# Patient Record
Sex: MALE | Race: BLACK OR AFRICAN AMERICAN | Employment: OTHER | URBAN - METROPOLITAN AREA
[De-identification: names, ages, dates, MRNs, and addresses within clinical notes are randomized per-mention and may not be internally consistent; named-entity substitution may affect disease eponyms.]

---

## 2017-01-06 ENCOUNTER — PRE VISIT (OUTPATIENT)
Dept: CARDIOLOGY | Facility: CLINIC | Age: 62
End: 2017-01-06

## 2017-01-06 DIAGNOSIS — I50.32 CHRONIC DIASTOLIC HEART FAILURE (H): Primary | ICD-10-CM

## 2017-01-13 ENCOUNTER — OFFICE VISIT (OUTPATIENT)
Dept: CARDIOLOGY | Facility: CLINIC | Age: 62
End: 2017-01-13
Attending: NURSE PRACTITIONER
Payer: MEDICARE

## 2017-01-13 VITALS
HEART RATE: 58 BPM | WEIGHT: 260 LBS | OXYGEN SATURATION: 98 % | DIASTOLIC BLOOD PRESSURE: 86 MMHG | SYSTOLIC BLOOD PRESSURE: 133 MMHG | BODY MASS INDEX: 37.22 KG/M2 | HEIGHT: 70 IN

## 2017-01-13 DIAGNOSIS — I50.32 CHRONIC DIASTOLIC HEART FAILURE (H): ICD-10-CM

## 2017-01-13 DIAGNOSIS — I50.30 (HFPEF) HEART FAILURE WITH PRESERVED EJECTION FRACTION (H): Primary | ICD-10-CM

## 2017-01-13 LAB
ANION GAP SERPL CALCULATED.3IONS-SCNC: 5 MMOL/L (ref 3–14)
BUN SERPL-MCNC: 19 MG/DL (ref 7–30)
CALCIUM SERPL-MCNC: 8.7 MG/DL (ref 8.5–10.1)
CHLORIDE SERPL-SCNC: 108 MMOL/L (ref 94–109)
CO2 SERPL-SCNC: 30 MMOL/L (ref 20–32)
CREAT SERPL-MCNC: 1.14 MG/DL (ref 0.66–1.25)
GFR SERPL CREATININE-BSD FRML MDRD: 65 ML/MIN/1.7M2
GLUCOSE SERPL-MCNC: 96 MG/DL (ref 70–99)
POTASSIUM SERPL-SCNC: 4.1 MMOL/L (ref 3.4–5.3)
SODIUM SERPL-SCNC: 144 MMOL/L (ref 133–144)

## 2017-01-13 PROCEDURE — 99212 OFFICE O/P EST SF 10 MIN: CPT | Mod: ZF

## 2017-01-13 PROCEDURE — 80048 BASIC METABOLIC PNL TOTAL CA: CPT | Performed by: NURSE PRACTITIONER

## 2017-01-13 PROCEDURE — 36415 COLL VENOUS BLD VENIPUNCTURE: CPT | Performed by: NURSE PRACTITIONER

## 2017-01-13 PROCEDURE — 99213 OFFICE O/P EST LOW 20 MIN: CPT | Mod: ZP | Performed by: NURSE PRACTITIONER

## 2017-01-13 RX ORDER — LISINOPRIL 20 MG/1
40 TABLET ORAL DAILY
Qty: 180 TABLET | Refills: 3 | Status: SHIPPED | OUTPATIENT
Start: 2017-01-13 | End: 2018-02-27

## 2017-01-13 RX ORDER — POTASSIUM CHLORIDE 750 MG/1
20 TABLET, EXTENDED RELEASE ORAL DAILY
Qty: 180 TABLET | Refills: 3 | Status: SHIPPED | OUTPATIENT
Start: 2017-01-13 | End: 2018-01-15

## 2017-01-13 ASSESSMENT — PAIN SCALES - GENERAL: PAINLEVEL: NO PAIN (0)

## 2017-01-13 NOTE — NURSING NOTE
Diet: Patient instructed regarding a heart failure healthy diet, including discussion of reduced fat and 2000 mg daily sodium restriction, daily weights, medication purpose and compliance, fluid restrictions and resources for patient and family to access for assistance with heart failure management.       Labs: Patient was given results of the laboratory testing obtained today and patient was instructed about when to return for the next laboratory testing.    Med Reconcile: Reviewed and verified all current medications with the patient. The updated medication list was printed and given to the patient.    Return Appointment: Patient given instructions regarding scheduling next clinic visit.     Patient stated he understood all health information given and agreed to call with further questions or concerns.    Mohini Rhodes RN

## 2017-01-13 NOTE — MR AVS SNAPSHOT
After Visit Summary   2017    Arti Nguyen    MRN: [id number]           Patient Information     Date Of Birth          1955        Visit Information        Provider Department      2017 9:00 AM Sheela Yee NP [facility]        Today's Diagnoses     (HFpEF) heart failure with preserved ejection fraction (H)    -  1       Care Instructions    You were seen today in the Cardiovascular Clinic at the [facility].     Cardiology Providers you saw during your visit: Sheela Yee NP       1. Please increase Lisinopril to 40 mg daily by taking 2 tablets a day    2. Okay to continue taking potassium 20 mEq once a day rather than twice a day    Please make a follow-up CORE/heart failure appt with Sheela in 1 month.     Results for ARTI NGUYEN (MRN [id number]) as of 2017 09:07   Ref. Range 2017 08:14   Sodium Latest Ref Range: 133-144 mmol/L 144   Potassium Latest Ref Range: 3.4-5.3 mmol/L 4.1   Chloride Latest Ref Range:  mmol/L 108   Carbon Dioxide Latest Ref Range: 20-32 mmol/L 30   Urea Nitrogen Latest Ref Range: 7-30 mg/dL 19   Creatinine Latest Ref Range: 0.66-1.25 mg/dL 1.14   GFR Estimate Latest Ref Range: >60 mL/min/1.7m2 65   GFR Estimate If Black Latest Ref Range: >60 mL/min/1.7m2 79   Calcium Latest Ref Range: 8.5-10.1 mg/dL 8.7   Anion Gap Latest Ref Range: 3-14 mmol/L 5   Glucose Latest Ref Range: 70-99 mg/dL 96       Please limit your fluid intake to 2 L (64 ounces) daily.  2 Liters a day = 8.5 cups, or 72 ounces.  Please limit your salt intake to 2 grams a day or less.    If you gain 2# in 24 hours or 5# in one week call Mohini Rhodes RN so we can adjust your medications as needed over the phone.    Please feel free to call me with any questions or concerns.      Mohini Rhodes RN BSN [facility]  [facility]  Cardiology Care Coordinator-Heart Failure Clinic    Questions and [phone].   First press  "#1 for the University and then press #3 for \"Medical Questions\" to reach us Cardiology Nurses.     On Call Cardiologist for after hours or on weekends: [phone]   option #4 and ask to speak to the on-call Cardiologist. Inform them you are a CORE/heart failure patient at the [place].        If you need a medication refill please contact your pharmacy.  Please allow 3 business days for your refill to be completed.  _______________________________________________________  C.O.R.E. CLINIC Cardiomyopathy, Optimization, Rehabilitation, Education   The C.O.R.E. CLINIC is a heart failure specialty clinic within the [facility] where you will work with specialized nurse practioners dedicated to helping patients with heart failure carefully adjust medications, receive education, and learn who and when to call if symptoms develop. They specialize in helping you better understand your condition, slow the progression of your disease, improve the length and quality of your life, help you detect future heart problems before they become life threatening, and avoid hospitalizations.  As always, thank you for trusting us with your health care needs!             Follow-ups after your visit        Follow-up notes from your care team     Return in about 1 month (around 2/13/2017).      Your next 10 appointments already scheduled     Feb 17, 2017  9:30 AM   Lab with  LAB   [facility] ([facility])    [address] [place] [address]  1st [facility] [id number] [phone]            Feb 17, 2017 10:00 AM   (Arrive by 9:45 AM)   CORE RETURN with Sheela Yee NP   [facility] ([facility])    [address] [place] [address]  3rd [facility] [id number] [phone]            Mar 07, 2017 10:00 AM   LAB with  LAB   [facility] ([facility])    [address] [address] "   [phone]           Patient must bring picture ID.  Patient should be prepared to give a urine specimen  Please do not eat 10-12 hours before your appointment if you are coming in fasting for labs on lipids, cholesterol, or glucose (sugar).  Pregnant women should follow their Care Team instructions. Water with medications is okay. Do not drink coffee or other fluids.   If you have concerns about taking  your medications, please ask at office or if scheduling via [employer], send a message by clicking on Secure Messaging, Message Your Care Team.            Mar 07, 2017 10:40 AM   (Arrive by 10:25 AM)   CT CHEST ABDOMEN PELVIS W/O & W CONTRAST with UCCT1   [facility] ([facility])    [address] [facility]  1st Floor  [facility] [address] [phone]           Please bring any scans or X-rays taken at other hospitals, if similar tests were done. Also bring a list of your medicines, including vitamins, minerals and over-the-counter drugs. It is safest to leave personal items at home.  Be sure to tell your doctor:   If you have any allergies.   If there s any chance you are pregnant.   If you are breastfeeding.   If you have any special needs.  You may have contrast for this exam. To prepare:   Do not eat or drink for 2 hours before your exam. If you need to take medicine, you may take it with small sips of water. (We may ask you to take liquid medicine as well.)   The day before your exam, drink extra fluids at least six 8-ounce glasses (unless your doctor tells you to restrict your fluids).  Patients over 70 or patients with diabetes or kidney problems:   If you haven t had a blood test (creatinine test) within the last 30 days, go to your clinic or Diagnostic Imaging Department for this test.  If you have diabetes:   If your kidney function is normal, continue taking your metformin (Avandamet, Glucophage, Glucovance, Metaglip) on the day of your exam.   If your kidney  function is abnormal, wait 48 hours before restarting this medicine.  You will have oral contrast for this exam:   You will drink the contrast at home. Get this from your clinic or Diagnostic Imaging Department. Please follow the directions given.  Please wear loose clothing, such as a sweat suit or jogging clothes. Avoid snaps, zippers and other metal. We may ask you to undress and put on a hospital gown.  If you have any questions, please call the Imaging Department where you will have your exam.            Mar 09, 2017 10:30 AM   (Arrive by 10:15 AM)   Return Visit with Sean Freed MD   [facility] ([facility])    [address] [facility]  2nd Floor  [facility] [address] [phone]              Who to contact     If you have questions or need follow up information about today's clinic visit or your schedule please contact [facility] directly at [phone].  Normal or non-critical lab and imaging results will be communicated to you by [employer], letter or phone within 4 business days after the clinic has received the results. If you do not hear from us within 7 days, please contact the clinic through [employer] or phone. If you have a critical or abnormal lab result, we will notify you by phone as soon as possible.  Submit refill requests through [employer] or call your pharmacy and they will forward the refill request to us. Please allow 3 business days for your refill to be completed.          Additional Information About Your Visit        [employer] Information     [employer] gives you secure access to your electronic health record. If you see a primary care provider, you can also send messages to your care team and make appointments. If you have questions, please call your primary care clinic.  If you do not have a primary care provider, please call [phone] and they will assist you.        Care EveryWhere ID     This is your Care EveryWhere ID.  "This could be used by other organizations to access your [place] medical records  [id number]        Your Vitals Were     Pulse Height BMI (Body Mass Index) Pulse Oximetry          58 1.778 m (5' 10\") 37.31 kg/m2 98%         Blood Pressure from Last 3 Encounters:   01/13/17 141/94   12/13/16 137/85   12/02/16 142/99    Weight from Last 3 Encounters:   01/13/17 117.935 kg (260 lb)   12/13/16 118.071 kg (260 lb 4.8 oz)   12/02/16 119.568 kg (263 lb 9.6 oz)              Today, you had the following     No orders found for display         Today's Medication Changes          These changes are accurate as of: 1/13/17  9:35 AM.  If you have any questions, ask your nurse or doctor.               These medicines have changed or have updated prescriptions.        Dose/Directions    lisinopril 20 MG tablet   Commonly known as:  PRINIVIL/ZESTRIL   This may have changed:  how much to take   Used for:  (HFpEF) heart failure with preserved ejection fraction (H)   Changed by:  Sheela Yee NP        Dose:  40 mg   Take 2 tablets (40 mg) by mouth daily   Quantity:  180 tablet   Refills:  3       potassium chloride SA 10 MEQ CR tablet   Commonly known as:  K-DUR/KLOR-CON M   This may have changed:  when to take this   Used for:  (HFpEF) heart failure with preserved ejection fraction (H)   Changed by:  Sheela Yee NP        Dose:  20 mEq   Take 2 tablets (20 mEq) by mouth daily   Quantity:  180 tablet   Refills:  3       solifenacin 10 MG tablet   Commonly known as:  VESICARE   This may have changed:  additional instructions   Used for:  Urinary urgency        Dose:  10 mg   Take 1 tablet (10 mg) by mouth daily   Quantity:  60 tablet   Refills:  3       vitamin D 2000 UNITS tablet   This may have changed:  additional instructions   Used for:  Vitamin D deficiency        Dose:  2000 Units   Take 2,000 Units by mouth daily   Quantity:  60 tablet   Refills:  6            Where to get your medicines      These " medications were sent to [id number] - [place], [place] - [address]  [address], [place] [address]     Phone:  [phone]    - lisinopril 20 MG tablet  - potassium chloride SA 10 MEQ CR tablet             Primary Care Provider Office Phone # Fax #    Rabia Sarai Carmona [phone]       [facility] [address]  [facility] [address]        Thank you!     Thank you for choosing [facility]  for your care. Our goal is always to provide you with excellent care. Hearing back from our patients is one way we can continue to improve our services. Please take a few minutes to complete the written survey that you may receive in the mail after your visit with us. Thank you!             Your Updated Medication List - Protect others around you: Learn how to safely use, store and throw away your medicines at www.disposemymeds.org.          This list is accurate as of: 1/13/17  9:35 AM.  Always use your most recent med list.                   Brand Name Dispense Instructions for use    acetaminophen 500 MG tablet    TYLENOL    100 tablet    Take 2 tablets (1,000 mg) by mouth 3 times daily       amLODIPine 10 MG tablet    NORVASC    30 tablet    Take 1 tablet (10 mg) by mouth daily       blood glucose monitoring lancets     1 Box    Use to test blood sugars 2 times daily or as directed.       blood glucose monitoring test strip    no brand specified    100 each    Use to test blood sugars 2 times daily or as directed       ferrous sulfate 325 (65 FE) MG tablet    IRON    90 tablet    Take 1 tablet (325 mg) by mouth 2 times daily       furosemide 40 MG tablet    LASIX    60 tablet    Take 1 tablet (40 mg) by mouth daily       lisinopril 20 MG tablet    PRINIVIL/ZESTRIL    180 tablet    Take 2 tablets (40 mg) by mouth daily       metFORMIN 500 MG tablet    GLUCOPHAGE    60 tablet    Take 2 tablets (1,000 mg) by mouth daily (with breakfast) AM        metoprolol 100 MG 24 hr tablet    TOPROL-XL    45 tablet    Take 1.5 tablets (150 mg) by mouth daily       omeprazole 20 MG CR capsule    priLOSEC    120 capsule    Take 1 capsule (20 mg) by mouth every other day       order for DME     1 Units    Equipment being ordered: BP cuff, large       oxyCODONE 5 MG IR tablet    ROXICODONE    40 tablet    Take 1-2 tablets (5-10 mg) by mouth every 4 hours as needed for moderate to severe pain       polyethylene glycol Packet    MIRALAX/GLYCOLAX    24 packet    Take 17 g by mouth daily       potassium chloride SA 10 MEQ CR tablet    K-DUR/KLOR-CON M    180 tablet    Take 2 tablets (20 mEq) by mouth daily       senna-docusate 8.6-50 MG per tablet    SENOKOT-S;PERICOLACE    60 tablet    Take 1 tablet by mouth daily       solifenacin 10 MG tablet    VESICARE    60 tablet    Take 1 tablet (10 mg) by mouth daily       spironolactone 25 MG tablet    ALDACTONE    45 tablet    Take 1 tablet (25 mg) by mouth daily       vitamin D 2000 UNITS tablet     60 tablet    Take 2,000 Units by mouth daily

## 2017-01-13 NOTE — LETTER
1/13/2017      RE: Jazz Berman  [address]   [address]  [facility] [address]       Dear Colleague,    Thank you for the opportunity to participate in the care of your patient, Jazz Berman, at the [facility] HEART [facility]. Please see a copy of my visit note below.    HPI:  Mr. Berman is a 61-year-old male with recent diagnosis of Heart Failure with preserved Ejection Fraction, hypertension, diabetes, obesity and atrial fibrillation. He returns to CORE clinic for follow up.    He denies shortness of breath, palpitations, lightheadedness, edema, bloating, orthopnea, or PND. He does snore and is using his CPAP. He has noted a rare fleeting chest ache when at rest. It is not associated with nausea or radiating pain. No falls or syncope.    PAST MEDICAL HISTORY:  Past Medical History   Diagnosis Date     Arthritis      Atrial fibrillation (H)      BPH (benign prostatic hyperplasia) 8/29/2013     Cardiomegaly 8/30/2012     Crushing injury of foot 8/30/2012     Depressive disorder, not elsewhere classified 8/30/2012     Diabetes mellitus type 2 in obese (H)      Diverticulosis of large intestine 7/4/2016     Colonoscopy 7/2/16       Former smoker      Gastric mass      GI bleed      History of blood transfusion      Hypertension      Hypertension      Keloid 6/11/2012     GREG on CPAP        FAMILY HISTORY:  Family History   Problem Relation Age of Onset     Hypertension Father      DIABETES Mother      Colon Cancer No family hx of      Crohn Disease No family hx of      Ulcerative Colitis No family hx of      CANCER No family hx of      no skin cancer     Glaucoma No family hx of      Macular Degeneration No family hx of        SOCIAL HISTORY:  Social History     Social History     Marital Status: Single     Spouse Name: N/A     Number of Children: N/A     Years of Education: N/A     Social History Main Topics     Smoking status: Former Smoker -- 30 years     Types:  Cigarettes     Quit date: 06/02/2011     Smokeless tobacco: Never Used     Alcohol Use: No      Comment: twice per week and 6 pack per sitting quit about 6 months ago     Drug Use: No      Comment: +marijuana and crack cocaine     Sexual Activity: Yes     Other Topics Concern     None     Social History Narrative       CURRENT MEDICATIONS:    Current Outpatient Prescriptions on File Prior to Visit:  omeprazole (PRILOSEC) 20 MG capsule Take 1 capsule (20 mg) by mouth every other day   spironolactone (ALDACTONE) 25 MG tablet Take 1 tablet (25 mg) by mouth daily   oxyCODONE (ROXICODONE) 5 MG immediate release tablet Take 1-2 tablets (5-10 mg) by mouth every 4 hours as needed for moderate to severe pain   amLODIPine (NORVASC) 10 MG tablet Take 1 tablet (10 mg) by mouth daily   furosemide (LASIX) 40 MG tablet Take 1 tablet (40 mg) by mouth daily   polyethylene glycol (MIRALAX/GLYCOLAX) packet Take 17 g by mouth daily   senna-docusate (SENOKOT-S;PERICOLACE) 8.6-50 MG per tablet Take 1 tablet by mouth daily   acetaminophen (TYLENOL) 500 MG tablet Take 2 tablets (1,000 mg) by mouth 3 times daily   solifenacin (VESICARE) 10 MG tablet Take 1 tablet (10 mg) by mouth daily (Patient taking differently: Take 10 mg by mouth daily AM)   ferrous sulfate (IRON) 325 (65 FE) MG tablet Take 1 tablet (325 mg) by mouth 2 times daily   Cholecalciferol (VITAMIN D) 2000 UNITS tablet Take 2,000 Units by mouth daily (Patient taking differently: Take 2,000 Units by mouth daily AM)   blood glucose monitoring (NO BRAND SPECIFIED) test strip Use to test blood sugars 2 times daily or as directed   blood glucose monitoring (ONE TOUCH DELICA) lancets Use to test blood sugars 2 times daily or as directed.   order for DME Equipment being ordered: BP cuff, large     No current facility-administered medications on file prior to visit.       ROS:   Constitutional: No fever, chills, or sweats. Weight stable  ENT: No visual disturbance, ear ache, epistaxis,  "sore throat.   Allergies/Immunologic: Negative.   Respiratory: No cough, hemoptysis.   Cardiovascular: As per HPI.   GI: No nausea, vomiting, hematemesis, melena, or hematochezia.   : No urinary frequency, dysuria, or hematuria.   Integument: Negative.   Psychiatric: Negative.   Neuro: Negative.   Endocrinology: Negative.   Musculoskeletal: Negative.    EXAM:  BP (!) 141/94  Pulse 58  Ht 1.778 m (5' 10\")  Wt 117.9 kg (260 lb)  SpO2 98%  BMI 37.31 kg/m2  General: appears comfortable, alert and articulate  Head: normocephalic, atraumatic  Eyes: anicteric sclera, EOMI  Neck: no adenopathy  Orophyarynx: moist mucosa, no lesions, dentition intact  Heart: regular, S1/S2, no murmur, gallop, rub, JVP is flat  Lungs: clear, no rales or wheezing  Abdomen: Round, soft, non-tender, bowel sounds present  Extremities: warm and without edema  Neurological: normal speech and affect, no gross motor deficits    Labs:    Last Basic Metabolic Panel:  NA      144   1/13/2017   POTASSIUM      4.1   1/13/2017  CHLORIDE      108   1/13/2017  NATALYA      8.7   1/13/2017  CO2       30   1/13/2017  BUN       19   1/13/2017  CR     1.14   1/13/2017  CR      135   3/13/2009  GLC       96   1/13/2017    Assessment and Plan:   Mr. Berman is a 61 year old man with  HFpEF who looks well though hypertensive today. He will increase lisinopril to 40 mg daily. He will return to clinic in 1 month or as needed.  1. HFpEF  Rate controlled: Controlled  Blood pressure: increasing lisinopril today  Volume Controlled: Controlled  Apnea: using CPAP  Aldosterone antagonist: yes  2. Atrial fibrillation, rate controlled. Currently off warfarin. No evidence of GI bleed since his recent tumor resection. Defer to GI team for readiness to resume and encouraged Mr. Berman to contact GI team to pursue further  3. Hypertension. Increasing lisinopril today    20 minutes spent in direct care, of which >50% in counseling    CC  Patient Care Team:  Rabia Carmona MD " as PCP - General (Student in organized health care education/training program)  Preethi Coates MD as MD (Dermatology - MOHS-Micrographic Surgery)  Simeon Hillman MD as MD (Dermatology)  Naty Hua MD as MD (Colon and Rectal Surgery)  Azra Hall MD as MD (Ophthalmology)  Ashley Contreras PA-C as Physician Assistant (Physician Assistant)  Carol Andrade RD as Registered Dietician (Dietitian, Registered)  Mohini Rhodes, RN as Nurse Coordinator (Cardiology)

## 2017-01-13 NOTE — PROGRESS NOTES
HPI:  Mr. Berman is a 61-year-old male with recent diagnosis of Heart Failure with preserved Ejection Fraction, hypertension, diabetes, obesity and atrial fibrillation. He returns to [facility] clinic for follow up.    He denies shortness of breath, palpitations, lightheadedness, edema, bloating, orthopnea, or PND. He does snore and is using his CPAP. He has noted a rare fleeting chest ache when at rest. It is not associated with nausea or radiating pain. No falls or syncope.    PAST MEDICAL HISTORY:  Past Medical History   Diagnosis Date     Arthritis      Atrial fibrillation (H)      BPH (benign prostatic hyperplasia) 8/29/2013     Cardiomegaly 8/30/2012     Crushing injury of foot 8/30/2012     Depressive disorder, not elsewhere classified 8/30/2012     Diabetes mellitus type 2 in obese (H)      Diverticulosis of large intestine 7/4/2016     Colonoscopy 7/2/16       Former smoker      Gastric mass      GI bleed      History of blood transfusion      Hypertension      Hypertension      Keloid 6/11/2012     GREG on CPAP        FAMILY HISTORY:  Family History   Problem Relation Age of Onset     Hypertension Father      DIABETES Mother      Colon Cancer No family hx of      Crohn Disease No family hx of      Ulcerative Colitis No family hx of      CANCER No family hx of      no skin cancer     Glaucoma No family hx of      Macular Degeneration No family hx of        SOCIAL HISTORY:  Social History     Social History     Marital Status: Single     Spouse Name: N/A     Number of Children: N/A     Years of Education: N/A     Social History Main Topics     Smoking status: Former Smoker -- 30 years     Types: Cigarettes     Quit date: 06/02/2011     Smokeless tobacco: Never Used     Alcohol Use: No      Comment: twice per week and 6 pack per sitting quit about 6 months ago     Drug Use: No      Comment: +marijuana and crack cocaine     Sexual Activity: Yes     Other Topics Concern     None     Social History Narrative        CURRENT MEDICATIONS:    Current Outpatient Prescriptions on File Prior to Visit:  omeprazole (PRILOSEC) 20 MG capsule Take 1 capsule (20 mg) by mouth every other day   spironolactone (ALDACTONE) 25 MG tablet Take 1 tablet (25 mg) by mouth daily   oxyCODONE (ROXICODONE) 5 MG immediate release tablet Take 1-2 tablets (5-10 mg) by mouth every 4 hours as needed for moderate to severe pain   amLODIPine (NORVASC) 10 MG tablet Take 1 tablet (10 mg) by mouth daily   furosemide (LASIX) 40 MG tablet Take 1 tablet (40 mg) by mouth daily   polyethylene glycol (MIRALAX/GLYCOLAX) packet Take 17 g by mouth daily   senna-docusate (SENOKOT-S;PERICOLACE) 8.6-50 MG per tablet Take 1 tablet by mouth daily   acetaminophen (TYLENOL) 500 MG tablet Take 2 tablets (1,000 mg) by mouth 3 times daily   solifenacin (VESICARE) 10 MG tablet Take 1 tablet (10 mg) by mouth daily (Patient taking differently: Take 10 mg by mouth daily AM)   ferrous sulfate (IRON) 325 (65 FE) MG tablet Take 1 tablet (325 mg) by mouth 2 times daily   Cholecalciferol (VITAMIN D) 2000 UNITS tablet Take 2,000 Units by mouth daily (Patient taking differently: Take 2,000 Units by mouth daily AM)   blood glucose monitoring (NO BRAND SPECIFIED) test strip Use to test blood sugars 2 times daily or as directed   blood glucose monitoring (ONE TOUCH DELICA) lancets Use to test blood sugars 2 times daily or as directed.   order for DME Equipment being ordered: BP cuff, large     No current facility-administered medications on file prior to visit.       ROS:   Constitutional: No fever, chills, or sweats. Weight stable  ENT: No visual disturbance, ear ache, epistaxis, sore throat.   Allergies/Immunologic: Negative.   Respiratory: No cough, hemoptysis.   Cardiovascular: As per HPI.   GI: No nausea, vomiting, hematemesis, melena, or hematochezia.   : No urinary frequency, dysuria, or hematuria.   Integument: Negative.   Psychiatric: Negative.   Neuro: Negative.  "  Endocrinology: Negative.   Musculoskeletal: Negative.    EXAM:  BP (!) 141/94  Pulse 58  Ht 1.778 m (5' 10\")  Wt 117.9 kg (260 lb)  SpO2 98%  BMI 37.31 kg/m2  General: appears comfortable, alert and articulate  Head: normocephalic, atraumatic  Eyes: anicteric sclera, EOMI  Neck: no adenopathy  Orophyarynx: moist mucosa, no lesions, dentition intact  Heart: regular, S1/S2, no murmur, gallop, rub, JVP is flat  Lungs: clear, no rales or wheezing  Abdomen: Round, soft, non-tender, bowel sounds present  Extremities: warm and without edema  Neurological: normal speech and affect, no gross motor deficits    Labs:    Last Basic Metabolic Panel:  NA      144   1/13/2017   POTASSIUM      4.1   1/13/2017  CHLORIDE      108   1/13/2017  NATALYA      8.7   1/13/2017  CO2       30   1/13/2017  BUN       19   1/13/2017  CR     1.14   1/13/2017  CR      135   3/13/2009  GLC       96   1/13/2017    Assessment and Plan:   Mr. Berman is a 61 year old man with  HFpEF who looks well though hypertensive today. He will increase lisinopril to 40 mg daily. He will return to clinic in 1 month or as needed.  1. HFpEF  Rate controlled: Controlled  Blood pressure: increasing lisinopril today  Volume Controlled: Controlled  Apnea: using CPAP  Aldosterone antagonist: yes  2. Atrial fibrillation, rate controlled. Currently off warfarin. No evidence of GI bleed since his recent tumor resection. Defer to GI team for readiness to resume and encouraged Mr. Berman to contact GI team to pursue further  3. Hypertension. Increasing lisinopril today    20 minutes spent in direct care, of which >50% in counseling    CC  Patient Care Team:  Rabia Carmona MD as PCP - General (Student in organized health care education/training program)  Preethi Coates MD as MD (Dermatology - MOHS-Micrographic Surgery)  Simeon Hillman MD as MD (Dermatology)  Naty Hua MD as MD (Colon and Rectal Surgery)  Azra Hall MD as MD " (Ophthalmology)  Ashley Contreras PA-C as Physician Assistant (Physician Assistant)  Carol Andrade RD as Registered Dietician (Dietitian, Registered)  Mohini Rhodes, RN as Nurse Coordinator (Cardiology)  Sheela Yee, ZENON as Nurse Practitioner (Cardiology)  [facility]

## 2017-01-13 NOTE — PATIENT INSTRUCTIONS
"You were seen today in the Cardiovascular Clinic at the [facility].     Cardiology Providers you saw during your visit: Sheela Yee NP       1. Please increase Lisinopril to 40 mg daily by taking 2 tablets a day    2. Okay to continue taking potassium 20 mEq once a day rather than twice a day    Please make a follow-up CORE/heart failure appt with Sheela in 1 month.     Results for ARTI NGUYEN (MRN [id number]) as of 2017 09:07   Ref. Range 2017 08:14   Sodium Latest Ref Range: 133-144 mmol/L 144   Potassium Latest Ref Range: 3.4-5.3 mmol/L 4.1   Chloride Latest Ref Range:  mmol/L 108   Carbon Dioxide Latest Ref Range: 20-32 mmol/L 30   Urea Nitrogen Latest Ref Range: 7-30 mg/dL 19   Creatinine Latest Ref Range: 0.66-1.25 mg/dL 1.14   GFR Estimate Latest Ref Range: >60 mL/min/1.7m2 65   GFR Estimate If Black Latest Ref Range: >60 mL/min/1.7m2 79   Calcium Latest Ref Range: 8.5-10.1 mg/dL 8.7   Anion Gap Latest Ref Range: 3-14 mmol/L 5   Glucose Latest Ref Range: 70-99 mg/dL 96       Please limit your fluid intake to 2 L (64 ounces) daily.  2 Liters a day = 8.5 cups, or 72 ounces.  Please limit your salt intake to 2 grams a day or less.    If you gain 2# in 24 hours or 5# in one week call Mohini Rhodes RN so we can adjust your medications as needed over the phone.    Please feel free to call me with any questions or concerns.      Mohini Rhodes RN BSN CHFN  [facility]  Cardiology Care Coordinator-Heart Failure Clinic    Questions and [phone].   First press #1 for the [employer] and then press #3 for \"Medical Questions\" to reach us Cardiology Nurses.     On Call Cardiologist for after hours or on weekends: [phone]   option #4 and ask to speak to the on-call Cardiologist. Inform them you are a CORE/heart failure patient at the [facility].        If you need a medication refill please contact your pharmacy.  Please allow 3 business days " for your refill to be completed.  _______________________________________________________  C.O.R.E. CLINIC Cardiomyopathy, Optimization, Rehabilitation, Education   The C.O.R.E. CLINIC is a heart failure specialty clinic within the [facility] where you will work with specialized nurse practioners dedicated to helping patients with heart failure carefully adjust medications, receive education, and learn who and when to call if symptoms develop. They specialize in helping you better understand your condition, slow the progression of your disease, improve the length and quality of your life, help you detect future heart problems before they become life threatening, and avoid hospitalizations.  As always, thank you for trusting us with your health care needs!

## 2017-01-13 NOTE — NURSING NOTE
"Chief Complaint   Patient presents with     Follow Up For     Return CORE appt: 61yr old male with a h/o chronic diastolic heart failure/HFpEF presenting for follow up with labs     /94 mmHg  Pulse 58  Ht 1.778 m (5' 10\")  Wt 117.935 kg (260 lb)  BMI 37.31 kg/m2  SpO2 98%    Repeat Blood Pressure   BP Pulse Site Cuff Size Time Date   133/86 --- Left Arm Large 08:58 AM 1/13/2017        No orthostatic vitals data filed.  No peak flow data filed.  No pain information filed.    "

## 2017-02-03 ENCOUNTER — TELEPHONE (OUTPATIENT)
Dept: FAMILY MEDICINE | Facility: CLINIC | Age: 62
End: 2017-02-03

## 2017-02-03 NOTE — TELEPHONE ENCOUNTER
I have called the pt back.  Medication has been left for him at the .    Jayson Hernandez Pharm.D.

## 2017-02-10 DIAGNOSIS — E11.9 TYPE 2 DIABETES MELLITUS WITHOUT COMPLICATION, WITHOUT LONG-TERM CURRENT USE OF INSULIN (H): Primary | ICD-10-CM

## 2017-02-10 NOTE — TELEPHONE ENCOUNTER
Refill request received via fax from patient's pharmacy. Refilled per standing protocol. Sent to patient's preferred pharmacy.    Date of last office visit: 1/13/17  Last A1c value: 5.6    Cheryl Landaverde RN

## 2017-02-11 ENCOUNTER — PRE VISIT (OUTPATIENT)
Dept: CARDIOLOGY | Facility: CLINIC | Age: 62
End: 2017-02-11

## 2017-02-11 DIAGNOSIS — I50.32 CHRONIC DIASTOLIC HEART FAILURE (H): Primary | ICD-10-CM

## 2017-02-17 ENCOUNTER — OFFICE VISIT (OUTPATIENT)
Dept: CARDIOLOGY | Facility: CLINIC | Age: 62
End: 2017-02-17
Attending: NURSE PRACTITIONER
Payer: MEDICARE

## 2017-02-17 VITALS
HEART RATE: 60 BPM | HEIGHT: 70 IN | WEIGHT: 262 LBS | DIASTOLIC BLOOD PRESSURE: 83 MMHG | OXYGEN SATURATION: 98 % | BODY MASS INDEX: 37.51 KG/M2 | SYSTOLIC BLOOD PRESSURE: 130 MMHG

## 2017-02-17 DIAGNOSIS — E11.8 TYPE 2 DIABETES MELLITUS WITH COMPLICATION (H): ICD-10-CM

## 2017-02-17 DIAGNOSIS — I50.32 CHRONIC DIASTOLIC HEART FAILURE (H): ICD-10-CM

## 2017-02-17 DIAGNOSIS — I10 BENIGN ESSENTIAL HYPERTENSION: ICD-10-CM

## 2017-02-17 LAB
ANION GAP SERPL CALCULATED.3IONS-SCNC: 8 MMOL/L (ref 3–14)
BUN SERPL-MCNC: 18 MG/DL (ref 7–30)
CALCIUM SERPL-MCNC: 8.9 MG/DL (ref 8.5–10.1)
CHLORIDE SERPL-SCNC: 106 MMOL/L (ref 94–109)
CO2 SERPL-SCNC: 28 MMOL/L (ref 20–32)
CREAT SERPL-MCNC: 1.08 MG/DL (ref 0.66–1.25)
CREAT UR-MCNC: 37 MG/DL
GFR SERPL CREATININE-BSD FRML MDRD: 69 ML/MIN/1.7M2
GLUCOSE SERPL-MCNC: 83 MG/DL (ref 70–99)
MICROALBUMIN UR-MCNC: 6 MG/L
MICROALBUMIN/CREAT UR: 15.79 MG/G CR (ref 0–17)
POTASSIUM SERPL-SCNC: 3.9 MMOL/L (ref 3.4–5.3)
SODIUM SERPL-SCNC: 143 MMOL/L (ref 133–144)

## 2017-02-17 PROCEDURE — 99212 OFFICE O/P EST SF 10 MIN: CPT | Mod: ZF

## 2017-02-17 PROCEDURE — 99214 OFFICE O/P EST MOD 30 MIN: CPT | Mod: ZP | Performed by: NURSE PRACTITIONER

## 2017-02-17 PROCEDURE — 40000724 ZZH UMP OPEN ENCOUNTER >70 DAYS

## 2017-02-17 PROCEDURE — 82043 UR ALBUMIN QUANTITATIVE: CPT | Performed by: HOSPITALIST

## 2017-02-17 PROCEDURE — 80048 BASIC METABOLIC PNL TOTAL CA: CPT | Performed by: NURSE PRACTITIONER

## 2017-02-17 PROCEDURE — 36415 COLL VENOUS BLD VENIPUNCTURE: CPT | Performed by: NURSE PRACTITIONER

## 2017-02-17 RX ORDER — METOPROLOL SUCCINATE 100 MG/1
200 TABLET, EXTENDED RELEASE ORAL DAILY
Qty: 60 TABLET | Refills: 11 | Status: SHIPPED | OUTPATIENT
Start: 2017-02-17 | End: 2018-01-15

## 2017-02-17 ASSESSMENT — PAIN SCALES - GENERAL: PAINLEVEL: NO PAIN (0)

## 2017-02-17 NOTE — LETTER
2/17/2017      RE: Jazz Berman  [address]     [place] [facility] [address]       Dear Colleague,    Thank you for the opportunity to participate in the care of your patient, Jazz Berman, at the [facility] HEART [facility]. Please see a copy of my visit note below.    HPI  Mr. Berman is a 62 year old man with a history of HFpEF, HTN, diabetes, obesity, and atrial fibrillation who returns to [facility] for follow up.    Jazz is feeling well. He reports his dyspnea is stable. No chest pain, palpitations, lightheadedness, falls, or syncope. He notes rare fleeting lightheadedness related to position changes. His appetite is good. No nausea though does note some bloating which he relates to gas. He is sleeping on 1 pillow and denies PND. He is using his CPAP.    PMH  Past Medical History:   Diagnosis Date     Arthritis      Atrial fibrillation (H)      BPH (benign prostatic hyperplasia) 8/29/2013     Cardiomegaly 8/30/2012     Crushing injury of foot 8/30/2012     Depressive disorder, not elsewhere classified 8/30/2012     Diabetes mellitus type 2 in obese (H)      Diverticulosis of large intestine 7/4/2016    Colonoscopy 7/2/16       Former smoker      Gastric mass      GI bleed      History of blood transfusion      Hypertension      Hypertension      Keloid 6/11/2012     GREG on CPAP      Social History     Social History     Marital status: Single     Spouse name: N/A     Number of children: N/A     Years of education: N/A     Occupational History     Not on file.     Social History Main Topics     Smoking status: Former Smoker     Years: 30.00     Types: Cigarettes     Quit date: 6/2/2011     Smokeless tobacco: Never Used     Alcohol use No      Comment: twice per week and 6 pack per sitting, quit about 6 months ago     Drug use: No      Comment: +marijuana and crack cocaine     Sexual activity: Yes     Other Topics Concern     Not on file     Social History Narrative  "    Family History   Problem Relation Age of Onset     Hypertension Father      DIABETES Mother      Colon Cancer No family hx of      Crohn Disease No family hx of      Ulcerative Colitis No family hx of      CANCER No family hx of      no skin cancer     Glaucoma No family hx of      Macular Degeneration No family hx of        Current Outpatient Prescriptions on File Prior to Visit:  lisinopril (PRINIVIL/ZESTRIL) 20 MG tablet Take 2 tablets (40 mg) by mouth daily   potassium chloride SA (K-DUR/KLOR-CON M) 10 MEQ CR tablet Take 2 tablets (20 mEq) by mouth daily   amLODIPine (NORVASC) 10 MG tablet Take 1 tablet (10 mg) by mouth daily   polyethylene glycol (MIRALAX/GLYCOLAX) packet Take 17 g by mouth daily   acetaminophen (TYLENOL) 500 MG tablet Take 2 tablets (1,000 mg) by mouth 3 times daily   blood glucose monitoring (NO BRAND SPECIFIED) test strip Use to test blood sugars 2 times daily or as directed   blood glucose monitoring (ONE TOUCH DELICA) lancets Use to test blood sugars 2 times daily or as directed.   order for DME Equipment being ordered: BP cuff, large     No current facility-administered medications on file prior to visit.     Physical examination:  /83 (BP Location: Left arm, Cuff Size: Adult Large)  Pulse 60  Ht 1.778 m (5' 10\")  Wt 118.8 kg (262 lb)  SpO2 98%  BMI 37.59 kg/m2  GENERAL APPEARANCE: healthy, alert and no distress  HEENT: no icterus, no xanthelasmas, normal pupil size and reaction, normal palate, mucosa moist, no cyanosis.  NECK: no adenopathy, no asymmetry, masses, or scars  CHEST: lungs clear to auscultation - no rales, rhonchi or wheezes, no use of accessory muscles, no retractions, respirations are unlabored, normal respiratory rate, no kyphosis, no scoliosis  CARDIOVASCULAR: regular rhythm, normal S1 with physiologic split S2, no S3 or S4 and no murmur, click or rub, precordium quiet with normal PMI. JVP is flat  ABDOMEN: obese, soft, non tender, without hepatomegaly, no " masses palpable, bowel sounds normal  EXTREMITIES: warm and without edema  NEURO: alert and oriented to person/place/time, normal speech, gait and affect  SKIN: no ecchymoses, no rashes    LABS  Last Basic Metabolic Panel:  Lab Results   Component Value Date     02/17/2017      Lab Results   Component Value Date    POTASSIUM 3.9 02/17/2017     Lab Results   Component Value Date    CHLORIDE 106 02/17/2017     Lab Results   Component Value Date    NATALYA 8.9 02/17/2017     Lab Results   Component Value Date    CO2 28 02/17/2017     Lab Results   Component Value Date    BUN 18 02/17/2017     Lab Results   Component Value Date    CR 1.08 02/17/2017     03/13/2009     Lab Results   Component Value Date    GLC 83 02/17/2017         Assessment and Plan  Mr. Berman is a 62 year old man with a history of chronic heart failure with preserved ejection fraction who appears well though his blood pressure is not within goal ranges. He will increase Toprol to 200 mg daily. His risks for HFpEF include obesity and apnea. He was encouraged to increase physical activity and moderate his diet. He is using his CPAP. Mr. Berman will see his PCP next week and return to [facility] in 6 months or as needed.     25 minutes spent in direct care, >50% in counseling.    Sheela Yee NP

## 2017-02-17 NOTE — PROGRESS NOTES
HPI  Mr. Berman is a 62 year old man with a history of HFpEF, HTN, diabetes, obesity, and atrial fibrillation who returns to [facility] for follow up.    Jazz is feeling well. He reports his dyspnea is stable. No chest pain, palpitations, lightheadedness, falls, or syncope. He notes rare fleeting lightheadedness related to position changes. His appetite is good. No nausea though does note some bloating which he relates to gas. He is sleeping on 1 pillow and denies PND. He is using his CPAP.    PMH  Past Medical History:   Diagnosis Date     Arthritis      Atrial fibrillation (H)      BPH (benign prostatic hyperplasia) 8/29/2013     Cardiomegaly 8/30/2012     Crushing injury of foot 8/30/2012     Depressive disorder, not elsewhere classified 8/30/2012     Diabetes mellitus type 2 in obese (H)      Diverticulosis of large intestine 7/4/2016    Colonoscopy 7/2/16       Former smoker      Gastric mass      GI bleed      History of blood transfusion      Hypertension      Hypertension      Keloid 6/11/2012     GREG on CPAP      Social History     Social History     Marital status: Single     Spouse name: N/A     Number of children: N/A     Years of education: N/A     Occupational History     Not on file.     Social History Main Topics     Smoking status: Former Smoker     Years: 30.00     Types: Cigarettes     Quit date: 6/2/2011     Smokeless tobacco: Never Used     Alcohol use No      Comment: twice per week and 6 pack per sitting, quit about 6 months ago     Drug use: No      Comment: +marijuana and crack cocaine     Sexual activity: Yes     Other Topics Concern     Not on file     Social History Narrative     Family History   Problem Relation Age of Onset     Hypertension Father      DIABETES Mother      Colon Cancer No family hx of      Crohn Disease No family hx of      Ulcerative Colitis No family hx of      CANCER No family hx of      no skin cancer     Glaucoma No family hx of      Macular Degeneration No family hx of  "       Current Outpatient Prescriptions on File Prior to Visit:  lisinopril (PRINIVIL/ZESTRIL) 20 MG tablet Take 2 tablets (40 mg) by mouth daily   potassium chloride SA (K-DUR/KLOR-CON M) 10 MEQ CR tablet Take 2 tablets (20 mEq) by mouth daily   amLODIPine (NORVASC) 10 MG tablet Take 1 tablet (10 mg) by mouth daily   polyethylene glycol (MIRALAX/GLYCOLAX) packet Take 17 g by mouth daily   acetaminophen (TYLENOL) 500 MG tablet Take 2 tablets (1,000 mg) by mouth 3 times daily   blood glucose monitoring (NO BRAND SPECIFIED) test strip Use to test blood sugars 2 times daily or as directed   blood glucose monitoring (ONE TOUCH DELICA) lancets Use to test blood sugars 2 times daily or as directed.   order for DME Equipment being ordered: BP cuff, large     No current facility-administered medications on file prior to visit.     Physical examination:  /83 (BP Location: Left arm, Cuff Size: Adult Large)  Pulse 60  Ht 1.778 m (5' 10\")  Wt 118.8 kg (262 lb)  SpO2 98%  BMI 37.59 kg/m2  GENERAL APPEARANCE: healthy, alert and no distress  HEENT: no icterus, no xanthelasmas, normal pupil size and reaction, normal palate, mucosa moist, no cyanosis.  NECK: no adenopathy, no asymmetry, masses, or scars  CHEST: lungs clear to auscultation - no rales, rhonchi or wheezes, no use of accessory muscles, no retractions, respirations are unlabored, normal respiratory rate, no kyphosis, no scoliosis  CARDIOVASCULAR: regular rhythm, normal S1 with physiologic split S2, no S3 or S4 and no murmur, click or rub, precordium quiet with normal PMI. JVP is flat  ABDOMEN: obese, soft, non tender, without hepatomegaly, no masses palpable, bowel sounds normal  EXTREMITIES: warm and without edema  NEURO: alert and oriented to person/place/time, normal speech, gait and affect  SKIN: no ecchymoses, no rashes    LABS  Last Basic Metabolic Panel:  Lab Results   Component Value Date     02/17/2017      Lab Results   Component Value Date    " POTASSIUM 3.9 02/17/2017     Lab Results   Component Value Date    CHLORIDE 106 02/17/2017     Lab Results   Component Value Date    NATALYA 8.9 02/17/2017     Lab Results   Component Value Date    CO2 28 02/17/2017     Lab Results   Component Value Date    BUN 18 02/17/2017     Lab Results   Component Value Date    CR 1.08 02/17/2017     03/13/2009     Lab Results   Component Value Date    GLC 83 02/17/2017         Assessment and Plan  Mr. Berman is a 62 year old man with a history of chronic heart failure with preserved ejection fraction who appears well though his blood pressure is not within goal ranges. He will increase Toprol to 200 mg daily. His risks for HFpEF include obesity and apnea. He was encouraged to increase physical activity and moderate his diet. He is using his CPAP. Mr. Berman will see his PCP next week and return to [facility] in 6 months or as needed.     25 minutes spent in direct care, >50% in counseling.

## 2017-02-17 NOTE — PATIENT INSTRUCTIONS
"You were seen today in the Cardiovascular Clinic at the [facility].     Cardiology Providers you saw during your visit: Sheela Yee NP       1.  Please increase Toprol (metoprolol succinate) to 200 mg daily by taking two of the 100 mg tablets a day.    2.  Please call us if you have any complaints.     Please make a follow-up CORE/heart failure appt with Sheela in 6 months with labs.     Results for ARTI NGUYEN (MRN [id number]) as of 2017 09:36   Ref. Range 2017 09:01   Sodium Latest Ref Range: 133 - 144 mmol/L 143   Potassium Latest Ref Range: 3.4 - 5.3 mmol/L 3.9   Chloride Latest Ref Range: 94 - 109 mmol/L 106   Carbon Dioxide Latest Ref Range: 20 - 32 mmol/L 28   Urea Nitrogen Latest Ref Range: 7 - 30 mg/dL 18   Creatinine Latest Ref Range: 0.66 - 1.25 mg/dL 1.08   GFR Estimate Latest Ref Range: >60 mL/min/1.7m2 69   GFR Estimate If Black Latest Ref Range: >60 mL/min/1.7m2 84   Calcium Latest Ref Range: 8.5 - 10.1 mg/dL 8.9   Anion Gap Latest Ref Range: 3 - 14 mmol/L 8   Glucose Latest Ref Range: 70 - 99 mg/dL 83         Please limit your fluid intake to 2 L (64 ounces) daily.  2 Liters a day = 8.5 cups, or 72 ounces.  Please limit your salt intake to 2 grams a day or less.    If you gain 2# in 24 hours or 5# in one week call Mohini Rhodes RN so we can adjust your medications as needed over the phone.    Please feel free to call me with any questions or concerns.      Mohini Rhodes RN BSN CHFN  [facility]  Cardiology Care Coordinator-Heart Failure Clinic    Questions and [phone].   First press #1 for the [employer] and then press #3 for \"Medical Questions\" to reach us Cardiology Nurses.     On Call Cardiologist for after hours or on weekends: [phone]   option #4 and ask to speak to the on-call Cardiologist. Inform them you are a CORE/heart failure patient at the [facility].        If you need a medication refill please contact your " pharmacy.  Please allow 3 business days for your refill to be completed.  _______________________________________________________  C.O.R.E. CLINIC Cardiomyopathy, Optimization, Rehabilitation, Education   The C.O.R.E. CLINIC is a heart failure specialty clinic within the [facility] where you will work with specialized nurse practioners dedicated to helping patients with heart failure carefully adjust medications, receive education, and learn who and when to call if symptoms develop. They specialize in helping you better understand your condition, slow the progression of your disease, improve the length and quality of your life, help you detect future heart problems before they become life threatening, and avoid hospitalizations.  As always, thank you for trusting us with your health care needs!

## 2017-02-17 NOTE — MR AVS SNAPSHOT
After Visit Summary   2017    Arti Nguyen    MRN: [id number]           Patient Information     Date Of Birth          1955        Visit Information        Provider Department      2017 10:00 AM Sheela Yee NP [facility]        Today's Diagnoses     Benign essential hypertension          Care Instructions    You were seen today in the Cardiovascular Clinic at the [facility].     Cardiology Providers you saw during your visit: Sheela Yee NP       1.  Please increase Toprol (metoprolol succinate) to 200 mg daily by taking two of the 100 mg tablets a day.    2.  Please call us if you have any complaints.     Please make a follow-up CORE/heart failure appt with Sheela in 6 months with labs.     Results for ARTI NGUYEN (MRN [id number]) as of 2017 09:36   Ref. Range 2017 09:01   Sodium Latest Ref Range: 133 - 144 mmol/L 143   Potassium Latest Ref Range: 3.4 - 5.3 mmol/L 3.9   Chloride Latest Ref Range: 94 - 109 mmol/L 106   Carbon Dioxide Latest Ref Range: 20 - 32 mmol/L 28   Urea Nitrogen Latest Ref Range: 7 - 30 mg/dL 18   Creatinine Latest Ref Range: 0.66 - 1.25 mg/dL 1.08   GFR Estimate Latest Ref Range: >60 mL/min/1.7m2 69   GFR Estimate If Black Latest Ref Range: >60 mL/min/1.7m2 84   Calcium Latest Ref Range: 8.5 - 10.1 mg/dL 8.9   Anion Gap Latest Ref Range: 3 - 14 mmol/L 8   Glucose Latest Ref Range: 70 - 99 mg/dL 83         Please limit your fluid intake to 2 L (64 ounces) daily.  2 Liters a day = 8.5 cups, or 72 ounces.  Please limit your salt intake to 2 grams a day or less.    If you gain 2# in 24 hours or 5# in one week call Mohini Rhodes RN so we can adjust your medications as needed over the phone.    Please feel free to call me with any questions or concerns.      Mohini Rhodes RN BSN [facility]  [facility]  Cardiology Care Coordinator-Heart Failure Clinic    Questions and [phone].   First  "press #1 for the University and then press #3 for \"Medical Questions\" to reach us Cardiology Nurses.     On Call Cardiologist for after hours or on weekends: [phone]   option #4 and ask to speak to the on-call Cardiologist. Inform them you are a CORE/heart failure patient at the [place].        If you need a medication refill please contact your pharmacy.  Please allow 3 business days for your refill to be completed.  _______________________________________________________  C.O.R.E. CLINIC Cardiomyopathy, Optimization, Rehabilitation, Education   The C.O.R.E. CLINIC is a heart failure specialty clinic within the [facility] where you will work with specialized nurse practioners dedicated to helping patients with heart failure carefully adjust medications, receive education, and learn who and when to call if symptoms develop. They specialize in helping you better understand your condition, slow the progression of your disease, improve the length and quality of your life, help you detect future heart problems before they become life threatening, and avoid hospitalizations.  As always, thank you for trusting us with your health care needs!             Follow-ups after your visit        Your next 10 appointments already scheduled     Feb 22, 2017  1:40 PM CST   Return Visit with Rabia Carmona MD   [facility] ([facility])    [address],  [address]  [facility] [address]   [phone]            Mar 07, 2017 10:00 AM CST   LAB with  LAB    Health Lab ([facility])    [address]  [facility] [id number] [phone]           Patient must bring picture ID.  Patient should be prepared to give a urine specimen  Please do not eat 10-12 hours before your appointment if you are coming in fasting for labs on lipids, cholesterol, or glucose (sugar).  Pregnant women should follow their " Care Team instructions. Water with medications is okay. Do not drink coffee or other fluids.   If you have concerns about taking  your medications, please ask at office or if scheduling via Inveni, send a message by clicking on Secure Messaging, Message Your Care Team.            Mar 07, 2017 10:40 AM CST   (Arrive by 10:25 AM)   CT CHEST ABDOMEN PELVIS W/O & W CONTRAST with UCCT1   [facility] CT ([facility])    [address]  1st Floor  [facility] [address] [phone]           Please bring any scans or X-rays taken at other hospitals, if similar tests were done. Also bring a list of your medicines, including vitamins, minerals and over-the-counter drugs. It is safest to leave personal items at home.  Be sure to tell your doctor:   If you have any allergies.   If there s any chance you are pregnant.   If you are breastfeeding.   If you have any special needs.  You may have contrast for this exam. To prepare:   Do not eat or drink for 2 hours before your exam. If you need to take medicine, you may take it with small sips of water. (We may ask you to take liquid medicine as well.)   The day before your exam, drink extra fluids at least six 8-ounce glasses (unless your doctor tells you to restrict your fluids).  Patients over 70 or patients with diabetes or kidney problems:   If you haven t had a blood test (creatinine test) within the last 30 days, go to your clinic or Diagnostic Imaging Department for this test.  If you have diabetes:   If your kidney function is normal, continue taking your metformin (Avandamet, Glucophage, Glucovance, Metaglip) on the day of your exam.   If your kidney function is abnormal, wait 48 hours before restarting this medicine.  You will have oral contrast for this exam:   You will drink the contrast at home. Get this from your clinic or Diagnostic Imaging Department. Please follow the directions given.  Please wear loose clothing,  such as a sweat suit or jogging clothes. Avoid snaps, zippers and other metal. We may ask you to undress and put on a hospital gown.  If you have any questions, please call the Imaging Department where you will have your exam.            Mar 09, 2017 10:30 AM CST   (Arrive by 10:15 AM)   Return Visit with Sean Freed MD   [facility] ([facility])    [address]  2nd Floor  [facility] [address] [phone]            Aug 17, 2017 11:00 AM CDT   Lab with  LAB   [facility] ([facility])    [address]  1st Floor  [facility] [id number] [phone]            Aug 17, 2017 11:30 AM CDT   (Arrive by 11:15 AM)   CORE RETURN with Sheela Yee NP   [facility] ([facility])    [address] [place] [address]  3rd Floor  [facility] [id number] [phone]              Who to contact     If you have questions or need follow up information about today's clinic visit or your schedule please contact [facility] directly at [phone].  Normal or non-critical lab and imaging results will be communicated to you by MyChart, letter or phone within 4 business days after the clinic has received the results. If you do not hear from us within 7 days, please contact the clinic through WebVeth[employer] or phone. If you have a critical or abnormal lab result, we will notify you by phone as soon as possible.  Submit refill requests through [facility] or call your pharmacy and they will forward the refill request to us. Please allow 3 business days for your refill to be completed.          Additional Information About Your Visit        [facility] Information     [facility] gives you secure access to your electronic health record. If you see a primary care provider, you can also send messages to your care team and make appointments. If you have questions, please call your primary care clinic.  If you do  "not have a primary care provider, please call [phone] and they will assist you.        Care EveryWhere ID     This is your Care EveryWhere ID. This could be used by other organizations to access your [place] medical records  [id number]        Your Vitals Were     Pulse Height Pulse Oximetry BMI (Body Mass Index)          60 1.778 m (5' 10\") 98% 37.59 kg/m2         Blood Pressure from Last 3 Encounters:   02/17/17 130/83   01/13/17 (!) 141/94   12/13/16 137/85    Weight from Last 3 Encounters:   02/17/17 118.8 kg (262 lb)   01/13/17 117.9 kg (260 lb)   12/13/16 118.1 kg (260 lb 4.8 oz)              Today, you had the following     No orders found for display         Today's Medication Changes          These changes are accurate as of: 2/17/17 10:06 AM.  If you have any questions, ask your nurse or doctor.               These medicines have changed or have updated prescriptions.        Dose/Directions    metoprolol 100 MG 24 hr tablet   Commonly known as:  TOPROL-XL   This may have changed:  how much to take   Used for:  Benign essential hypertension   Changed by:  Sheela Yee, ZENON        Dose:  200 mg   Take 2 tablets (200 mg) by mouth daily   Quantity:  60 tablet   Refills:  11       solifenacin 10 MG tablet   Commonly known as:  VESICARE   This may have changed:  additional instructions   Used for:  Urinary urgency        Dose:  10 mg   Take 1 tablet (10 mg) by mouth daily   Quantity:  60 tablet   Refills:  3       vitamin D 2000 UNITS tablet   This may have changed:  additional instructions   Used for:  Vitamin D deficiency        Dose:  2000 Units   Take 2,000 Units by mouth daily   Quantity:  60 tablet   Refills:  6            Where to get your medicines      These medications [id number] - [place], [place] - [address]  [address], [place] [address]     Phone:  [phone]     metoprolol 100 MG 24 hr tablet                Primary Care Provider Office " Phone # Fax Micaela Camarillo Sarai Carmona [phone]       [facility] [address] [facility] [address]        Thank you!     Thank you for choosing [facility]  for your care. Our goal is always to provide you with excellent care. Hearing back from our patients is one way we can continue to improve our services. Please take a few minutes to complete the written survey that you may receive in the mail after your visit with us. Thank you!             Your Updated Medication List - Protect others around you: Learn how to safely use, store and throw away your medicines at www.disposemymeds.org.          This list is accurate as of: 2/17/17 10:06 AM.  Always use your most recent med list.                   Brand Name Dispense Instructions for use    acetaminophen 500 MG tablet    TYLENOL    100 tablet    Take 2 tablets (1,000 mg) by mouth 3 times daily       amLODIPine 10 MG tablet    NORVASC    30 tablet    Take 1 tablet (10 mg) by mouth daily       blood glucose monitoring lancets     1 Box    Use to test blood sugars 2 times daily or as directed.       blood glucose monitoring test strip    no brand specified    100 each    Use to test blood sugars 2 times daily or as directed       ferrous sulfate 325 (65 FE) MG tablet    IRON    90 tablet    Take 1 tablet (325 mg) by mouth 2 times daily       furosemide 40 MG tablet    LASIX    60 tablet    Take 1 tablet (40 mg) by mouth daily       lisinopril 20 MG tablet    PRINIVIL/ZESTRIL    180 tablet    Take 2 tablets (40 mg) by mouth daily       metFORMIN 500 MG tablet    GLUCOPHAGE    60 tablet    Take 2 tablets (1,000 mg) by mouth daily (with breakfast) AM       metoprolol 100 MG 24 hr tablet    TOPROL-XL    60 tablet    Take 2 tablets (200 mg) by mouth daily       omeprazole 20 MG CR capsule    priLOSEC    120 capsule    Take 1 capsule (20 mg) by mouth every other day       order for DME     1 Units    Equipment being ordered: BP  cuff, large       oxyCODONE 5 MG IR tablet    ROXICODONE    40 tablet    Take 1-2 tablets (5-10 mg) by mouth every 4 hours as needed for moderate to severe pain       polyethylene glycol Packet    MIRALAX/GLYCOLAX    24 packet    Take 17 g by mouth daily       potassium chloride SA 10 MEQ CR tablet    K-DUR/KLOR-CON M    180 tablet    Take 2 tablets (20 mEq) by mouth daily       senna-docusate 8.6-50 MG per tablet    SENOKOT-S;PERICOLACE    60 tablet    Take 1 tablet by mouth daily       solifenacin 10 MG tablet    VESICARE    60 tablet    Take 1 tablet (10 mg) by mouth daily       spironolactone 25 MG tablet    ALDACTONE    45 tablet    Take 1 tablet (25 mg) by mouth daily       vitamin D 2000 UNITS tablet     60 tablet    Take 2,000 Units by mouth daily

## 2017-02-17 NOTE — NURSING NOTE
"Chief Complaint   Patient presents with     Follow Up For     Return CORE appt: 62yr old male with a h/o chronic diastolic heart failure/HFpEF presenting for follow up with labs     Vitals:    02/17/17 0931 02/17/17 0933   BP: 125/87 130/83   BP Location: Left arm Left arm   Cuff Size: Adult Large Adult Large   Pulse: 56 60   SpO2: 98%    Weight: 118.8 kg (262 lb)    Height: 1.778 m (5' 10\")        "

## 2017-02-22 ENCOUNTER — OFFICE VISIT (OUTPATIENT)
Dept: FAMILY MEDICINE | Facility: CLINIC | Age: 62
End: 2017-02-22

## 2017-02-22 VITALS
OXYGEN SATURATION: 98 % | DIASTOLIC BLOOD PRESSURE: 85 MMHG | HEIGHT: 70 IN | TEMPERATURE: 97.4 F | SYSTOLIC BLOOD PRESSURE: 122 MMHG | HEART RATE: 57 BPM | BODY MASS INDEX: 37.88 KG/M2 | WEIGHT: 264.6 LBS

## 2017-02-22 DIAGNOSIS — I10 HYPERTENSION, ESSENTIAL: Primary | ICD-10-CM

## 2017-02-22 DIAGNOSIS — E11.9 TYPE 2 DIABETES MELLITUS WITHOUT COMPLICATION, WITHOUT LONG-TERM CURRENT USE OF INSULIN (H): ICD-10-CM

## 2017-02-22 LAB — HBA1C MFR BLD: 5.5 % (ref 4.1–5.7)

## 2017-02-22 ASSESSMENT — ANXIETY QUESTIONNAIRES
7. FEELING AFRAID AS IF SOMETHING AWFUL MIGHT HAPPEN: NOT AT ALL
3. WORRYING TOO MUCH ABOUT DIFFERENT THINGS: NOT AT ALL
5. BEING SO RESTLESS THAT IT IS HARD TO SIT STILL: NOT AT ALL
1. FEELING NERVOUS, ANXIOUS, OR ON EDGE: NOT AT ALL
6. BECOMING EASILY ANNOYED OR IRRITABLE: NOT AT ALL
2. NOT BEING ABLE TO STOP OR CONTROL WORRYING: NOT AT ALL
IF YOU CHECKED OFF ANY PROBLEMS ON THIS QUESTIONNAIRE, HOW DIFFICULT HAVE THESE PROBLEMS MADE IT FOR YOU TO DO YOUR WORK, TAKE CARE OF THINGS AT HOME, OR GET ALONG WITH OTHER PEOPLE: NOT DIFFICULT AT ALL
GAD7 TOTAL SCORE: 0

## 2017-02-22 ASSESSMENT — PATIENT HEALTH QUESTIONNAIRE - PHQ9: 5. POOR APPETITE OR OVEREATING: NOT AT ALL

## 2017-02-22 NOTE — PROGRESS NOTES
HPI:       Jazz Berman is a 62 year old who presents for the following  Patient presents with:  Recheck Medication: Follow up    Has been been having stable blood pressure at home.   Following at Core clinic. He is experiencing intermittent low blood sugars, 3-4x in last month.    Diabetes Follow-up    Patient is checking blood sugars: twice daily.    Blood sugar testing frequency justification: Frequent hypoglycemia  Results are as follows:         am -          bedtime -          -Last A1C was   Lab Results   Component Value Date    A1C 5.6 06/08/2016    A1C 6.1 03/30/2016    A1C 6.5 12/07/2015    A1C 6.2 01/28/2014    A1C 6.1 05/01/2013        Diabetic concerns: low blood sugar several less than 70 in the past few weeks    Chest Pain or exercise related calf pain (claudication):no     Symptoms of hypoglycemia (low blood sugar): shaky, dizzy, weak     Paresthesias (numbness or burning in feet) or sores: No   Diabetic eye exam within the last year?: Yes      Adherence and Exercise  Medication side effects: no  How often is a medication missed? Never  Are you able to follow the treatment plan? Yes  Exercise:walking  2-3 days/week for an average of 15-30 minutes      Problem, Medication and Allergy Lists were   reviewed and are current.     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 Favored to be a gastrointestinal stromal tumor. No evidence of obstruction. A few subcentimeter lymph nodes along the posterior margin.  No Remote metastases demonstrated.      Follow up: [facility] clinic at [facility], within 2-3 weeks for EUS and gastric tumor biopsy. GI nurse will and help schedule an appointment (Phone Nr: [phone])       Diverticulosis of large intestine 07/04/2016     Priority: Medium     Colonoscopy 7/2/16        Acute gastrointestinal hemorrhage 06/29/2016     Priority: Medium     Fracture of medial malleolus, left, closed 04/12/2014     Priority: Medium     Closed reduction and percutaneous fixation at [facility]       Closed fracture of part of tibia 04/12/2014     Priority: Medium     History of substance abuse 02/25/2014     Priority: Medium     Remote history of marijuana and crack cocaine. No IVDU       Mild recurrent major depression (H) 09/05/2013     Priority: Medium     Class: Chronic     CARDIOVASCULAR SCREENING; LDL GOAL LESS THAN 130 08/30/2013     Priority: Medium     August 30, 2013 CHD risk factors: +male age >45, +hypertension, +HDL <40, 10% Mohler Risk Calculator       H/O colonoscopy with polypectomy 08/29/2013     Priority: Medium     Class: Chronic     7/2/16:  2 polyps and diverticulosis. Follow up per pathology report   - One 2 mm polyp in the ascending colon. Resected andretrieved.  - One 6 mm polyp in the transverse colon. Resected and retrieved. Clip was placed.   - Diverticulosis in the sigmoid colon.    August 29, 2013  History of adenomatous polyp s/p resection, Recommendation repeat in one year.       Bilateral lower extremity edema 08/29/2013      Priority: Medium     BPH (benign prostatic hyperplasia) 08/29/2013     Priority: Medium     Erectile dysfunction 07/10/2013     Priority: Medium     Keloid 06/26/2013     Priority: Medium     Located on L ear s/p excision and R ear, follows with Dermatology       Psychosexual dysfunction with inhibited sexual excitement 08/30/2012     Priority: Medium     BMI greater than 40 08/30/2012     Priority: Medium     Class: Chronic     Ectropion 07/18/2011     Priority: Medium     Epiphora 07/18/2011     Priority: Medium     Mixed emotional features as adjustment reaction 10/06/2010     Priority: Medium   ,     Current Outpatient Prescriptions   Medication Sig Dispense Refill     metoprolol (TOPROL-XL) 100 MG 24 hr tablet Take 2 tablets (200 mg) by mouth daily 60 tablet 11     metFORMIN (GLUCOPHAGE) 500 MG tablet Take 2 tablets (1,000 mg) by mouth daily (with breakfast) AM 60 tablet 3     lisinopril (PRINIVIL/ZESTRIL) 20 MG tablet Take 2 tablets (40 mg) by mouth daily 180 tablet 3     potassium chloride SA (K-DUR/KLOR-CON M) 10 MEQ CR tablet Take 2 tablets (20 mEq) by mouth daily 180 tablet 3     omeprazole (PRILOSEC) 20 MG capsule Take 1 capsule (20 mg) by mouth every other day 120 capsule 0     spironolactone (ALDACTONE) 25 MG tablet Take 1 tablet (25 mg) by mouth daily 45 tablet 3     oxyCODONE (ROXICODONE) 5 MG immediate release tablet Take 1-2 tablets (5-10 mg) by mouth every 4 hours as needed for moderate to severe pain 40 tablet 0     amLODIPine (NORVASC) 10 MG tablet Take 1 tablet (10 mg) by mouth daily 30 tablet 3     furosemide (LASIX) 40 MG tablet Take 1 tablet (40 mg) by mouth daily 60 tablet 3     polyethylene glycol (MIRALAX/GLYCOLAX) packet Take 17 g by mouth daily 24 packet 0     senna-docusate (SENOKOT-S;PERICOLACE) 8.6-50 MG per tablet Take 1 tablet by mouth daily 60 tablet 1     acetaminophen (TYLENOL) 500 MG tablet Take 2 tablets (1,000 mg) by mouth 3 times daily 100 tablet 0     solifenacin (VESICARE)  "10 MG tablet Take 1 tablet (10 mg) by mouth daily (Patient taking differently: Take 10 mg by mouth daily AM) 60 tablet 3     ferrous sulfate (IRON) 325 (65 FE) MG tablet Take 1 tablet (325 mg) by mouth 2 times daily 90 tablet 2     Cholecalciferol (VITAMIN D) 2000 UNITS tablet Take 2,000 Units by mouth daily (Patient taking differently: Take 2,000 Units by mouth daily AM) 60 tablet 6     blood glucose monitoring (NO BRAND SPECIFIED) test strip Use to test blood sugars 2 times daily or as directed 100 each 3     blood glucose monitoring (ONE TOUCH DELICA) lancets Use to test blood sugars 2 times daily or as directed. 1 Box 11     order for DME Equipment being ordered: BP cuff, large 1 Units 0   ,     Allergies   Allergen Reactions     Dye [Contrast Dye] Rash     Dye left skin hyperpigmentation on his back     Patient is an established patient of this clinic.         Review of Systems:   Review of Systems   Constitutional: Negative for appetite change, chills, fatigue and fever.   HENT: Negative for congestion, sinus pressure and sore throat.    Eyes: Negative for visual disturbance.   Respiratory: Negative for cough, shortness of breath and wheezing.    Cardiovascular: Negative for chest pain and leg swelling.   Gastrointestinal: Negative for abdominal distention, abdominal pain, blood in stool, constipation, diarrhea, nausea and vomiting.   Endocrine: Negative for polydipsia, polyphagia and polyuria.   Genitourinary: Negative for dysuria and hematuria.   Musculoskeletal: Negative for arthralgias and myalgias.   Skin: Negative for color change and rash.   Neurological: Negative for weakness and headaches.             Physical Exam:   Patient Vitals for the past 24 hrs:   BP Temp Temp src Pulse SpO2 Height Weight   02/22/17 1348 122/85 97.4  F (36.3  C) Oral 57 98 % 5' 10\" (177.8 cm) 264 lb 9.6 oz (120 kg)     Body mass index is 37.97 kg/(m^2).  Vitals were reviewed and were normal     Physical Exam   Constitutional: " He is oriented to person, place, and time. He appears well-developed and well-nourished. No distress.   HENT:   Head: Normocephalic and atraumatic.   Eyes: Conjunctivae are normal. Right eye exhibits no discharge. Left eye exhibits no discharge. No scleral icterus.   Neck: Normal range of motion. Neck supple.   Cardiovascular: Normal rate and regular rhythm.  Exam reveals no gallop and no friction rub.    No murmur heard.  Pulmonary/Chest: Effort normal and breath sounds normal. No respiratory distress. He has no wheezes. He has no rales.   Abdominal: Soft. Bowel sounds are normal. He exhibits no distension and no mass. There is no tenderness. There is no rebound and no guarding.   Musculoskeletal: Normal range of motion. He exhibits edema (trace b/l peripheral edema. ).   Lymphadenopathy:     He has no cervical adenopathy.   Neurological: He is alert and oriented to person, place, and time.   Skin: Skin is warm and dry. No rash noted. He is not diaphoretic.   Psychiatric: He has a normal mood and affect.         Results:     Results for orders placed or performed in visit on 02/22/17   Hemoglobin A1c ([facility])   Result Value Ref Range    Hemoglobin A1C 5.5 4.1 - 5.7 %       Assessment and Plan     Jazz Berman is a 63 yo male with a h/o HTN, s/p gastric tumor resection, HFpEF and A.fib not on AC who was seen today for diabetes follow up.     1. Hypertension, essential, well controlled.   - patient will continue to be followed in CORE clinic   - no changes to medications today.   - Advised to continue to take home BP    2. Type 2 diabetes mellitus without complication, without long-term current use of insulin (H). Since patient has symptomatic lows with A1c very well under goal of <7, will decrease his tight glucose control. Especially in light of patient's increase in activity and better diet control since he has recovered from his abdominal surgery.   - will decrease metformin to 1.5 tablets a day (750mg a day)  to utilize his current available  medication. Once patient is ready for next refill, change to a 850mg daily pill.   - if patient continues to a hypoglycemic episodes despite the decrease in dosing, will need to proceed with further w/u.     There are no discontinued medications.  Options for treatment and follow-up care were reviewed with the patient. Jazz Berman  engaged in the decision making process and verbalized understanding of the options discussed and agreed with the final plan.    Rabia Carmona MD  [facility]  [phone]

## 2017-02-22 NOTE — MR AVS SNAPSHOT
After Visit Summary   2/22/2017    Jazz Berman    MRN: [id number]           Patient Information     Date Of Birth          1955        Visit Information        Provider Department      2/22/2017 1:40 PM Rabia Dickey MD [facility]        Today's Diagnoses     Hypertension, essential    -  1    Type 2 diabetes mellitus without complication, without long-term current use of insulin (H)          Care Instructions    Here is the plan from today's visit    1. Hypertension, essential  - no changes to you medications today. Your blood pressure is great!    2. Type 2 diabetes mellitus without complication, without long-term current use of insulin (H)  - we will decrease your metformin to 1.5 tablets a day (750mg a day). Once you need another refill we can change to a 850mg daily pill.     Please call or return to clinic if your symptoms don't go away.    Follow up plan  Please make a clinic appointment for follow up with me (RABIA DICKEY) in 2-3  months for follow up.    Thank you for coming to [facility] today.  Lab Testing:  **If you had lab testing today and your results are reassuring or normal they will be mailed to you or sent through [employer] within 7 days.   **If the lab tests need quick action we will call you with the results.  The phone number we will call with results is # [phone] (home) . If this is not the best number please call our clinic and change the number.  Medication Refills:  If you need any refills please call your pharmacy and they will contact us.   If you need to  your refill at a new pharmacy, please contact the new pharmacy directly. The new pharmacy will help you get your medications transferred faster.   Scheduling:  If you have any concerns about today's visit or wish to schedule another appointment please call our office during normal business hours [phone] (8-5:00 M-F)  If a referral was made to a [facility] and you don't get a call from central scheduling please call [phone].  If a Mammogram was ordered for you at [facility] call [phone] to schedule or change your appointment.  If you had an XRay/CT/Ultrasound/MRI ordered the number is [phone] to schedule or change your radiology appointment.   Medical Concerns:  If you have urgent medical concerns please call [phone] at any time of the day.  If you have a medical emergency please call 911.          Follow-ups after your visit        Follow-up notes from your care team     Return in about 3 months (around 5/22/2017).      Your next 10 appointments already scheduled     Mar 07, 2017 10:00 AM CST   LAB with  LAB   [facility] ([facility])    [address] [place] [address] [phone]           Patient must bring picture ID.  Patient should be prepared to give a urine specimen  Please do not eat 10-12 hours before your appointment if you are coming in fasting for labs on lipids, cholesterol, or glucose (sugar).  Pregnant women should follow their Care Team instructions. Water with medications is okay. Do not drink coffee or other fluids.   If you have concerns about taking  your medications, please ask at office or if scheduling via [employer], send a message by clicking on Secure Messaging, Message Your Care Team.            Mar 07, 2017 10:40 AM CST   (Arrive by 10:25 AM)   CT CHEST ABDOMEN PELVIS W/O & W CONTRAST with UCCT1   [facility] [place] [facility] ([facility])    [address] [address] [phone]           Please bring any scans or X-rays taken at other hospitals, if similar tests were done. Also bring a list of your medicines, including vitamins, minerals and over-the-counter drugs. It is safest to leave personal items at home.  Be sure to tell your doctor:   If you have any allergies.   If  there s any chance you are pregnant.   If you are breastfeeding.   If you have any special needs.  You may have contrast for this exam. To prepare:   Do not eat or drink for 2 hours before your exam. If you need to take medicine, you may take it with small sips of water. (We may ask you to take liquid medicine as well.)   The day before your exam, drink extra fluids at least six 8-ounce glasses (unless your doctor tells you to restrict your fluids).  Patients over 70 or patients with diabetes or kidney problems:   If you haven t had a blood test (creatinine test) within the last 30 days, go to your clinic or Diagnostic Imaging Department for this test.  If you have diabetes:   If your kidney function is normal, continue taking your metformin (Avandamet, Glucophage, Glucovance, Metaglip) on the day of your exam.   If your kidney function is abnormal, wait 48 hours before restarting this medicine.  You will have oral contrast for this exam:   You will drink the contrast at home. Get this from your clinic or Diagnostic Imaging Department. Please follow the directions given.  Please wear loose clothing, such as a sweat suit or jogging clothes. Avoid snaps, zippers and other metal. We may ask you to undress and put on a hospital gown.  If you have any questions, please call the Imaging Department where you will have your exam.            Mar 09, 2017 10:30 AM CST   (Arrive by 10:15 AM)   Return Visit with Sean Freed MD   [facility] ([facility])    [address] [place] [address]  2nd Floor  [facility] [address]   [phone]            Aug 17, 2017 11:00 AM CDT   Lab with  LAB   [facility])    [address] [place] [address]  1st Floor  [facility] [address]   [phone]            Aug 17, 2017 11:30 AM CDT   (Arrive by 11:15 AM)   CORE RETURN with Sheela Yee NP   [facility])     "[address] [id number] [phone]              Who to contact     Please call your clinic at [phone] to:    Ask questions about your health    Make or cancel appointments    Discuss your medicines    Learn about your test results    Speak to your doctor   If you have compliments or concerns about an experience at your clinic, or if you wish to file a complaint, please contact [facility] Patient Relations at [phone] or email us at [email or web address]         Additional Information About Your Visit        UIBLUEPRINThart Information     [employer] gives you secure access to your electronic health record. If you see a primary care provider, you can also send messages to your care team and make appointments. If you have questions, please call your primary care clinic.  If you do not have a primary care provider, please call [phone] and they will assist you.      [employer] is an electronic gateway that provides easy, online access to your medical records. With [employer], you can request a clinic appointment, read your test results, renew a prescription or communicate with your care team.     To access your existing account, please contact your [facility] or call [phone] for assistance.        Care EveryWhere ID     This is your Care EveryWhere ID. This could be used by other organizations to access your [place] medical records  [id number]        Your Vitals Were     Pulse Temperature Height Pulse Oximetry BMI (Body Mass Index)       57 97.4  F (36.3  C) (Oral) 5' 10\" (177.8 cm) 98% 37.97 kg/m2        Blood Pressure from Last 3 Encounters:   02/22/17 122/85   02/17/17 130/83   01/13/17 (!) 141/94    Weight from Last 3 Encounters:   02/22/17 264 lb 9.6 oz (120 kg)   02/17/17 262 lb (118.8 kg)   01/13/17 260 lb (117.9 kg)              We Performed the Following     Hemoglobin A1c ([facility])          Today's " Medication Changes          These changes are accurate as of: 2/22/17  2:31 PM.  If you have any questions, ask your nurse or doctor.               These medicines have changed or have updated prescriptions.        Dose/Directions    metFORMIN 850 MG tablet   Commonly known as:  GLUCOPHAGE   This may have changed:    - medication strength  - how much to take   Used for:  Type 2 diabetes mellitus without complication, without long-term current use of insulin (H)   Changed by:  Rabia Carmona MD        Dose:  850 mg   Take 1 tablet (850 mg) by mouth daily (with breakfast) AM   Quantity:  90 tablet   Refills:  3       solifenacin 10 MG tablet   Commonly known as:  VESICARE   This may have changed:  additional instructions   Used for:  Urinary urgency        Dose:  10 mg   Take 1 tablet (10 mg) by mouth daily   Quantity:  60 tablet   Refills:  3       vitamin D 2000 UNITS tablet   This may have changed:  additional instructions   Used for:  Vitamin D deficiency        Dose:  2000 Units   Take 2,000 Units by mouth daily   Quantity:  60 tablet   Refills:  6            Where to get your medicines      These medications were sent to [facility]/pharmacy #[id number] - [place], [place] - [address], [place] [address]     Phone:  [phone]     metFORMIN 850 MG tablet                Primary Care Provider Office Phone # Fax #    Rabia Carmona [phone]       [facility] [address] [facility] [address]        Thank you!     Thank you for choosing [facility]  for your care. Our goal is always to provide you with excellent care. Hearing back from our patients is one way we can continue to improve our services. Please take a few minutes to complete the written survey that you may receive in the mail after your visit with us. Thank you!             Your Updated Medication List - Protect others around you: Learn how to safely use, store  and throw away your medicines at www.disposemymeds.org.          This list is accurate as of: 2/22/17  2:31 PM.  Always use your most recent med list.                   Brand Name Dispense Instructions for use    acetaminophen 500 MG tablet    TYLENOL    100 tablet    Take 2 tablets (1,000 mg) by mouth 3 times daily       amLODIPine 10 MG tablet    NORVASC    30 tablet    Take 1 tablet (10 mg) by mouth daily       blood glucose monitoring lancets     1 Box    Use to test blood sugars 2 times daily or as directed.       blood glucose monitoring test strip    no brand specified    100 each    Use to test blood sugars 2 times daily or as directed       ferrous sulfate 325 (65 FE) MG tablet    IRON    90 tablet    Take 1 tablet (325 mg) by mouth 2 times daily       furosemide 40 MG tablet    LASIX    60 tablet    Take 1 tablet (40 mg) by mouth daily       lisinopril 20 MG tablet    PRINIVIL/ZESTRIL    180 tablet    Take 2 tablets (40 mg) by mouth daily       metFORMIN 850 MG tablet    GLUCOPHAGE    90 tablet    Take 1 tablet (850 mg) by mouth daily (with breakfast) AM       metoprolol 100 MG 24 hr tablet    TOPROL-XL    60 tablet    Take 2 tablets (200 mg) by mouth daily       omeprazole 20 MG CR capsule    priLOSEC    120 capsule    Take 1 capsule (20 mg) by mouth every other day       order for DME     1 Units    Equipment being ordered: BP cuff, large       oxyCODONE 5 MG IR tablet    ROXICODONE    40 tablet    Take 1-2 tablets (5-10 mg) by mouth every 4 hours as needed for moderate to severe pain       polyethylene glycol Packet    MIRALAX/GLYCOLAX    24 packet    Take 17 g by mouth daily       potassium chloride SA 10 MEQ CR tablet    K-DUR/KLOR-CON M    180 tablet    Take 2 tablets (20 mEq) by mouth daily       senna-docusate 8.6-50 MG per tablet    SENOKOT-S;PERICOLACE    60 tablet    Take 1 tablet by mouth daily       solifenacin 10 MG tablet    VESICARE    60 tablet    Take 1 tablet (10 mg) by mouth daily        spironolactone 25 MG tablet    ALDACTONE    45 tablet    Take 1 tablet (25 mg) by mouth daily       vitamin D 2000 UNITS tablet     60 tablet    Take 2,000 Units by mouth daily

## 2017-02-22 NOTE — PATIENT INSTRUCTIONS
Here is the plan from today's visit    1. Hypertension, essential  - no changes to you medications today. Your blood pressure is great!    2. Type 2 diabetes mellitus without complication, without long-term current use of insulin (H)  - we will decrease your metformin to 1.5 tablets a day (750mg a day). Once you need another refill we can change to a 850mg daily pill.     Please call or return to clinic if your symptoms don't go away.    Follow up plan  Please make a clinic appointment for follow up with me (TRINITY DICKEY) in 2-3  months for follow up.    Thank you for coming to [facility] today.  Lab Testing:  **If you had lab testing today and your results are reassuring or normal they will be mailed to you or sent through [employer] within 7 days.   **If the lab tests need quick action we will call you with the results.  The phone number we will call with results is # [phone] (home) . If this is not the best number please call our clinic and change the number.  Medication Refills:  If you need any refills please call your pharmacy and they will contact us.   If you need to  your refill at a new pharmacy, please contact the new pharmacy directly. The new pharmacy will help you get your medications transferred faster.   Scheduling:  If you have any concerns about today's visit or wish to schedule another appointment please call our office during normal business hours [phone] (8-5:00 M-F)  If a referral was made to a [facility] and you don't get a call from central scheduling please call [phone].  If a Mammogram was ordered for you at [facility] call [phone] to schedule or change your appointment.  If you had an XRay/CT/Ultrasound/MRI ordered the number is [phone] to schedule or change your radiology appointment.   Medical Concerns:  If you have urgent medical concerns please call [phone] at any time of the day.  If you have a medical  emergency please call 911.

## 2017-02-22 NOTE — PROGRESS NOTES
Preceptor Attestation:   Patient seen and discussed with the resident. Assessment and plan reviewed with resident and agreed upon.   Supervising Physician:  Inez Gibbons MD  [facility]

## 2017-02-23 ASSESSMENT — PATIENT HEALTH QUESTIONNAIRE - PHQ9: SUM OF ALL RESPONSES TO PHQ QUESTIONS 1-9: 1

## 2017-02-23 ASSESSMENT — ANXIETY QUESTIONNAIRES: GAD7 TOTAL SCORE: 0

## 2017-02-26 ASSESSMENT — ENCOUNTER SYMPTOMS
POLYPHAGIA: 0
SHORTNESS OF BREATH: 0
FEVER: 0
SINUS PRESSURE: 0
APPETITE CHANGE: 0
CONSTIPATION: 0
FATIGUE: 0
COLOR CHANGE: 0
HEMATURIA: 0
POLYDIPSIA: 0
SORE THROAT: 0
ARTHRALGIAS: 0
NAUSEA: 0
BLOOD IN STOOL: 0
MYALGIAS: 0
DYSURIA: 0
VOMITING: 0
DIARRHEA: 0
ABDOMINAL PAIN: 0
WEAKNESS: 0
HEADACHES: 0
CHILLS: 0
COUGH: 0
ABDOMINAL DISTENTION: 0
WHEEZING: 0

## 2017-03-03 ENCOUNTER — CARE COORDINATION (OUTPATIENT)
Dept: ONCOLOGY | Facility: CLINIC | Age: 62
End: 2017-03-03

## 2017-03-03 DIAGNOSIS — C49.A2: Primary | ICD-10-CM

## 2017-03-03 RX ORDER — METHYLPREDNISOLONE 32 MG/1
TABLET ORAL
Qty: 2 TABLET | Refills: 1 | Status: SHIPPED | OUTPATIENT
Start: 2017-03-03 | End: 2017-03-24

## 2017-03-03 NOTE — PROGRESS NOTES
Called Jazz to let him know that he has methylprednisolone ordered for his upcoming CT on 3/7.  Let him know to take 1 tablet 12 hours prior to scan, and 1 tablet 2 hours prior to scan.  Jazz will pick this up at his local pharmacy.  He verbalized understanding of the plan of care.

## 2017-03-07 DIAGNOSIS — I51.7 CARDIOMEGALY: ICD-10-CM

## 2017-03-07 DIAGNOSIS — I10 ESSENTIAL HYPERTENSION WITH GOAL BLOOD PRESSURE LESS THAN 140/90: ICD-10-CM

## 2017-03-07 DIAGNOSIS — C49.A0 GIST (GASTROINTESTINAL STROMAL TUMOR), MALIGNANT (H): ICD-10-CM

## 2017-03-07 DIAGNOSIS — G47.33 OSA (OBSTRUCTIVE SLEEP APNEA): ICD-10-CM

## 2017-03-07 LAB
ALBUMIN SERPL-MCNC: 3.8 G/DL (ref 3.4–5)
ALP SERPL-CCNC: 91 U/L (ref 40–150)
ALT SERPL W P-5'-P-CCNC: 20 U/L (ref 0–70)
ANION GAP SERPL CALCULATED.3IONS-SCNC: 8 MMOL/L (ref 3–14)
AST SERPL W P-5'-P-CCNC: 15 U/L (ref 0–45)
BASOPHILS # BLD AUTO: 0 10E9/L (ref 0–0.2)
BASOPHILS NFR BLD AUTO: 0.1 %
BILIRUB SERPL-MCNC: 0.9 MG/DL (ref 0.2–1.3)
BUN SERPL-MCNC: 18 MG/DL (ref 7–30)
CALCIUM SERPL-MCNC: 9.1 MG/DL (ref 8.5–10.1)
CHLORIDE SERPL-SCNC: 109 MMOL/L (ref 94–109)
CO2 SERPL-SCNC: 25 MMOL/L (ref 20–32)
CREAT SERPL-MCNC: 0.95 MG/DL (ref 0.66–1.25)
DIFFERENTIAL METHOD BLD: ABNORMAL
EOSINOPHIL # BLD AUTO: 0 10E9/L (ref 0–0.7)
EOSINOPHIL NFR BLD AUTO: 0 %
ERYTHROCYTE [DISTWIDTH] IN BLOOD BY AUTOMATED COUNT: 15 % (ref 10–15)
GFR SERPL CREATININE-BSD FRML MDRD: 81 ML/MIN/1.7M2
GLUCOSE SERPL-MCNC: 127 MG/DL (ref 70–99)
HCT VFR BLD AUTO: 46.2 % (ref 40–53)
HGB BLD-MCNC: 15.3 G/DL (ref 13.3–17.7)
IMM GRANULOCYTES # BLD: 0 10E9/L (ref 0–0.4)
IMM GRANULOCYTES NFR BLD: 0.5 %
LYMPHOCYTES # BLD AUTO: 0.6 10E9/L (ref 0.8–5.3)
LYMPHOCYTES NFR BLD AUTO: 7.5 %
MCH RBC QN AUTO: 29.1 PG (ref 26.5–33)
MCHC RBC AUTO-ENTMCNC: 33.1 G/DL (ref 31.5–36.5)
MCV RBC AUTO: 88 FL (ref 78–100)
MONOCYTES # BLD AUTO: 0.1 10E9/L (ref 0–1.3)
MONOCYTES NFR BLD AUTO: 1.6 %
NEUTROPHILS # BLD AUTO: 7.3 10E9/L (ref 1.6–8.3)
NEUTROPHILS NFR BLD AUTO: 90.3 %
NRBC # BLD AUTO: 0 10*3/UL
NRBC BLD AUTO-RTO: 0 /100
PLATELET # BLD AUTO: 234 10E9/L (ref 150–450)
POTASSIUM SERPL-SCNC: 4 MMOL/L (ref 3.4–5.3)
PROT SERPL-MCNC: 8 G/DL (ref 6.8–8.8)
RBC # BLD AUTO: 5.25 10E12/L (ref 4.4–5.9)
SODIUM SERPL-SCNC: 142 MMOL/L (ref 133–144)
WBC # BLD AUTO: 8 10E9/L (ref 4–11)

## 2017-03-09 ENCOUNTER — ONCOLOGY VISIT (OUTPATIENT)
Dept: ONCOLOGY | Facility: CLINIC | Age: 62
End: 2017-03-09
Attending: INTERNAL MEDICINE
Payer: MEDICARE

## 2017-03-09 VITALS
RESPIRATION RATE: 18 BRPM | TEMPERATURE: 97 F | DIASTOLIC BLOOD PRESSURE: 83 MMHG | BODY MASS INDEX: 38.21 KG/M2 | OXYGEN SATURATION: 96 % | HEART RATE: 44 BPM | SYSTOLIC BLOOD PRESSURE: 144 MMHG | WEIGHT: 266.32 LBS

## 2017-03-09 DIAGNOSIS — I50.32 CHRONIC DIASTOLIC HEART FAILURE (H): ICD-10-CM

## 2017-03-09 DIAGNOSIS — R60.0 BILATERAL LOWER EXTREMITY EDEMA: ICD-10-CM

## 2017-03-09 DIAGNOSIS — N40.0 BENIGN PROSTATIC HYPERPLASIA WITHOUT LOWER URINARY TRACT SYMPTOMS, UNSPECIFIED MORPHOLOGY: ICD-10-CM

## 2017-03-09 DIAGNOSIS — C49.A2: ICD-10-CM

## 2017-03-09 DIAGNOSIS — N40.0 BENIGN PROSTATIC HYPERPLASIA WITHOUT LOWER URINARY TRACT SYMPTOMS: ICD-10-CM

## 2017-03-09 DIAGNOSIS — F19.11 HISTORY OF SUBSTANCE ABUSE (H): ICD-10-CM

## 2017-03-09 DIAGNOSIS — G47.33 OSA (OBSTRUCTIVE SLEEP APNEA): ICD-10-CM

## 2017-03-09 DIAGNOSIS — I10 HYPERTENSION, ESSENTIAL: ICD-10-CM

## 2017-03-09 DIAGNOSIS — F33.0 MILD RECURRENT MAJOR DEPRESSION (H): ICD-10-CM

## 2017-03-09 PROCEDURE — 99212 OFFICE O/P EST SF 10 MIN: CPT

## 2017-03-09 PROCEDURE — 99214 OFFICE O/P EST MOD 30 MIN: CPT | Mod: ZP | Performed by: INTERNAL MEDICINE

## 2017-03-09 PROCEDURE — 80053 COMPREHEN METABOLIC PANEL: CPT | Performed by: INTERNAL MEDICINE

## 2017-03-09 ASSESSMENT — PAIN SCALES - GENERAL: PAINLEVEL: NO PAIN (0)

## 2017-03-09 NOTE — PROGRESS NOTES
3-9-17    I saw Mr. 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     Background  In brief, he was in his usual state of health but noted some black stools in 06/2016. In early July 2016 he was admitted for anemia and GI bleeding and had orthostatic symptoms. 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 Endoscopy before that showed some punctate oozing and it is possible that some of the bleeding was coming from the tumor. He is recovering well from surgery and right now he is feeling pretty good without much in the way of complaints. He does note, however, that has lost 30 pounds since surgery and has a rather low energy level which has not improved much since surgery. He does not get out of the house every day and walks only for about 15 minutes. He denies being bothered by shortness of breath right now though he has had that problem and is felt to likely have HFpEF. He does not go up stairs due to difficulty with his left leg which has a geo that was put in following a fracture. He has sleep apnea and uses a CPAP machine and also has a history of hypertension, depression, diabetes, cardiomegaly, and he was found to be in atrial fibrillation when he was hospitalized for the GI bleed.  -  Pathology report which showed 1 mitosis per 50 high-power fields, a tumor size of 15 cm and negative margins. 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   -    Interval history.    He is doing pretty well overall.  He does have a history of chronic congestive heart failure and is on multiple medications.   He had some tiny nonspecific lung nodules on the imaging.   A heart echo on 07/04/2016 showed a normal LV function, but the left ventricular diastolic function could not be assessed.   Hes not very active but gets out most days.    A 10-point ROS is otherwise unremarkable.     -  Background PMH, FH, SH  His past history is notable for keloid formation, resection of a rectal polyp,  and a crushing foot injury.   He feels that contrast dye created a bit of a rash that left some skin hyperpigmentation on his back.   He is currently single and lives alone. He stopped smoking in 2011, though he was a polysubstance abuser in the past. He no longer drinks alcohol. He is retired from working in a furniture store.   Family history is positive for diabetes and hypertension.   -    On exam he appeared comfortable with a quiet affect.   /83 (BP Location: Left arm, Patient Position: Chair, Cuff Size: Adult Regular)  Pulse (!) 44  Temp 97  F (36.1  C) (Oral)  Resp 18  Wt 120.8 kg (266 lb 5.1 oz)  SpO2 96%  BMI 38.21 kg/m2  HEENT: Normal oropharynx. No icterus.   LYMPH: No lymphadenopathy.   CHEST: Clear.   HEART: There was an irregularly irregular rhythm.   ABDOMEN: There was an obese abdomen with a well-healed surgical scar , no HSMT.   NEURO: Normal Romberg, cant heel walk w geo.      -  I reviewed his imaging and there is no suggestion of PD though he has some stable lung nodules.      -  No KIT mutation back yet.  We will resend.       It appears his recurrence risk is on the order of 10-15% over a few years.  I do not think that risk warrants the toxicities of Gleevec, especially given his multiple other problems and medications.     We will check on the sending his sample to [place] for KIT and reflex PDGFRA, SDH, etc., testing, as this might give us some further information.     He had an echo that showed no structural heart disease, and while he was felt to be at high risk for stroke with a CHADS2 score of 4, anticoagulation was planned to be held until a few months after surgery after documentation of no further GI bleeding.  He will f/u w his PCP on that.   His atrial fibrillation, anticoagulation and other issues will be followed by his primary care team and Cardiology.     We will see him about 9-7 w CT and labs 2 d before.    All his questions were addressed and he will call if he has  others.  -  Sean Freed M.D.  Professor  Hematology, Oncology and Transplantation

## 2017-03-09 NOTE — MR AVS SNAPSHOT
After Visit Summary   3/9/2017    Jazz Berman    MRN: [id number]           Patient Information     Date Of Birth          1955        Visit Information        Provider Department      3/9/2017 10:30 AM Sean Freed MD [facility]        Today's Diagnoses     Hypertension, essential        GREG (obstructive sleep apnea) on CPAP        Bilateral lower extremity edema        Benign prostatic hyperplasia without lower urinary tract symptoms, unspecified morphology        Mild recurrent major depression (H)        History of substance abuse        Gastrointestinal stromal sarcoma of stomach (H)        Chronic diastolic heart failure (H)           Follow-ups after your visit        Your next 10 appointments already scheduled     Aug 17, 2017 11:00 AM CDT   Lab with  LAB   [facility] ([facility])    [address] [place] [address]  1st [facility] [address] [phone]            Aug 17, 2017 11:30 AM CDT   (Arrive by 11:15 AM)   CORE RETURN with Sheela Yee NP   [facility] ([facility])    [address] [place] [id number] [phone]            Sep 05, 2017  9:00 AM CDT   LAB with [facility]   [facility] ([facility])    [address] [place] [id number] [phone]           Patient must bring picture ID.  Patient should be prepared to give a urine specimen  Please do not eat 10-12 hours before your appointment if you are coming in fasting for labs on lipids, cholesterol, or glucose (sugar).  Pregnant women should follow their Care Team instructions. Water with medications is okay. Do not drink coffee or other fluids.   If you have concerns about taking  your medications, please ask at office or if scheduling via [employer], send a message by clicking on Secure Messaging, Message Your Care Team.            Sep 05, 2017  9:40  AM CDT   (Arrive by 9:25 AM)   CT CHEST ABDOMEN PELVIS W/O & W CONTRAST with UCCT2   [facility] ([facility])    [address] [facility]  1st [facility] [address] [phone]           Please bring any scans or X-rays taken at other hospitals, if similar tests were done. Also bring a list of your medicines, including vitamins, minerals and over-the-counter drugs. It is safest to leave personal items at home.  Be sure to tell your doctor:   If you have any allergies.   If there s any chance you are pregnant.   If you are breastfeeding.   If you have any special needs.  You may have contrast for this exam. To prepare:   Do not eat or drink for 2 hours before your exam. If you need to take medicine, you may take it with small sips of water. (We may ask you to take liquid medicine as well.)   The day before your exam, drink extra fluids at least six 8-ounce glasses (unless your doctor tells you to restrict your fluids).  Patients over 70 or patients with diabetes or kidney problems:   If you haven t had a blood test (creatinine test) within the last 30 days, go to your clinic or Diagnostic Imaging Department for this test.  If you have diabetes:   If your kidney function is normal, continue taking your metformin (Avandamet, Glucophage, Glucovance, Metaglip) on the day of your exam.   If your kidney function is abnormal, wait 48 hours before restarting this medicine.  You will have oral contrast for this exam:   You will drink the contrast at home. Get this from your clinic or Diagnostic Imaging Department. Please follow the directions given.  Please wear loose clothing, such as a sweat suit or jogging clothes. Avoid snaps, zippers and other metal. We may ask you to undress and put on a hospital gown.  If you have any questions, please call the Imaging Department where you will have your exam.            Sep 07, 2017 10:00 AM CDT   (Arrive by 9:45 AM)   Return Visit  with Sean Freed MD   [facility] ([facility])    [address] [facility]  2nd Floor  [facility] [address] [phone]              Future tests that were ordered for you today     Open Future Orders        Priority Expected Expires Ordered    CT Chest Abdomen Pelvis w/o & w Contrast Routine 9/5/2017 6/7/2024 3/9/2017            Who to contact     If you have questions or need follow up information about today's clinic visit or your schedule please contact [facility] directly at [phone].  Normal or non-critical lab and imaging results will be communicated to you by [employer], letter or phone within 4 business days after the clinic has received the results. If you do not hear from us within 7 days, please contact the clinic through [employer] or phone. If you have a critical or abnormal lab result, we will notify you by phone as soon as possible.  Submit refill requests through [employer] or call your pharmacy and they will forward the refill request to us. Please allow 3 business days for your refill to be completed.          Additional Information About Your Visit        [employer] Information     [employer] gives you secure access to your electronic health record. If you see a primary care provider, you can also send messages to your care team and make appointments. If you have questions, please call your primary care clinic.  If you do not have a primary care provider, please call [phone] and they will assist you.        Care EveryWhere ID     This is your Care EveryWhere ID. This could be used by other organizations to access your [place] medical records  [id number]        Your Vitals Were     Pulse Temperature Respirations Pulse Oximetry BMI (Body Mass Index)       44 97  F (36.1  C) (Oral) 18 96% 38.21 kg/m2        Blood Pressure from Last 3 Encounters:   03/09/17 144/83   02/22/17 122/85   02/17/17 130/83    Weight from Last 3  Encounters:   03/09/17 120.8 kg (266 lb 5.1 oz)   02/22/17 120 kg (264 lb 9.6 oz)   02/17/17 118.8 kg (262 lb)              We Performed the Following     CBC with platelets differential     Comprehensive metabolic panel          Today's Medication Changes          These changes are accurate as of: 3/9/17 11:28 AM.  If you have any questions, ask your nurse or doctor.               These medicines have changed or have updated prescriptions.        Dose/Directions    solifenacin 10 MG tablet   Commonly known as:  VESICARE   This may have changed:  additional instructions   Used for:  Urinary urgency        Dose:  10 mg   Take 1 tablet (10 mg) by mouth daily   Quantity:  60 tablet   Refills:  3       vitamin D 2000 UNITS tablet   This may have changed:  additional instructions   Used for:  Vitamin D deficiency        Dose:  2000 Units   Take 2,000 Units by mouth daily   Quantity:  60 tablet   Refills:  6                Primary Care Provider Office Phone # Fax #    Rabia Carmona [phone]       [facility] [address] [facility] [address]        Thank you!     Thank you for choosing [facility]  for your care. Our goal is always to provide you with excellent care. Hearing back from our patients is one way we can continue to improve our services. Please take a few minutes to complete the written survey that you may receive in the mail after your visit with us. Thank you!             Your Updated Medication List - Protect others around you: Learn how to safely use, store and throw away your medicines at www.disposemymeds.org.          This list is accurate as of: 3/9/17 11:28 AM.  Always use your most recent med list.                   Brand Name Dispense Instructions for use    acetaminophen 500 MG tablet    TYLENOL    100 tablet    Take 2 tablets (1,000 mg) by mouth 3 times daily       amLODIPine 10 MG tablet    NORVASC    30 tablet    Take 1 tablet (10  mg) by mouth daily       blood glucose monitoring lancets     1 Box    Use to test blood sugars 2 times daily or as directed.       blood glucose monitoring test strip    no brand specified    100 each    Use to test blood sugars 2 times daily or as directed       ferrous sulfate 325 (65 FE) MG tablet    IRON    90 tablet    Take 1 tablet (325 mg) by mouth 2 times daily       furosemide 40 MG tablet    LASIX    60 tablet    Take 1 tablet (40 mg) by mouth daily       lisinopril 20 MG tablet    PRINIVIL/ZESTRIL    180 tablet    Take 2 tablets (40 mg) by mouth daily       metFORMIN 850 MG tablet    GLUCOPHAGE    90 tablet    Take 1 tablet (850 mg) by mouth daily (with breakfast) AM       methylPREDNISolone 32 MG tablet    MEDROL    2 tablet    Take 1 (32 mg) by mouth 24 hours before and one 12 hours before the scan       metoprolol 100 MG 24 hr tablet    TOPROL-XL    60 tablet    Take 2 tablets (200 mg) by mouth daily       omeprazole 20 MG CR capsule    priLOSEC    120 capsule    Take 1 capsule (20 mg) by mouth every other day       order for DME     1 Units    Equipment being ordered: BP cuff, large       oxyCODONE 5 MG IR tablet    ROXICODONE    40 tablet    Take 1-2 tablets (5-10 mg) by mouth every 4 hours as needed for moderate to severe pain       polyethylene glycol Packet    MIRALAX/GLYCOLAX    24 packet    Take 17 g by mouth daily       potassium chloride SA 10 MEQ CR tablet    K-DUR/KLOR-CON M    180 tablet    Take 2 tablets (20 mEq) by mouth daily       senna-docusate 8.6-50 MG per tablet    SENOKOT-S;PERICOLACE    60 tablet    Take 1 tablet by mouth daily       solifenacin 10 MG tablet    VESICARE    60 tablet    Take 1 tablet (10 mg) by mouth daily       spironolactone 25 MG tablet    ALDACTONE    45 tablet    Take 1 tablet (25 mg) by mouth daily       vitamin D 2000 UNITS tablet     60 tablet    Take 2,000 Units by mouth daily

## 2017-03-09 NOTE — LETTER
3/9/2017       RE: Jazz Berman  [address]   [address]  [place] [address]     Dear Colleague,    Thank you for referring your patient, Jazz Berman, to the [facility]. Please see a copy of my visit note below.       3-9-17    I saw Mr. 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     Background  In brief, he was in his usual state of health but noted some black stools in 06/2016. In early July 2016 he was admitted for anemia and GI bleeding and had orthostatic symptoms. 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 Endoscopy before that showed some punctate oozing and it is possible that some of the bleeding was coming from the tumor. He is recovering well from surgery and right now he is feeling pretty good without much in the way of complaints. He does note, however, that has lost 30 pounds since surgery and has a rather low energy level which has not improved much since surgery. He does not get out of the house every day and walks only for about 15 minutes. He denies being bothered by shortness of breath right now though he has had that problem and is felt to likely have HFpEF. He does not go up stairs due to difficulty with his left leg which has a geo that was put in following a fracture. He has sleep apnea and uses a CPAP machine and also has a history of hypertension, depression, diabetes, cardiomegaly, and he was found to be in atrial fibrillation when he was hospitalized for the GI bleed.  -  Pathology report which showed 1 mitosis per 50 high-power fields, a tumor size of 15 cm and negative margins. 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   -    Interval history.    He is doing pretty well overall.  He does have a history of chronic congestive heart failure and is on multiple medications.   He had some tiny nonspecific lung nodules on the imaging.   A heart echo on 07/04/2016 showed a normal LV function, but the left ventricular  diastolic function could not be assessed.   Hes not very active but gets out most days.    A 10-point ROS is otherwise unremarkable.     -  Background PMH, FH, SH  His past history is notable for keloid formation, resection of a rectal polyp, and a crushing foot injury.   He feels that contrast dye created a bit of a rash that left some skin hyperpigmentation on his back.   He is currently single and lives alone. He stopped smoking in 2011, though he was a polysubstance abuser in the past. He no longer drinks alcohol. He is retired from working in a furniture store.   Family history is positive for diabetes and hypertension.   -    On exam he appeared comfortable with a quiet affect.   /83 (BP Location: Left arm, Patient Position: Chair, Cuff Size: Adult Regular)  Pulse (!) 44  Temp 97  F (36.1  C) (Oral)  Resp 18  Wt 120.8 kg (266 lb 5.1 oz)  SpO2 96%  BMI 38.21 kg/m2  HEENT: Normal oropharynx. No icterus.   LYMPH: No lymphadenopathy.   CHEST: Clear.   HEART: There was an irregularly irregular rhythm.   ABDOMEN: There was an obese abdomen with a well-healed surgical scar , no HSMT.   NEURO: Normal Romberg, cant heel walk w geo.      -  I reviewed his imaging and there is no suggestion of PD though he has some stable lung nodules.      -  No KIT mutation back yet.  We will resend.       It appears his recurrence risk is on the order of 10-15% over a few years.  I do not think that risk warrants the toxicities of Gleevec, especially given his multiple other problems and medications.     We will check on the sending his sample to [place] for KIT and reflex PDGFRA, SDH, etc., testing, as this might give us some further information.     He had an echo that showed no structural heart disease, and while he was felt to be at high risk for stroke with a CHADS2 score of 4, anticoagulation was planned to be held until a few months after surgery after documentation of no further GI bleeding.  He will f/u w his PCP on  that.   His atrial fibrillation, anticoagulation and other issues will be followed by his primary care team and Cardiology.     We will see him about 9-7 w CT and labs 2 d before.    All his questions were addressed and he will call if he has others.  -  Sean Freed M.D.  Professor  Hematology, Oncology and Transplantation

## 2017-03-09 NOTE — NURSING NOTE
"Jazz Berman is a 62 year old male who presents for:  Chief Complaint   Patient presents with     Oncology Clinic Visit     Return for GIST Tumor, CT results        Initial Vitals:  /83 (BP Location: Left arm, Patient Position: Chair, Cuff Size: Adult Regular)  Pulse (!) 44  Temp 97  F (36.1  C) (Oral)  Resp 18  Wt 120.8 kg (266 lb 5.1 oz)  SpO2 96%  BMI 38.21 kg/m2 Estimated body mass index is 38.21 kg/(m^2) as calculated from the following:    Height as of 2/22/17: 1.778 m (5' 10\").    Weight as of this encounter: 120.8 kg (266 lb 5.1 oz).. Body surface area is 2.44 meters squared. BP completed using cuff size: regular  No Pain (0) No LMP for male patient. Allergies and medications reviewed.     Medications: Medication refills not needed today.  Pharmacy name entered into [employer]: [id number] - [place], [place] - [address]    Comments:     6  minutes for nursing intake (face to face time)   Kiah Wynne MA        "

## 2017-03-14 ENCOUNTER — TELEPHONE (OUTPATIENT)
Dept: FAMILY MEDICINE | Facility: CLINIC | Age: 62
End: 2017-03-14

## 2017-03-21 PROBLEM — Z79.01 CURRENT USE OF LONG TERM ANTICOAGULATION: Status: ACTIVE | Noted: 2017-03-21

## 2017-03-21 PROBLEM — Z51.81 ANTICOAGULATION GOAL OF INR 2 TO 3: Status: ACTIVE | Noted: 2017-03-21

## 2017-03-21 PROBLEM — Z79.01 ANTICOAGULATION GOAL OF INR 2 TO 3: Status: ACTIVE | Noted: 2017-03-21

## 2017-03-22 NOTE — TELEPHONE ENCOUNTER
I have called Jazz Berman today to discuss his anticoagulation plan. He agrees to come this week to discuss in the clinic.  He will come on Friday 3/24/17.    Alexei Crump.D.

## 2017-03-22 NOTE — TELEPHONE ENCOUNTER
Discussed with PharmD regarding anticoagulation for patient. It has been >3 months since GI procedure with no evidence of bleeding.     CHADS2 score is 3 (unknown previous stroke) and AC is indicated. Had been on warfarin in past.     INR goal of 2-3.     Has been following Cardiology and Hem/Onc.     Will defer to PharmD recommendations regarding Lovenox vs Warfarin.     Rabia Carmona MD  [facility]  [phone]

## 2017-03-24 ENCOUNTER — OFFICE VISIT (OUTPATIENT)
Dept: PHARMACY | Facility: CLINIC | Age: 62
End: 2017-03-24

## 2017-03-24 VITALS
TEMPERATURE: 98.5 F | OXYGEN SATURATION: 98 % | SYSTOLIC BLOOD PRESSURE: 124 MMHG | DIASTOLIC BLOOD PRESSURE: 71 MMHG | HEART RATE: 50 BPM | RESPIRATION RATE: 20 BRPM

## 2017-03-24 DIAGNOSIS — K92.2 ACUTE GASTROINTESTINAL HEMORRHAGE: ICD-10-CM

## 2017-03-24 DIAGNOSIS — I48.20 CHRONIC ATRIAL FIBRILLATION (H): ICD-10-CM

## 2017-03-24 DIAGNOSIS — I51.7 CARDIOMEGALY: Primary | ICD-10-CM

## 2017-03-24 LAB — INR PPP: 1

## 2017-03-24 RX ORDER — OMEPRAZOLE 40 MG/1
40 CAPSULE, DELAYED RELEASE ORAL DAILY
Qty: 30 CAPSULE | Refills: 3 | Status: SHIPPED | OUTPATIENT
Start: 2017-03-24 | End: 2017-07-31

## 2017-03-24 RX ORDER — DABIGATRAN ETEXILATE 150 MG/1
CAPSULE ORAL
Qty: 60 CAPSULE | Refills: 2 | Status: SHIPPED | OUTPATIENT
Start: 2017-03-24 | End: 2017-04-24

## 2017-03-24 NOTE — MR AVS SNAPSHOT
After Visit Summary   3/24/2017    Jazz Berman    MRN: [id number]           Patient Information     Date Of Birth          1955        Visit Information        Provider Department      3/24/2017 1:20 PM [facility]        Today's Diagnoses     Cardiomegaly    -  1    Acute gastrointestinal hemorrhage        Chronic atrial fibrillation (H)          Care Instructions    MEDICATION COMMENTS FROM TODAY:  Suggestions:  -       Stop taking Aspirin  -       Begin taking Dabigatran 150 mg twice daily,  May start tonight  -       Begin taking the Omeprazole 40 mg once a day      Follow up:  -       With your primary care provider in 4-8 weeks  -            I offer these suggestions to him and his medical providers. I suggested Jazz make a follow-up appointment with his provider(s) to discuss these suggestions.      Any questions or concerns, please call [phone] or contact me via [employer].     Jayson Hernandez, PharmD  Medication Management Pharmacist          Follow-ups after your visit        Your next 10 appointments already scheduled     Apr 18, 2017 10:40 AM CDT   Return Visit with [facility] ([facility])    [address] [place] [address],  [address]  [facility] [address]   [phone]            Aug 17, 2017 11:00 AM CDT   Lab with  LAB    Health Lab ([facility])    [address] [place] [id number] [phone]            Aug 17, 2017 11:30 AM CDT   (Arrive by 11:15 AM)   CORE RETURN with Sheela Yee NP   [facility] ([facility])    [address] [place] [address]  3rd [facility] [id number] [phone]            Sep 05, 2017  9:00 AM CDT   LAB with  LAB    Health Lab ([facility])    [address] [place] [address]  1st [facility] [id number] [phone]           Patient  must bring picture ID.  Patient should be prepared to give a urine specimen  Please do not eat 10-12 hours before your appointment if you are coming in fasting for labs on lipids, cholesterol, or glucose (sugar).  Pregnant women should follow their Care Team instructions. Water with medications is okay. Do not drink coffee or other fluids.   If you have concerns about taking  your medications, please ask at office or if scheduling via [employer], send a message by clicking on Secure Messaging, Message Your Care Team.            Sep 05, 2017  9:40 AM CDT   (Arrive by 9:25 AM)   CT CHEST ABDOMEN PELVIS W/O & W CONTRAST with UCCT2   [facility] ([facility])    [address] [facility]  [address]st [facility] [address] [phone]           Please bring any scans or X-rays taken at other hospitals, if similar tests were done. Also bring a list of your medicines, including vitamins, minerals and over-the-counter drugs. It is safest to leave personal items at home.  Be sure to tell your doctor:   If you have any allergies.   If there s any chance you are pregnant.   If you are breastfeeding.   If you have any special needs.  You may have contrast for this exam. To prepare:   Do not eat or drink for 2 hours before your exam. If you need to take medicine, you may take it with small sips of water. (We may ask you to take liquid medicine as well.)   The day before your exam, drink extra fluids at least six 8-ounce glasses (unless your doctor tells you to restrict your fluids).  Patients over 70 or patients with diabetes or kidney problems:   If you haven t had a blood test (creatinine test) within the last 30 days, go to your clinic or Diagnostic Imaging Department for this test.  If you have diabetes:   If your kidney function is normal, continue taking your metformin (Avandamet, Glucophage, Glucovance, Metaglip) on the day of your exam.   If your kidney function is abnormal, wait 48  hours before restarting this medicine.  You will have oral contrast for this exam:   You will drink the contrast at home. Get this from your clinic or Diagnostic Imaging Department. Please follow the directions given.  Please wear loose clothing, such as a sweat suit or jogging clothes. Avoid snaps, zippers and other metal. We may ask you to undress and put on a hospital gown.  If you have any questions, please call the Imaging Department where you will have your exam.            Sep 07, 2017 10:00 AM CDT   (Arrive by 9:45 AM)   Return Visit with Sean Freed MD   [facility] ([facility])    [address] [address] [phone]              Future tests that were ordered for you today     Open Standing Orders        Priority Remaining Interval Expires Ordered    INR Routine 12/12  3/24/2018 3/24/2017    INR ([facility]) Routine 15/16  3/24/2018 3/24/2017            Who to contact     Please call your clinic at [phone] to:    Ask questions about your health    Make or cancel appointments    Discuss your medicines    Learn about your test results    Speak to your doctor   If you have compliments or concerns about an experience at your clinic, or if you wish to file a complaint, please contact [facility] Patient Relations at [phone] or email us at [email or web address]         Additional Information About Your Visit        [employer] Information     [employer] gives you secure access to your electronic health record. If you see a primary care provider, you can also send messages to your care team and make appointments. If you have questions, please call your primary care clinic.  If you do not have a primary care provider, please call [phone] and they will assist you.      [employer] is an electronic gateway that provides easy, online access to your medical records. With [employer], you can  request a clinic appointment, read your test results, renew a prescription or communicate with your care team.     To access your existing account, please contact your [facility] or call [phone] for assistance.        Care EveryWhere ID     This is your Care EveryWhere ID. This could be used by other organizations to access your [facility] medical records  [id number]         Blood Pressure from Last 3 Encounters:   03/09/17 144/83   02/22/17 122/85   02/17/17 130/83    Weight from Last 3 Encounters:   03/09/17 266 lb 5.1 oz (120.8 kg)   02/22/17 264 lb 9.6 oz (120 kg)   02/17/17 262 lb (118.8 kg)              We Performed the Following     INR ([facility])          Today's Medication Changes          These changes are accurate as of: 3/24/17  1:54 PM.  If you have any questions, ask your nurse or doctor.               Start taking these medicines.        Dose/Directions    dabigatran ANTICOAGULANT 150 MG Caps capsule   Commonly known as:  PRADAXA   Used for:  Cardiomegaly, Chronic atrial fibrillation (H)        Take 1 capsule (150 mg) by mouth twice daily. Store in original 's bottle or blister pack; use within 120 days of opening.   Quantity:  60 capsule   Refills:  2         These medicines have changed or have updated prescriptions.        Dose/Directions    omeprazole 40 MG capsule   Commonly known as:  priLOSEC   This may have changed:    - medication strength  - how much to take  - when to take this  - additional instructions   Used for:  Acute gastrointestinal hemorrhage        Dose:  40 mg   Take 1 capsule (40 mg) by mouth daily Take 30-60 minutes before a meal.   Quantity:  30 capsule   Refills:  3       solifenacin 10 MG tablet   Commonly known as:  VESICARE   This may have changed:  additional instructions   Used for:  Urinary urgency        Dose:  10 mg   Take 1 tablet (10 mg) by mouth daily   Quantity:  60 tablet   Refills:  3       vitamin D 2000 UNITS  tablet   This may have changed:  additional instructions   Used for:  Vitamin D deficiency        Dose:  2000 Units   Take 2,000 Units by mouth daily   Quantity:  60 tablet   Refills:  6         Stop taking these medicines if you haven't already. Please contact your care team if you have questions.     methylPREDNISolone 32 MG tablet   Commonly known as:  MEDROL           oxyCODONE 5 MG IR tablet   Commonly known as:  ROXICODONE           senna-docusate 8.6-50 MG per tablet   Commonly known as:  SENOKOT-S;PERICOLACE                Where to get your medicines      These medications were sent to [facility]/pharmacy #[id number] - [place], [place] - [address]  [address], [place] [address]     Phone:  [phone]     dabigatran ANTICOAGULANT 150 MG Caps capsule    omeprazole 40 MG capsule                Primary Care Provider Office Phone # Fax #    Rabia Carmona [phone]       [facility] [address]  [facility] [address]        Thank you!     Thank you for choosing [facility]  for your care. Our goal is always to provide you with excellent care. Hearing back from our patients is one way we can continue to improve our services. Please take a few minutes to complete the written survey that you may receive in the mail after your visit with us. Thank you!             Your Updated Medication List - Protect others around you: Learn how to safely use, store and throw away your medicines at www.disposemymeds.org.          This list is accurate as of: 3/24/17  1:54 PM.  Always use your most recent med list.                   Brand Name Dispense Instructions for use    acetaminophen 500 MG tablet    TYLENOL    100 tablet    Take 2 tablets (1,000 mg) by mouth 3 times daily       amLODIPine 10 MG tablet    NORVASC    30 tablet    Take 1 tablet (10 mg) by mouth daily       blood glucose monitoring lancets     1 Box    Use to test blood sugars 2 times daily  or as directed.       blood glucose monitoring test strip    no brand specified    100 each    Use to test blood sugars 2 times daily or as directed       dabigatran ANTICOAGULANT 150 MG Caps capsule    PRADAXA    60 capsule    Take 1 capsule (150 mg) by mouth twice daily. Store in original 's bottle or blister pack; use within 120 days of opening.       ferrous sulfate 325 (65 FE) MG tablet    IRON    90 tablet    Take 1 tablet (325 mg) by mouth 2 times daily       furosemide 40 MG tablet    LASIX    60 tablet    Take 1 tablet (40 mg) by mouth daily       lisinopril 20 MG tablet    PRINIVIL/ZESTRIL    180 tablet    Take 2 tablets (40 mg) by mouth daily       metFORMIN 850 MG tablet    GLUCOPHAGE    90 tablet    Take 1 tablet (850 mg) by mouth daily (with breakfast) AM       metoprolol 100 MG 24 hr tablet    TOPROL-XL    60 tablet    Take 2 tablets (200 mg) by mouth daily       omeprazole 40 MG capsule    priLOSEC    30 capsule    Take 1 capsule (40 mg) by mouth daily Take 30-60 minutes before a meal.       order for DME     1 Units    Equipment being ordered: BP cuff, large       polyethylene glycol Packet    MIRALAX/GLYCOLAX    24 packet    Take 17 g by mouth daily       potassium chloride SA 10 MEQ CR tablet    K-DUR/KLOR-CON M    180 tablet    Take 2 tablets (20 mEq) by mouth daily       solifenacin 10 MG tablet    VESICARE    60 tablet    Take 1 tablet (10 mg) by mouth daily       spironolactone 25 MG tablet    ALDACTONE    45 tablet    Take 1 tablet (25 mg) by mouth daily       vitamin D 2000 UNITS tablet     60 tablet    Take 2,000 Units by mouth daily

## 2017-03-24 NOTE — PATIENT INSTRUCTIONS
MEDICATION COMMENTS FROM TODAY:  Suggestions:  -       Stop taking Aspirin  -       Begin taking Dabigatran 150 mg twice daily,  May start tonight  -       Begin taking the Omeprazole 40 mg once a day      Follow up:  -       With your primary care provider in 4-8 weeks  -            I offer these suggestions to him and his medical providers. I suggested Jazz make a follow-up appointment with his provider(s) to discuss these suggestions.      Any questions or concerns, please call [phone] or contact me via [employer].     Jayson Hernandez, PharmD  Medication Management Pharmacist

## 2017-03-24 NOTE — Clinical Note
Dr. Carmona, We have started Dabigatran on [place] today.  I have sent the Rx and will call him in 2 weeks to check in but otherwise no follow up needed.  Thanks Jayson

## 2017-03-24 NOTE — PROGRESS NOTES
Clinical Pharmacy Note     Jazz was referred by Dr. Carmona for pharmacy services for MTM      Med List Review:  Discussed today medication indications, dosage and effectiveness.    Patient does not bring their medication to the visit today.  Discussed use of OTC meds today: NONE    Patient reported being compliant all of the time   Patient reports no side effects.    Subjective:  Jazz is here today to discuss anticoagulation options.  Jazz is a 63 yo man that was recently treated for a GI bleed and in the process of that work up was found to have Atrial Fibrillation.  Pt has a history of heart failure, htn DM and obesity.  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          1)   A fib.    Currently rate controlled with  Metoprolol 100 mg Qday.   ASA -  Was holding for a while.  Has restarted after the surgery.     2)   Jazz was unsure why he was taking iron.  We were not clear if this was left after his hospitalization, but he has been avoiding this medication.     3)DM:   Metformin 850 once a day  Lab Results   Component Value Date    A1C 5.5 02/22/2017    A1C 5.6 06/08/2016    A1C 6.1 03/30/2016    A1C 6.5 12/07/2015    A1C 6.2 01/28/2014          Objective:    /71 (BP Location: Left arm, Patient Position: Chair, Cuff Size: Adult Regular)  Pulse 50  Temp 98.5  F (36.9  C) (Oral)  Resp 20  SpO2 98%  BP Readings from Last 6 Encounters:   03/24/17 124/71   03/09/17 144/83   02/22/17 122/85   02/17/17 130/83   01/13/17 (!) 141/94   12/13/16 137/85     Estimated Creatinine Clearance: 105 mL/min (based on Cr of 0.95).  GFR Estimate   Date Value Ref Range Status   03/07/2017 81 >60 mL/min/1.7m2 Final     Comment:     Non  GFR Calc   02/17/2017 69 >60 mL/min/1.7m2 Final     Comment:     Non  GFR Calc   01/13/2017 65 >60 mL/min/1.7m2 Final     Comment:     Non  GFR Calc     GFR Estimate If Black   Date Value Ref  Range Status   03/07/2017 >90   GFR Calc   >60 mL/min/1.7m2 Final   02/17/2017 84 >60 mL/min/1.7m2 Final     Comment:      GFR Calc   01/13/2017 79 >60 mL/min/1.7m2 Final     Comment:      GFR Calc     /71 (BP Location: Left arm, Patient Position: Chair, Cuff Size: Adult Regular)  Pulse 50  Temp 98.5  F (36.9  C) (Oral)  Resp 20  SpO2 98%    Drug Therapy Assessment:  Drug therapy problems identified:       1. Atrial FIb  We had a long discussion about the different options for anticoagulation. He has elected to use a DOAC.  I have described how to use the product(s).  I have explained related adverse effects of the drug therapy to the patient today.  I have reviewed his insurance coveage and feel confident that this medication will be covered by his insurance plan.    - dabigatran ANTICOAGULANT (PRADAXA) 150 MG CAPS capsule; Take 1 capsule (150 mg) by mouth twice daily. Store in original 's bottle or blister pack; use within 120 days of opening.  Dispense: 60 capsule; Refill: 2    2. Acute gastrointestinal hemorrhage  Continue on PPI along with anticoagulation plan.  While this does not need to be present for the entire course of anticoag, it may be safe to have this on for the sort therm.     - omeprazole (PRILOSEC) 40 MG capsule; Take 1 capsule (40 mg) by mouth daily Take 30-60 minutes before a meal.  Dispense: 30 capsule; Refill: 3    All medications were reviewed and found to be indicated, effective, safe and convenient unless drug therapy problem identified as described above.        Drug Therapy Plan and Follow up:    Plan  1.   Chronic atrial fibrillation (H)  Follow up by phone in one week  - dabigatran ANTICOAGULANT (PRADAXA) 150 MG CAPS capsule; Take 1 capsule (150 mg) by mouth twice daily. Store in original 's bottle or blister pack; use within 120 days of opening.  Dispense: 60 capsule; Refill: 2    2. Acute  gastrointestinal hemorrhage    - omeprazole (PRILOSEC) 40 MG capsule; Take 1 capsule (40 mg) by mouth daily Take 30-60 minutes before a meal.  Dispense: 30 capsule; Refill: 3    3.        Follow up: via phone   Patient was provided with written instructions/medication list via AVS        Options for treatment and/or follow-up care were reviewed with the patient. Patient was engaged and actively involved in the decision making process.  Jazz Berman verbalized understanding of the options discussed and was satisfied with the final plan.      Dr. Carmona was provided my action  via routed note and Dr. Zarate was available for supervision during this visit and is the authorizing prescriber for this visit through the pharmacist collaborative practice agreement.    Jayson Hernandez, Pharm.D             Total Time: 30  # DTPs Identified: 1    Medical Condition 1: Anticoagulation, Goals of therapy: Not at goal, Drug Class: Anticoagulant - oral, Indication: Needs additional drug therapy,  ,  ,  , Intervention: Initiate drug, Verification: Patient Agreed - CPA   ,  ,  ,  ,  ,  ,  ,  ,     ,  ,  ,  ,  ,  ,  ,  ,     ,  ,  ,

## 2017-03-27 DIAGNOSIS — R39.15 URINARY URGENCY: ICD-10-CM

## 2017-03-28 RX ORDER — SOLIFENACIN SUCCINATE 10 MG/1
10 TABLET, FILM COATED ORAL DAILY
Qty: 60 TABLET | Refills: 3 | Status: SHIPPED | OUTPATIENT
Start: 2017-03-28 | End: 2017-10-23

## 2017-04-07 ENCOUNTER — TELEPHONE (OUTPATIENT)
Dept: PHARMACY | Facility: CLINIC | Age: 62
End: 2017-04-07

## 2017-04-07 NOTE — TELEPHONE ENCOUNTER
Pt called  Told that Viagra has been received by the clinic and it is ready for .  Pt will come today to .    Alexei Crump.D.

## 2017-04-20 DIAGNOSIS — D50.9 IRON DEFICIENCY ANEMIA, UNSPECIFIED IRON DEFICIENCY ANEMIA TYPE: Primary | ICD-10-CM

## 2017-04-20 NOTE — TELEPHONE ENCOUNTER
Request for medication refill:    Date of last visit at clinic: 2-22-17    Please complete refill if appropriate and CLOSE ENCOUNTER.    Closing the encounter signifies the refill is complete.    If refill has been denied, please complete the smart phrase .smirefuse and route it to the [facility] RN TRIAGE pool to inform the patient and the pharmacy.    Shira Nicole

## 2017-04-21 RX ORDER — FERROUS SULFATE 325(65) MG
325 TABLET ORAL 2 TIMES DAILY
Qty: 90 TABLET | Refills: 2 | Status: SHIPPED | OUTPATIENT
Start: 2017-04-21 | End: 2018-01-17

## 2017-04-21 RX ORDER — FERROUS SULFATE 325(65) MG
325 TABLET ORAL 2 TIMES DAILY
Qty: 90 TABLET | Refills: 2 | Status: SHIPPED | OUTPATIENT
Start: 2017-04-21 | End: 2017-04-21

## 2017-04-24 ENCOUNTER — OFFICE VISIT (OUTPATIENT)
Dept: FAMILY MEDICINE | Facility: CLINIC | Age: 62
End: 2017-04-24

## 2017-04-24 VITALS
DIASTOLIC BLOOD PRESSURE: 83 MMHG | HEIGHT: 70 IN | HEART RATE: 61 BPM | RESPIRATION RATE: 16 BRPM | TEMPERATURE: 98 F | SYSTOLIC BLOOD PRESSURE: 126 MMHG | OXYGEN SATURATION: 99 % | WEIGHT: 265.2 LBS | BODY MASS INDEX: 37.97 KG/M2

## 2017-04-24 DIAGNOSIS — Z51.81 ANTICOAGULATION GOAL OF INR 2 TO 3: ICD-10-CM

## 2017-04-24 DIAGNOSIS — Z51.81 ENCOUNTER FOR THERAPEUTIC DRUG MONITORING: ICD-10-CM

## 2017-04-24 DIAGNOSIS — Z00.00 ENCOUNTER FOR ROUTINE ADULT HEALTH EXAMINATION WITHOUT ABNORMAL FINDINGS: ICD-10-CM

## 2017-04-24 DIAGNOSIS — I48.0 PAROXYSMAL ATRIAL FIBRILLATION (H): ICD-10-CM

## 2017-04-24 DIAGNOSIS — Z79.01 ANTICOAGULATION GOAL OF INR 2 TO 3: ICD-10-CM

## 2017-04-24 DIAGNOSIS — I10 HYPERTENSION, ESSENTIAL: Primary | ICD-10-CM

## 2017-04-24 LAB
ANION GAP SERPL CALC-SCNC: 8 MMOL/L (ref 6–17)
CHLORIDE SERPLBLD-SCNC: 102.2 MMOL/L (ref 98–110)
CHOLEST SERPL-MCNC: 144 MG/DL
CO2 SERPL-SCNC: 28 MMOL/L (ref 20–32)
HDLC SERPL-MCNC: 43 MG/DL
LDLC SERPL CALC-MCNC: 85 MG/DL
NONHDLC SERPL-MCNC: 101 MG/DL
POTASSIUM SERPL-SCNC: 3.5 MMOL/DL (ref 3.3–4.5)
SODIUM SERPL-SCNC: 138.2 MMOL/L (ref 132.6–141.4)
TRIGL SERPL-MCNC: 80 MG/DL

## 2017-04-24 ASSESSMENT — ENCOUNTER SYMPTOMS
SORE THROAT: 0
ABDOMINAL PAIN: 0
ABDOMINAL DISTENTION: 0
VOMITING: 0
DYSURIA: 0
COLOR CHANGE: 0
FATIGUE: 0
BLOOD IN STOOL: 0
COUGH: 0
APPETITE CHANGE: 0
SINUS PRESSURE: 0
HEADACHES: 0
SHORTNESS OF BREATH: 0
WEAKNESS: 0
MYALGIAS: 0
CONSTIPATION: 0
HEMATURIA: 0
WHEEZING: 0
DIARRHEA: 0
CHILLS: 0
NAUSEA: 0
ARTHRALGIAS: 0
FEVER: 0

## 2017-04-24 ASSESSMENT — ANXIETY QUESTIONNAIRES
7. FEELING AFRAID AS IF SOMETHING AWFUL MIGHT HAPPEN: NOT AT ALL
3. WORRYING TOO MUCH ABOUT DIFFERENT THINGS: NOT AT ALL
1. FEELING NERVOUS, ANXIOUS, OR ON EDGE: NOT AT ALL
5. BEING SO RESTLESS THAT IT IS HARD TO SIT STILL: NOT AT ALL
2. NOT BEING ABLE TO STOP OR CONTROL WORRYING: NOT AT ALL
6. BECOMING EASILY ANNOYED OR IRRITABLE: NOT AT ALL
GAD7 TOTAL SCORE: 0
IF YOU CHECKED OFF ANY PROBLEMS ON THIS QUESTIONNAIRE, HOW DIFFICULT HAVE THESE PROBLEMS MADE IT FOR YOU TO DO YOUR WORK, TAKE CARE OF THINGS AT HOME, OR GET ALONG WITH OTHER PEOPLE: NOT DIFFICULT AT ALL

## 2017-04-24 ASSESSMENT — PATIENT HEALTH QUESTIONNAIRE - PHQ9: 5. POOR APPETITE OR OVEREATING: NOT AT ALL

## 2017-04-24 NOTE — PROGRESS NOTES
"      HPI:       Jazz Berman is a 62 year old who presents for the following  Patient presents with:  Hypertension: Follow up  Recheck Medication: potassium medication      Jazz states he is overall doing well. He feels the Pradaxa is making him \"feel funny\". He would like to discuss other AC options.     Hypertension Follow-up      Outpatient blood pressures are not being checked.     Chest Pain? :No     Low Salt Diet: no added salt    Daily NSAID Use?No     Did patient take their HTN pills today/last night as usual?  Yes         Adherence and Exercise  Medication side effects: yes: headaches and funny feeling from pradaxa  How often is a medication missed? Never  Are you able to follow the treatment plan? Yes  Exercise:walking  4-5 days/week for an average of 15-30 minutes     Problem, Medication and Allergy Lists were   reviewed and are current.     Patient Active Problem List    Diagnosis Date Noted     Impaired fasting glucose 05/07/2013     Priority: High     Hypertension, essential 08/30/2012     Priority: High     Class: Chronic     Use large BP cuff       Cardiomegaly 08/30/2012     Priority: High     GREG (obstructive sleep apnea) on CPAP 08/30/2012     Priority: High     Class: Chronic     Paroxysmal atrial fibrillation (H) 04/24/2017     Priority: Medium     Atrial fibrillation, rate controlled.       Anticoagulation goal of INR 2 to 3 03/21/2017     Priority: Medium     Current use of long term anticoagulation 03/21/2017     Priority: Medium     Will need to restart anticoagulation. Pharmacy consulted and will appreciate recs. 3/21/2017. 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 Favored to be a gastrointestinal stromal tumor. No evidence of obstruction. A few subcentimeter lymph nodes along the posterior margin.  No Remote metastases demonstrated.      Follow up: GI clinic at [facility], within 2-3 weeks for EUS and gastric tumor biopsy. GI nurse will and help schedule an appointment (Phone Nr: [phone])       Diverticulosis of large intestine 07/04/2016     Priority: Medium     Colonoscopy 7/2/16        Acute gastrointestinal hemorrhage 06/29/2016     Priority: Medium     Fracture of medial malleolus, left, closed 04/12/2014     Priority: Medium     Closed reduction and percutaneous fixation at [facility]       Closed fracture of part of tibia 04/12/2014     Priority: Medium     History of substance abuse 02/25/2014     Priority: Medium     Remote history of marijuana and crack cocaine. No IVDU       Mild recurrent major depression (H) 09/05/2013     Priority: Medium     Class: Chronic     CARDIOVASCULAR SCREENING; LDL GOAL LESS THAN 130 08/30/2013     Priority: Medium     August 30, 2013 CHD risk factors: +male age >45, +hypertension, +HDL <40, 10% [place] Risk Calculator       H/O colonoscopy with polypectomy 08/29/2013     Priority: Medium     Class: Chronic     7/2/16:  2 polyps and diverticulosis. Follow up per pathology report   - One 2 mm polyp in the ascending colon. Resected andretrieved.  - One 6 mm polyp in the transverse colon. Resected and retrieved. Clip was placed.   - Diverticulosis in the sigmoid colon.    August 29, 2013  History of adenomatous polyp s/p resection,  Recommendation repeat in one year.       Bilateral lower extremity edema 08/29/2013     Priority: Medium     BPH (benign prostatic hyperplasia) 08/29/2013     Priority: Medium     Erectile dysfunction 07/10/2013     Priority: Medium     Keloid 06/26/2013     Priority: Medium     Located on L ear s/p excision and R ear, follows with Dermatology       Psychosexual dysfunction with inhibited sexual excitement 08/30/2012     Priority: Medium     BMI greater than 40 08/30/2012     Priority: Medium     Class: Chronic     Ectropion 07/18/2011     Priority: Medium     Epiphora 07/18/2011     Priority: Medium     Mixed emotional features as adjustment reaction 10/06/2010     Priority: Medium   ,     Current Outpatient Prescriptions   Medication Sig Dispense Refill     rivaroxaban ANTICOAGULANT (XARELTO) 20 MG TABS tablet Take 1 tablet (20 mg) by mouth daily (with dinner) 30 tablet 5     ferrous sulfate (IRON) 325 (65 FE) MG tablet Take 1 tablet (325 mg) by mouth 2 times daily 90 tablet 2     solifenacin (VESICARE) 10 MG tablet Take 1 tablet (10 mg) by mouth daily AM 60 tablet 3     omeprazole (PRILOSEC) 40 MG capsule Take 1 capsule (40 mg) by mouth daily Take 30-60 minutes before a meal. 30 capsule 3     metFORMIN (GLUCOPHAGE) 850 MG tablet Take 1 tablet (850 mg) by mouth daily (with breakfast) AM 90 tablet 3     metoprolol (TOPROL-XL) 100 MG 24 hr tablet Take 2 tablets (200 mg) by mouth daily 60 tablet 11     lisinopril (PRINIVIL/ZESTRIL) 20 MG tablet Take 2 tablets (40 mg) by mouth daily 180 tablet 3     potassium chloride SA (K-DUR/KLOR-CON M) 10 MEQ CR tablet Take 2 tablets (20 mEq) by mouth daily 180 tablet 3     spironolactone (ALDACTONE) 25 MG tablet Take 1 tablet (25 mg) by mouth daily 45 tablet 3     amLODIPine (NORVASC) 10 MG tablet Take 1 tablet (10 mg) by mouth daily 30 tablet 3     furosemide (LASIX) 40 MG tablet Take 1 tablet (40 mg) by mouth daily 60 tablet 3     polyethylene glycol (MIRALAX/GLYCOLAX) packet  Take 17 g by mouth daily 24 packet 0     acetaminophen (TYLENOL) 500 MG tablet Take 2 tablets (1,000 mg) by mouth 3 times daily 100 tablet 0     Cholecalciferol (VITAMIN D) 2000 UNITS tablet Take 2,000 Units by mouth daily (Patient taking differently: Take 2,000 Units by mouth daily AM) 60 tablet 6     blood glucose monitoring (NO BRAND SPECIFIED) test strip Use to test blood sugars 2 times daily or as directed 100 each 3     blood glucose monitoring (ONE TOUCH DELICA) lancets Use to test blood sugars 2 times daily or as directed. 1 Box 11     order for DME Equipment being ordered: BP cuff, large 1 Units 0   ,     Allergies   Allergen Reactions     Dye [Contrast Dye] Rash     Dye left skin hyperpigmentation on his back     Patient is an established patient of this clinic.         Review of Systems:   Review of Systems   Constitutional: Negative for appetite change, chills, fatigue and fever.   HENT: Negative for congestion, sinus pressure and sore throat.    Eyes: Negative for visual disturbance.   Respiratory: Negative for cough, shortness of breath and wheezing.    Cardiovascular: Negative for chest pain and leg swelling.   Gastrointestinal: Negative for abdominal distention, abdominal pain, blood in stool, constipation, diarrhea, nausea and vomiting.   Genitourinary: Negative for dysuria and hematuria.   Musculoskeletal: Negative for arthralgias and myalgias.   Skin: Negative for color change and rash.   Neurological: Negative for weakness and headaches.             Physical Exam:   No data found.    Body mass index is 38.05 kg/(m^2).  Vitals were reviewed and were normal      Physical Exam   Constitutional: He is oriented to person, place, and time. He appears well-developed and well-nourished. No distress.   HENT:   Head: Normocephalic and atraumatic.   Eyes: Conjunctivae are normal. Right eye exhibits no discharge. Left eye exhibits no discharge. No scleral icterus.   Neck: Normal range of motion. Neck supple.    Cardiovascular: Normal rate and regular rhythm.  Exam reveals no gallop and no friction rub.    No murmur heard.  Pulmonary/Chest: Effort normal and breath sounds normal. No respiratory distress. He has no wheezes. He has no rales.   Abdominal: Soft. Bowel sounds are normal. He exhibits no distension and no mass. There is no tenderness. There is no rebound and no guarding.   Musculoskeletal: Normal range of motion. He exhibits edema (trace b/l peripheral edema. ).   Lymphadenopathy:     He has no cervical adenopathy.   Neurological: He is alert and oriented to person, place, and time.   Skin: Skin is warm and dry. No rash noted. He is not diaphoretic.   Psychiatric: He has a normal mood and affect.         Results:     Results for orders placed or performed in visit on 04/24/17   Electrolyte Panel ([facility])   Result Value Ref Range    Chloride 102.2 98.0 - 110.0 mmol/L    Carbon Dioxide 28.0 20.0 - 32.0 mmol/L    Potassium 3.5 3.3 - 4.5 mmol/dL    Sodium 138.2 132.6 - 141.4 mmol/L    Anion Gap 8.0 6.0 - 17.0   Lipid panel reflex to direct LDL - Results > 1hr   Result Value Ref Range    Cholesterol 144 <200 mg/dL    Triglycerides 80 <150 mg/dL    HDL Cholesterol 43 >39 mg/dL    LDL Cholesterol Calculated 85 <100 mg/dL    Non HDL Cholesterol 101 <130 mg/dL       Assessment and Plan     Jazz Berman is a 63 yo male with a h/o HTN, s/p gastric tumor resection, HFpEF and A.fib not on AC who was seen today for hypertension follow up, to discuss AC and need for potassium supplementation.     1. Encounter for therapeutic drug monitoring  - Electrolyte Panel ([facility])  - K is wnl today on his current K dosing. Would continue K without any changes.     2. Encounter for routine adult health examination without abnormal findings  - Lipid panel reflex to direct LDL - Results > 1hr    3. Hypertension, essential. Currently at goal.   - no changes to blood pressure medications  - recheck your potassium in about 2-4 weeks.  Otherwise continue to the supplements.     4. Anticoagulation (thinning of blood) goal of INR 2 to 3  5. Paroxysmal atrial fibrillation (H)  - rivaroxaban ANTICOAGULANT (XARELTO) 20 MG TABS tablet; Take 1 tablet (20 mg) by mouth daily (with dinner)  Dispense: 30 tablet; Refill: 5    - discussed with Jazz to continue taking the pradaxa until you start Xarelto. Then stop Pradaxa and start Xarelto. Counseled patient that they can't be taken on the same day.   - will likely need to go through the prior auth process to get the xarelto.       Medications Discontinued During This Encounter   Medication Reason     dabigatran ANTICOAGULANT (PRADAXA) 150 MG CAPS capsule      Options for treatment and follow-up care were reviewed with the patient. Jazz Berman  engaged in the decision making process and verbalized understanding of the options discussed and agreed with the final plan.    Rabia Carmona MD  [facility]  [phone]

## 2017-04-24 NOTE — NURSING NOTE
Diabetic Foot Screen:  Any complaints of increased pain or numbness ? No  Is there a foot ulcer now or a history of foot ulcer? No  Does the foot have an abnormal shape? No  Are the nails thick, too long or ingrown?  YES Nails are thick and white/yellow  Are there any redness or open areas? No         Sensation Testing done at all points on the diagram with monofilament     Right Foot: Sensation Normal at all points  Left Foot: Sensation Normal at all points     Risk Category: 0- No loss of protective sensation  Performed by Shira Nicole

## 2017-04-24 NOTE — PATIENT INSTRUCTIONS
Here is the plan from today's visit    1. Encounter for therapeutic drug monitoring  - Electrolyte Panel ([facility])    2. Encounter for routine adult health examination without abnormal findings  - Lipid panel reflex to direct LDL - Results > 1hr    3. Hypertension, essential  - no changes to your blood pressure medications  - recheck your potassium in about 2-4 weeks. Otherwise continue to the supplements.     4. Anticoagulation (thinning of blood) goal of INR 2 to 3  5. Paroxysmal atrial fibrillation (H)  - rivaroxaban ANTICOAGULANT (XARELTO) 20 MG TABS tablet; Take 1 tablet (20 mg) by mouth daily (with dinner)  Dispense: 30 tablet; Refill: 5    - continue taking the pradaxa until you get the Xarelto. Then stop Pradaxa and start Xarelto. They can't be taken on the same day.   - will likely need to go through the prior auth process to get the xarelto.     Please call or return to clinic if your symptoms don't go away.    Follow up plan  Please make a clinic appointment for follow up with me (TRINITY DICKEY) in 2-4  weeks for recheck your potassium.    Thank you for coming to [facility] today.  Lab Testing:  **If you had lab testing today and your results are reassuring or normal they will be mailed to you or sent through [employer] within 7 days.   **If the lab tests need quick action we will call you with the results.  The phone number we will call with results is # [phone] (home) . If this is not the best number please call our clinic and change the number.  Medication Refills:  If you need any refills please call your pharmacy and they will contact us.   If you need to  your refill at a new pharmacy, please contact the new pharmacy directly. The new pharmacy will help you get your medications transferred faster.   Scheduling:  If you have any concerns about today's visit or wish to schedule another appointment please call our office during normal business hours [phone] (8-5:00 M-F)  If  a referral was made to a [facility] and you don't get a call from central scheduling please call [phone].  If a Mammogram was ordered for you at [facility] call [phone] to schedule or change your appointment.  If you had an XRay/CT/Ultrasound/MRI ordered the number is [phone] to schedule or change your radiology appointment.   Medical Concerns:  If you have urgent medical concerns please call [phone] at any time of the day.  If you have a medical emergency please call [phone].

## 2017-04-24 NOTE — MR AVS SNAPSHOT
After Visit Summary   4/24/2017    Jazz Berman    MRN: [id number]           Patient Information     Date Of Birth          1955        Visit Information        Provider Department      4/24/2017 2:20 PM Rabia Dickey MD [facility]        Today's Diagnoses     Hypertension, essential    -  1    Encounter for therapeutic drug monitoring        Encounter for routine adult health examination without abnormal findings        Anticoagulation goal of INR 2 to 3        Paroxysmal atrial fibrillation (H)          Care Instructions    Here is the plan from today's visit    1. Encounter for therapeutic drug monitoring  - Electrolyte Panel ([facility])    2. Encounter for routine adult health examination without abnormal findings  - Lipid panel reflex to direct LDL - Results > 1hr    3. Hypertension, essential  - no changes to your blood pressure medications  - recheck your potassium in about 2-4 weeks. Otherwise continue to the supplements.     4. Anticoagulation (thinning of blood) goal of INR 2 to 3  5. Paroxysmal atrial fibrillation (H)  - rivaroxaban ANTICOAGULANT (XARELTO) 20 MG TABS tablet; Take 1 tablet (20 mg) by mouth daily (with dinner)  Dispense: 30 tablet; Refill: 5    - continue taking the pradaxa until you get the Xarelto. Then stop Pradaxa and start Xarelto. They can't be taken on the same day.   - will likely need to go through the prior auth process to get the xarelto.     Please call or return to clinic if your symptoms don't go away.    Follow up plan  Please make a clinic appointment for follow up with me (RABIA DICKEY) in 2-4  weeks for recheck your potassium.    Thank you for coming to [facility] today.  Lab Testing:  **If you had lab testing today and your results are reassuring or normal they will be mailed to you or sent through [employer] within 7 days.   **If the lab tests need quick action we will call you with the results.  The phone number  we will call with results is # [phone] (home) . If this is not the best number please call our clinic and change the number.  Medication Refills:  If you need any refills please call your pharmacy and they will contact us.   If you need to  your refill at a new pharmacy, please contact the new pharmacy directly. The new pharmacy will help you get your medications transferred faster.   Scheduling:  If you have any concerns about today's visit or wish to schedule another appointment please call our office during normal business hours [phone] (8-5:00 M-F)  If a referral was made to a [facility] and you don't get a call from central scheduling please call [phone].  If a Mammogram was ordered for you at [facility] call [phone] to schedule or change your appointment.  If you had an XRay/CT/Ultrasound/MRI ordered the number is [phone] to schedule or change your radiology appointment.   Medical Concerns:  If you have urgent medical concerns please call [phone] at any time of the day.  If you have a medical emergency please call [phone].          Follow-ups after your visit        Your next 10 appointments already scheduled     Aug 17, 2017 11:00 AM CDT   Lab with [facility] ([facility])    [address] [place] [id number] [phone]            Aug 17, 2017 11:30 AM CDT   (Arrive by 11:15 AM)   CORE RETURN with [facility] ([facility])    [address] [place] [id number] [phone]            Sep 05, 2017  9:00 AM CDT   LAB with  LAB   [facility] ([facility])    [address] [place] [address] [phone]           Patient must bring picture ID.  Patient should be prepared to give a urine specimen  Please do not eat 10-12 hours before your  appointment if you are coming in fasting for labs on lipids, cholesterol, or glucose (sugar).  Pregnant women should follow their Care Team instructions. Water with medications is okay. Do not drink coffee or other fluids.   If you have concerns about taking  your medications, please ask at office or if scheduling via [employer], send a message by clicking on Secure Messaging, Message Your Care Team.            Sep 05, 2017  9:40 AM CDT   (Arrive by 9:25 AM)   CT CHEST ABDOMEN PELVIS W/O & W CONTRAST with UCCT2   [facility] ([facility])    [address] [address] [phone]           Please bring any scans or X-rays taken at other hospitals, if similar tests were done. Also bring a list of your medicines, including vitamins, minerals and over-the-counter drugs. It is safest to leave personal items at home.  Be sure to tell your doctor:   If you have any allergies.   If there s any chance you are pregnant.   If you are breastfeeding.   If you have any special needs.  You may have contrast for this exam. To prepare:   Do not eat or drink for 2 hours before your exam. If you need to take medicine, you may take it with small sips of water. (We may ask you to take liquid medicine as well.)   The day before your exam, drink extra fluids at least six 8-ounce glasses (unless your doctor tells you to restrict your fluids).  Patients over 70 or patients with diabetes or kidney problems:   If you haven t had a blood test (creatinine test) within the last 30 days, go to your clinic or Diagnostic Imaging Department for this test.  If you have diabetes:   If your kidney function is normal, continue taking your metformin (Avandamet, Glucophage, Glucovance, Metaglip) on the day of your exam.   If your kidney function is abnormal, wait 48 hours before restarting this medicine.  You will have oral contrast for this exam:   You will drink the contrast at  home. Get this from your clinic or Diagnostic Imaging Department. Please follow the directions given.  Please wear loose clothing, such as a sweat suit or jogging clothes. Avoid snaps, zippers and other metal. We may ask you to undress and put on a hospital gown.  If you have any questions, please call the Imaging Department where you will have your exam.            Sep 07, 2017 10:00 AM CDT   (Arrive by 9:45 AM)   Return Visit with Sean Freed MD   [facility] ([facility])    [address] [address] [phone]              Who to contact     Please call your clinic at [phone] to:    Ask questions about your health    Make or cancel appointments    Discuss your medicines    Learn about your test results    Speak to your doctor   If you have compliments or concerns about an experience at your clinic, or if you wish to file a complaint, please contact [facility] Patient Relations at [phone] or email us at [email or web address]         Additional Information About Your Visit        [employer] Information     [employer] gives you secure access to your electronic health record. If you see a primary care provider, you can also send messages to your care team and make appointments. If you have questions, please call your primary care clinic.  If you do not have a primary care provider, please call [phone] and they will assist you.      iLink is an electronic gateway that provides easy, online access to your medical records. With iLink, you can request a clinic appointment, read your test results, renew a prescription or communicate with your care team.     To access your existing account, please contact your [facility] or call [phone] for assistance.        Care EveryWhere ID     This is your Care EveryWhere ID. This could be used by other  "organizations to access your [facility] medical records  [id number]        Your Vitals Were     Pulse Temperature Respirations Height Pulse Oximetry BMI (Body Mass Index)    61 98  F (36.7  C) (Oral) 16 5' 10\" (177.8 cm) 99% 38.05 kg/m2       Blood Pressure from Last 3 Encounters:   04/24/17 126/83   03/24/17 124/71   03/09/17 144/83    Weight from Last 3 Encounters:   04/24/17 265 lb 3.2 oz (120.3 kg)   03/09/17 266 lb 5.1 oz (120.8 kg)   02/22/17 264 lb 9.6 oz (120 kg)              We Performed the Following     Electrolyte Panel ([facility])     Lipid panel reflex to direct LDL - Results > 1hr          Today's Medication Changes          These changes are accurate as of: 4/24/17  3:38 PM.  If you have any questions, ask your nurse or doctor.               Start taking these medicines.        Dose/Directions    rivaroxaban ANTICOAGULANT 20 MG Tabs tablet   Commonly known as:  XARELTO   Used for:  Paroxysmal atrial fibrillation (H)   Started by:  Rabia Carmona MD        Dose:  20 mg   Take 1 tablet (20 mg) by mouth daily (with dinner)   Quantity:  30 tablet   Refills:  5         These medicines have changed or have updated prescriptions.        Dose/Directions    vitamin D 2000 UNITS tablet   This may have changed:  additional instructions   Used for:  Vitamin D deficiency        Dose:  2000 Units   Take 2,000 Units by mouth daily   Quantity:  60 tablet   Refills:  6         Stop taking these medicines if you haven't already. Please contact your care team if you have questions.     dabigatran ANTICOAGULANT 150 MG Caps capsule   Commonly known as:  PRADAXA   Stopped by:  Rabia Carmona MD                Where to get your medicines      These medications were sent to [id number] - [place], [place] - [address]  [address], [place] [address]     Phone:  [phone]     rivaroxaban ANTICOAGULANT 20 MG Tabs tablet                Primary Care Provider Office Phone # Fax #    " Rabia Carmona [phone]       [facility] [address]  [facility] [address]        Thank you!     Thank you for choosing [facility]  for your care. Our goal is always to provide you with excellent care. Hearing back from our patients is one way we can continue to improve our services. Please take a few minutes to complete the written survey that you may receive in the mail after your visit with us. Thank you!             Your Updated Medication List - Protect others around you: Learn how to safely use, store and throw away your medicines at www.disposemymeds.org.          This list is accurate as of: 4/24/17  3:38 PM.  Always use your most recent med list.                   Brand Name Dispense Instructions for use    acetaminophen 500 MG tablet    TYLENOL    100 tablet    Take 2 tablets (1,000 mg) by mouth 3 times daily       amLODIPine 10 MG tablet    NORVASC    30 tablet    Take 1 tablet (10 mg) by mouth daily       blood glucose monitoring lancets     1 Box    Use to test blood sugars 2 times daily or as directed.       blood glucose monitoring test strip    no brand specified    100 each    Use to test blood sugars 2 times daily or as directed       ferrous sulfate 325 (65 FE) MG tablet    IRON    90 tablet    Take 1 tablet (325 mg) by mouth 2 times daily       furosemide 40 MG tablet    LASIX    60 tablet    Take 1 tablet (40 mg) by mouth daily       lisinopril 20 MG tablet    PRINIVIL/ZESTRIL    180 tablet    Take 2 tablets (40 mg) by mouth daily       metFORMIN 850 MG tablet    GLUCOPHAGE    90 tablet    Take 1 tablet (850 mg) by mouth daily (with breakfast) AM       metoprolol 100 MG 24 hr tablet    TOPROL-XL    60 tablet    Take 2 tablets (200 mg) by mouth daily       omeprazole 40 MG capsule    priLOSEC    30 capsule    Take 1 capsule (40 mg) by mouth daily Take 30-60 minutes before a meal.       order for DME     1 Units    Equipment being  ordered: BP cuff, large       polyethylene glycol Packet    MIRALAX/GLYCOLAX    24 packet    Take 17 g by mouth daily       potassium chloride SA 10 MEQ CR tablet    K-DUR/KLOR-CON M    180 tablet    Take 2 tablets (20 mEq) by mouth daily       rivaroxaban ANTICOAGULANT 20 MG Tabs tablet    XARELTO    30 tablet    Take 1 tablet (20 mg) by mouth daily (with dinner)       solifenacin 10 MG tablet    VESICARE    60 tablet    Take 1 tablet (10 mg) by mouth daily AM       spironolactone 25 MG tablet    ALDACTONE    45 tablet    Take 1 tablet (25 mg) by mouth daily       vitamin D 2000 UNITS tablet     60 tablet    Take 2,000 Units by mouth daily

## 2017-04-24 NOTE — PROGRESS NOTES
Preceptor Attestation:   Patient seen and discussed with the resident. Assessment and plan reviewed with resident and agreed upon.   Supervising Physician:  Florin Glasgow MD  [facility]

## 2017-04-25 ASSESSMENT — PATIENT HEALTH QUESTIONNAIRE - PHQ9: SUM OF ALL RESPONSES TO PHQ QUESTIONS 1-9: 0

## 2017-04-25 ASSESSMENT — ANXIETY QUESTIONNAIRES: GAD7 TOTAL SCORE: 0

## 2017-05-04 ENCOUNTER — MEDICAL CORRESPONDENCE (OUTPATIENT)
Dept: HEALTH INFORMATION MANAGEMENT | Facility: CLINIC | Age: 62
End: 2017-05-04

## 2017-05-12 ENCOUNTER — DOCUMENTATION ONLY (OUTPATIENT)
Dept: FAMILY MEDICINE | Facility: CLINIC | Age: 62
End: 2017-05-12

## 2017-05-12 DIAGNOSIS — I50.30 DIASTOLIC CONGESTIVE HEART FAILURE, UNSPECIFIED CONGESTIVE HEART FAILURE CHRONICITY: ICD-10-CM

## 2017-05-12 RX ORDER — FUROSEMIDE 40 MG
40 TABLET ORAL DAILY
Qty: 60 TABLET | Refills: 3 | Status: SHIPPED | OUTPATIENT
Start: 2017-05-12 | End: 2017-12-12

## 2017-05-12 NOTE — TELEPHONE ENCOUNTER
Date of last visit at clinic: 4-24-17    Please complete refill and CLOSE ENCOUNTER.  Closing the encounter signifies the refill is complete.

## 2017-05-12 NOTE — PROGRESS NOTES
"When opening a documentation only encounter, be sure to enter in \"Chief Complaint\" Forms and in \" Comments\" Title of form, description if needed.    Form received via: Fax  Form now resides in: Provider Ready    Form sent out via: Fax  Patient informed: No  Output date: 5/11/17    Form received by recipient via fax confirmation  Please close the encounter.    "

## 2017-05-15 ENCOUNTER — TELEPHONE (OUTPATIENT)
Dept: FAMILY MEDICINE | Facility: CLINIC | Age: 62
End: 2017-05-15

## 2017-05-15 NOTE — TELEPHONE ENCOUNTER
[facility] phone call message- patient requesting a refill:    Full Medication Name:  Viagra         Additional Comments: Pt requested a medication refill.     OK to leave a message on voice mail? Yes    Primary language: English      needed? No    Call taken on May 15, 2017 at 12:25 PM by Kacy Angeles

## 2017-05-19 NOTE — TELEPHONE ENCOUNTER
Med ordered  Will be filled June 3rd.  Order number: [id number]    Pt was called and informed.        Brittany CrumpD.

## 2017-06-05 DIAGNOSIS — I50.32 CHRONIC DIASTOLIC HEART FAILURE (H): ICD-10-CM

## 2017-06-05 RX ORDER — SPIRONOLACTONE 25 MG/1
25 TABLET ORAL DAILY
Qty: 90 TABLET | Refills: 3 | Status: SHIPPED | OUTPATIENT
Start: 2017-06-05 | End: 2018-02-14

## 2017-06-05 NOTE — TELEPHONE ENCOUNTER
Request for medication refill:    Date of last visit at clinic: 04/24/2017    Please complete refill if appropriate and CLOSE ENCOUNTER.    Closing the encounter signifies the refill is complete.    If refill has been denied, please complete the smart phrase .smirefuse and route it to the [facility] RN TRIAGE pool to inform the patient and the pharmacy.    Aida Pat, CMA

## 2017-06-07 ENCOUNTER — OFFICE VISIT (OUTPATIENT)
Dept: FAMILY MEDICINE | Facility: CLINIC | Age: 62
End: 2017-06-07

## 2017-06-07 VITALS
BODY MASS INDEX: 37.94 KG/M2 | TEMPERATURE: 98.3 F | OXYGEN SATURATION: 98 % | SYSTOLIC BLOOD PRESSURE: 133 MMHG | DIASTOLIC BLOOD PRESSURE: 91 MMHG | WEIGHT: 264.4 LBS | HEART RATE: 66 BPM | RESPIRATION RATE: 16 BRPM

## 2017-06-07 DIAGNOSIS — Z79.01 ANTICOAGULATION GOAL OF INR 2 TO 3: ICD-10-CM

## 2017-06-07 DIAGNOSIS — R73.01 IMPAIRED FASTING GLUCOSE: ICD-10-CM

## 2017-06-07 DIAGNOSIS — G47.33 OSA (OBSTRUCTIVE SLEEP APNEA): Primary | ICD-10-CM

## 2017-06-07 DIAGNOSIS — I10 HYPERTENSION, ESSENTIAL: ICD-10-CM

## 2017-06-07 DIAGNOSIS — I48.0 PAROXYSMAL ATRIAL FIBRILLATION (H): ICD-10-CM

## 2017-06-07 DIAGNOSIS — Z79.01 CURRENT USE OF LONG TERM ANTICOAGULATION: ICD-10-CM

## 2017-06-07 DIAGNOSIS — Z51.81 ANTICOAGULATION GOAL OF INR 2 TO 3: ICD-10-CM

## 2017-06-07 LAB — HBA1C MFR BLD: 5.8 % (ref 4.1–5.7)

## 2017-06-07 NOTE — PATIENT INSTRUCTIONS
Here is the plan from today's visit    1. GREG (obstructive sleep apnea) on CPAP  - continue to use the CPAP    2. Hypertension, essential  - no changes to medications today  - I will talk to Aydee Carlton, , to see how we can get you the BP cuff.     3. Paroxysmal atrial fibrillation (H)  4. Anticoagulation goal of INR 2 to 3  5. Current use of long term anticoagulation  - continue taking the xarelto.     6. Impaired fasting glucose  - OPHTHALMOLOGY ADULT REFERRAL  - Hemoglobin A1c ([facility])      Please call or return to clinic if your symptoms don't go away.    Follow up plan  Please make a clinic appointment for follow up with me (TRINITY DICKEY) in 2-3  months for follow up.    Thank you for coming to [facility] today.  Lab Testing:  **If you had lab testing today and your results are reassuring or normal they will be mailed to you or sent through [employer] within 7 days.   **If the lab tests need quick action we will call you with the results.  The phone number we will call with results is # [phone] (home) . If this is not the best number please call our clinic and change the number.  Medication Refills:  If you need any refills please call your pharmacy and they will contact us.   If you need to  your refill at a new pharmacy, please contact the new pharmacy directly. The new pharmacy will help you get your medications transferred faster.   Scheduling:  If you have any concerns about today's visit or wish to schedule another appointment please call our office during normal business hours [phone] (8-5:00 M-F)  If a referral was made to a [facility] and you don't get a call from central scheduling please call [phone].  If a Mammogram was ordered for you at [facility] call [phone] to schedule or change your appointment.  If you had an XRay/CT/Ultrasound/MRI ordered the number is [phone] to schedule or change your radiology  appointment.   Medical Concerns:  If you have urgent medical concerns please call [phone] at any time of the day.  If you have a medical emergency please call [phone].

## 2017-06-07 NOTE — PROGRESS NOTES
HPI:       Jazz Berman is a 62 year old who presents for the following  Patient presents with:  Recheck Medication: f/u on meds    On viagra as needed. Has been on it for quite some time. Uses special program to help with cost.     Diabetes Follow-up    Patient is checking blood sugars: once daily.  Results are as follows:         am - 98-120s         -Last A1C was   Lab Results   Component Value Date    A1C 5.5 02/22/2017    A1C 5.6 06/08/2016    A1C 6.1 03/30/2016    A1C 6.5 12/07/2015    A1C 6.2 01/28/2014        Diabetic concerns: None    Chest Pain or exercise related calf pain (claudication):no     Symptoms of hypoglycemia (low blood sugar): none     Paresthesias (numbness or burning in feet) or sores: No     Diabetic eye exam within the last year?: No      Hypertension Follow-up      Outpatient blood pressures are being checked at home.  Results are 130s/80-90s.    Chest Pain? :No     Low Salt Diet: no added salt    Daily NSAID Use?No     Did patient take their HTN pills today/last night as usual?  Yes         Adherence and Exercise  Medication side effects: no  How often is a medication missed? Never, has home RN set up meds.   Are you able to follow the treatment plan? Yes  Exercise:walking  6-7 days/week for an average of 15-30 minutes      Needs BP cuff at home. Using [employer] [address], [place] [address]. [facility] [employer]. [phone], Fax [phone].   THOMAS LindoW ,     Discussed xarelto risks and benefits. Possibly switching if needed. Not interested in warfarin with frequent INR checks.     Problem, Medication and Allergy Lists were   reviewed and are current.     Patient Active Problem List    Diagnosis Date Noted     Impaired fasting glucose 05/07/2013     Priority: High     Hypertension, essential 08/30/2012     Priority: High     Class: Chronic     Use large BP cuff       Cardiomegaly 08/30/2012     Priority: High     GREG (obstructive  sleep apnea) on CPAP 08/30/2012     Priority: High     Class: Chronic     Paroxysmal atrial fibrillation (H) 04/24/2017     Priority: Medium     Atrial fibrillation, rate controlled.       Anticoagulation goal of INR 2 to 3 03/21/2017     Priority: Medium     Current use of long term anticoagulation 03/21/2017     Priority: Medium     Will need to restart anticoagulation. Pharmacy consulted and will appreciate recs. 3/21/2017. 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 Favored to be a gastrointestinal stromal tumor. No evidence of obstruction. A few subcentimeter lymph nodes along the posterior margin.  No Remote metastases demonstrated.      Follow up: GI clinic at [facility], within 2-3 weeks for EUS and gastric tumor biopsy. GI nurse will and help schedule an appointment (Phone Nr: [phone])       Diverticulosis of large intestine 07/04/2016     Priority: Medium     Colonoscopy 7/2/16        Acute gastrointestinal hemorrhage 06/29/2016     Priority: Medium     Fracture of medial malleolus, left, closed 04/12/2014     Priority: Medium     Closed reduction and percutaneous fixation at [facility]       Closed fracture of part of tibia 04/12/2014     Priority: Medium     History of substance  abuse 02/25/2014     Priority: Medium     Remote history of marijuana and crack cocaine. No IVDU       Mild recurrent major depression (H) 09/05/2013     Priority: Medium     Class: Chronic     CARDIOVASCULAR SCREENING; LDL GOAL LESS THAN 130 08/30/2013     Priority: Medium     August 30, 2013 CHD risk factors: +male age >45, +hypertension, +HDL <40, 10% [place] Risk Calculator       H/O colonoscopy with polypectomy 08/29/2013     Priority: Medium     Class: Chronic     7/2/16:  2 polyps and diverticulosis. Follow up per pathology report   - One 2 mm polyp in the ascending colon. Resected andretrieved.  - One 6 mm polyp in the transverse colon. Resected and retrieved. Clip was placed.   - Diverticulosis in the sigmoid colon.    August 29, 2013  History of adenomatous polyp s/p resection, Recommendation repeat in one year.       Bilateral lower extremity edema 08/29/2013     Priority: Medium     BPH (benign prostatic hyperplasia) 08/29/2013     Priority: Medium     Erectile dysfunction 07/10/2013     Priority: Medium     Keloid 06/26/2013     Priority: Medium     Located on L ear s/p excision and R ear, follows with Dermatology       Psychosexual dysfunction with inhibited sexual excitement 08/30/2012     Priority: Medium     BMI greater than 40 08/30/2012     Priority: Medium     Class: Chronic     Ectropion 07/18/2011     Priority: Medium     Epiphora 07/18/2011     Priority: Medium     Mixed emotional features as adjustment reaction 10/06/2010     Priority: Medium   ,     Current Outpatient Prescriptions   Medication Sig Dispense Refill     spironolactone (ALDACTONE) 25 MG tablet Take 1 tablet (25 mg) by mouth daily 90 tablet 3     furosemide (LASIX) 40 MG tablet Take 1 tablet (40 mg) by mouth daily 60 tablet 3     rivaroxaban ANTICOAGULANT (XARELTO) 20 MG TABS tablet Take 1 tablet (20 mg) by mouth daily (with dinner) 30 tablet 5     ferrous sulfate (IRON) 325 (65 FE) MG tablet Take 1 tablet (325 mg) by mouth 2  times daily 90 tablet 2     solifenacin (VESICARE) 10 MG tablet Take 1 tablet (10 mg) by mouth daily AM 60 tablet 3     omeprazole (PRILOSEC) 40 MG capsule Take 1 capsule (40 mg) by mouth daily Take 30-60 minutes before a meal. 30 capsule 3     metFORMIN (GLUCOPHAGE) 850 MG tablet Take 1 tablet (850 mg) by mouth daily (with breakfast) AM 90 tablet 3     metoprolol (TOPROL-XL) 100 MG 24 hr tablet Take 2 tablets (200 mg) by mouth daily 60 tablet 11     lisinopril (PRINIVIL/ZESTRIL) 20 MG tablet Take 2 tablets (40 mg) by mouth daily 180 tablet 3     potassium chloride SA (K-DUR/KLOR-CON M) 10 MEQ CR tablet Take 2 tablets (20 mEq) by mouth daily 180 tablet 3     amLODIPine (NORVASC) 10 MG tablet Take 1 tablet (10 mg) by mouth daily 30 tablet 3     polyethylene glycol (MIRALAX/GLYCOLAX) packet Take 17 g by mouth daily 24 packet 0     acetaminophen (TYLENOL) 500 MG tablet Take 2 tablets (1,000 mg) by mouth 3 times daily 100 tablet 0     Cholecalciferol (VITAMIN D) 2000 UNITS tablet Take 2,000 Units by mouth daily (Patient taking differently: Take 2,000 Units by mouth daily AM) 60 tablet 6     blood glucose monitoring (NO BRAND SPECIFIED) test strip Use to test blood sugars 2 times daily or as directed 100 each 3     blood glucose monitoring (ONE TOUCH DELICA) lancets Use to test blood sugars 2 times daily or as directed. 1 Box 11     order for DME Equipment being ordered: BP cuff, large 1 Units 0   ,     Allergies   Allergen Reactions     Dye [Contrast Dye] Rash     Dye left skin hyperpigmentation on his back     Patient is an established patient of this clinic.         Review of Systems:   Review of Systems   Constitutional: Negative for appetite change, chills, fatigue and fever.   HENT: Negative for congestion, sinus pressure and sore throat.    Eyes: Negative for visual disturbance.   Respiratory: Negative for cough, shortness of breath and wheezing.    Cardiovascular: Negative for chest pain and leg swelling.    Gastrointestinal: Negative for abdominal distention, abdominal pain, blood in stool, constipation, diarrhea, nausea and vomiting.   Genitourinary: Negative for dysuria and hematuria.   Musculoskeletal: Negative for arthralgias and myalgias.   Skin: Negative for color change and rash.   Neurological: Negative for weakness and headaches.             Physical Exam:   Patient Vitals for the past 24 hrs:   BP Temp Temp src Pulse Resp SpO2 Weight   06/07/17 1606 (!) 133/91 98.3  F (36.8  C) Oral 66 16 98 % 264 lb 6.4 oz (119.9 kg)     Body mass index is 37.94 kg/(m^2).  Vital signs normal except elevated DBP.     Physical Exam   Constitutional: He is oriented to person, place, and time. He appears well-developed and well-nourished. No distress.   HENT:   Head: Normocephalic and atraumatic.   Eyes: Conjunctivae are normal. Right eye exhibits no discharge. Left eye exhibits no discharge. No scleral icterus.   Neck: Normal range of motion. Neck supple.   Cardiovascular: Normal rate and regular rhythm.  Exam reveals no gallop and no friction rub.    No murmur heard.  Pulmonary/Chest: Effort normal and breath sounds normal. No respiratory distress. He has no wheezes. He has no rales.   Abdominal: Soft. Bowel sounds are normal. He exhibits no distension and no mass. There is no tenderness. There is no rebound and no guarding.   Musculoskeletal: Normal range of motion. He exhibits edema (trace b/l peripheral edema. ).   Lymphadenopathy:     He has no cervical adenopathy.   Neurological: He is alert and oriented to person, place, and time.   Skin: Skin is warm and dry. No rash noted. He is not diaphoretic.   Psychiatric: He has a normal mood and affect.         Results:     Results for orders placed or performed in visit on 06/07/17   Hemoglobin A1c ([facility])   Result Value Ref Range    Hemoglobin A1C 5.8 (H) 4.1 - 5.7 %       Assessment and Plan     Jazz Berman is a 63 yo male with a h/o HTN, s/p gastric tumor resection,  HFpEF and A.fib on xarelto who was seen today for chronic disease follow up.    1. GREG (obstructive sleep apnea) on CPAP  - continue to use the CPAP    2. Hypertension, essential  - no changes to medications today  - Will talk to Aydee Carlton, , to see how we can get patient the BP cuff.   - copied business card regarding how to get BP cuff and placed in Aydee's mailbox.     3. Paroxysmal atrial fibrillation (H)  4. Anticoagulation goal of INR 2 to 3  5. Current use of long term anticoagulation  - continue taking the xarelto.   - discussed risks and benefits of other anticoagulants vs current xarelto. He chose to continue xarelto.     6. Impaired fasting glucose  - OPHTHALMOLOGY ADULT REFERRAL, has been >1yr since last eye exam  - Hemoglobin A1c ([facility]), see above.     There are no discontinued medications.  Options for treatment and follow-up care were reviewed with the patient. aJzz Berman  engaged in the decision making process and verbalized understanding of the options discussed and agreed with the final plan.    Rabia Carmona MD  [facility]  [phone]

## 2017-06-07 NOTE — MR AVS SNAPSHOT
After Visit Summary   6/7/2017    Jazz Berman    MRN: [id number]           Patient Information     Date Of Birth          1955        Visit Information        Provider Department      6/7/2017 4:00 PM Rabia Dickey MD [facility]        Today's Diagnoses     GREG (obstructive sleep apnea) on CPAP    -  1    Hypertension, essential        Paroxysmal atrial fibrillation (H)        Anticoagulation goal of INR 2 to 3        Current use of long term anticoagulation        Impaired fasting glucose          Care Instructions    Here is the plan from today's visit    1. GREG (obstructive sleep apnea) on CPAP  - continue to use the CPAP    2. Hypertension, essential  - no changes to medications today  - I will talk to Aydee Carlton, , to see how we can get you the BP cuff.     3. Paroxysmal atrial fibrillation (H)  4. Anticoagulation goal of INR 2 to 3  5. Current use of long term anticoagulation  - continue taking the xarelto.     6. Impaired fasting glucose  - OPHTHALMOLOGY ADULT REFERRAL  - Hemoglobin A1c ([facility])      Please call or return to clinic if your symptoms don't go away.    Follow up plan  Please make a clinic appointment for follow up with me (RABIA DICKEY) in 2-3  months for follow up.    Thank you for coming to [facility] today.  Lab Testing:  **If you had lab testing today and your results are reassuring or normal they will be mailed to you or sent through [email or web address] within 7 days.   **If the lab tests need quick action we will call you with the results.  The phone number we will call with results is # [phone] (home) . If this is not the best number please call our clinic and change the number.  Medication Refills:  If you need any refills please call your pharmacy and they will contact us.   If you need to  your refill at a new pharmacy, please contact the new pharmacy directly. The new pharmacy will help you get your  medications transferred faster.   Scheduling:  If you have any concerns about today's visit or wish to schedule another appointment please call our office during normal business hours [phone] (8-5:00 M-F)  If a referral was made to a [facility] and you don't get a call from central scheduling please call [phone].  If a Mammogram was ordered for you at [facility] call [phone] to schedule or change your appointment.  If you had an XRay/CT/Ultrasound/MRI ordered the number is [phone] to schedule or change your radiology appointment.   Medical Concerns:  If you have urgent medical concerns please call [phone] at any time of the day.  If you have a medical emergency please call 285.            Follow-ups after your visit        Additional Services     OPHTHALMOLOGY ADULT REFERRAL       Your provider has referred you to: [facility]: [facility] - [place] [phone]   [email or web address]    Please be aware that coverage of these services is subject to the terms and limitations of your health insurance plan.  Call member services at your health plan with any benefit or coverage questions.      Please bring the following with you to your appointment:    (1) Any X-Rays, CTs or MRIs which have been performed.  Contact the facility where they were done to arrange for  prior to your scheduled appointment.    (2) List of current medications  (3) This referral request   (4) Any documents/labs given to you for this referral                  Your next 10 appointments already scheduled     Aug 17, 2017 11:00 AM CDT   Lab with  LAB   [facility])    [address] [place] [id number] [phone]            Aug 17, 2017 11:30 AM CDT   (Arrive by 11:15 AM)   CORE RETURN with DULCE Yates [facility] ([facility])    [address] [place] [address]  [address] [id number] [phone]            Sep 05, 2017  9:00 AM CDT   LAB with  LAB   [facility] ([facility])    [address] [place] [id number] [phone]           Patient must bring picture ID.  Patient should be prepared to give a urine specimen  Please do not eat 10-12 hours before your appointment if you are coming in fasting for labs on lipids, cholesterol, or glucose (sugar).  Pregnant women should follow their Care Team instructions. Water with medications is okay. Do not drink coffee or other fluids.   If you have concerns about taking  your medications, please ask at office or if scheduling via Voice Assist, send a message by clicking on Secure Messaging, Message Your Care Team.            Sep 05, 2017  9:40 AM CDT   (Arrive by 9:25 AM)   CT CHEST ABDOMEN PELVIS W/O & W CONTRAST with UCCT2   [facility] [place] [facility] ([facility])    [address] [place] [id number] [phone]           Please bring any scans or X-rays taken at other hospitals, if similar tests were done. Also bring a list of your medicines, including vitamins, minerals and over-the-counter drugs. It is safest to leave personal items at home.  Be sure to tell your doctor:   If you have any allergies.   If there s any chance you are pregnant.   If you are breastfeeding.   If you have any special needs.  You may have contrast for this exam. To prepare:   Do not eat or drink for 2 hours before your exam. If you need to take medicine, you may take it with small sips of water. (We may ask you to take liquid medicine as well.)   The day before your exam, drink extra fluids at least six 8-ounce glasses (unless your doctor tells you to restrict your fluids).  Patients over 70 or patients with diabetes or kidney problems:   If you haven t had a blood test (creatinine test) within the last 30 days, go to your clinic  or Diagnostic Imaging Department for this test.  If you have diabetes:   If your kidney function is normal, continue taking your metformin (Avandamet, Glucophage, Glucovance, Metaglip) on the day of your exam.   If your kidney function is abnormal, wait 48 hours before restarting this medicine.  You will have oral contrast for this exam:   You will drink the contrast at home. Get this from your clinic or Diagnostic Imaging Department. Please follow the directions given.  Please wear loose clothing, such as a sweat suit or jogging clothes. Avoid snaps, zippers and other metal. We may ask you to undress and put on a hospital gown.  If you have any questions, please call the Imaging Department where you will have your exam.            Sep 07, 2017 10:00 AM CDT   (Arrive by 9:45 AM)   Return Visit with Sean Freed MD   [facility] ([facility])    [address] [place] [address] [phone]              Who to contact     Please call your clinic at [phone] to:    Ask questions about your health    Make or cancel appointments    Discuss your medicines    Learn about your test results    Speak to your doctor   If you have compliments or concerns about an experience at your clinic, or if you wish to file a complaint, please contact [facility] Patient Relations at [phone] or email us at [email or web address]         Additional Information About Your Visit        Stream[employer] Information     [employer] gives you secure access to your electronic health record. If you see a primary care provider, you can also send messages to your care team and make appointments. If you have questions, please call your primary care clinic.  If you do not have a primary care provider, please call [phone] and they will assist you.      [employer] is an electronic gateway that provides easy, online access to your medical  records. With [employer], you can request a clinic appointment, read your test results, renew a prescription or communicate with your care team.     To access your existing account, please contact your [facility] or call [phone] for assistance.        Care EveryWhere ID     This is your Care EveryWhere ID. This could be used by other organizations to access your [place] medical records  [id number]        Your Vitals Were     Pulse Temperature Respirations Pulse Oximetry BMI (Body Mass Index)       66 98.3  F (36.8  C) (Oral) 16 98% 37.94 kg/m2        Blood Pressure from Last 3 Encounters:   06/07/17 (!) 133/91   04/24/17 126/83   03/24/17 124/71    Weight from Last 3 Encounters:   06/07/17 264 lb 6.4 oz (119.9 kg)   04/24/17 265 lb 3.2 oz (120.3 kg)   03/09/17 266 lb 5.1 oz (120.8 kg)              We Performed the Following     Hemoglobin A1c ([facility])     OPHTHALMOLOGY ADULT REFERRAL          Today's Medication Changes          These changes are accurate as of: 6/7/17  4:41 PM.  If you have any questions, ask your nurse or doctor.               These medicines have changed or have updated prescriptions.        Dose/Directions    vitamin D 2000 UNITS tablet   This may have changed:  additional instructions   Used for:  Vitamin D deficiency        Dose:  2000 Units   Take 2,000 Units by mouth daily   Quantity:  60 tablet   Refills:  6                Primary Care Provider Office Phone # Fax #    Rabia Carmona [phone]       [facility] [address] [facility] [address]        Thank you!     Thank you for choosing [facility]  for your care. Our goal is always to provide you with excellent care. Hearing back from our patients is one way we can continue to improve our services. Please take a few minutes to complete the written survey that you may receive in the mail after your visit with us. Thank you!             Your  Updated Medication List - Protect others around you: Learn how to safely use, store and throw away your medicines at www.disposemymeds.org.          This list is accurate as of: 6/7/17  4:41 PM.  Always use your most recent med list.                   Brand Name Dispense Instructions for use    acetaminophen 500 MG tablet    TYLENOL    100 tablet    Take 2 tablets (1,000 mg) by mouth 3 times daily       amLODIPine 10 MG tablet    NORVASC    30 tablet    Take 1 tablet (10 mg) by mouth daily       blood glucose monitoring lancets     1 Box    Use to test blood sugars 2 times daily or as directed.       blood glucose monitoring test strip    no brand specified    100 each    Use to test blood sugars 2 times daily or as directed       ferrous sulfate 325 (65 FE) MG tablet    IRON    90 tablet    Take 1 tablet (325 mg) by mouth 2 times daily       furosemide 40 MG tablet    LASIX    60 tablet    Take 1 tablet (40 mg) by mouth daily       lisinopril 20 MG tablet    PRINIVIL/ZESTRIL    180 tablet    Take 2 tablets (40 mg) by mouth daily       metFORMIN 850 MG tablet    GLUCOPHAGE    90 tablet    Take 1 tablet (850 mg) by mouth daily (with breakfast) AM       metoprolol 100 MG 24 hr tablet    TOPROL-XL    60 tablet    Take 2 tablets (200 mg) by mouth daily       omeprazole 40 MG capsule    priLOSEC    30 capsule    Take 1 capsule (40 mg) by mouth daily Take 30-60 minutes before a meal.       order for DME     1 Units    Equipment being ordered: BP cuff, large       polyethylene glycol Packet    MIRALAX/GLYCOLAX    24 packet    Take 17 g by mouth daily       potassium chloride SA 10 MEQ CR tablet    K-DUR/KLOR-CON M    180 tablet    Take 2 tablets (20 mEq) by mouth daily       rivaroxaban ANTICOAGULANT 20 MG Tabs tablet    XARELTO    30 tablet    Take 1 tablet (20 mg) by mouth daily (with dinner)       solifenacin 10 MG tablet    VESICARE    60 tablet    Take 1 tablet (10 mg) by mouth daily AM       spironolactone 25 MG  tablet    ALDACTONE    90 tablet    Take 1 tablet (25 mg) by mouth daily       vitamin D 2000 UNITS tablet     60 tablet    Take 2,000 Units by mouth daily

## 2017-06-07 NOTE — PROGRESS NOTES
Preceptor Attestation:   Patient seen and discussed with the resident. Assessment and plan reviewed with resident and agreed upon.   Supervising Physician:  Moses Lind MD  [facility]

## 2017-06-09 ENCOUNTER — TELEPHONE (OUTPATIENT)
Dept: FAMILY MEDICINE | Facility: CLINIC | Age: 62
End: 2017-06-09

## 2017-06-09 ENCOUNTER — DOCUMENTATION ONLY (OUTPATIENT)
Dept: FAMILY MEDICINE | Facility: CLINIC | Age: 62
End: 2017-06-09

## 2017-06-09 NOTE — TELEPHONE ENCOUNTER
Medication was received today.  I have called the patient and informed him to come to clinic for .    Brittany CrumpD.

## 2017-06-09 NOTE — PROGRESS NOTES
"When opening a documentation only encounter, be sure to enter in \"Chief Complaint\" Forms and in \" Comments\" Title of form, description if needed.    Form received via: Fax  Form now resides in: PCS Pending    Form sent out via: Fax  Patient informed: No  Output date: 6/8/17    Form received by recipient via fax confirmation  Please close the encounter.    "

## 2017-06-09 NOTE — TELEPHONE ENCOUNTER
[facility] phone call message- medication clarification/question:    Full Medication Name: Viagra      Question: Pt states he checked the company and they said the Medication is already mailed to the clinic here. Pt is wondering if he can come pick it up today. Please advise      Pharmacy [facility] as [id number] - [place], [place] - [address]: No    OK to leave a message on voice mail? Yes    Primary language: English      needed? No    Call taken on June 9, 2017 at 12:32 PM by Kacy Angeles

## 2017-06-12 ASSESSMENT — ENCOUNTER SYMPTOMS
WEAKNESS: 0
SHORTNESS OF BREATH: 0
HEMATURIA: 0
FATIGUE: 0
APPETITE CHANGE: 0
ABDOMINAL PAIN: 0
COLOR CHANGE: 0
HEADACHES: 0
NAUSEA: 0
SINUS PRESSURE: 0
ARTHRALGIAS: 0
CHILLS: 0
FEVER: 0
BLOOD IN STOOL: 0
DYSURIA: 0
WHEEZING: 0
CONSTIPATION: 0
COUGH: 0
VOMITING: 0
DIARRHEA: 0
SORE THROAT: 0
ABDOMINAL DISTENTION: 0
MYALGIAS: 0

## 2017-06-13 DIAGNOSIS — E55.9 VITAMIN D DEFICIENCY: ICD-10-CM

## 2017-06-13 RX ORDER — CHOLECALCIFEROL (VITAMIN D3) 50 MCG
2000 TABLET ORAL DAILY
Qty: 60 TABLET | Refills: 6 | Status: SHIPPED | OUTPATIENT
Start: 2017-06-13 | End: 2018-02-27

## 2017-06-13 NOTE — TELEPHONE ENCOUNTER
Request for medication refill:    Date of last visit at clinic: 6-7-17    Please complete refill if appropriate and CLOSE ENCOUNTER.    Closing the encounter signifies the refill is complete.    If refill has been denied, please complete the smart phrase .smirefuse and route it to the [facility] RN TRIAGE pool to inform the patient and the pharmacy.    Shira Nicole

## 2017-06-22 ENCOUNTER — OFFICE VISIT (OUTPATIENT)
Dept: OPHTHALMOLOGY | Facility: CLINIC | Age: 62
End: 2017-06-22

## 2017-06-22 DIAGNOSIS — H04.123 DRY EYES, BILATERAL: Primary | ICD-10-CM

## 2017-06-22 DIAGNOSIS — H16.143 PUNCTATE KERATITIS, BILATERAL: ICD-10-CM

## 2017-06-22 RX ORDER — CYCLOSPORINE 0.5 MG/ML
1 EMULSION OPHTHALMIC 2 TIMES DAILY
Qty: 1 BOX | Refills: 3 | Status: SHIPPED | OUTPATIENT
Start: 2017-06-22 | End: 2019-10-30

## 2017-06-22 ASSESSMENT — TONOMETRY
OD_IOP_MMHG: 18
IOP_METHOD: TONOPEN
OS_IOP_MMHG: 17

## 2017-06-22 ASSESSMENT — VISUAL ACUITY
OS_CC: 20/20
OD_PH_CC: 20/20
OD_CC: 20/25
METHOD: SNELLEN - LINEAR

## 2017-06-22 ASSESSMENT — EXTERNAL EXAM - LEFT EYE: OS_EXAM: NORMAL

## 2017-06-22 ASSESSMENT — EXTERNAL EXAM - RIGHT EYE: OD_EXAM: NORMAL

## 2017-06-22 NOTE — PROGRESS NOTES
A/P  1.) Dry Eye OU  -Symptomatic x several months OU  -AT only help temporarily  -Dryness 2' to ocular surface inflammation  -Start FML chidi short term, Restasis bid OU  -AT prn for ocular comfort    RTC 2 months for dry eye f/u, diabetic DFE    I have confirmed the patient's CC, HPI and reviewed Past Medical History, Past Surgical History, Social History, Family History, Problem List, Medication List and agree with Tech note.     Andie Hyde, OD FAAO

## 2017-06-22 NOTE — MR AVS SNAPSHOT
After Visit Summary   6/22/2017    Jazz Berman    MRN: [id number]           Patient Information     Date Of Birth          1955        Visit Information        Provider Department      6/22/2017 9:40 AM Andie Hyde OD M [facility] Ophthalmology        Today's Diagnoses     Dry eyes, bilateral    -  1    Punctate keratitis, bilateral           Follow-ups after your visit        Your next 10 appointments already scheduled     Aug 17, 2017 11:00 AM CDT   Lab with  LAB    [facility] ([facility])    [address] [place] [address]  [address]st [facility] [address]   [phone]            Aug 17, 2017 11:30 AM CDT   (Arrive by 11:15 AM)   CORE RETURN with DULCE Yates [facility] ([facility])    [address] [place] [address]  [address] [facility] [address] [phone]            Aug 24, 2017  9:00 AM CDT   (Arrive by 8:45 AM)   RETURN GENERAL with JEAN Fay [facility] Ophthalmology ([facility])    [address] [place] [address]  [address] [facility] [address] [phone]            Sep 05, 2017  9:00 AM CDT   LAB with  LAB   [facility] ([facility])    [address] [place] [id number] [phone]           Patient must bring picture ID.  Patient should be prepared to give a urine specimen  Please do not eat 10-12 hours before your appointment if you are coming in fasting for labs on lipids, cholesterol, or glucose (sugar).  Pregnant women should follow their Care Team instructions. Water with medications is okay. Do not drink coffee or other fluids.   If you have concerns about taking  your medications, please ask at office or if scheduling via [employer], send a message by clicking on Secure Messaging, Message Your Care Team.            Sep 05, 2017  9:40 AM CDT   (Arrive by 9:25 AM)   CT CHEST ABDOMEN PELVIS W/O & W CONTRAST  with UCCT2   [facility] ([facility])    [address]  [address] [employer] [address] [phone]           Please bring any scans or X-rays taken at other hospitals, if similar tests were done. Also bring a list of your medicines, including vitamins, minerals and over-the-counter drugs. It is safest to leave personal items at home.  Be sure to tell your doctor:   If you have any allergies.   If there s any chance you are pregnant.   If you are breastfeeding.   If you have any special needs.  You may have contrast for this exam. To prepare:   Do not eat or drink for 2 hours before your exam. If you need to take medicine, you may take it with small sips of water. (We may ask you to take liquid medicine as well.)   The day before your exam, drink extra fluids at least six 8-ounce glasses (unless your doctor tells you to restrict your fluids).  Patients over 70 or patients with diabetes or kidney problems:   If you haven t had a blood test (creatinine test) within the last 30 days, go to your clinic or Diagnostic Imaging Department for this test.  If you have diabetes:   If your kidney function is normal, continue taking your metformin (Avandamet, Glucophage, Glucovance, Metaglip) on the day of your exam.   If your kidney function is abnormal, wait 48 hours before restarting this medicine.  You will have oral contrast for this exam:   You will drink the contrast at home. Get this from your clinic or Diagnostic Imaging Department. Please follow the directions given.  Please wear loose clothing, such as a sweat suit or jogging clothes. Avoid snaps, zippers and other metal. We may ask you to undress and put on a hospital gown.  If you have any questions, please call the Imaging Department where you will have your exam.            Sep 07, 2017 10:00 AM CDT   (Arrive by 9:45 AM)   Return Visit with Sean Freed MD   [facility] ([facility])    [address]  [address] [facility] [address] [phone]              Who to contact     Please call your clinic at [phone] to:    Ask questions about your health    Make or cancel appointments    Discuss your medicines    Learn about your test results    Speak to your doctor   If you have compliments or concerns about an experience at your clinic, or if you wish to file a complaint, please contact [facility] Patient Relations at [phone] or email us at [email or web address]         Additional Information About Your Visit        [employer] Information     [employer] gives you secure access to your electronic health record. If you see a primary care provider, you can also send messages to your care team and make appointments. If you have questions, please call your primary care clinic.  If you do not have a primary care provider, please call [phone] and they will assist you.      [employer] is an electronic gateway that provides easy, online access to your medical records. With [employer], you can request a clinic appointment, read your test results, renew a prescription or communicate with your care team.     To access your existing account, please contact your [facility] Clinic or call [phone] for assistance.        Care EveryWhere ID     This is your Care EveryWhere ID. This could be used by other organizations to access your [facility] medical records  [id number]         Blood Pressure from Last 3 Encounters:   06/07/17 (!) 133/91   04/24/17 126/83   03/24/17 124/71    Weight from Last 3 Encounters:   06/07/17 119.9 kg (264 lb 6.4 oz)   04/24/17 120.3 kg (265 lb 3.2 oz)   03/09/17 120.8 kg (266 lb 5.1 oz)              Today, you had the following     No orders found for display         Today's Medication Changes          These changes are accurate as of: 6/22/17 10:11 AM.  If you have any questions,  ask your nurse or doctor.               Start taking these medicines.        Dose/Directions    cycloSPORINE 0.05 % ophthalmic emulsion   Commonly known as:  RESTASIS   Used for:  Dry eyes, bilateral, Punctate keratitis, bilateral   Started by:  Andie Hyde, OD        Dose:  1 drop   Place 1 drop into both eyes 2 times daily   Quantity:  1 Box   Refills:  3       fluorometholone 0.1 % ophthalmic ointment   Commonly known as:  FML S.O.P.   Used for:  Dry eyes, bilateral, Punctate keratitis, bilateral   Started by:  Andie Hyde, OD        Dose:  1 Application   Place 1 Application into both eyes 2 times daily for 14 days   Quantity:  1 Tube   Refills:  3            Where to get your medicines      These medications were sent to [id number] - [place], [place] - [address], [place] [address]     Phone:  [phone]     cycloSPORINE 0.05 % ophthalmic emulsion    fluorometholone 0.1 % ophthalmic ointment                Primary Care Provider Office Phone # Fax #    Rabia Carmona [phone]       [facility] [address] [facility] [address]        Equal Access to Services     JENNA [facility] HospitalMARAH AH: Hadii corey ku hadasho Soomaali, waaxda luqadaha, qaybta kaalmada adeegyada, latia perez . So [facility] [phone].    ATENCIÓN: Si habla español, tiene a tapia disposición servicios gratuitos de asistencia lingüística. Llame al [phone].    We comply with applicable federal civil rights laws and [place] laws. We do not discriminate on the basis of race, color, national origin, age, disability sex, sexual orientation or gender identity.            Thank you!     Thank you for choosing [facility] OPHTHALMOLOG[facility]  for your care. Our goal is always to provide you with excellent care. Hearing back from our patients is one way we can continue to improve our services. Please take a few minutes to complete the  written survey that you may receive in the mail after your visit with us. Thank you!             Your Updated Medication List - Protect others around you: Learn how to safely use, store and throw away your medicines at www.disposemymeds.org.          This list is accurate as of: 6/22/17 10:11 AM.  Always use your most recent med list.                   Brand Name Dispense Instructions for use Diagnosis    acetaminophen 500 MG tablet    TYLENOL    100 tablet    Take 2 tablets (1,000 mg) by mouth 3 times daily    Gastrointestinal stromal sarcoma of stomach (H)       amLODIPine 10 MG tablet    NORVASC    30 tablet    Take 1 tablet (10 mg) by mouth daily    Essential hypertension with goal blood pressure less than 140/90       blood glucose monitoring lancets     1 Box    Use to test blood sugars 2 times daily or as directed.    Diabetes mellitus (H)       blood glucose monitoring test strip    no brand specified    100 each    Use to test blood sugars 2 times daily or as directed    Diabetes mellitus (H)       cycloSPORINE 0.05 % ophthalmic emulsion    RESTASIS    1 Box    Place 1 drop into both eyes 2 times daily    Dry eyes, bilateral, Punctate keratitis, bilateral       ferrous sulfate 325 (65 FE) MG tablet    IRON    90 tablet    Take 1 tablet (325 mg) by mouth 2 times daily    Iron deficiency anemia, unspecified iron deficiency anemia type       fluorometholone 0.1 % ophthalmic ointment    FML S.O.P.    1 Tube    Place 1 Application into both eyes 2 times daily for 14 days    Dry eyes, bilateral, Punctate keratitis, bilateral       furosemide 40 MG tablet    LASIX    60 tablet    Take 1 tablet (40 mg) by mouth daily    Diastolic congestive heart failure, unspecified congestive heart failure chronicity (H)       lisinopril 20 MG tablet    PRINIVIL/ZESTRIL    180 tablet    Take 2 tablets (40 mg) by mouth daily    (HFpEF) heart failure with preserved ejection fraction (H)       metFORMIN 850 MG tablet    GLUCOPHAGE     90 tablet    Take 1 tablet (850 mg) by mouth daily (with breakfast) AM    Type 2 diabetes mellitus without complication, without long-term current use of insulin (H)       metoprolol 100 MG 24 hr tablet    TOPROL-XL    60 tablet    Take 2 tablets (200 mg) by mouth daily    Benign essential hypertension       omeprazole 40 MG capsule    priLOSEC    30 capsule    Take 1 capsule (40 mg) by mouth daily Take 30-60 minutes before a meal.    Acute gastrointestinal hemorrhage       order for DME     1 Units    Equipment being ordered: BP cuff, large    Hypertension, essential       polyethylene glycol Packet    MIRALAX/GLYCOLAX    24 packet    Take 17 g by mouth daily    Constipation, unspecified constipation type       potassium chloride SA 10 MEQ CR tablet    K-DUR/KLOR-CON M    180 tablet    Take 2 tablets (20 mEq) by mouth daily    (HFpEF) heart failure with preserved ejection fraction (H)       rivaroxaban ANTICOAGULANT 20 MG Tabs tablet    XARELTO    30 tablet    Take 1 tablet (20 mg) by mouth daily (with dinner)    Paroxysmal atrial fibrillation (H)       solifenacin 10 MG tablet    VESICARE    60 tablet    Take 1 tablet (10 mg) by mouth daily AM    Urinary urgency       spironolactone 25 MG tablet    ALDACTONE    90 tablet    Take 1 tablet (25 mg) by mouth daily    Chronic diastolic heart failure (H)       vitamin D 2000 UNITS tablet     60 tablet    Take 2,000 Units by mouth daily    Vitamin D deficiency

## 2017-06-22 NOTE — LETTER
June 22, 2017    Eye drops for dry eye:    FML ointment: use twice a day (morning & night) for 2-3 weeks. This can temporarily blur the vision.    Restasis: use twice a day continuously (BEFORE the ointment). This drop reduces inflammation that causes dry eye, but may take a while to start working. It can sting on instillation    Refresh Artificial Tears: Use as needed if the eyes are feeling dry. 3-4 times a day. These are over the counter eyedrops.

## 2017-06-22 NOTE — NURSING NOTE
Chief Complaint   Patient presents with     Consult For     dry eyes     HPI    Symptoms:              Comments:  DM2  Last eye exam 1/27/16 w/ Dr. Hall:  Diabetes mellitus type 2 without retinopathy, Hyperopic astigmatism of both eyes, Presbyopia

## 2017-07-03 ENCOUNTER — MEDICAL CORRESPONDENCE (OUTPATIENT)
Dept: HEALTH INFORMATION MANAGEMENT | Facility: CLINIC | Age: 62
End: 2017-07-03

## 2017-07-03 DIAGNOSIS — I10 ESSENTIAL HYPERTENSION WITH GOAL BLOOD PRESSURE LESS THAN 140/90: ICD-10-CM

## 2017-07-03 NOTE — TELEPHONE ENCOUNTER
Request for medication refill:    Date of last visit at clinic: 6/7/17    Please complete refill if appropriate and CLOSE ENCOUNTER.    Closing the encounter signifies the refill is complete.    If refill has been denied, please complete the smart phrase .smirefuse and route it to the [facility] RN TRIAGE pool to inform the patient and the pharmacy.    Manjula Kaur MA

## 2017-07-04 RX ORDER — AMLODIPINE BESYLATE 10 MG/1
10 TABLET ORAL DAILY
Qty: 30 TABLET | Refills: 3 | Status: SHIPPED | OUTPATIENT
Start: 2017-07-04 | End: 2017-10-03

## 2017-07-31 DIAGNOSIS — K92.2 ACUTE GASTROINTESTINAL HEMORRHAGE: ICD-10-CM

## 2017-07-31 RX ORDER — OMEPRAZOLE 40 MG/1
40 CAPSULE, DELAYED RELEASE ORAL DAILY
Qty: 30 CAPSULE | Refills: 3 | Status: SHIPPED | OUTPATIENT
Start: 2017-07-31 | End: 2017-10-06

## 2017-07-31 NOTE — TELEPHONE ENCOUNTER
Date of last visit at clinic: 6/7/17    Please complete refill and CLOSE ENCOUNTER.  Closing the encounter signifies the refill is complete.

## 2017-08-01 DIAGNOSIS — N52.9 ERECTILE DYSFUNCTION, UNSPECIFIED ERECTILE DYSFUNCTION TYPE: ICD-10-CM

## 2017-08-01 RX ORDER — SILDENAFIL 100 MG/1
TABLET, FILM COATED ORAL
Qty: 30 TABLET | Refills: 3 | Status: SHIPPED | OUTPATIENT
Start: 2017-08-01 | End: 2018-06-20

## 2017-08-01 NOTE — TELEPHONE ENCOUNTER
Request for medication refill:    Date of last visit at clinic: 6/7/17    Please complete refill if appropriate and CLOSE ENCOUNTER.    Closing the encounter signifies the refill is complete.    If refill has been denied, please complete the smart phrase .smirefuse and route it to the [facility] RN TRIAGE pool to inform the patient and the pharmacy.    Claire Frye

## 2017-08-02 ENCOUNTER — TELEPHONE (OUTPATIENT)
Dept: FAMILY MEDICINE | Facility: CLINIC | Age: 62
End: 2017-08-02

## 2017-08-02 NOTE — TELEPHONE ENCOUNTER
[facility] phone call message- patient requesting a refill:    Full Medication Name: viagra    Dose:     [facility]   [id number] - [place], [place] - [address] [place] [address]  Phone: [phone] Fax: [phone]  : Yes    Additional Comments: pt requesting viagra    OK to leave a message on voice mail? Yes    Primary language: English      needed? No    Call taken on August 2, 2017 at 2:02 PM by Francisco Agosto

## 2017-08-04 NOTE — TELEPHONE ENCOUNTER
Rx order called into [facility]    Next available order date: 8/5/17  Order #:[id number]    Jayson Hernandez Pharm.D.

## 2017-08-07 ENCOUNTER — DOCUMENTATION ONLY (OUTPATIENT)
Dept: FAMILY MEDICINE | Facility: CLINIC | Age: 62
End: 2017-08-07

## 2017-08-07 NOTE — PROGRESS NOTES
"When opening a documentation only encounter, be sure to enter in \"Chief Complaint\" Forms and in \" Comments\" Title of form, description if needed.    Form received via: Fax  Form now resides in: Provider Ready    Form sent out via: Fax  Patient informed: No  Output date: 8/2/17    Form received by recipient via fax confirmation  Please close the encounter.    "

## 2017-08-09 ENCOUNTER — PRE VISIT (OUTPATIENT)
Dept: CARDIOLOGY | Facility: CLINIC | Age: 62
End: 2017-08-09

## 2017-08-09 DIAGNOSIS — I50.32 CHRONIC DIASTOLIC HEART FAILURE (H): Primary | ICD-10-CM

## 2017-08-11 ENCOUNTER — TELEPHONE (OUTPATIENT)
Dept: FAMILY MEDICINE | Facility: CLINIC | Age: 62
End: 2017-08-11

## 2017-08-11 NOTE — TELEPHONE ENCOUNTER
I have called the pt to inform him that the Viagra has arrived.  He will  today.    Alexei Crump.D.

## 2017-08-11 NOTE — TELEPHONE ENCOUNTER
Patient needs his medication when it is ready to .  He said the packages came by [employer] to us yesterday.  Please call when ready to .

## 2017-08-14 ENCOUNTER — OFFICE VISIT (OUTPATIENT)
Dept: CARDIOLOGY | Facility: CLINIC | Age: 62
End: 2017-08-14
Attending: NURSE PRACTITIONER
Payer: MEDICARE

## 2017-08-14 ENCOUNTER — TRANSFERRED RECORDS (OUTPATIENT)
Dept: HEALTH INFORMATION MANAGEMENT | Facility: CLINIC | Age: 62
End: 2017-08-14

## 2017-08-14 ENCOUNTER — DOCUMENTATION ONLY (OUTPATIENT)
Dept: FAMILY MEDICINE | Facility: CLINIC | Age: 62
End: 2017-08-14

## 2017-08-14 VITALS
WEIGHT: 270.8 LBS | DIASTOLIC BLOOD PRESSURE: 53 MMHG | SYSTOLIC BLOOD PRESSURE: 128 MMHG | OXYGEN SATURATION: 98 % | BODY MASS INDEX: 38.77 KG/M2 | HEIGHT: 70 IN | HEART RATE: 52 BPM

## 2017-08-14 DIAGNOSIS — I50.32 CHRONIC DIASTOLIC HEART FAILURE (H): Primary | ICD-10-CM

## 2017-08-14 DIAGNOSIS — I50.32 CHRONIC DIASTOLIC HEART FAILURE (H): ICD-10-CM

## 2017-08-14 DIAGNOSIS — K92.2 ACUTE GASTROINTESTINAL HEMORRHAGE: ICD-10-CM

## 2017-08-14 LAB
ANION GAP SERPL CALCULATED.3IONS-SCNC: 7 MMOL/L (ref 3–14)
BUN SERPL-MCNC: 14 MG/DL (ref 7–30)
CALCIUM SERPL-MCNC: 9.1 MG/DL (ref 8.5–10.1)
CHLORIDE SERPL-SCNC: 107 MMOL/L (ref 94–109)
CO2 SERPL-SCNC: 27 MMOL/L (ref 20–32)
CREAT SERPL-MCNC: 1.3 MG/DL (ref 0.66–1.25)
GFR SERPL CREATININE-BSD FRML MDRD: 56 ML/MIN/1.7M2
GLUCOSE SERPL-MCNC: 104 MG/DL (ref 70–99)
INR PPP: 1.98 (ref 0.86–1.14)
POTASSIUM SERPL-SCNC: 4 MMOL/L (ref 3.4–5.3)
SODIUM SERPL-SCNC: 140 MMOL/L (ref 133–144)

## 2017-08-14 PROCEDURE — 80048 BASIC METABOLIC PNL TOTAL CA: CPT | Performed by: FAMILY MEDICINE

## 2017-08-14 PROCEDURE — 99213 OFFICE O/P EST LOW 20 MIN: CPT | Mod: ZP | Performed by: NURSE PRACTITIONER

## 2017-08-14 PROCEDURE — 99212 OFFICE O/P EST SF 10 MIN: CPT | Mod: ZF

## 2017-08-14 PROCEDURE — 36415 COLL VENOUS BLD VENIPUNCTURE: CPT | Performed by: FAMILY MEDICINE

## 2017-08-14 PROCEDURE — 85610 PROTHROMBIN TIME: CPT | Performed by: FAMILY MEDICINE

## 2017-08-14 ASSESSMENT — PAIN SCALES - GENERAL: PAINLEVEL: NO PAIN (0)

## 2017-08-14 NOTE — MR AVS SNAPSHOT
"              After Visit Summary   2017    Jazz Berman    MRN: [id number]           Patient Information     Date Of Birth          1955        Visit Information        Provider Department      2017 8:30 AM Sheela Yee NP [facility]        Today's Diagnoses     Chronic diastolic heart failure (H)    -  1      Care Instructions    You were seen today in the Cardiovascular Clinic at the [facility].     Cardiology Providers you saw during your visit: Sheela Yee      1. Please have blood work drawn in 2 weeks    2. Please increase physical activity    3. Please consider an organized weight loss program such as Weight Watchers or the DASH Diet    4. Please make a follow-up CORE/heart failure appt in 6 months      Please limit your fluid intake to 2 L (64 ounces) daily.  2 Liters a day = 8.5 cups, or 72 ounces.  Please limit your salt intake to 2 grams a day or less.    If you gain 2# in 24 hours or 5# in one week call Mohini Rhodes RN so we can adjust your medications as needed over the phone.    Please feel free to call me with any questions or concerns.      Mohini Rhodes RN BSN CHFN  [facility]  Cardiology Care Coordinator-Heart Failure Clinic    Questions and [phone].   First press #1 for the University and then press #3 for \"Medical Questions\" to reach us Cardiology Nurses.     On Call Cardiologist for after hours or on weekends: [phone]   option #4 and ask to speak to the on-call Cardiologist. Inform them you are a CORE/heart failure patient at the [facility].        If you need a medication refill please contact your pharmacy.  Please allow 3 business days for your refill to be completed.  _______________________________________________________  C.O.R.E. CLINIC Cardiomyopathy, Optimization, Rehabilitation, Education   The C.O.R.E. CLINIC is a heart failure specialty clinic within the [facility] " [facility] where you will work with specialized nurse practitioners dedicated to helping patients with heart failure carefully adjust medications, receive education, and learn who and when to call if symptoms develop. They specialize in helping you better understand your condition, slow the progression of your disease, improve the length and quality of your life, help you detect future heart problems before they become life threatening, and avoid hospitalizations.  As always, thank you for trusting us with your health care needs!                Follow-ups after your visit        Your next 10 appointments already scheduled     Aug 24, 2017  9:00 AM CDT   (Arrive by 8:45 AM)   RETURN GENERAL with Andie Hyde OD   [facility] ([facility])    [address] [place] [address]  4th Floor  [facility] [address] [phone]            Sep 05, 2017  9:00 AM CDT   LAB with  LAB   [facility] ([facility])    [address] [place] [id number] [phone]           Patient must bring picture ID. Patient should be prepared to give a urine specimen  Please do not eat 10-12 hours before your appointment if you are coming in fasting for labs on lipids, cholesterol, or glucose (sugar). Pregnant women should follow their Care Team instructions. Water with medications is okay. Do not drink coffee or other fluids. If you have concerns about taking  your medications, please ask at office or if scheduling via [employer], send a message by clicking on Secure Messaging, Message Your Care Team.            Sep 05, 2017  9:40 AM CDT   (Arrive by 9:25 AM)   CT CHEST ABDOMEN PELVIS W/O & W CONTRAST with UCCT2   [facility] ([facility])    [address] [place] [id number] [phone]           Please bring any scans or X-rays taken at other hospitals, if similar tests  were done. Also bring a list of your medicines, including vitamins, minerals and over-the-counter drugs. It is safest to leave personal items at home.  Be sure to tell your doctor:   If you have any allergies.   If there s any chance you are pregnant.   If you are breastfeeding.   If you have any special needs.  You may have contrast for this exam. To prepare:   Do not eat or drink for 2 hours before your exam. If you need to take medicine, you may take it with small sips of water. (We may ask you to take liquid medicine as well.)   The day before your exam, drink extra fluids at least six 8-ounce glasses (unless your doctor tells you to restrict your fluids).  Patients over 70 or patients with diabetes or kidney problems:   If you haven t had a blood test (creatinine test) within the last 30 days, go to your clinic or Diagnostic Imaging Department for this test.  If you have diabetes:   If your kidney function is normal, continue taking your metformin (Avandamet, Glucophage, Glucovance, Metaglip) on the day of your exam.   If your kidney function is abnormal, wait 48 hours before restarting this medicine.  You will have oral contrast for this exam:   You will drink the contrast at home. Get this from your clinic or Diagnostic Imaging Department. Please follow the directions given.  Please wear loose clothing, such as a sweat suit or jogging clothes. Avoid snaps, zippers and other metal. We may ask you to undress and put on a hospital gown.  If you have any questions, please call the Imaging Department where you will have your exam.            Sep 07, 2017 10:00 AM CDT   (Arrive by 9:45 AM)   Return Visit with Sean Freed MD   [facility] ([facility])    [address] [place] [address]  2nd Floor  [facility] [id number] [phone]            Feb 14, 2018  8:00 AM CST   Lab with [facility] ([facility])    [address] [place] [address]  "Floor  [facility] [address] [phone]            Feb 14, 2018  8:30 AM CST   (Arrive by 8:15 AM)   CORE RETURN with DULCE Pimentel CNP   [facility] ([facility])    [address] [facility]  3rd [facility] [address] [phone]              Future tests that were ordered for you today     Open Future Orders        Priority Expected Expires Ordered    Basic metabolic panel Routine 8/28/2017 9/1/2017 8/14/2017            Who to contact     If you have questions or need follow up information about today's clinic visit or your schedule please contact [facility] directly at [phone].  Normal or non-critical lab and imaging results will be communicated to you by [employer], letter or phone within 4 business days after the clinic has received the results. If you do not hear from us within 7 days, please contact the clinic through "[employer]t or phone. If you have a critical or abnormal lab result, we will notify you by phone as soon as possible.  Submit refill requests through [employer] or call your pharmacy and they will forward the refill request to us. Please allow 3 business days for your refill to be completed.          Additional Information About Your Visit        Appticles Information     [employer] gives you secure access to your electronic health record. If you see a primary care provider, you can also send messages to your care team and make appointments. If you have questions, please call your primary care clinic.  If you do not have a primary care provider, please call [phone] and they will assist you.        Care EveryWhere ID     This is your Care EveryWhere ID. This could be used by other organizations to access your [facility] medical records  [id number]        Your Vitals Were     Pulse Height Pulse Oximetry BMI (Body Mass Index)          52 1.778 m (5' 10\") 98% 38.86 kg/m2         Blood Pressure from Last 3 Encounters:   08/14/17 128/53 "   06/07/17 (!) 133/91   04/24/17 126/83    Weight from Last 3 Encounters:   08/14/17 122.8 kg (270 lb 12.8 oz)   06/07/17 119.9 kg (264 lb 6.4 oz)   04/24/17 120.3 kg (265 lb 3.2 oz)               Primary Care Provider Office Phone # Fax Micaela Camarillo Sarai Carmona [phone]       [facility] [address] [facility] [address]        Equal Access to Services     [facility] : Hadii aad ku hadasho Soomaali, waaxda luqadaha, qaybta kaalmada adeegyada, waxmarkos dixon hayhiginio perez . So [facility] [phone].    ATENCIÓN: Si habla español, tiene a tapia disposición servicios gratuitos de asistencia lingüística. [facility] [phone].    We comply with applicable federal civil rights laws and [place] laws. We do not discriminate on the basis of race, color, national origin, age, disability sex, sexual orientation or gender identity.            Thank you!     Thank you for choosing [facility]  for your care. Our goal is always to provide you with excellent care. Hearing back from our patients is one way we can continue to improve our services. Please take a few minutes to complete the written survey that you may receive in the mail after your visit with us. Thank you!             Your Updated Medication List - Protect others around you: Learn how to safely use, store and throw away your medicines at www.disposemymeds.org.          This list is accurate as of: 8/14/17  9:14 AM.  Always use your most recent med list.                   Brand Name Dispense Instructions for use Diagnosis    acetaminophen 500 MG tablet    TYLENOL    100 tablet    Take 2 tablets (1,000 mg) by mouth 3 times daily    Gastrointestinal stromal sarcoma of stomach (H)       amLODIPine 10 MG tablet    NORVASC    30 tablet    Take 1 tablet (10 mg) by mouth daily    Essential hypertension with goal blood pressure less than 140/90       blood glucose monitoring lancets     1 Box    Use to test blood  sugars 2 times daily or as directed.    Diabetes mellitus (H)       blood glucose monitoring test strip    no brand specified    100 each    Use to test blood sugars 2 times daily or as directed    Diabetes mellitus (H)       cycloSPORINE 0.05 % ophthalmic emulsion    RESTASIS    1 Box    Place 1 drop into both eyes 2 times daily    Dry eyes, bilateral, Punctate keratitis, bilateral       ferrous sulfate 325 (65 FE) MG tablet    IRON    90 tablet    Take 1 tablet (325 mg) by mouth 2 times daily    Iron deficiency anemia, unspecified iron deficiency anemia type       furosemide 40 MG tablet    LASIX    60 tablet    Take 1 tablet (40 mg) by mouth daily    Diastolic congestive heart failure, unspecified congestive heart failure chronicity (H)       lisinopril 20 MG tablet    PRINIVIL/ZESTRIL    180 tablet    Take 2 tablets (40 mg) by mouth daily    (HFpEF) heart failure with preserved ejection fraction (H)       metFORMIN 850 MG tablet    GLUCOPHAGE    90 tablet    Take 1 tablet (850 mg) by mouth daily (with breakfast) AM    Type 2 diabetes mellitus without complication, without long-term current use of insulin (H)       metoprolol 100 MG 24 hr tablet    TOPROL-XL    60 tablet    Take 2 tablets (200 mg) by mouth daily    Benign essential hypertension       omeprazole 40 MG capsule    priLOSEC    30 capsule    Take 1 capsule (40 mg) by mouth daily Take 30-60 minutes before a meal.    Acute gastrointestinal hemorrhage       order for DME     1 Units    Equipment being ordered: BP cuff, large    Hypertension, essential       polyethylene glycol Packet    MIRALAX/GLYCOLAX    24 packet    Take 17 g by mouth daily    Constipation, unspecified constipation type       potassium chloride SA 10 MEQ CR tablet    K-DUR/KLOR-CON M    180 tablet    Take 2 tablets (20 mEq) by mouth daily    (HFpEF) heart failure with preserved ejection fraction (H)       rivaroxaban ANTICOAGULANT 20 MG Tabs tablet    XARELTO    30 tablet    Take 1  tablet (20 mg) by mouth daily (with dinner)    Paroxysmal atrial fibrillation (H)       sildenafil 100 MG cap/tab    VIAGRA    30 tablet    Take 100mg tablet as directed.  -Rx prescribed via [employer] connection to care program-    Erectile dysfunction, unspecified erectile dysfunction type       solifenacin 10 MG tablet    VESICARE    60 tablet    Take 1 tablet (10 mg) by mouth daily AM    Urinary urgency       spironolactone 25 MG tablet    ALDACTONE    90 tablet    Take 1 tablet (25 mg) by mouth daily    Chronic diastolic heart failure (H)       vitamin D 2000 UNITS tablet     60 tablet    Take 2,000 Units by mouth daily    Vitamin D deficiency

## 2017-08-14 NOTE — LETTER
8/14/2017      RE: Jazz Berman  [address]     [facility] [address]       Dear Colleague,    Thank you for the opportunity to participate in the care of your patient, Jazz Berman, at the [facility] HEART [facility]. Please see a copy of my visit note below.    HPI  Mr. Berman is a 62 year old man with a history of HFpEF, HTN, diabetes, obesity, and atrial fibrillation who returns to [facility] for follow up. He was seen in clinic 6 months ago when he was hypertensive. His Toprol was increased.     Jazz is feeling well. He denies shortness of breath, chest pain, palpitations, lightheadedness, significant edema, or syncope. One pillow orthopnea and no PND. Using CPAP. Appetite is good. Weights have trended up slowly. He is trying to increase exercise with more walking.     Jazz has a home care nurse that sets up his pills weekly. His blood pressures without his medications are quite high but are appropriate after his medicines.    PMH  Past Medical History:   Diagnosis Date     Arthritis      Atrial fibrillation (H)      BPH (benign prostatic hyperplasia) 8/29/2013     Cardiomegaly 8/30/2012     Crushing injury of foot 8/30/2012     Depressive disorder, not elsewhere classified 8/30/2012     Diabetes mellitus type 2 in obese (H)      Diverticulosis of large intestine 7/4/2016    Colonoscopy 7/2/16       Former smoker      Gastric mass      GI bleed      History of blood transfusion      Hypertension      Hypertension      Keloid 6/11/2012     GREG on CPAP      Social History     Social History     Marital status: Single     Spouse name: N/A     Number of children: N/A     Years of education: N/A     Occupational History     Not on file.     Social History Main Topics     Smoking status: Former Smoker     Years: 30.00     Types: Cigarettes     Quit date: 6/2/2011     Smokeless tobacco: Never Used     Alcohol use No      Comment: twice per week and 6 pack per  sitting, quit about 6 months ago     Drug use: No      Comment: +marijuana and crack cocaine     Sexual activity: Yes     Other Topics Concern     Not on file     Social History Narrative     Family History   Problem Relation Age of Onset     Hypertension Father      DIABETES Mother      Colon Cancer No family hx of      Crohn Disease No family hx of      Ulcerative Colitis No family hx of      CANCER No family hx of      no skin cancer     Glaucoma No family hx of      Macular Degeneration No family hx of      MEDS  Current Outpatient Prescriptions on File Prior to Visit:  sildenafil (VIAGRA) 100 MG cap/tab Take 100mg tablet as directed.  -Rx prescribed via [employer] connection to care program-   omeprazole (PRILOSEC) 40 MG capsule Take 1 capsule (40 mg) by mouth daily Take 30-60 minutes before a meal.   amLODIPine (NORVASC) 10 MG tablet Take 1 tablet (10 mg) by mouth daily   cycloSPORINE (RESTASIS) 0.05 % ophthalmic emulsion Place 1 drop into both eyes 2 times daily   Cholecalciferol (VITAMIN D) 2000 UNITS tablet Take 2,000 Units by mouth daily   spironolactone (ALDACTONE) 25 MG tablet Take 1 tablet (25 mg) by mouth daily   furosemide (LASIX) 40 MG tablet Take 1 tablet (40 mg) by mouth daily   rivaroxaban ANTICOAGULANT (XARELTO) 20 MG TABS tablet Take 1 tablet (20 mg) by mouth daily (with dinner)   ferrous sulfate (IRON) 325 (65 FE) MG tablet Take 1 tablet (325 mg) by mouth 2 times daily   solifenacin (VESICARE) 10 MG tablet Take 1 tablet (10 mg) by mouth daily AM   metFORMIN (GLUCOPHAGE) 850 MG tablet Take 1 tablet (850 mg) by mouth daily (with breakfast) AM   metoprolol (TOPROL-XL) 100 MG 24 hr tablet Take 2 tablets (200 mg) by mouth daily   lisinopril (PRINIVIL/ZESTRIL) 20 MG tablet Take 2 tablets (40 mg) by mouth daily   potassium chloride SA (K-DUR/KLOR-CON M) 10 MEQ CR tablet Take 2 tablets (20 mEq) by mouth daily   blood glucose monitoring (NO BRAND SPECIFIED) test strip Use to test blood sugars 2 times daily  "or as directed   blood glucose monitoring (ONE TOUCH DELICA) lancets Use to test blood sugars 2 times daily or as directed.   order for DME Equipment being ordered: BP cuff, large   polyethylene glycol (MIRALAX/GLYCOLAX) packet Take 17 g by mouth daily (Patient not taking: Reported on 8/14/2017)   acetaminophen (TYLENOL) 500 MG tablet Take 2 tablets (1,000 mg) by mouth 3 times daily (Patient not taking: Reported on 8/14/2017)     No current facility-administered medications on file prior to visit.     Physical examination:  /53 (BP Location: Left arm, Patient Position: Chair, Cuff Size: Adult Large)  Pulse 52  Ht 1.778 m (5' 10\")  Wt 122.8 kg (270 lb 12.8 oz)  SpO2 98%  BMI 38.86 kg/m2  GENERAL APPEARANCE: healthy, alert and no distress  HEENT: no icterus, no xanthelasmas, normal pupil size and reaction, normal palate, mucosa moist, no cyanosis.  NECK: no adenopathy, no asymmetry, masses, or scars  CHEST: lungs clear to auscultation - no rales, rhonchi or wheezes, no use of accessory muscles, no retractions, respirations are unlabored, normal respiratory rate, no kyphosis, no scoliosis  CARDIOVASCULAR: regular rhythm, normal S1 with physiologic split S2, no S3 or S4 and no murmur, click or rub, precordium quiet with normal PMI. JVP is flat  ABDOMEN: obese, soft, non tender, without hepatomegaly, no masses palpable, bowel sounds normal  EXTREMITIES: warm and without edema  NEURO: alert and oriented to person/place/time, normal speech, gait and affect  SKIN: no ecchymoses, no rashes    LABS  Last Basic Metabolic Panel:  Lab Results   Component Value Date     08/14/2017      Lab Results   Component Value Date    POTASSIUM 4.0 08/14/2017     Lab Results   Component Value Date    CHLORIDE 107 08/14/2017     Lab Results   Component Value Date    NATALYA 9.1 08/14/2017     Lab Results   Component Value Date    CO2 27 08/14/2017     Lab Results   Component Value Date    BUN 14 08/14/2017     Lab Results "   Component Value Date    CR 1.30 08/14/2017     Lab Results   Component Value Date     08/14/2017       Assessment and Plan  Mr. Berman is a 62 year old man with a history of chronic heart failure with preserved ejection fraction who appears well. Creatinine is up today. We'll repeat labs in 2 weeks. We'll make no changes today. Encouraged to increase physical activity and weight loss through an organized weight management program such as Weight Watchers or following the DASH Diet. He will return to clinic in 6 months and as needed.    20 minutes spent in direct care, >50% in counseling.      Sheela Yee, ZENON

## 2017-08-14 NOTE — PROGRESS NOTES
HPI  Mr. Beramn is a 62 year old man with a history of HFpEF, HTN, diabetes, obesity, and atrial fibrillation who [place] [facility] [place] He was seen in clinic 6 months ago when he was hypertensive. His Toprol was increased.     Jazz is feeling well. He denies shortness of breath, chest pain, palpitations, lightheadedness, significant edema, or syncope. One pillow orthopnea and no PND. Using CPAP. Appetite is good. Weights have trended up slowly. He is trying to increase exercise with more walking.     Jazz has a home care nurse that sets up his pills weekly. His blood pressures without his medications are quite high but are appropriate after his medicines.    PMH  Past Medical History:   Diagnosis Date     Arthritis      Atrial fibrillation (H)      BPH (benign prostatic hyperplasia) 8/29/2013     Cardiomegaly 8/30/2012     Crushing injury of foot 8/30/2012     Depressive disorder, not elsewhere classified 8/30/2012     Diabetes mellitus type 2 in obese (H)      Diverticulosis of large intestine 7/4/2016    Colonoscopy 7/2/16       Former smoker      Gastric mass      GI bleed      History of blood transfusion      Hypertension      Hypertension      Keloid 6/11/2012     GREG on CPAP      Social History     Social History     Marital status: Single     Spouse name: N/A     Number of children: N/A     Years of education: N/A     Occupational History     Not on file.     Social History Main Topics     Smoking status: Former Smoker     Years: 30.00     Types: Cigarettes     Quit date: 6/2/2011     Smokeless tobacco: Never Used     Alcohol use No      Comment: twice per week and 6 pack per sitting, quit about 6 months ago     Drug use: No      Comment: +marijuana and crack cocaine     Sexual activity: Yes     Other Topics Concern     Not on file     Social History Narrative     Family History   Problem Relation Age of Onset     Hypertension Father      DIABETES Mother      Colon Cancer No family hx of       Crohn Disease No family hx of      Ulcerative Colitis No family hx of      CANCER No family hx of      no skin cancer     Glaucoma No family hx of      Macular Degeneration No family hx of      MEDS  Current Outpatient Prescriptions on File Prior to Visit:  sildenafil (VIAGRA) 100 MG cap/tab Take 100mg tablet as directed.  -Rx prescribed via [facility] connection to care program-   omeprazole (PRILOSEC) 40 MG capsule Take 1 capsule (40 mg) by mouth daily Take 30-60 minutes before a meal.   amLODIPine (NORVASC) 10 MG tablet Take 1 tablet (10 mg) by mouth daily   cycloSPORINE (RESTASIS) 0.05 % ophthalmic emulsion Place 1 drop into both eyes 2 times daily   Cholecalciferol (VITAMIN D) 2000 UNITS tablet Take 2,000 Units by mouth daily   spironolactone (ALDACTONE) 25 MG tablet Take 1 tablet (25 mg) by mouth daily   furosemide (LASIX) 40 MG tablet Take 1 tablet (40 mg) by mouth daily   rivaroxaban ANTICOAGULANT (XARELTO) 20 MG TABS tablet Take 1 tablet (20 mg) by mouth daily (with dinner)   ferrous sulfate (IRON) 325 (65 FE) MG tablet Take 1 tablet (325 mg) by mouth 2 times daily   solifenacin (VESICARE) 10 MG tablet Take 1 tablet (10 mg) by mouth daily AM   metFORMIN (GLUCOPHAGE) 850 MG tablet Take 1 tablet (850 mg) by mouth daily (with breakfast) AM   metoprolol (TOPROL-XL) 100 MG 24 hr tablet Take 2 tablets (200 mg) by mouth daily   lisinopril (PRINIVIL/ZESTRIL) 20 MG tablet Take 2 tablets (40 mg) by mouth daily   potassium chloride SA (K-DUR/KLOR-CON M) 10 MEQ CR tablet Take 2 tablets (20 mEq) by mouth daily   blood glucose monitoring (NO BRAND SPECIFIED) test strip Use to test blood sugars 2 times daily or as directed   blood glucose monitoring (ONE TOUCH DELICA) lancets Use to test blood sugars 2 times daily or as directed.   order for DME Equipment being ordered: BP cuff, large   polyethylene glycol (MIRALAX/GLYCOLAX) packet Take 17 g by mouth daily (Patient not taking: Reported on 8/14/2017)   acetaminophen (TYLENOL)  "500 MG tablet Take 2 tablets (1,000 mg) by mouth 3 times daily (Patient not taking: Reported on 8/14/2017)     No current facility-administered medications on file prior to visit.     Physical examination:  /53 (BP Location: Left arm, Patient Position: Chair, Cuff Size: Adult Large)  Pulse 52  Ht 1.778 m (5' 10\")  Wt 122.8 kg (270 lb 12.8 oz)  SpO2 98%  BMI 38.86 kg/m2  GENERAL APPEARANCE: healthy, alert and no distress  HEENT: no icterus, no xanthelasmas, normal pupil size and reaction, normal palate, mucosa moist, no cyanosis.  NECK: no adenopathy, no asymmetry, masses, or scars  CHEST: lungs clear to auscultation - no rales, rhonchi or wheezes, no use of accessory muscles, no retractions, respirations are unlabored, normal respiratory rate, no kyphosis, no scoliosis  CARDIOVASCULAR: regular rhythm, normal S1 with physiologic split S2, no S3 or S4 and no murmur, click or rub, precordium quiet with normal PMI. JVP is flat  ABDOMEN: obese, soft, non tender, without hepatomegaly, no masses palpable, bowel sounds normal  EXTREMITIES: warm and without edema  NEURO: alert and oriented to person/place/time, normal speech, gait and affect  SKIN: no ecchymoses, no rashes    LABS  Last Basic Metabolic Panel:  Lab Results   Component Value Date     08/14/2017      Lab Results   Component Value Date    POTASSIUM 4.0 08/14/2017     Lab Results   Component Value Date    CHLORIDE 107 08/14/2017     Lab Results   Component Value Date    NATALYA 9.1 08/14/2017     Lab Results   Component Value Date    CO2 27 08/14/2017     Lab Results   Component Value Date    BUN 14 08/14/2017     Lab Results   Component Value Date    CR 1.30 08/14/2017     Lab Results   Component Value Date     08/14/2017       Assessment and Plan  Mr. Berman is a 62 year old man with a history of chronic heart failure with preserved ejection fraction who appears well. Creatinine is up today. We'll repeat labs in 2 weeks. We'll make no " changes today. Encouraged to increase physical activity and weight loss through an organized weight management program such as Weight Watchers or following the DASH Diet. He will return to clinic in 6 months and as needed.    20 minutes spent in direct care, >50% in counseling.

## 2017-08-14 NOTE — NURSING NOTE
Chief Complaint   Patient presents with     Follow Up For     Return CORE appt: 62yr old male with a h/o chronic diastolic heart failure/HFpEF presenting for follow up with labs     Vitals were taken and medications were reconciled.     BHAVIK Hernandez  8:25 AM

## 2017-08-14 NOTE — PATIENT INSTRUCTIONS
"You were seen today in the Cardiovascular Clinic at the [facility].     Cardiology Providers you saw during your visit: Sheela Yee      1. Please have blood work drawn in 2 weeks    2. Please increase physical activity    3. Please consider an organized weight loss program such as Weight Watchers or the DASH Diet    4. Please make a follow-up CORE/heart failure appt in 6 months      Please limit your fluid intake to 2 L (64 ounces) daily.  2 Liters a day = 8.5 cups, or 72 ounces.  Please limit your salt intake to 2 grams a day or less.    If you gain 2# in 24 hours or 5# in one week call Mohini Rhodes RN so we can adjust your medications as needed over the phone.    Please feel free to call me with any questions or concerns.      Mohini Rhodes RN BSN CHFN  [facility]  Cardiology Care Coordinator-Heart Failure Clinic    Questions and [phone].   First press #1 for the University and then press #3 for \"Medical Questions\" to reach us Cardiology Nurses.     On Call Cardiologist for after hours or on weekends: [phone]   option #4 and ask to speak to the on-call Cardiologist. Inform them you are a CORE/heart failure patient at the [facility].        If you need a medication refill please contact your pharmacy.  Please allow 3 business days for your refill to be completed.  _______________________________________________________  [facility]IN[facility] Cardiomyopathy, Optimization, Rehabilitation, Education   The [facility].R.E. CLINIC is a heart failure specialty clinic within the [facility] where you will work with specialized nurse practitioners dedicated to helping patients with heart failure carefully adjust medications, receive education, and learn who and when to call if symptoms develop. They specialize in helping you better understand your condition, slow the progression of your disease, improve the length and quality of your " life, help you detect future heart problems before they become life threatening, and avoid hospitalizations.  As always, thank you for trusting us with your health care needs!

## 2017-08-23 DIAGNOSIS — E11.9 DIABETES MELLITUS (H): ICD-10-CM

## 2017-08-23 NOTE — TELEPHONE ENCOUNTER
Refill request received via fax from patient's pharmacy. Refilled per standing protocol. Sent to patient's preferred pharmacy.    Supplies refilled as previously written.    Gayla Bales RN

## 2017-08-23 NOTE — TELEPHONE ENCOUNTER
Refill request received via fax from patient's pharmacy. Refilled per standing protocol. Sent to patient's preferred pharmacy.    Date of last office visit: 6/7/17  Last A1c value: 5.8    Medication refilled as previously written.    Najma Deshpande RN

## 2017-08-24 ENCOUNTER — OFFICE VISIT (OUTPATIENT)
Dept: OPHTHALMOLOGY | Facility: CLINIC | Age: 62
End: 2017-08-24

## 2017-08-24 DIAGNOSIS — E11.9 TYPE 2 DIABETES MELLITUS WITHOUT COMPLICATION, WITHOUT LONG-TERM CURRENT USE OF INSULIN (H): Primary | ICD-10-CM

## 2017-08-24 DIAGNOSIS — H04.123 DRY EYES, BILATERAL: ICD-10-CM

## 2017-08-24 DIAGNOSIS — H52.203 HYPEROPIC ASTIGMATISM OF BOTH EYES: ICD-10-CM

## 2017-08-24 ASSESSMENT — VISUAL ACUITY
OD_CC: J1+
CORRECTION_TYPE: GLASSES
OS_CC: 20/20
OD_CC: 20/20
OS_CC: J1+
METHOD: SNELLEN - LINEAR

## 2017-08-24 ASSESSMENT — CUP TO DISC RATIO
OD_RATIO: 0.40
OS_RATIO: 0.40

## 2017-08-24 ASSESSMENT — CONF VISUAL FIELD
OS_NORMAL: 1
OD_NORMAL: 1
METHOD: COUNTING FINGERS

## 2017-08-24 ASSESSMENT — TONOMETRY
OS_IOP_MMHG: 16
IOP_METHOD: ICARE
OD_IOP_MMHG: 16

## 2017-08-24 ASSESSMENT — REFRACTION_MANIFEST
OS_SPHERE: +0.50
OD_AXIS: 105
OD_CYLINDER: +0.25
OS_ADD: +2.25
OD_SPHERE: +0.50
OS_AXIS: 060
OS_CYLINDER: +0.50
OD_ADD: +2.25

## 2017-08-24 ASSESSMENT — REFRACTION_WEARINGRX
OS_SPHERE: +0.50
OD_AXIS: 105
OD_CYLINDER: +0.25
OD_ADD: +2.25
OS_CYLINDER: +0.50
OD_SPHERE: +0.50
OS_ADD: +2.25
OS_AXIS: 060

## 2017-08-24 ASSESSMENT — EXTERNAL EXAM - LEFT EYE: OS_EXAM: NORMAL

## 2017-08-24 ASSESSMENT — EXTERNAL EXAM - RIGHT EYE: OD_EXAM: NORMAL

## 2017-08-24 NOTE — NURSING NOTE
Chief Complaints and History of Present Illnesses   Patient presents with     Follow Up For     Dry eyes, bilateral      Eye Exam For Diabetes     HPI    Affected eye(s):  Both   Symptoms:     No blurred vision   Eye discharge      Frequency:  Constant       Do you have eye pain now?:  No      Comments:  Pt states eyes have cleared up a little bit. Restasis BID OU and done with FML. No pain. Occasionally still waking up with eye discharge.     DM II Diagnosed for 3 years,  A1c 5.8 in June 2017.    Matilda Kaur COT 8:54 AM August 24, 2017

## 2017-08-24 NOTE — PROGRESS NOTES
A/P  1.) Type 2 DM without ophthalmic manifestation  -Last A1c 5.8, no previous h/o retinopathy  -Reviewed effects of DM on the eyes and importance of good blood sugar control  -Monitor annually    2.) Dry Eye OU  -Symptoms and clinical appearance much improved on Restasis - continue  -Add nighttime chidi (Genteal gel) OU  -Continue AT prn during the day    RTC 1 year diabetic eye exam, sooner prn for worsening dryness concerns    I have confirmed the patient's CC, HPI and reviewed Past Medical History, Past Surgical History, Social History, Family History, Problem List, Medication List and agree with Tech note.     Andie Hyde, OD FAAO

## 2017-08-24 NOTE — LETTER
August 24, 2017       DIABETIC EYE EXAMINATION REPORT:    ESTABLISHED PATIENT  Rabia Carmona MD      Name: Jazz Berman   MRN: [id number]   Date of Service: 8/24/2017       Dear Dr. Carmona,    I had the pleasure of seeing your patient Jazz for his diabetic eye exam. He has type 2 diabetes with an excellent A1c of 5.8. He has no history of diabetic retinopathy.    VISUAL ACUITY:    Visual Acuity (Snellen - Linear)      Right Left   Dist cc 20/20 20/20   Near cc J1+ J1+       Correction:  Glasses           Detailed Retinal Examination:  250.00 NIDDM without Eye Complications    Follow up recommendations: in 1 year for reevaluation.    Thank you again for the kind referral.  If I can provide you with any additional information or be of further assistance in the future, please feel free to contact me at any time.    Sincerely,    MALLORIE BARRON

## 2017-08-30 DIAGNOSIS — C49.A2: Primary | ICD-10-CM

## 2017-08-30 RX ORDER — METHYLPREDNISOLONE 32 MG/1
32 TABLET ORAL DAILY
Qty: 2 TABLET | Refills: 3 | Status: SHIPPED | OUTPATIENT
Start: 2017-08-30 | End: 2017-10-12

## 2017-09-01 ENCOUNTER — MEDICAL CORRESPONDENCE (OUTPATIENT)
Dept: HEALTH INFORMATION MANAGEMENT | Facility: CLINIC | Age: 62
End: 2017-09-01

## 2017-09-02 ENCOUNTER — HEALTH MAINTENANCE LETTER (OUTPATIENT)
Age: 62
End: 2017-09-02

## 2017-09-05 DIAGNOSIS — C49.A2: ICD-10-CM

## 2017-09-05 DIAGNOSIS — I50.32 CHRONIC DIASTOLIC HEART FAILURE (H): ICD-10-CM

## 2017-09-05 DIAGNOSIS — C49.A2: Primary | ICD-10-CM

## 2017-09-05 LAB
ALBUMIN SERPL-MCNC: 3.6 G/DL (ref 3.4–5)
ALP SERPL-CCNC: 97 U/L (ref 40–150)
ALT SERPL W P-5'-P-CCNC: 20 U/L (ref 0–70)
ANION GAP SERPL CALCULATED.3IONS-SCNC: 6 MMOL/L (ref 3–14)
AST SERPL W P-5'-P-CCNC: 13 U/L (ref 0–45)
BILIRUB DIRECT SERPL-MCNC: 0.2 MG/DL (ref 0–0.2)
BILIRUB SERPL-MCNC: 0.7 MG/DL (ref 0.2–1.3)
BUN SERPL-MCNC: 21 MG/DL (ref 7–30)
CALCIUM SERPL-MCNC: 9.4 MG/DL (ref 8.5–10.1)
CHLORIDE SERPL-SCNC: 105 MMOL/L (ref 94–109)
CO2 SERPL-SCNC: 27 MMOL/L (ref 20–32)
CREAT SERPL-MCNC: 1.14 MG/DL (ref 0.66–1.25)
ERYTHROCYTE [DISTWIDTH] IN BLOOD BY AUTOMATED COUNT: 13.6 % (ref 10–15)
GFR SERPL CREATININE-BSD FRML MDRD: 65 ML/MIN/1.7M2
GLUCOSE SERPL-MCNC: 138 MG/DL (ref 70–99)
HCT VFR BLD AUTO: 44.7 % (ref 40–53)
HGB BLD-MCNC: 14.9 G/DL (ref 13.3–17.7)
MCH RBC QN AUTO: 30.2 PG (ref 26.5–33)
MCHC RBC AUTO-ENTMCNC: 33.3 G/DL (ref 31.5–36.5)
MCV RBC AUTO: 91 FL (ref 78–100)
PLATELET # BLD AUTO: 234 10E9/L (ref 150–450)
POTASSIUM SERPL-SCNC: 4.7 MMOL/L (ref 3.4–5.3)
PROT SERPL-MCNC: 8.2 G/DL (ref 6.8–8.8)
RBC # BLD AUTO: 4.94 10E12/L (ref 4.4–5.9)
SODIUM SERPL-SCNC: 138 MMOL/L (ref 133–144)
WBC # BLD AUTO: 6.3 10E9/L (ref 4–11)

## 2017-09-06 NOTE — PROGRESS NOTES
9-7-17     I saw Mr. 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      Background  In brief, he was in his usual state of health but noted some black stools in 06/2016. In early July 2016 he was admitted for anemia and GI bleeding and had orthostatic symptoms. 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 Endoscopy before that showed some punctate oozing and it is possible that some of the bleeding was coming from the tumor. He is recovering well from surgery and right now he is feeling pretty good without much in the way of complaints. He does note, however, that has lost 30 pounds since surgery and has a rather low energy level which has not improved much since surgery. He does not get out of the house every day and walks only for about 15 minutes. He denies being bothered by shortness of breath right now though he has had that problem and is felt to likely have HFpEF. He does not go up stairs due to difficulty with his left leg which has a geo that was put in following a fracture. He has sleep apnea and uses a CPAP machine and also has a history of hypertension, depression, diabetes, cardiomegaly, and he was found to be in atrial fibrillation when he was hospitalized for the GI bleed.  -  Surgery was August 2016 and pathology report showed 1 mitosis per 50 high-power fields, a tumor size of 15 cm and negative margins. 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   -       Interval history.     He is doing pretty well overall.    He does have a history of chronic congestive heart failure and is on multiple medications. He is walking about 30 min a day and that's going well.    He had some tiny nonspecific lung nodules, some adrenal nodules, and nonspecific splenic lesions on the imaging.     He has occasional hand tingling when he gets cold.    A heart echo on 07/04/2016 showed a normal LV function, but the left ventricular diastolic function could not be assessed.      A  "10-point ROS is otherwise unremarkable.      -  Background PMH, FH, SH  His past history is notable for keloid formation, resection of a rectal polyp, and a crushing foot injury.   He feels that contrast dye created a bit of a rash that left some skin hyperpigmentation on his back.   He is currently single and lives alone. He stopped smoking in 2011, though he was a polysubstance abuser in the past. He no longer drinks alcohol. He is retired from working in a furniture store.   Family history is positive for diabetes and hypertension.   -     On exam he appeared comfortable with a quiet affect.   /83 (BP Location: Left arm, Patient Position: Sitting, Cuff Size: Adult Large)  Pulse 67  Temp 98.9  F (37.2  C) (Oral)  Resp 16  Ht 1.778 m (5' 10\")  Wt 122.2 kg (269 lb 8 oz)  SpO2 97%  BMI 38.67 kg/m2  HEENT: Normal oropharynx. No icterus.   LYMPH: No lymphadenopathy.   CHEST: Clear.   HEART: There was an irregularly irregular rhythm.   ABDOMEN: There was an obese abdomen with a well-healed surgical scar , no HSMT.   NEURO: Normal Romberg, can heel walk w difficulty (broke both ankles in the past).        -  I reviewed his imaging and there is no suggestion of PD though he has some stable lung nodules. There are some nonspecific splenic lesion that are less obvious today.  The adrenal nodules are stable.       -  GNE, LFT, Hb OK.    No KIT mutation back yet it seems.  We will resend.       -    It appears his recurrence risk is on the order of 10-15% over a few years.  I do not think that risk warrants the toxicities of Gleevec, especially given his multiple other problems and medications.      We will check on the sending his sample to [place] for KIT and reflex PDGFRA, SDH, etc., testing, as this might give us some further information.     He had an echo that showed no structural heart disease, and while he was felt to be at high risk for stroke with a CHADS2 score of 4, and he is on xarelto.  He will f/u w " his PCP on that. His atrial fibrillation, anticoagulation and other issues will be followed by his primary care team and Cardiology.     We will see him about 3-22 w CT and labs 2 d before.     All his questions were addressed and he will call if he has others.  -  Sean Freed M.D.  Professor  Hematology, Oncology and Transplantation

## 2017-09-07 ENCOUNTER — ONCOLOGY VISIT (OUTPATIENT)
Dept: ONCOLOGY | Facility: CLINIC | Age: 62
End: 2017-09-07
Attending: INTERNAL MEDICINE
Payer: MEDICARE

## 2017-09-07 VITALS
BODY MASS INDEX: 38.58 KG/M2 | OXYGEN SATURATION: 97 % | TEMPERATURE: 98.9 F | HEIGHT: 70 IN | RESPIRATION RATE: 16 BRPM | SYSTOLIC BLOOD PRESSURE: 113 MMHG | WEIGHT: 269.5 LBS | HEART RATE: 67 BPM | DIASTOLIC BLOOD PRESSURE: 83 MMHG

## 2017-09-07 DIAGNOSIS — Z79.01 CURRENT USE OF LONG TERM ANTICOAGULATION: ICD-10-CM

## 2017-09-07 DIAGNOSIS — C49.A2: ICD-10-CM

## 2017-09-07 DIAGNOSIS — N40.0 BENIGN PROSTATIC HYPERPLASIA WITHOUT LOWER URINARY TRACT SYMPTOMS: ICD-10-CM

## 2017-09-07 DIAGNOSIS — F43.29 MIXED EMOTIONAL FEATURES AS ADJUSTMENT REACTION: ICD-10-CM

## 2017-09-07 DIAGNOSIS — I10 HYPERTENSION, ESSENTIAL: ICD-10-CM

## 2017-09-07 DIAGNOSIS — I48.0 PAROXYSMAL ATRIAL FIBRILLATION (H): ICD-10-CM

## 2017-09-07 DIAGNOSIS — F33.0 MILD RECURRENT MAJOR DEPRESSION (H): ICD-10-CM

## 2017-09-07 DIAGNOSIS — E66.9 OBESITY, UNSPECIFIED OBESITY SEVERITY, UNSPECIFIED OBESITY TYPE: ICD-10-CM

## 2017-09-07 DIAGNOSIS — I51.7 CARDIOMEGALY: ICD-10-CM

## 2017-09-07 DIAGNOSIS — I50.32 CHRONIC DIASTOLIC HEART FAILURE (H): ICD-10-CM

## 2017-09-07 DIAGNOSIS — K57.30 DIVERTICULOSIS OF LARGE INTESTINE WITHOUT HEMORRHAGE: ICD-10-CM

## 2017-09-07 DIAGNOSIS — G47.33 OSA (OBSTRUCTIVE SLEEP APNEA): ICD-10-CM

## 2017-09-07 PROCEDURE — 99213 OFFICE O/P EST LOW 20 MIN: CPT | Mod: ZF

## 2017-09-07 PROCEDURE — 99214 OFFICE O/P EST MOD 30 MIN: CPT | Mod: ZP | Performed by: INTERNAL MEDICINE

## 2017-09-07 ASSESSMENT — PAIN SCALES - GENERAL: PAINLEVEL: NO PAIN (0)

## 2017-09-07 NOTE — NURSING NOTE
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    Weight as of this encounter: 122.2 kg (269 lb 8 oz). Body surface area is 2.46 meters squared.  No Pain (0) Comment: Data Unavailable   No LMP for male patient.  Allergies reviewed: Yes  Medications reviewed: Yes    Medications: Medication refills not needed today.  Pharmacy name entered into [employer]: [id number] - [place], [place] - [address]    Clinical concerns: No new concerns. Provider was notified.    10 minutes for nursing intake (face to face time)     Sarai Buckner LPN            "

## 2017-09-07 NOTE — MR AVS SNAPSHOT
After Visit Summary   9/7/2017    Jazz Berman    MRN: [id number]           Patient Information     Date Of Birth          1955        Visit Information        Provider Department      9/7/2017 10:00 AM Sean Fered MD [facility]        Today's Diagnoses     Hypertension, essential        Cardiomegaly        GREG (obstructive sleep apnea) on CPAP        Benign prostatic hyperplasia without lower urinary tract symptoms        Mild recurrent major depression (H)        Gastrointestinal stromal sarcoma of stomach (H)        Diverticulosis of large intestine without hemorrhage        Mixed emotional features as adjustment reaction        Obesity, unspecified obesity severity, unspecified obesity type        Chronic diastolic heart failure (H)        Current use of long term anticoagulation        Paroxysmal atrial fibrillation (H)           Follow-ups after your visit        Your next 10 appointments already scheduled     Sep 12, 2017 10:20 AM CDT   Return Visit with Rabia Carmona MD   [facility] ([facility])    [address] [place] [address],  [address]  [facility] [address]   [phone]            Feb 14, 2018  8:00 AM CST   Lab with [facility])    [address] [place] [address]  1st [facility] [id number] [phone]            Feb 14, 2018  8:30 AM CST   (Arrive by 8:15 AM)   CORE RETURN with DULCE Pimentel [facility])    [address] [place] [id number] [phone]              Future tests that were ordered for you today     Open Future Orders        Priority Expected Expires Ordered    CT Abdomen Pelvis w/o & w Contrast Routine 3/20/2018 7/7/2018 9/7/2017    CBC with platelets differential Routine 3/20/2018 7/7/2018 9/7/2017    Comprehensive metabolic panel Routine 3/20/2018 7/7/2018 9/7/2017     "        Who to contact     If you have questions or need follow up information about today's clinic visit or your schedule please contact [facility] directly at [phone].  Normal or non-critical lab and imaging results will be communicated to you by MyChart, letter or phone within 4 business days after the clinic has received the results. If you do not hear from us within 7 days, please contact the clinic through MyChart or phone. If you have a critical or abnormal lab result, we will notify you by phone as soon as possible.  Submit refill requests through [employer] or call your pharmacy and they will forward the refill request to us. Please allow 3 business days for your refill to be completed.          Additional Information About Your Visit        [employer] Information     [employer] gives you secure access to your electronic health record. If you see a primary care provider, you can also send messages to your care team and make appointments. If you have questions, please call your primary care clinic.  If you do not have a primary care provider, please call [phone] and they will assist you.        Care EveryWhere ID     This is your Care EveryWhere ID. This could be used by other organizations to access your [place] medical records  [id number]        Your Vitals Were     Pulse Temperature Respirations Height Pulse Oximetry BMI (Body Mass Index)    67 98.9  F (37.2  C) (Oral) 16 1.778 m (5' 10\") 97% 38.67 kg/m2       Blood Pressure from Last 3 Encounters:   09/07/17 113/83   08/14/17 128/53   06/07/17 (!) 133/91    Weight from Last 3 Encounters:   09/07/17 122.2 kg (269 lb 8 oz)   08/14/17 122.8 kg (270 lb 12.8 oz)   06/07/17 119.9 kg (264 lb 6.4 oz)               Primary Care Provider Office Phone # Fax #    Rabia Carmona [phone]       [facility] [address] [facility] [address]        Equal Access to Services     GERTRUDE RAJAN AH: Hadii aad " shirley Valentin, wasylwiada luqadaha, qaybta kamichelle ceron, latia rodriguezin hayaan mallyalana rosenbaum lamundojanet tonie. So [facility] [phone].    ATENCIÓN: Si habla leni, tiene a tapai disposición servicios gratuitos de asistencia lingüística. Kayy al [phone].    We comply with applicable federal civil rights laws and [place] laws. We do not discriminate on the basis of race, color, national origin, age, disability sex, sexual orientation or gender identity.            Thank you!     Thank you for choosing [facility]  for your care. Our goal is always to provide you with excellent care. Hearing back from our patients is one way we can continue to improve our services. Please take a few minutes to complete the written survey that you may receive in the mail after your visit with us. Thank you!             Your Updated Medication List - Protect others around you: Learn how to safely use, store and throw away your medicines at www.disposemymeds.org.          This list is accurate as of: 9/7/17 10:26 AM.  Always use your most recent med list.                   Brand Name Dispense Instructions for use Diagnosis    acetaminophen 500 MG tablet    TYLENOL    100 tablet    Take 2 tablets (1,000 mg) by mouth 3 times daily    Gastrointestinal stromal sarcoma of stomach (H)       amLODIPine 10 MG tablet    NORVASC    30 tablet    Take 1 tablet (10 mg) by mouth daily    Essential hypertension with goal blood pressure less than 140/90       blood glucose monitoring lancets     100 each    Use to test blood sugars 2 times daily or as directed.    Diabetes mellitus (H)       blood glucose monitoring test strip    no brand specified    100 each    Use to test blood sugars 2 times daily or as directed    Diabetes mellitus (H)       cycloSPORINE 0.05 % ophthalmic emulsion    RESTASIS    1 Box    Place 1 drop into both eyes 2 times daily    Dry eyes, bilateral, Punctate keratitis, bilateral       ferrous sulfate 325 (65  FE) MG tablet    IRON    90 tablet    Take 1 tablet (325 mg) by mouth 2 times daily    Iron deficiency anemia, unspecified iron deficiency anemia type       furosemide 40 MG tablet    LASIX    60 tablet    Take 1 tablet (40 mg) by mouth daily    Diastolic congestive heart failure, unspecified congestive heart failure chronicity (H)       lisinopril 20 MG tablet    PRINIVIL/ZESTRIL    180 tablet    Take 2 tablets (40 mg) by mouth daily    (HFpEF) heart failure with preserved ejection fraction (H)       metFORMIN 850 MG tablet    GLUCOPHAGE    90 tablet    Take 1 tablet (850 mg) by mouth daily (with breakfast) AM    Type 2 diabetes mellitus without complication, without long-term current use of insulin (H)       methylPREDNISolone 32 MG tablet    MEDROL    2 tablet    Take 1 tablet (32 mg) by mouth daily Take 1 tab (32 mg) 12 and 2 hours before CT scan    Gastrointestinal stromal sarcoma of stomach (H)       metoprolol 100 MG 24 hr tablet    TOPROL-XL    60 tablet    Take 2 tablets (200 mg) by mouth daily    Benign essential hypertension       omeprazole 40 MG capsule    priLOSEC    30 capsule    Take 1 capsule (40 mg) by mouth daily Take 30-60 minutes before a meal.    Acute gastrointestinal hemorrhage       order for DME     1 Units    Equipment being ordered: BP cuff, large    Hypertension, essential       polyethylene glycol Packet    MIRALAX/GLYCOLAX    24 packet    Take 17 g by mouth daily    Constipation, unspecified constipation type       potassium chloride SA 10 MEQ CR tablet    K-DUR/KLOR-CON M    180 tablet    Take 2 tablets (20 mEq) by mouth daily    (HFpEF) heart failure with preserved ejection fraction (H)       rivaroxaban ANTICOAGULANT 20 MG Tabs tablet    XARELTO    30 tablet    Take 1 tablet (20 mg) by mouth daily (with dinner)    Paroxysmal atrial fibrillation (H)       sildenafil 100 MG cap/tab    VIAGRA    30 tablet    Take 100mg tablet as directed.  -Rx prescribed via [employer] connection to care  program-    Erectile dysfunction, unspecified erectile dysfunction type       solifenacin 10 MG tablet    VESICARE    60 tablet    Take 1 tablet (10 mg) by mouth daily AM    Urinary urgency       spironolactone 25 MG tablet    ALDACTONE    90 tablet    Take 1 tablet (25 mg) by mouth daily    Chronic diastolic heart failure (H)       vitamin D 2000 UNITS tablet     60 tablet    Take 2,000 Units by mouth daily    Vitamin D deficiency

## 2017-09-07 NOTE — LETTER
9/7/2017       RE: Jazz Berman  [address]     [facility] [address]     Dear Colleague,    Thank you for referring your patient, Jazz Berman, to the [facility]. Please see a copy of my visit note below.    9-7-17     I saw Mr. 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      Background  In brief, he was in his usual state of health but noted some black stools in 06/2016. In early July 2016 he was admitted for anemia and GI bleeding and had orthostatic symptoms. 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 Endoscopy before that showed some punctate oozing and it is possible that some of the bleeding was coming from the tumor. He is recovering well from surgery and right now he is feeling pretty good without much in the way of complaints. He does note, however, that has lost 30 pounds since surgery and has a rather low energy level which has not improved much since surgery. He does not get out of the house every day and walks only for about 15 minutes. He denies being bothered by shortness of breath right now though he has had that problem and is felt to likely have HFpEF. He does not go up stairs due to difficulty with his left leg which has a geo that was put in following a fracture. He has sleep apnea and uses a CPAP machine and also has a history of hypertension, depression, diabetes, cardiomegaly, and he was found to be in atrial fibrillation when he was hospitalized for the GI bleed.  -  Surgery was August 2016 and pathology report showed 1 mitosis per 50 high-power fields, a tumor size of 15 cm and negative margins. 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   -       Interval history.     He is doing pretty well overall.    He does have a history of chronic congestive heart failure and is on multiple medications. He is walking about 30 min a day and that's going well.    He had some tiny nonspecific lung nodules, some adrenal  "nodules, and nonspecific splenic lesions on the imaging.     He has occasional hand tingling when he gets cold.    A heart echo on 07/04/2016 showed a normal LV function, but the left ventricular diastolic function could not be assessed.      A 10-point ROS is otherwise unremarkable.      -  Background PMH, FH, SH  His past history is notable for keloid formation, resection of a rectal polyp, and a crushing foot injury.   He feels that contrast dye created a bit of a rash that left some skin hyperpigmentation on his back.   He is currently single and lives alone. He stopped smoking in 2011, though he was a polysubstance abuser in the past. He no longer drinks alcohol. He is retired from working in a furniture store.   Family history is positive for diabetes and hypertension.   -     On exam he appeared comfortable with a quiet affect.   /83 (BP Location: Left arm, Patient Position: Sitting, Cuff Size: Adult Large)  Pulse 67  Temp 98.9  F (37.2  C) (Oral)  Resp 16  Ht 1.778 m (5' 10\")  Wt 122.2 kg (269 lb 8 oz)  SpO2 97%  BMI 38.67 kg/m2  HEENT: Normal oropharynx. No icterus.   LYMPH: No lymphadenopathy.   CHEST: Clear.   HEART: There was an irregularly irregular rhythm.   ABDOMEN: There was an obese abdomen with a well-healed surgical scar , no HSMT.   NEURO: Normal Romberg, can heel walk w difficulty (broke both ankles in the past).        -  I reviewed his imaging and there is no suggestion of PD though he has some stable lung nodules. There are some nonspecific splenic lesion that are less obvious today.  The adrenal nodules are stable.       -  GNE, LFT, Hb OK.    No KIT mutation back yet it seems.  We will resend.       -    It appears his recurrence risk is on the order of 10-15% over a few years.  I do not think that risk warrants the toxicities of Gleevec, especially given his multiple other problems and medications.      We will check on the sending his sample to [place] for KIT and reflex " PDGFRA, SDH, etc., testing, as this might give us some further information.     He had an echo that showed no structural heart disease, and while he was felt to be at high risk for stroke with a CHADS2 score of 4, and he is on xarelto.  He will f/u w his PCP on that. His atrial fibrillation, anticoagulation and other issues will be followed by his primary care team and Cardiology.     We will see him about 3-22 w CT and labs 2 d before.     All his questions were addressed and he will call if he has others.  -  Sean Freed M.D.  Professor  Hematology, Oncology and Transplantation

## 2017-09-20 ENCOUNTER — TELEPHONE (OUTPATIENT)
Dept: FAMILY MEDICINE | Facility: CLINIC | Age: 62
End: 2017-09-20

## 2017-09-20 NOTE — TELEPHONE ENCOUNTER
[facility] phone call message- patient requesting a refill:    Full Medication Name: sildenafil (VIAGRA) 100 MG cap/tab    Dose:     Pharmacy confirmed as   [id number] - [place], [place] - [address] [place] [address]  Phone: [phone] Fax: [phone]  : Yes    Additional Comments: pt requesting refill    OK to leave a message on voice mail? Yes    Primary language: English      needed? No    Call taken on September 20, 2017 at 2:56 PM by Francisco Agosto

## 2017-09-22 NOTE — TELEPHONE ENCOUNTER
Refill has been ordered from [employer].  Order number [id number]  Will be shipped and ordered Oct 7th at the soonest.    Jayson Hernandez Pharm.D.

## 2017-10-03 DIAGNOSIS — I10 ESSENTIAL HYPERTENSION WITH GOAL BLOOD PRESSURE LESS THAN 140/90: ICD-10-CM

## 2017-10-04 RX ORDER — AMLODIPINE BESYLATE 10 MG/1
10 TABLET ORAL DAILY
Qty: 30 TABLET | Refills: 3 | Status: SHIPPED | OUTPATIENT
Start: 2017-10-04 | End: 2018-02-22

## 2017-10-06 DIAGNOSIS — K92.2 ACUTE GASTROINTESTINAL HEMORRHAGE: ICD-10-CM

## 2017-10-06 RX ORDER — OMEPRAZOLE 40 MG/1
40 CAPSULE, DELAYED RELEASE ORAL DAILY
Qty: 90 CAPSULE | Refills: 3 | Status: SHIPPED | OUTPATIENT
Start: 2017-10-06 | End: 2017-10-10

## 2017-10-06 NOTE — TELEPHONE ENCOUNTER
Request for medication refill:    Date of last visit at clinic: 6-7-17    Please complete refill if appropriate and CLOSE ENCOUNTER.    Closing the encounter signifies the refill is complete.    If refill has been denied, please complete the smart phrase .smirefuse and route it to the [facility] RN TRIAGE pool to inform the patient and the pharmacy.    Manjula Kaur MA

## 2017-10-10 DIAGNOSIS — K92.2 ACUTE GASTROINTESTINAL HEMORRHAGE: ICD-10-CM

## 2017-10-10 RX ORDER — OMEPRAZOLE 40 MG/1
40 CAPSULE, DELAYED RELEASE ORAL DAILY
Qty: 90 CAPSULE | Refills: 3 | Status: SHIPPED | OUTPATIENT
Start: 2017-10-10 | End: 2018-02-27

## 2017-10-10 NOTE — TELEPHONE ENCOUNTER
Request for medication refill:    Date of last visit at clinic: 6/7/17    Please complete refill if appropriate and CLOSE ENCOUNTER.    Closing the encounter signifies the refill is complete.    If refill has been denied, please complete the smart phrase .smirefuse and route it to the [facility] RN TRIAGE pool to inform the patient and the pharmacy.    Claire Frye

## 2017-10-12 ENCOUNTER — DOCUMENTATION ONLY (OUTPATIENT)
Dept: FAMILY MEDICINE | Facility: CLINIC | Age: 62
End: 2017-10-12

## 2017-10-12 ENCOUNTER — TELEPHONE (OUTPATIENT)
Dept: FAMILY MEDICINE | Facility: CLINIC | Age: 62
End: 2017-10-12

## 2017-10-12 ENCOUNTER — OFFICE VISIT (OUTPATIENT)
Dept: FAMILY MEDICINE | Facility: CLINIC | Age: 62
End: 2017-10-12

## 2017-10-12 VITALS
OXYGEN SATURATION: 99 % | TEMPERATURE: 98.1 F | WEIGHT: 271 LBS | BODY MASS INDEX: 38.88 KG/M2 | RESPIRATION RATE: 18 BRPM | SYSTOLIC BLOOD PRESSURE: 124 MMHG | DIASTOLIC BLOOD PRESSURE: 83 MMHG | HEART RATE: 71 BPM

## 2017-10-12 DIAGNOSIS — Z00.129 ENCOUNTER FOR WELL CHILD VISIT AT 4 MONTHS OF AGE: Primary | ICD-10-CM

## 2017-10-12 DIAGNOSIS — I50.32 CHRONIC DIASTOLIC HEART FAILURE (H): ICD-10-CM

## 2017-10-12 DIAGNOSIS — E11.9 TYPE 2 DIABETES MELLITUS WITHOUT COMPLICATION, WITHOUT LONG-TERM CURRENT USE OF INSULIN (H): ICD-10-CM

## 2017-10-12 DIAGNOSIS — I10 HYPERTENSION, ESSENTIAL: ICD-10-CM

## 2017-10-12 DIAGNOSIS — Z00.00 PREVENTATIVE HEALTH CARE: ICD-10-CM

## 2017-10-12 LAB
BUN SERPL-MCNC: 23.9 MG/DL (ref 7–21)
CALCIUM SERPL-MCNC: 10.1 MG/DL (ref 8.5–10.1)
CHLORIDE SERPLBLD-SCNC: 103.9 MMOL/L (ref 98–110)
CO2 SERPL-SCNC: 27.3 MMOL/L (ref 20–32)
CREAT SERPL-MCNC: 1.2 MG/DL (ref 0.7–1.3)
GFR SERPL CREATININE-BSD FRML MDRD: 65.2 ML/MIN/1.7 M2
GLUCOSE SERPL-MCNC: 107.6 MG'DL (ref 70–99)
HBA1C MFR BLD: 5.5 % (ref 4.1–5.7)
NT-PROBNP SERPL-MCNC: 1002 PG/ML (ref 0–125)
POTASSIUM SERPL-SCNC: 4.7 MMOL/DL (ref 3.3–4.5)
SODIUM SERPL-SCNC: 141.2 MMOL/L (ref 132.6–141.4)

## 2017-10-12 NOTE — PROGRESS NOTES
"When opening a documentation only encounter, be sure to enter in \"Chief Complaint\" Forms and in \" Comments\" Title of form, description if needed.    Form received via: Fax  Form now resides in: Provider Ready    Form sent out via: Fax  Patient informed: No  Output date: 10/11/17    Form received by recipient via fax confirmation  Please close the encounter.    "

## 2017-10-12 NOTE — PATIENT INSTRUCTIONS
Here is the plan from today's visit    1. Hypertension, essential  - Basic Metabolic Panel ([facility])  - I want you to make sure to check your blood pressures at home at least a few days a week. If you get a high reading, then recheck it 5-10 minutes later.     2. Chronic diastolic heart failure (H)  - N terminal pro BNP outpatient  - if this is elevated, you will likely need extra dose(s) of lasix. However, before we make that decision, I will contact your cardiologist to discuss.     3. Type 2 diabetes mellitus without complication, without long-term current use of insulin (H)  - STATIN NOT PRESCRIBED, INTENTIONAL,; 1 each daily Please choose reason not prescribed, below  - Hemoglobin A1c ([facility])  - I will ask your cardiologist about the statin and whether you should be on it or not given your history.       Please call or return to clinic if your symptoms don't go away.    Follow up plan  Please make a clinic appointment for follow up with me (TRINITY DICKEY) in 3-4  weeks for blood pressure and weight check.    Thank you for coming to [facility] today.  Lab Testing:  **If you had lab testing today and your results are reassuring or normal they will be mailed to you or sent through [employer] within 7 days.   **If the lab tests need quick action we will call you with the results.  The phone number we will call with results is # [phone] (home) . If this is not the best number please call our clinic and change the number.  Medication Refills:  If you need any refills please call your pharmacy and they will contact us.   If you need to  your refill at a new pharmacy, please contact the new pharmacy directly. The new pharmacy will help you get your medications transferred faster.   Scheduling:  If you have any concerns about today's visit or wish to schedule another appointment please call our office during normal business hours [phone] (8-5:00 M-F)    Medical Concerns:  If you have  urgent medical concerns please call [phone] at any time of the day.

## 2017-10-12 NOTE — MR AVS SNAPSHOT
After Visit Summary   10/12/2017    Jazz Berman    MRN: [id number]           Patient Information     Date Of Birth          1955        Visit Information        Provider Department      10/12/2017 9:40 AM Rabia Dickey MD [facility]        Today's Diagnoses     Hypertension, essential    -  1    Chronic diastolic heart failure (H)        Type 2 diabetes mellitus without complication, without long-term current use of insulin (H)          Care Instructions    Here is the plan from today's visit    1. Hypertension, essential  - Basic Metabolic Panel ([facility])  - I want you to make sure to check your blood pressures at home at least a few days a week. If you get a high reading, then recheck it 5-10 minutes later.     2. Chronic diastolic heart failure (H)  - N terminal pro BNP outpatient  - if this is elevated, you will likely need extra dose(s) of lasix. However, before we make that decision, I will contact your cardiologist to discuss.     3. Type 2 diabetes mellitus without complication, without long-term current use of insulin (H)  - STATIN NOT PRESCRIBED, INTENTIONAL,; 1 each daily Please choose reason not prescribed, below  - Hemoglobin A1c ([facility])  - I will ask your cardiologist about the statin and whether you should be on it or not given your history.       Please call or return to clinic if your symptoms don't go away.    Follow up plan  Please make a clinic appointment for follow up with me (RABIA DICKEY) in 3-4  weeks for blood pressure and weight check.    Thank you for coming to [facility] today.  Lab Testing:  **If you had lab testing today and your results are reassuring or normal they will be mailed to you or sent through [employer] within 7 days.   **If the lab tests need quick action we will call you with the results.  The phone number we will call with results is # [phone] (home) . If this is not the best number please call our  clinic and change the number.  Medication Refills:  If you need any refills please call your pharmacy and they will contact us.   If you need to  your refill at a new pharmacy, please contact the new pharmacy directly. The new pharmacy will help you get your medications transferred faster.   Scheduling:  If you have any concerns about today's visit or wish to schedule another appointment please call our office during normal business hours [phone] (8-5:00 M-F)    Medical Concerns:  If you have urgent medical concerns please call [phone] at any time of the day.            Follow-ups after your visit        Your next 10 appointments already scheduled     Feb 14, 2018  8:00 AM CST   Lab with  LAB   [facility] ([facility])    [address] [place] [id number]   [phone]            Feb 14, 2018  8:30 AM CST   (Arrive by 8:15 AM)   CORE RETURN with DULCE Pimentel CNP   [facility] ([facility])    [address] [place] [id number]   [phone]            Mar 20, 2018 10:00 AM CDT   [facility] Draw with  [facility] DRAW   [facility] ([facility])    [address] [place] [id number]   [phone]            Mar 20, 2018 10:20 AM CDT   (Arrive by 10:05 AM)   CT ABDOMEN PELVIS W/O & W CONTRAST with UCCT2   [facility] ([facility])    [address] [place] [id number] [phone]           Please bring any scans or X-rays taken at other hospitals, if similar tests were done. Also bring a list of your medicines, including vitamins, minerals and over-the-counter drugs. It is safest to leave personal items at home.  Be sure to tell your doctor:   If you have any allergies.   If there s any chance you are pregnant.   If you are breastfeeding.   If you have  any special needs.  You may have contrast for this exam. To prepare:   Do not eat or drink for 2 hours before your exam. If you need to take medicine, you may take it with small sips of water. (We may ask you to take liquid medicine as well.)   The day before your exam, drink extra fluids at least six 8-ounce glasses (unless your doctor tells you to restrict your fluids).  Patients over 70 or patients with diabetes or kidney problems:   If you haven t had a blood test (creatinine test) within the last 30 days, go to your clinic or Diagnostic Imaging Department for this test.  If you have diabetes:   If your kidney function is normal, continue taking your metformin (Avandamet, Glucophage, Glucovance, Metaglip) on the day of your exam.   If your kidney function is abnormal, wait 48 hours before restarting this medicine.  You will have oral contrast for this exam:   You will drink the contrast at home. Get this from your clinic or Diagnostic Imaging Department. Please follow the directions given.  Please wear loose clothing, such as a sweat suit or jogging clothes. Avoid snaps, zippers and other metal. We may ask you to undress and put on a hospital gown.  If you have any questions, please call the Imaging Department where you will have your exam.            Mar 22, 2018 10:30 AM CDT   (Arrive by 10:15 AM)   Return Visit with Sean Freed MD   [facility] ([facility])    [address] [place] [address] [phone]              Who to contact     Please call your clinic at [phone] to:    Ask questions about your health    Make or cancel appointments    Discuss your medicines    Learn about your test results    Speak to your doctor   If you have compliments or concerns about an experience at your clinic, or if you wish to file a complaint, please contact [facility] Patient Relations at [phone] or email  us at [email or web address]         Additional Information About Your Visit        [employer] Information     [employer] gives you secure access to your electronic health record. If you see a primary care provider, you can also send messages to your care team and make appointments. If you have questions, please call your primary care clinic.  If you do not have a primary care provider, please call [phone] and they will assist you.      [employer] is an electronic gateway that provides easy, online access to your medical records. With [employer], you can request a clinic appointment, read your test results, renew a prescription or communicate with your care team.     To access your existing account, please contact your [facility] or call [phone] for assistance.        Care EveryWhere ID     This is your Care EveryWhere ID. This could be used by other organizations to access your [place] medical records  [id number]        Your Vitals Were     Pulse Temperature Respirations Pulse Oximetry BMI (Body Mass Index)       71 98.1  F (36.7  C) (Oral) 18 99% 38.88 kg/m2        Blood Pressure from Last 3 Encounters:   10/12/17 (!) 147/92   09/07/17 113/83   08/14/17 128/53    Weight from Last 3 Encounters:   10/12/17 271 lb (122.9 kg)   09/07/17 269 lb 8 oz (122.2 kg)   08/14/17 270 lb 12.8 oz (122.8 kg)              We Performed the Following     Basic Metabolic Panel ([facility])     Hemoglobin A1c ([facility])     N terminal pro BNP outpatient          Today's Medication Changes          These changes are accurate as of: 10/12/17 10:53 AM.  If you have any questions, ask your nurse or doctor.               Start taking these medicines.        Dose/Directions    STATIN NOT PRESCRIBED (INTENTIONAL)   Used for:  Type 2 diabetes mellitus without complication, without long-term current use of insulin (H)   Started by:  Rabia Carmona MD        Dose:  1 each   1 each daily Please  choose reason not prescribed, below   Refills:  0         Stop taking these medicines if you haven't already. Please contact your care team if you have questions.     acetaminophen 500 MG tablet   Commonly known as:  TYLENOL   Stopped by:  Rabia Carmona MD           methylPREDNISolone 32 MG tablet   Commonly known as:  MEDROL   Stopped by:  Rabia Carmona MD           polyethylene glycol Packet   Commonly known as:  MIRALAX/GLYCOLAX   Stopped by:  Rabia Carmona MD                Where to get your medicines      Some of these will need a paper prescription and others can be bought over the counter.  Ask your nurse if you have questions.     You don't need a prescription for these medications     STATIN NOT PRESCRIBED (INTENTIONAL)                Primary Care Provider Office Phone # Fax #    Rabia Carmona [phone]       [facility] [address] [facility] [address]        Equal Access to Services     [facility]: Hadii corey ku hadasho Soomaali, waaxda luqadaha, qaybta kaalmada adeegyada, waxay destiny hayhiginio perez . So [facility] [phone].    ATENCIÓN: Si habla español, tiene a tapia disposición servicios gratuitos de asistencia lingüística. Llame al [phone].    We comply with applicable federal civil rights laws and [place] laws. We do not discriminate on the basis of race, color, national origin, age, disability, sex, sexual orientation, or gender identity.            Thank you!     Thank you for choosing [facility]  for your care. Our goal is always to provide you with excellent care. Hearing back from our patients is one way we can continue to improve our services. Please take a few minutes to complete the written survey that you may receive in the mail after your visit with us. Thank you!             Your Updated Medication List - Protect others around you: Learn how to safely use, store and throw away  your medicines at www.disposemymeds.org.          This list is accurate as of: 10/12/17 10:53 AM.  Always use your most recent med list.                   Brand Name Dispense Instructions for use Diagnosis    amLODIPine 10 MG tablet    NORVASC    30 tablet    Take 1 tablet (10 mg) by mouth daily    Essential hypertension with goal blood pressure less than 140/90       blood glucose monitoring lancets     100 each    Use to test blood sugars 2 times daily or as directed.    Diabetes mellitus (H)       blood glucose monitoring test strip    no brand specified    100 each    Use to test blood sugars 2 times daily or as directed    Diabetes mellitus (H)       cycloSPORINE 0.05 % ophthalmic emulsion    RESTASIS    1 Box    Place 1 drop into both eyes 2 times daily    Dry eyes, bilateral, Punctate keratitis, bilateral       ferrous sulfate 325 (65 FE) MG tablet    IRON    90 tablet    Take 1 tablet (325 mg) by mouth 2 times daily    Iron deficiency anemia, unspecified iron deficiency anemia type       furosemide 40 MG tablet    LASIX    60 tablet    Take 1 tablet (40 mg) by mouth daily    Diastolic congestive heart failure, unspecified congestive heart failure chronicity (H)       lisinopril 20 MG tablet    PRINIVIL/ZESTRIL    180 tablet    Take 2 tablets (40 mg) by mouth daily    (HFpEF) heart failure with preserved ejection fraction (H)       metFORMIN 850 MG tablet    GLUCOPHAGE    90 tablet    Take 1 tablet (850 mg) by mouth daily (with breakfast) AM    Type 2 diabetes mellitus without complication, without long-term current use of insulin (H)       metoprolol 100 MG 24 hr tablet    TOPROL-XL    60 tablet    Take 2 tablets (200 mg) by mouth daily    Benign essential hypertension       omeprazole 40 MG capsule    priLOSEC    90 capsule    Take 1 capsule (40 mg) by mouth daily Take 30-60 minutes before a meal.    Acute gastrointestinal hemorrhage       order for DME     1 Units    Equipment being ordered: BP cuff, large     Hypertension, essential       potassium chloride SA 10 MEQ CR tablet    K-DUR/KLOR-CON M    180 tablet    Take 2 tablets (20 mEq) by mouth daily    (HFpEF) heart failure with preserved ejection fraction (H)       rivaroxaban ANTICOAGULANT 20 MG Tabs tablet    XARELTO    30 tablet    Take 1 tablet (20 mg) by mouth daily (with dinner)    Paroxysmal atrial fibrillation (H)       sildenafil 100 MG tablet    VIAGRA    30 tablet    Take 100mg tablet as directed.  -Rx prescribed via [email or web address] connection to care program-    Erectile dysfunction, unspecified erectile dysfunction type       solifenacin 10 MG tablet    VESICARE    60 tablet    Take 1 tablet (10 mg) by mouth daily AM    Urinary urgency       spironolactone 25 MG tablet    ALDACTONE    90 tablet    Take 1 tablet (25 mg) by mouth daily    Chronic diastolic heart failure (H)       STATIN NOT PRESCRIBED (INTENTIONAL)      1 each daily Please choose reason not prescribed, below    Type 2 diabetes mellitus without complication, without long-term current use of insulin (H)       vitamin D 2000 UNITS tablet     60 tablet    Take 2,000 Units by mouth daily    Vitamin D deficiency

## 2017-10-12 NOTE — PROGRESS NOTES
HPI:       Jazz Berman is a 62 year old who presents for the following  Patient presents with:  Follow Up BP    Hypertension Follow-up      Outpatient blood pressures are being checked at home.  Results are 120s-140s/80-90s.    Chest Pain? :No     Low Salt Diet: no added salt    Daily NSAID Use?No     Did patient take their HTN pills today/last night as usual?  Yes    Home RN setting up medications and taking BP at home. No headaches, vision change, wheezing, SOB or LE swelling. Has not missed any doses of medications. Is not taking a statin at this time.     Has gained some weight over last few months. Does report increase in PO intake. Has not missing any doses of spironolactone or lasix. No recent changes in medications.        Diabetes Follow-up      Patient is checking blood sugars: a few times a week, 90s-120s    Diabetic concerns: None     Symptoms of hypoglycemia (low blood sugar): none     Paresthesias (numbness or burning in feet) or sores: No     Date of last diabetic eye exam: 10/2017 (a couple weeks ago)    Adherence and Exercise  Medication side effects: no  How often is a medication missed? Never  Exercise:walking  2-3 days/week for an average of 15-30 minutes       Problem, Medication and Allergy Lists were   reviewed and are current.     Patient Active Problem List    Diagnosis Date Noted     Impaired fasting glucose 05/07/2013     Priority: High     Hypertension, essential 08/30/2012     Priority: High     Class: Chronic     Use large BP cuff       Cardiomegaly 08/30/2012     Priority: High     GREG (obstructive sleep apnea) on CPAP 08/30/2012     Priority: High     Class: Chronic     Type 2 diabetes mellitus without complication, without long-term current use of insulin (H) 10/12/2017     Priority: Medium     Paroxysmal atrial fibrillation (H) 04/24/2017     Priority: Medium     Atrial fibrillation, rate controlled.       Anticoagulation goal of INR 2 to 3 03/21/2017     Priority: Medium      Current use of long term anticoagulation 03/21/2017     Priority: Medium     Will need to restart anticoagulation. Pharmacy consulted and will appreciate recs. 3/21/2017. 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 Favored to be a gastrointestinal stromal tumor. No evidence of obstruction. A few subcentimeter lymph nodes along the posterior margin.  No Remote metastases demonstrated.      Follow up: GI clinic at [facility], within 2-3 weeks for EUS and gastric tumor biopsy. GI nurse will and help schedule an appointment (Phone Nr: [phone])       Diverticulosis of large intestine 07/04/2016     Priority: Medium     Colonoscopy 7/2/16        Acute gastrointestinal hemorrhage 06/29/2016     Priority: Medium     Fracture of medial malleolus, left, closed 04/12/2014     Priority: Medium     Closed reduction and percutaneous fixation at [facility]       Closed fracture of part of tibia 04/12/2014     Priority: Medium     History of substance abuse 02/25/2014     Priority: Medium     Remote history of marijuana and crack cocaine. No IVDU       Mild recurrent major depression (H) 09/05/2013     Priority: Medium     Class: Chronic     CARDIOVASCULAR SCREENING; LDL GOAL LESS THAN 130 08/30/2013      Priority: Medium     August 30, 2013 CHD risk factors: +male age >45, +hypertension, +HDL <40, 10% [place] Risk Calculator       H/O colonoscopy with polypectomy 08/29/2013     Priority: Medium     Class: Chronic     7/2/16:  2 polyps and diverticulosis. Follow up per pathology report   - One 2 mm polyp in the ascending colon. Resected andretrieved.  - One 6 mm polyp in the transverse colon. Resected and retrieved. Clip was placed.   - Diverticulosis in the sigmoid colon.    August 29, 2013  History of adenomatous polyp s/p resection, Recommendation repeat in one year.       Bilateral lower extremity edema 08/29/2013     Priority: Medium     BPH (benign prostatic hyperplasia) 08/29/2013     Priority: Medium     Erectile dysfunction 07/10/2013     Priority: Medium     Keloid 06/26/2013     Priority: Medium     Located on L ear s/p excision and R ear, follows with Dermatology       Psychosexual dysfunction with inhibited sexual excitement 08/30/2012     Priority: Medium     BMI greater than 40 08/30/2012     Priority: Medium     Class: Chronic     Ectropion 07/18/2011     Priority: Medium     Epiphora 07/18/2011     Priority: Medium     Mixed emotional features as adjustment reaction 10/06/2010     Priority: Medium   ,     Current Outpatient Prescriptions   Medication Sig Dispense Refill     STATIN NOT PRESCRIBED, INTENTIONAL, 1 each daily Please choose reason not prescribed, below       omeprazole (PRILOSEC) 40 MG capsule Take 1 capsule (40 mg) by mouth daily Take 30-60 minutes before a meal. 90 capsule 3     amLODIPine (NORVASC) 10 MG tablet Take 1 tablet (10 mg) by mouth daily 30 tablet 3     blood glucose monitoring (NO BRAND SPECIFIED) test strip Use to test blood sugars 2 times daily or as directed 100 each 3     blood glucose monitoring (ONE TOUCH DELICA) lancets Use to test blood sugars 2 times daily or as directed. 100 each 3     sildenafil (VIAGRA) 100 MG cap/tab Take 100mg tablet as directed.  -Rx  prescribed via [facility] connection to care program- 30 tablet 3     cycloSPORINE (RESTASIS) 0.05 % ophthalmic emulsion Place 1 drop into both eyes 2 times daily 1 Box 3     Cholecalciferol (VITAMIN D) 2000 UNITS tablet Take 2,000 Units by mouth daily 60 tablet 6     spironolactone (ALDACTONE) 25 MG tablet Take 1 tablet (25 mg) by mouth daily 90 tablet 3     furosemide (LASIX) 40 MG tablet Take 1 tablet (40 mg) by mouth daily 60 tablet 3     rivaroxaban ANTICOAGULANT (XARELTO) 20 MG TABS tablet Take 1 tablet (20 mg) by mouth daily (with dinner) 30 tablet 5     ferrous sulfate (IRON) 325 (65 FE) MG tablet Take 1 tablet (325 mg) by mouth 2 times daily 90 tablet 2     solifenacin (VESICARE) 10 MG tablet Take 1 tablet (10 mg) by mouth daily AM 60 tablet 3     metFORMIN (GLUCOPHAGE) 850 MG tablet Take 1 tablet (850 mg) by mouth daily (with breakfast) AM 90 tablet 3     metoprolol (TOPROL-XL) 100 MG 24 hr tablet Take 2 tablets (200 mg) by mouth daily 60 tablet 11     lisinopril (PRINIVIL/ZESTRIL) 20 MG tablet Take 2 tablets (40 mg) by mouth daily 180 tablet 3     potassium chloride SA (K-DUR/KLOR-CON M) 10 MEQ CR tablet Take 2 tablets (20 mEq) by mouth daily 180 tablet 3     order for DME Equipment being ordered: BP cuff, large 1 Units 0   ,     Allergies   Allergen Reactions     Dye [Contrast Dye] Rash     Dye left skin hyperpigmentation on his back     Patient is an established patient of this clinic.         Review of Systems:   Review of Systems   Constitutional: Negative for appetite change, chills, fatigue and fever.   HENT: Negative for congestion, sinus pressure and sore throat.    Eyes: Negative for visual disturbance.   Respiratory: Negative for cough, shortness of breath and wheezing.    Cardiovascular: Negative for chest pain and leg swelling.   Gastrointestinal: Negative for abdominal distention, abdominal pain, blood in stool, constipation, diarrhea, nausea and vomiting.   Genitourinary: Negative for dysuria  and hematuria.   Musculoskeletal: Negative for arthralgias and myalgias.   Skin: Negative for color change and rash.   Neurological: Negative for weakness and headaches.             Physical Exam:     /83  Pulse 71  Temp 98.1  F (36.7  C) (Oral)  Resp 18  Wt 271 lb (122.9 kg)  SpO2 99%  BMI 38.88 kg/m2  BP Readings from Last 3 Encounters:   10/12/17 124/83   09/07/17 113/83   08/14/17 128/53     Patient Vitals for the past 24 hrs:   BP   10/12/17 1143 124/83     Body mass index is 38.88 kg/(m^2).  Vitals were reviewed and were normal     Physical Exam   Constitutional: He is oriented to person, place, and time. He appears well-developed and well-nourished. No distress.   HENT:   Head: Normocephalic and atraumatic.   Eyes: Conjunctivae are normal. Right eye exhibits no discharge. Left eye exhibits no discharge. No scleral icterus.   Neck: Normal range of motion. Neck supple.   Cardiovascular: Normal rate and regular rhythm.  Exam reveals no gallop and no friction rub.    No murmur heard.  Pulmonary/Chest: Effort normal and breath sounds normal. No respiratory distress. He has no wheezes. He has no rales.   Abdominal: Soft. Bowel sounds are normal. He exhibits no distension and no mass. There is no tenderness. There is no rebound and no guarding.   Musculoskeletal: Normal range of motion. He exhibits edema (trace b/l peripheral edema. ).   Lymphadenopathy:     He has no cervical adenopathy.   Neurological: He is alert and oriented to person, place, and time.   Skin: Skin is warm and dry. No rash noted. He is not diaphoretic.   Psychiatric: He has a normal mood and affect.         Results:     Results for orders placed or performed in visit on 10/12/17   Basic Metabolic Panel ([facility])   Result Value Ref Range    Urea Nitrogen 23.9 (H) 7.0 - 21.0 mg/dL    Calcium 10.1 8.5 - 10.1 mg/dL    Chloride 103.9 98.0 - 110.0 mmol/L    Carbon Dioxide 27.3 20.0 - 32.0 mmol/L    Creatinine 1.2 0.7 - 1.3 mg/dL     Glucose 107.6 (H) 70.0 - 99.0 mg'dL    Potassium 4.7 (H) 3.3 - 4.5 mmol/dL    Sodium 141.2 132.6 - 141.4 mmol/L    GFR Estimate 65.2 >60.0 mL/min/1.7 m2    GFR Estimate If Black 78.9 >60.0 mL/min/1.7 m2   Hemoglobin A1c ([facility])   Result Value Ref Range    Hemoglobin A1C 5.5 4.1 - 5.7 %   N terminal pro BNP outpatient   Result Value Ref Range    N-Terminal Pro Bnp 1002 (H) 0 - 125 pg/mL       Assessment and Plan     Jazz Berman is a 61 yo male with a h/o HTN, s/p gastric tumor resection, HFpEF and A.fib on xarelto who was seen today for DM and HTN follow up.     1. Hypertension, essential, goal on repeat check.   - Basic Metabolic Panel ([facility]) - elevated K  - Advised Jazz to check blood pressures at home at least a few days a week. If he gets a high reading, then recheck it 5-10 minutes later.     2. Chronic diastolic heart failure (H)  - N terminal pro BNP outpatient - elevated.   - Since this is elevated today and weight has increased he will likely need extra dose(s) of lasix. Will discuss with cardiologist appropriate intervention. Currently taking 40mg Lasix daily.     3. Type 2 diabetes mellitus without complication, without long-term current use of insulin (H)  - STATIN NOT PRESCRIBED, INTENTIONAL,; 1 each daily Please choose reason not prescribed, below  - Hemoglobin A1c ([facility]) - very well controlled.   - Will discuss with cardiologist about the statin use given patient's history.   The 10-year ASCVD risk score (Smithton DC Jr, et al., 2013) is: 23.3%    Values used to calculate the score:      Age: 62 years      Sex: Male      Is Non- : Yes      Diabetic: Yes      Tobacco smoker: No      Systolic Blood Pressure: 124 mmHg      Is BP treated: Yes      HDL Cholesterol: 43 mg/dL      Total Cholesterol: 144 mg/dL    Medications Discontinued During This Encounter   Medication Reason     methylPREDNISolone (MEDROL) 32 MG tablet      polyethylene glycol (MIRALAX/GLYCOLAX) packet       acetaminophen (TYLENOL) 500 MG tablet      Options for treatment and follow-up care were reviewed with the patient. Trimble Cullen  engaged in the decision making process and verbalized understanding of the options discussed and agreed with the final plan.    Rabia Carmona MD  [facility]  [phone]    Addendum 10/13/2017:   Called patient October 13, 2017 regarding results of labs and that I spoke to provider from [facility]. Recommend that increase lasix dosing to BID over weekend. Then [facility] staff will contact patient early next week to follow up.     Sent script for lasix 40mg tablets x3 to add to his daily 40mg dosing. Will discuss statin use at his next visit.     Rabia Carmona MD  [facility]  [phone]

## 2017-10-12 NOTE — Clinical Note
I was thinking about having Jazz take BID dosing of his lasix for the next week since his weight is up and BNP is elevated. He is not on a statin and he does not recall why he is not on one. Just wanted to touch base with you and your team to see if you are ok with this plan or do you want him to be on increased dosing long term? Does your team feel strongly about getting him on a statin at this time? Thanks, Susan Carmona

## 2017-10-12 NOTE — TELEPHONE ENCOUNTER
[facility] phone call message- medication clarification/question:    Full Medication Name: sildenafil (VIAGRA) 100 MG cap/tab    Question: Patient states that the above medication will be delivered today (he was informed via the tracking information) and is wanting to pick the medication up from the clinic today as he will be leaving out of town tomorrow. Please call patient when medication is ready for . Thank you!     Pharmacy confirmed as   [id number] - [place], [place] - [address] [place] [address]  Phone: [phone] Fax: [phone]  : Yes    OK to leave a message on voice mail? Yes    Call taken on October 12, 2017 at 11:13 AM by Aravind Wilkinson

## 2017-10-13 ENCOUNTER — TELEPHONE (OUTPATIENT)
Dept: FAMILY MEDICINE | Facility: CLINIC | Age: 62
End: 2017-10-13

## 2017-10-13 RX ORDER — FUROSEMIDE 40 MG
40 TABLET ORAL DAILY
Qty: 3 TABLET | Refills: 0 | Status: SHIPPED | OUTPATIENT
Start: 2017-10-13 | End: 2017-11-06

## 2017-10-13 ASSESSMENT — ENCOUNTER SYMPTOMS
SHORTNESS OF BREATH: 0
COLOR CHANGE: 0
BLOOD IN STOOL: 0
SORE THROAT: 0
VOMITING: 0
NAUSEA: 0
DIARRHEA: 0
ABDOMINAL PAIN: 0
DYSURIA: 0
SINUS PRESSURE: 0
FATIGUE: 0
APPETITE CHANGE: 0
WEAKNESS: 0
COUGH: 0
ARTHRALGIAS: 0
HEMATURIA: 0
FEVER: 0
HEADACHES: 0
CHILLS: 0
MYALGIAS: 0
ABDOMINAL DISTENTION: 0
CONSTIPATION: 0
WHEEZING: 0

## 2017-10-13 NOTE — TELEPHONE ENCOUNTER
Patient states they were planning to pick-up medication at the  today.  There is no medication at the , and neither Jayson or Jess in Pharm D are in clinic.  Patient requests nurse call [phone] and voicemail is okay.

## 2017-10-13 NOTE — TELEPHONE ENCOUNTER
RN unable to locate medication at lock box, PharmD desk, mailboxes, or . RN called patient to inform. Patient states tracking reports it was delivered yesterday. Informed patient RN will continue to keep a watch out for the medication and send a message to PharmD to follow up.    Najma Deshpande RN

## 2017-10-13 NOTE — TELEPHONE ENCOUNTER
RN called patient per medication list with directions to take Furosemide twice daily dosing 10/13, 10/14, and 10/15 then return to daily dosing 10/16. Patient verbalized understanding.    Najma Deshpande RN

## 2017-10-13 NOTE — PROGRESS NOTES
Preceptor Attestation:   Patient seen and discussed with the resident. Assessment and plan reviewed with resident and agreed upon.   Supervising Physician:  Patrica Mas MD

## 2017-10-13 NOTE — TELEPHONE ENCOUNTER
[facility] phone call message- medication clarification/question:    Full Medication Name: Unknown   Dose: Unknown    Question: Patient states they received telephone call from PCP 10/13/2017 regarding medication they should take this weekend.  Patient requesting call back with name of medication.    Pharmacy confirmed as [id number] - [place], [place] - [address]: Yes    OK to leave a message on voice mail? Yes    Primary language: English      needed? No    Call taken on October 13, 2017 at 1:46 PM by Laury Vines

## 2017-10-17 ENCOUNTER — TELEPHONE (OUTPATIENT)
Dept: FAMILY MEDICINE | Facility: CLINIC | Age: 62
End: 2017-10-17

## 2017-10-17 ENCOUNTER — OFFICE VISIT (OUTPATIENT)
Dept: PHARMACY | Facility: CLINIC | Age: 62
End: 2017-10-17

## 2017-10-17 VITALS
WEIGHT: 268 LBS | OXYGEN SATURATION: 98 % | DIASTOLIC BLOOD PRESSURE: 87 MMHG | BODY MASS INDEX: 38.45 KG/M2 | HEART RATE: 64 BPM | SYSTOLIC BLOOD PRESSURE: 122 MMHG | TEMPERATURE: 98.5 F

## 2017-10-17 DIAGNOSIS — I10 HYPERTENSION, ESSENTIAL: Primary | ICD-10-CM

## 2017-10-17 NOTE — Clinical Note
Hi, we had a quick fu today (viagra refill) but wanted to let you know that Jazz had a 3lb wt loss after doubling his loop, but has returned to his normal dose.  Thanks Jayson Hernandez

## 2017-10-17 NOTE — PROGRESS NOTES
Clinical Pharmacy Note     Jazz is here today to  his Viagra Rx.  I have labeled the medications and given him the rx.      I have also reviewed his recent medication changes today. He was able to take the larger dose of Furosemide over the weekend and feels he lost some wt.  He does not check his weights at home.  He has resumed furosemide 20 mg daily.  Today he notes having some dizziness earlier in the day.    Wt today was down 3lbs.    No other changes or questions or concerns.    /87  Pulse 64  Temp 98.5  F (36.9  C) (Oral)  Wt 268 lb (121.6 kg)  SpO2 98%  BMI 38.45 kg/m2      Drug Therapy Plan and Follow up:    Plan.      Follow up: with pcp         Options for treatment and/or follow-up care were reviewed with the patient. Patient was engaged and actively involved in the decision making process.  Jazz Berman verbalized understanding of the options discussed and was satisfied with the final plan.      Dr. Carmona was provided my action  via routed note and Dr. Elizabeth  was available for supervision during this visit and is the authorizing prescriber for this visit through the pharmacist collaborative practice agreement.      Jayson Hernandez, Pharm.D             Total Time: 15  # DTPs Identified: 0    Medical Condition 1: CHF, Goals of therapy: At Goal,  ,  ,  ,  ,  ,  ,     ,  ,  ,  ,  ,  ,  ,  ,     ,  ,  ,  ,  ,  ,  ,  ,     ,  ,  ,

## 2017-10-17 NOTE — TELEPHONE ENCOUNTER
Patient requests call from Jayson regarding medication they state should have been delivered to clinic and ready to pick-up at the .  Please call patient at [phone].  Patient has appointment scheduled with Pharm D for 10/17/2017 at 1:40 to discuss this.

## 2017-10-17 NOTE — MR AVS SNAPSHOT
After Visit Summary   10/17/2017    Jazz Berman    MRN: [id number]           Patient Information     Date Of Birth          1955        Visit Information        Provider Department      10/17/2017 1:40 PM [facility]        Today's Diagnoses     Hypertension, essential    -  1       Follow-ups after your visit        Your next 10 appointments already scheduled     Nov 02, 2017 10:20 AM CDT   Return Visit with [facility] ([facility])    [address],  [address]  [facility] [address]   [phone]            Feb 14, 2018  8:00 AM CST   Lab with  LAB   [facility] ([facility])    [address] [place] [address]  1st Floor  [facility] [address]   [phone]            Feb 14, 2018  8:30 AM CST   (Arrive by 8:15 AM)   CORE RETURN with D[facility] ([facility])    [address] [place] [address]  [address] [facility] [address]   [phone]            Mar 20, 2018 10:00 AM CDT   [facility] Draw with  [facility] LAB DRAW   [facility] Draw ([facility])    [address] [place] [address]  [address] [facility] [address]   [phone]            Mar 20, 2018 10:20 AM CDT   (Arrive by 10:05 AM)   CT ABDOMEN PELVIS W/O & W CONTRAST with UCCT2   [facility] [place] [facility] ([facility])    [address] [place] [address]  [address] [facility] [address] [phone]           Please bring any scans or X-rays taken at other hospitals, if similar tests were done. Also bring a list of your medicines, including vitamins, minerals and over-the-counter drugs. It is safest to leave personal items at home.  Be sure to tell your doctor:   If you have any allergies.   If there s any chance you are pregnant.   If you are breastfeeding.   If you have any special needs.  You may have  contrast for this exam. To prepare:   Do not eat or drink for 2 hours before your exam. If you need to take medicine, you may take it with small sips of water. (We may ask you to take liquid medicine as well.)   The day before your exam, drink extra fluids at least six 8-ounce glasses (unless your doctor tells you to restrict your fluids).  Patients over 70 or patients with diabetes or kidney problems:   If you haven t had a blood test (creatinine test) within the last 30 days, go to your clinic or Diagnostic Imaging Department for this test.  If you have diabetes:   If your kidney function is normal, continue taking your metformin (Avandamet, Glucophage, Glucovance, Metaglip) on the day of your exam.   If your kidney function is abnormal, wait 48 hours before restarting this medicine.  You will have oral contrast for this exam:   You will drink the contrast at home. Get this from your clinic or Diagnostic Imaging Department. Please follow the directions given.  Please wear loose clothing, such as a sweat suit or jogging clothes. Avoid snaps, zippers and other metal. We may ask you to undress and put on a hospital gown.  If you have any questions, please call the Imaging Department where you will have your exam.            Mar 22, 2018 10:30 AM CDT   (Arrive by 10:15 AM)   Return Visit with Sean Freed MD   [facility] ([facility])    [address] [place] [address] [phone]              Who to contact     Please call your clinic at [phone] to:    Ask questions about your health    Make or cancel appointments    Discuss your medicines    Learn about your test results    Speak to your doctor   If you have compliments or concerns about an experience at your clinic, or if you wish to file a complaint, please contact [facility] Patient Relations at [phone] or email us at  [email or web address]         Additional Information About Your Visit        [employer] Information     [employer] gives you secure access to your electronic health record. If you see a primary care provider, you can also send messages to your care team and make appointments. If you have questions, please call your primary care clinic.  If you do not have a primary care provider, please call [phone] and they will assist you.      [employer] is an electronic gateway that provides easy, online access to your medical records. With [employer], you can request a clinic appointment, read your test results, renew a prescription or communicate with your care team.     To access your existing account, please contact your [facility] or call [phone] for assistance.        Care EveryWhere ID     This is your Care EveryWhere ID. This could be used by other organizations to access your [place] medical records  [id number]        Your Vitals Were     Pulse Temperature Pulse Oximetry BMI (Body Mass Index)          64 98.5  F (36.9  C) (Oral) 98% 38.45 kg/m2         Blood Pressure from Last 3 Encounters:   10/17/17 122/87   10/12/17 124/83   09/07/17 113/83    Weight from Last 3 Encounters:   10/17/17 268 lb (121.6 kg)   10/12/17 271 lb (122.9 kg)   09/07/17 269 lb 8 oz (122.2 kg)              Today, you had the following     No orders found for display       Primary Care Provider Office Phone # Fax #    Rabia Carmona [phone]       [facility] [address] [facility] [address]        Equal Access to Services     GERTRUDE RAJAN : Hadii aad ku hadasho Soomaali, waaxda luqadaha, qaybta kaalmada osmany, latia schilling. So [facility] [phone].    ATENCIÓN: Si habla español, tiene a tapia disposición servicios gratuitos de asistencia lingüística. Llame al [phone].    We comply with applicable federal civil rights laws and  [place] laws. We do not discriminate on the basis of race, color, national origin, age, disability, sex, sexual orientation, or gender identity.            Thank you!     Thank you for choosing [facility]  for your care. Our goal is always to provide you with excellent care. Hearing back from our patients is one way we can continue to improve our services. Please take a few minutes to complete the written survey that you may receive in the mail after your visit with us. Thank you!             Your Updated Medication List - Protect others around you: Learn how to safely use, store and throw away your medicines at www.disposemymeds.org.          This list is accurate as of: 10/17/17  4:50 PM.  Always use your most recent med list.                   Brand Name Dispense Instructions for use Diagnosis    amLODIPine 10 MG tablet    NORVASC    30 tablet    Take 1 tablet (10 mg) by mouth daily    Essential hypertension with goal blood pressure less than 140/90       blood glucose monitoring lancets     100 each    Use to test blood sugars 2 times daily or as directed.    Diabetes mellitus (H)       blood glucose monitoring test strip    no brand specified    100 each    Use to test blood sugars 2 times daily or as directed    Diabetes mellitus (H)       cycloSPORINE 0.05 % ophthalmic emulsion    RESTASIS    1 Box    Place 1 drop into both eyes 2 times daily    Dry eyes, bilateral, Punctate keratitis, bilateral       ferrous sulfate 325 (65 FE) MG tablet    IRON    90 tablet    Take 1 tablet (325 mg) by mouth 2 times daily    Iron deficiency anemia, unspecified iron deficiency anemia type       * furosemide 40 MG tablet    LASIX    60 tablet    Take 1 tablet (40 mg) by mouth daily    Diastolic congestive heart failure, unspecified congestive heart failure chronicity (H)       * furosemide 40 MG tablet    LASIX    3 tablet    Take 1 tablet (40 mg) by mouth daily    Chronic diastolic heart failure (H)        lisinopril 20 MG tablet    PRINIVIL/ZESTRIL    180 tablet    Take 2 tablets (40 mg) by mouth daily    (HFpEF) heart failure with preserved ejection fraction (H)       metFORMIN 850 MG tablet    GLUCOPHAGE    90 tablet    Take 1 tablet (850 mg) by mouth daily (with breakfast) AM    Type 2 diabetes mellitus without complication, without long-term current use of insulin (H)       metoprolol 100 MG 24 hr tablet    TOPROL-XL    60 tablet    Take 2 tablets (200 mg) by mouth daily    Benign essential hypertension       omeprazole 40 MG capsule    priLOSEC    90 capsule    Take 1 capsule (40 mg) by mouth daily Take 30-60 minutes before a meal.    Acute gastrointestinal hemorrhage       order for DME     1 Units    Equipment being ordered: BP cuff, large    Hypertension, essential       potassium chloride SA 10 MEQ CR tablet    K-DUR/KLOR-CON M    180 tablet    Take 2 tablets (20 mEq) by mouth daily    (HFpEF) heart failure with preserved ejection fraction (H)       rivaroxaban ANTICOAGULANT 20 MG Tabs tablet    XARELTO    30 tablet    Take 1 tablet (20 mg) by mouth daily (with dinner)    Paroxysmal atrial fibrillation (H)       sildenafil 100 MG tablet    VIAGRA    30 tablet    Take 100mg tablet as directed.  -Rx prescribed via [employer] connection to care program-    Erectile dysfunction, unspecified erectile dysfunction type       solifenacin 10 MG tablet    VESICARE    60 tablet    Take 1 tablet (10 mg) by mouth daily AM    Urinary urgency       spironolactone 25 MG tablet    ALDACTONE    90 tablet    Take 1 tablet (25 mg) by mouth daily    Chronic diastolic heart failure (H)       STATIN NOT PRESCRIBED (INTENTIONAL)      1 each daily Please choose reason not prescribed, below    Type 2 diabetes mellitus without complication, without long-term current use of insulin (H)       vitamin D 2000 UNITS tablet     60 tablet    Take 2,000 Units by mouth daily    Vitamin D deficiency       * Notice:  This list has 2 medication(s)  that are the same as other medications prescribed for you. Read the directions carefully, and ask your doctor or other care provider to review them with you.

## 2017-10-23 DIAGNOSIS — R39.15 URINARY URGENCY: ICD-10-CM

## 2017-10-23 RX ORDER — SOLIFENACIN SUCCINATE 10 MG/1
10 TABLET, FILM COATED ORAL DAILY
Qty: 60 TABLET | Refills: 3 | Status: SHIPPED | OUTPATIENT
Start: 2017-10-23 | End: 2018-02-27

## 2017-10-23 NOTE — TELEPHONE ENCOUNTER
Request for medication refill:    Date of last visit at clinic: 10-12-17    Please complete refill if appropriate and CLOSE ENCOUNTER.    Closing the encounter signifies the refill is complete.    If refill has been denied, please complete the smart phrase .smirefuse and route it to the [facility] RN TRIAGE pool to inform the patient and the pharmacy.    Manjula Kaur MA

## 2017-10-31 ENCOUNTER — MEDICAL CORRESPONDENCE (OUTPATIENT)
Dept: HEALTH INFORMATION MANAGEMENT | Facility: CLINIC | Age: 62
End: 2017-10-31

## 2017-11-02 ENCOUNTER — OFFICE VISIT (OUTPATIENT)
Dept: FAMILY MEDICINE | Facility: CLINIC | Age: 62
End: 2017-11-02

## 2017-11-02 VITALS
DIASTOLIC BLOOD PRESSURE: 79 MMHG | RESPIRATION RATE: 20 BRPM | TEMPERATURE: 98.5 F | SYSTOLIC BLOOD PRESSURE: 110 MMHG | OXYGEN SATURATION: 99 % | WEIGHT: 268 LBS | HEART RATE: 68 BPM | BODY MASS INDEX: 38.45 KG/M2

## 2017-11-02 DIAGNOSIS — I10 HYPERTENSION, ESSENTIAL: Primary | ICD-10-CM

## 2017-11-02 DIAGNOSIS — R06.02 SOB (SHORTNESS OF BREATH): ICD-10-CM

## 2017-11-02 LAB
BUN SERPL-MCNC: 22.1 MG/DL (ref 7–21)
CALCIUM SERPL-MCNC: 9.7 MG/DL (ref 8.5–10.1)
CHLORIDE SERPLBLD-SCNC: 103.8 MMOL/L (ref 98–110)
CO2 SERPL-SCNC: 30 MMOL/L (ref 20–32)
CREAT SERPL-MCNC: 1.3 MG/DL (ref 0.7–1.3)
GFR SERPL CREATININE-BSD FRML MDRD: 59.5 ML/MIN/1.7 M2
GLUCOSE SERPL-MCNC: 101.7 MG'DL (ref 70–99)
NT-PROBNP SERPL-MCNC: 929 PG/ML (ref 0–125)
POTASSIUM SERPL-SCNC: 4.2 MMOL/DL (ref 3.3–4.5)
SODIUM SERPL-SCNC: 140.6 MMOL/L (ref 132.6–141.4)

## 2017-11-02 ASSESSMENT — ENCOUNTER SYMPTOMS
FATIGUE: 0
COLOR CHANGE: 0
DIARRHEA: 0
DYSURIA: 0
ABDOMINAL PAIN: 0
CHILLS: 0
BLOOD IN STOOL: 0
ABDOMINAL DISTENTION: 0
CONSTIPATION: 0
COUGH: 0
APPETITE CHANGE: 0
ARTHRALGIAS: 0
MYALGIAS: 0
NAUSEA: 0
HEMATURIA: 0
HEADACHES: 0
SINUS PRESSURE: 0
WHEEZING: 0
FEVER: 0
SORE THROAT: 0
SHORTNESS OF BREATH: 0
WEAKNESS: 0
VOMITING: 0

## 2017-11-02 NOTE — PATIENT INSTRUCTIONS
Here is the plan from today's visit    1. Hypertension, essential  2. SOB (shortness of breath)  - Basic Metabolic Panel ([facility])  - N terminal pro BNP outpatient    - I will call you if there is anything concerning on your labs.   - I will talk with Jayson about the medications.     Please call or return to clinic if your symptoms don't go away.    Follow up plan  Please make a clinic appointment for follow up with me (TRINITY DICKEY) in 2  months for followup.    Thank you for coming to [facility] today.  Lab Testing:  **If you had lab testing today and your results are reassuring or normal they will be mailed to you or sent through [employer] within 7 days.   **If the lab tests need quick action we will call you with the results.  The phone number we will call with results is # [phone] (home) . If this is not the best number please call our clinic and change the number.  Medication Refills:  If you need any refills please call your pharmacy and they will contact us.   If you need to  your refill at a new pharmacy, please contact the new pharmacy directly. The new pharmacy will help you get your medications transferred faster.   Scheduling:  If you have any concerns about today's visit or wish to schedule another appointment please call our office during normal business hours [phone] (8-5:00 M-F)  If a referral was made to a [facility] and you don't get a call from central scheduling please call [phone].  If a Mammogram was ordered for you at [facility] call [phone] to schedule or change your appointment.  If you had an XRay/CT/Ultrasound/MRI ordered the number is [phone] to schedule or change your radiology appointment.   Medical Concerns:  If you have urgent medical concerns please call [phone] at any time of the day.

## 2017-11-02 NOTE — PROGRESS NOTES
HPI:       Jazz Berman is a 62 year old who presents for the following  Patient presents with:  Recheck Medication    Left thigh numbness and shooting pain, intermittent, worse at night. Has been going on since he fell on the bus several weeks ago. Bruising on his right side has resolved. He is not concerned about this at this time as he believes it will likely resolve soon.     He received letter regarding formulary changes for 2018. Xarelto will not be covered.     SOB improved since he increased his lasix for a few days just after his last office visit. No acute issues. BP seems to be well controlled. Denies any significant SOB, chest pain, nausea, vomiting, abdominal pain, heartburn, hematuria, blood in the stool or diarrhea. Overall has been doing well.       Problem, Medication and Allergy Lists were   reviewed and are current.     Patient Active Problem List    Diagnosis Date Noted     Impaired fasting glucose 05/07/2013     Priority: High     Hypertension, essential 08/30/2012     Priority: High     Class: Chronic     Use large BP cuff       Cardiomegaly 08/30/2012     Priority: High     GREG (obstructive sleep apnea) on CPAP 08/30/2012     Priority: High     Class: Chronic     Type 2 diabetes mellitus without complication, without long-term current use of insulin (H) 10/12/2017     Priority: Medium     Paroxysmal atrial fibrillation (H) 04/24/2017     Priority: Medium     Atrial fibrillation, rate controlled.       Anticoagulation goal of INR 2 to 3 03/21/2017     Priority: Medium     Current use of long term anticoagulation 03/21/2017     Priority: Medium     Will need to restart anticoagulation. Pharmacy consulted and will appreciate recs. 3/21/2017. Rabia Carmona MD, 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 Favored to be a gastrointestinal stromal tumor. No evidence of obstruction. A few subcentimeter lymph nodes along the posterior margin.  No Remote metastases demonstrated.      Follow up: [facility] clinic at [facility], within 2-3 weeks for EUS and gastric tumor biopsy. GI nurse will and help schedule an appointment (Phone Nr: [phone])       Diverticulosis of large intestine 07/04/2016     Priority: Medium     Colonoscopy 7/2/16        Acute gastrointestinal hemorrhage 06/29/2016     Priority: Medium     Fracture of medial malleolus, left, closed 04/12/2014     Priority: Medium     [place] [facility]       Closed fracture of part of tibia 04/12/2014     Priority: Medium     History of substance abuse 02/25/2014     Priority: Medium     Remote history of marijuana and crack cocaine. No IVDU       Mild recurrent major depression (H) 09/05/2013     Priority: Medium     Class: Chronic     CARDIOVASCULAR SCREENING; LDL GOAL LESS THAN 130 08/30/2013     Priority: Medium     August 30, 2013 CHD risk factors: +male age >45, +hypertension, +HDL <40, 10% [place] Risk Calculator       H/O colonoscopy with polypectomy 08/29/2013     Priority: Medium     Class: Chronic     7/2/16:  2 polyps and diverticulosis. Follow up per pathology report   - One 2 mm polyp in the ascending colon. Resected andretrieved.  - One 6 mm polyp in the transverse colon. Resected and retrieved. Clip was  placed.   - Diverticulosis in the sigmoid colon.    August 29, 2013  History of adenomatous polyp s/p resection, Recommendation repeat in one year.       Bilateral lower extremity edema 08/29/2013     Priority: Medium     BPH (benign prostatic hyperplasia) 08/29/2013     Priority: Medium     Erectile dysfunction 07/10/2013     Priority: Medium     Keloid 06/26/2013     Priority: Medium     Located on L ear s/p excision and R ear, follows with Dermatology       Psychosexual dysfunction with inhibited sexual excitement 08/30/2012     Priority: Medium     BMI greater than 40 08/30/2012     Priority: Medium     Class: Chronic     Ectropion 07/18/2011     Priority: Medium     Epiphora 07/18/2011     Priority: Medium     Mixed emotional features as adjustment reaction 10/06/2010     Priority: Medium   ,     Current Outpatient Prescriptions   Medication Sig Dispense Refill     solifenacin (VESICARE) 10 MG tablet Take 1 tablet (10 mg) by mouth daily AM 60 tablet 3     furosemide (LASIX) 40 MG tablet Take 1 tablet (40 mg) by mouth daily 3 tablet 0     STATIN NOT PRESCRIBED, INTENTIONAL, 1 each daily Please choose reason not prescribed, below       omeprazole (PRILOSEC) 40 MG capsule Take 1 capsule (40 mg) by mouth daily Take 30-60 minutes before a meal. 90 capsule 3     amLODIPine (NORVASC) 10 MG tablet Take 1 tablet (10 mg) by mouth daily 30 tablet 3     blood glucose monitoring (NO BRAND SPECIFIED) test strip Use to test blood sugars 2 times daily or as directed 100 each 3     blood glucose monitoring (ONE TOUCH DELICA) lancets Use to test blood sugars 2 times daily or as directed. 100 each 3     sildenafil (VIAGRA) 100 MG cap/tab Take 100mg tablet as directed.  -Rx prescribed via [employer] connection to care program- 30 tablet 3     cycloSPORINE (RESTASIS) 0.05 % ophthalmic emulsion Place 1 drop into both eyes 2 times daily 1 Box 3     Cholecalciferol (VITAMIN D) 2000 UNITS tablet Take 2,000 Units by mouth daily 60 tablet 6      spironolactone (ALDACTONE) 25 MG tablet Take 1 tablet (25 mg) by mouth daily 90 tablet 3     furosemide (LASIX) 40 MG tablet Take 1 tablet (40 mg) by mouth daily 60 tablet 3     rivaroxaban ANTICOAGULANT (XARELTO) 20 MG TABS tablet Take 1 tablet (20 mg) by mouth daily (with dinner) 30 tablet 5     ferrous sulfate (IRON) 325 (65 FE) MG tablet Take 1 tablet (325 mg) by mouth 2 times daily 90 tablet 2     metFORMIN (GLUCOPHAGE) 850 MG tablet Take 1 tablet (850 mg) by mouth daily (with breakfast) AM 90 tablet 3     metoprolol (TOPROL-XL) 100 MG 24 hr tablet Take 2 tablets (200 mg) by mouth daily 60 tablet 11     lisinopril (PRINIVIL/ZESTRIL) 20 MG tablet Take 2 tablets (40 mg) by mouth daily 180 tablet 3     potassium chloride SA (K-DUR/KLOR-CON M) 10 MEQ CR tablet Take 2 tablets (20 mEq) by mouth daily 180 tablet 3     order for DME Equipment being ordered: BP cuff, large 1 Units 0   ,     Allergies   Allergen Reactions     Dye [Contrast Dye] Rash     Dye left skin hyperpigmentation on his back     Patient is an established patient of this clinic.         Review of Systems:   Review of Systems   Constitutional: Negative for appetite change, chills, fatigue and fever.   HENT: Negative for congestion, sinus pressure and sore throat.    Eyes: Negative for visual disturbance.   Respiratory: Negative for cough, shortness of breath and wheezing.    Cardiovascular: Negative for chest pain and leg swelling.   Gastrointestinal: Negative for abdominal distention, abdominal pain, blood in stool, constipation, diarrhea, nausea and vomiting.   Genitourinary: Negative for dysuria and hematuria.   Musculoskeletal: Negative for arthralgias and myalgias.        Left lateral hip to thigh superficial pain, numbness and tingling, intermittent since his fall. Worse when in bed and laying on left side.    Skin: Negative for color change and rash.   Neurological: Negative for weakness and headaches.             Physical Exam:   Patient  Vitals for the past 24 hrs:   BP Temp Temp src Pulse Resp SpO2 Weight   11/02/17 0957 110/79 98.5  F (36.9  C) Oral 68 20 99 % 268 lb (121.6 kg)     Body mass index is 38.45 kg/(m^2).  Vitals were reviewed and were normal     Physical Exam   Constitutional: He is oriented to person, place, and time. He appears well-developed and well-nourished. No distress.   HENT:   Head: Normocephalic.   Mouth/Throat: Oropharynx is clear and moist. No oropharyngeal exudate.   Eyes: Conjunctivae are normal. No scleral icterus.   Neck: Normal range of motion. Neck supple.   Cardiovascular: Normal rate and regular rhythm.    No murmur heard.  Pulmonary/Chest: Effort normal and breath sounds normal. No respiratory distress.   Abdominal: Soft. Bowel sounds are normal. He exhibits no distension. There is no tenderness.   Musculoskeletal: He exhibits no edema or tenderness.   Lymphadenopathy:     He has no cervical adenopathy.   Neurological: He is alert and oriented to person, place, and time.   Skin: Skin is warm. No rash noted. He is not diaphoretic.         Results:     Results for orders placed or performed in visit on 11/02/17   Basic Metabolic Panel ([facility])   Result Value Ref Range    Urea Nitrogen 22.1 (H) 7.0 - 21.0 mg/dL    Calcium 9.7 8.5 - 10.1 mg/dL    Chloride 103.8 98.0 - 110.0 mmol/L    Carbon Dioxide 30.0 20.0 - 32.0 mmol/L    Creatinine 1.3 0.7 - 1.3 mg/dL    Glucose 101.7 (H) 70.0 - 99.0 mg'dL    Potassium 4.2 3.3 - 4.5 mmol/dL    Sodium 140.6 132.6 - 141.4 mmol/L    GFR Estimate 59.5 (L) >60.0 mL/min/1.7 m2    GFR Estimate If Black 71.9 >60.0 mL/min/1.7 m2     BNP pending.     Assessment and Plan     Jazz Berman is a 61 yo male with a h/o HTN, s/p gastric tumor resection, HFpEF and A.fib on xarelto who was seen today for SOB and lower extremity swelling follow up. Discussed with patient that will speak with PharmD to decide which AC medication would be appropriate for him going forward as Xarelto will not be  covered in 2018. He does not have any SOB and swelling has resolved today. [facility]. No adjustments to his medications long-term at this time. Will recheck labs today.     1. Hypertension, essential. At goal.   2. SOB (shortness of breath)  - Basic Metabolic Panel ([facility])  - N terminal pro BNP outpatient    There are no discontinued medications.  Options for treatment and follow-up care were reviewed with the patient. Jazz Berman  engaged in the decision making process and verbalized understanding of the options discussed and agreed with the final plan.    Rabia Carmona MD  [facility]  [phone]

## 2017-11-02 NOTE — MR AVS SNAPSHOT
After Visit Summary   11/2/2017    Jazz Berman    MRN: [id number]           Patient Information     Date Of Birth          1955        Visit Information        Provider Department      11/2/2017 10:20 AM Rabia Dickey MD [facility]        Today's Diagnoses     Hypertension, essential    -  1    SOB (shortness of breath)          Care Instructions    Here is the plan from today's visit    1. Hypertension, essential  2. SOB (shortness of breath)  - Basic Metabolic Panel ([facility])  - N terminal pro BNP outpatient    - I will call you if there is anything concerning on your labs.   - I will talk with [facility] about the medications.     Please call or return to clinic if your symptoms don't go away.    Follow up plan  Please make a clinic appointment for follow up with me (RABIA DICKEY) in 2  months for followup.    Thank you for coming to [facility] today.  Lab Testing:  **If you had lab testing today and your results are reassuring or normal they will be mailed to you or sent through [facility] within 7 days.   **If the lab tests need quick action we will call you with the results.  The phone number we will call with results is # [phone] (home) . If this is not the best number please call our clinic and change the number.  Medication Refills:  If you need any refills please call your pharmacy and they will contact us.   If you need to  your refill at a new pharmacy, please contact the new pharmacy directly. The new pharmacy will help you get your medications transferred faster.   Scheduling:  If you have any concerns about today's visit or wish to schedule another appointment please call our office during normal business hours [phone] (8-5:00 M-F)  If a referral was made to a [facility] and you don't get a call from central scheduling please call [phone].  If a Mammogram was ordered for you at [facility]  call [phone] to schedule or change your appointment.  If you had an XRay/CT/Ultrasound/MRI ordered the number is [phone] to schedule or change your radiology appointment.   Medical Concerns:  If you have urgent medical concerns please call [phone] at any time of the day.            Follow-ups after your visit        Your next 10 appointments already scheduled     Feb 14, 2018  8:00 AM CST   Lab with  LAB   [facility] ([facility])    [address] [place] [address]  [address] [facility] [id number] [phone]            Feb 14, 2018  8:30 AM CST   (Arrive by 8:15 AM)   CORE RETURN with DULCE Pimentel CNP   [facility] ([facility])    [address] [place] [address]  [address] [facility] [id number] [phone]            Mar 20, 2018 10:00 AM CDT   [facility] Draw with  [facility] DRAW   [facility] ([facility])    [address] [place] [id number] [phone]            Mar 20, 2018 10:20 AM CDT   (Arrive by 10:05 AM)   CT ABDOMEN PELVIS W/O & W CONTRAST with UCCT2   [facility] ([facility])    [address] [place] [id number] [phone]           Please bring any scans or X-rays taken at other hospitals, if similar tests were done. Also bring a list of your medicines, including vitamins, minerals and over-the-counter drugs. It is safest to leave personal items at home.  Be sure to tell your doctor:   If you have any allergies.   If there s any chance you are pregnant.   If you are breastfeeding.   If you have any special needs.  You may have contrast for this exam. To prepare:   Do not eat or drink for 2 hours before your exam. If you need to take medicine, you may take it with small sips of water. (We may ask you to take liquid medicine as well.)   The day before your exam, drink extra fluids at  least six 8-ounce glasses (unless your doctor tells you to restrict your fluids).  Patients over 70 or patients with diabetes or kidney problems:   If you haven t had a blood test (creatinine test) within the last 30 days, go to your clinic or Diagnostic Imaging Department for this test.  If you have diabetes:   If your kidney function is normal, continue taking your metformin (Avandamet, Glucophage, Glucovance, Metaglip) on the day of your exam.   If your kidney function is abnormal, wait 48 hours before restarting this medicine.  You will have oral contrast for this exam:   You will drink the contrast at home. Get this from your clinic or Diagnostic Imaging Department. Please follow the directions given.  Please wear loose clothing, such as a sweat suit or jogging clothes. Avoid snaps, zippers and other metal. We may ask you to undress and put on a hospital gown.  If you have any questions, please call the Imaging Department where you will have your exam.            Mar 22, 2018 10:30 AM CDT   (Arrive by 10:15 AM)   Return Visit with Sean Freed MD   [facility] ([facility])    [address] [place] [id number] [phone]              Who to contact     Please call your clinic at [phone] to:    Ask questions about your health    Make or cancel appointments    Discuss your medicines    Learn about your test results    Speak to your doctor   If you have compliments or concerns about an experience at your clinic, or if you wish to file a complaint, please contact [facility] Patient Relations at [phone] or email us at [email or web address]         Additional Information About Your Visit        MyChart Information     [employer] gives you secure access to your electronic health record. If you see a primary care provider, you can also send messages to your care team and make appointments. If  you have questions, please call your primary care clinic.  If you do not have a primary care provider, please call [phone] and they will assist you.      [employer] is an electronic gateway that provides easy, online access to your medical records. With [employer], you can request a clinic appointment, read your test results, renew a prescription or communicate with your care team.     To access your existing account, please contact your [facility] or call [phone] for assistance.        Care EveryWhere ID     This is your Care EveryWhere ID. This could be used by other organizations to access your [place] medical records  [id number]        Your Vitals Were     Pulse Temperature Respirations Pulse Oximetry BMI (Body Mass Index)       68 98.5  F (36.9  C) (Oral) 20 99% 38.45 kg/m2        Blood Pressure from Last 3 Encounters:   11/02/17 110/79   10/17/17 122/87   10/12/17 124/83    Weight from Last 3 Encounters:   11/02/17 268 lb (121.6 kg)   10/17/17 268 lb (121.6 kg)   10/12/17 271 lb (122.9 kg)              We Performed the Following     Basic Metabolic Panel ([facility])     N terminal pro BNP outpatient        Primary Care Provider Office Phone # Fax #    Rabia Sarai Carmona [phone]       [facility] [address] [facility] [address]        Equal Access to Services     GERTRUDE RAJAN : Hadii aad ku hadasho Somaddyali, waaxda luqadaha, qaybta kaalmada adeegyada, latia perez . So [facility] [phone].    ATENCIÓN: Si habla español, tiene a tapia disposición servicios gratuitos de asistencia lingüística. Llame al [phone].    We comply with applicable federal civil rights laws and [place] laws. We do not discriminate on the basis of race, color, national origin, age, disability, sex, sexual orientation, or gender identity.            Thank you!     Thank you for choosing [facility]  for your  care. Our goal is always to provide you with excellent care. Hearing back from our patients is one way we can continue to improve our services. Please take a few minutes to complete the written survey that you may receive in the mail after your visit with us. Thank you!             Your Updated Medication List - Protect others around you: Learn how to safely use, store and throw away your medicines at www.disposemymeds.org.          This list is accurate as of: 11/2/17 10:27 AM.  Always use your most recent med list.                   Brand Name Dispense Instructions for use Diagnosis    amLODIPine 10 MG tablet    NORVASC    30 tablet    Take 1 tablet (10 mg) by mouth daily    Essential hypertension with goal blood pressure less than 140/90       blood glucose monitoring lancets     100 each    Use to test blood sugars 2 times daily or as directed.    Diabetes mellitus (H)       blood glucose monitoring test strip    no brand specified    100 each    Use to test blood sugars 2 times daily or as directed    Diabetes mellitus (H)       cycloSPORINE 0.05 % ophthalmic emulsion    RESTASIS    1 Box    Place 1 drop into both eyes 2 times daily    Dry eyes, bilateral, Punctate keratitis, bilateral       ferrous sulfate 325 (65 FE) MG tablet    IRON    90 tablet    Take 1 tablet (325 mg) by mouth 2 times daily    Iron deficiency anemia, unspecified iron deficiency anemia type       * furosemide 40 MG tablet    LASIX    60 tablet    Take 1 tablet (40 mg) by mouth daily    Diastolic congestive heart failure, unspecified congestive heart failure chronicity (H)       * furosemide 40 MG tablet    LASIX    3 tablet    Take 1 tablet (40 mg) by mouth daily    Chronic diastolic heart failure (H)       lisinopril 20 MG tablet    PRINIVIL/ZESTRIL    180 tablet    Take 2 tablets (40 mg) by mouth daily    (HFpEF) heart failure with preserved ejection fraction (H)       metFORMIN 850 MG tablet    GLUCOPHAGE    90 tablet    Take 1 tablet  (850 mg) by mouth daily (with breakfast) AM    Type 2 diabetes mellitus without complication, without long-term current use of insulin (H)       metoprolol 100 MG 24 hr tablet    TOPROL-XL    60 tablet    Take 2 tablets (200 mg) by mouth daily    Benign essential hypertension       omeprazole 40 MG capsule    priLOSEC    90 capsule    Take 1 capsule (40 mg) by mouth daily Take 30-60 minutes before a meal.    Acute gastrointestinal hemorrhage       order for DME     1 Units    Equipment being ordered: BP cuff, large    Hypertension, essential       potassium chloride SA 10 MEQ CR tablet    K-DUR/KLOR-CON M    180 tablet    Take 2 tablets (20 mEq) by mouth daily    (HFpEF) heart failure with preserved ejection fraction (H)       rivaroxaban ANTICOAGULANT 20 MG Tabs tablet    XARELTO    30 tablet    Take 1 tablet (20 mg) by mouth daily (with dinner)    Paroxysmal atrial fibrillation (H)       sildenafil 100 MG tablet    VIAGRA    30 tablet    Take 100mg tablet as directed.  -Rx prescribed via [employer] connection to care program-    Erectile dysfunction, unspecified erectile dysfunction type       solifenacin 10 MG tablet    VESICARE    60 tablet    Take 1 tablet (10 mg) by mouth daily AM    Urinary urgency       spironolactone 25 MG tablet    ALDACTONE    90 tablet    Take 1 tablet (25 mg) by mouth daily    Chronic diastolic heart failure (H)       STATIN NOT PRESCRIBED (INTENTIONAL)      1 each daily Please choose reason not prescribed, below    Type 2 diabetes mellitus without complication, without long-term current use of insulin (H)       vitamin D 2000 UNITS tablet     60 tablet    Take 2,000 Units by mouth daily    Vitamin D deficiency       * Notice:  This list has 2 medication(s) that are the same as other medications prescribed for you. Read the directions carefully, and ask your doctor or other care provider to review them with you.

## 2017-11-02 NOTE — PROGRESS NOTES
Preceptor Attestation:   Patient seen and discussed with the resident. Assessment and plan reviewed with resident and agreed upon.   Supervising Physician:  Tiago Fung MD MD  [facility]

## 2017-11-06 DIAGNOSIS — I50.32 CHRONIC DIASTOLIC HEART FAILURE (H): ICD-10-CM

## 2017-11-09 RX ORDER — FUROSEMIDE 40 MG
TABLET ORAL
Qty: 90 TABLET | Refills: 3 | Status: SHIPPED | OUTPATIENT
Start: 2017-11-09 | End: 2018-02-27

## 2017-11-09 NOTE — TELEPHONE ENCOUNTER
Prescription approved per [facility] Ref[facility].  Elsa Menchaca RN

## 2017-12-05 ENCOUNTER — TELEPHONE (OUTPATIENT)
Dept: FAMILY MEDICINE | Facility: CLINIC | Age: 62
End: 2017-12-05

## 2017-12-05 DIAGNOSIS — I48.0 PAROXYSMAL ATRIAL FIBRILLATION (H): ICD-10-CM

## 2017-12-05 NOTE — TELEPHONE ENCOUNTER
[facility] phone call message- patient requesting a refill:    Full Medication Name: sildenafil (VIAGRA) 100 MG cap/tab        Pharmacy confirmed as   [employer] connection to care program  : Yes    Additional Comments: Patient states the Jayson calls in this prescription     OK to leave a message on voice mail? Yes    Primary language: English      needed? No    Call taken on December 5, 2017 at 8:57 AM by Elissa Carrillo

## 2017-12-06 NOTE — TELEPHONE ENCOUNTER
Order for Only-apartmentsa sent to [facility] today.    Rx will be available for reorder 12-9-17    rx order: [id number]    Jayson Hernandez Pharm.D.

## 2017-12-12 ENCOUNTER — OFFICE VISIT (OUTPATIENT)
Dept: PHARMACY | Facility: CLINIC | Age: 62
End: 2017-12-12

## 2017-12-12 VITALS
TEMPERATURE: 98.3 F | RESPIRATION RATE: 18 BRPM | SYSTOLIC BLOOD PRESSURE: 137 MMHG | OXYGEN SATURATION: 99 % | DIASTOLIC BLOOD PRESSURE: 94 MMHG | HEART RATE: 67 BPM

## 2017-12-12 DIAGNOSIS — I48.0 PAROXYSMAL ATRIAL FIBRILLATION (H): Primary | ICD-10-CM

## 2017-12-12 NOTE — PATIENT INSTRUCTIONS
MEDICATION COMMENTS FROM TODAY:  Suggestions:  -       In 2018 start taking Eliquis (apixabam)  The day after you stop taking Xarelto, begin the twice daily Eliquis the next day.              I offer these suggestions to him and his medical providers. I suggested Jazz make a follow-up appointment with his provider(s) to discuss these suggestions.      Any questions or concerns, please call [phone] or contact me via [employer].     Jayson Hernandez, PharmD  Medication Management Pharmacist

## 2017-12-12 NOTE — MR AVS SNAPSHOT
After Visit Summary   12/12/2017    aJzz Berman    MRN: [id number]           Patient Information     Date Of Birth          1955        Visit Information        Provider Department      12/12/2017 9:20 AM [facility]        Today's Diagnoses     Paroxysmal atrial fibrillation (H)    -  1      Care Instructions    MEDICATION COMMENTS FROM TODAY:  Suggestions:  -       In 2018 start taking Eliquis (apixabam)  The day after you stop taking Xarelto, begin the twice daily Eliquis the next day.              I offer these suggestions to him and his medical providers. I suggested Jazz make a follow-up appointment with his provider(s) to discuss these suggestions.      Any questions or concerns, please call [phone] or contact me via [employer].     Jayson Hernandez, PharmD  Medication Management Pharmacist            Follow-ups after your visit        Your next 10 appointments already scheduled     Jan 08, 2018  1:00 PM CST   Return Visit with [facility] ([facility])    [address] [place] [address],  [address]  [facility] [address]   [phone]            Feb 14, 2018  8:00 AM CST   Lab with  LAB   [facility] ([facility])    [address] [place] [address]  1st Floor  [facility] [address] [phone]            Feb 14, 2018  8:30 AM CST   (Arrive by 8:15 AM)   CORE RETURN with DULCE Pimentel CNP   [facility] ([facility])    [address] [place] [address]  3rd Floor  [facility] [address] [phone]            Mar 20, 2018 10:00 AM CDT   [facility] Draw with  [facility] DRAW   [facility] ([facility])    [address]  2nd Floor  [facility] [address] [phone]            Mar 20, 2018 10:20 AM CDT   (Arrive by 10:05 AM)   CT ABDOMEN PELVIS W/O & W CONTRAST with UCCT2   [facility] ([facility])    [address]  1st [facility] [address] [phone]           Please bring any scans or X-rays taken at other hospitals, if similar tests were done. Also bring a list of your medicines, including vitamins, minerals and over-the-counter drugs. It is safest to leave personal items at home.  Be sure to tell your doctor:   If you have any allergies.   If there s any chance you are pregnant.   If you are breastfeeding.   If you have any special needs.  You may have contrast for this exam. To prepare:   Do not eat or drink for 2 hours before your exam. If you need to take medicine, you may take it with small sips of water. (We may ask you to take liquid medicine as well.)   The day before your exam, drink extra fluids at least six 8-ounce glasses (unless your doctor tells you to restrict your fluids).  Patients over 70 or patients with diabetes or kidney problems:   If you haven t had a blood test (creatinine test) within the last 30 days, go to your clinic or Diagnostic Imaging Department for this test.  If you have diabetes:   If your kidney function is normal, continue taking your metformin (Avandamet, Glucophage, Glucovance, Metaglip) on the day of your exam.   If your kidney function is abnormal, wait 48 hours before restarting this medicine.  You will have oral contrast for this exam:   You will drink the contrast at home. Get this from your clinic or Diagnostic Imaging Department. Please follow the directions given.  Please wear loose clothing, such as a sweat suit or jogging clothes. Avoid snaps, zippers and other metal. We may ask you to undress and put on a hospital gown.  If you have any questions, please call the Imaging Department where you will have your exam.            Mar 22, 2018 10:30 AM CDT   (Arrive by 10:15 AM)   Return Visit with Sean Freed MD   [facility] ([facility]  [place])    [address] [address] [phone]              Who to contact     Please call your clinic at [phone] to:    Ask questions about your health    Make or cancel appointments    Discuss your medicines    Learn about your test results    Speak to your doctor   If you have compliments or concerns about an experience at your clinic, or if you wish to file a complaint, please contact [facility] Patient Relations at [phone] or email us at [email or web address]         Additional Information About Your Visit        [employer] Information     "[employer]" gives you secure access to your electronic health record. If you see a primary care provider, you can also send messages to your care team and make appointments. If you have questions, please call your primary care clinic.  If you do not have a primary care provider, please call [phone] and they will assist you.      "[employer]" is an electronic gateway that provides easy, online access to your medical records. With "[employer]", you can request a clinic appointment, read your test results, renew a prescription or communicate with your care team.     To access your existing account, please contact your [facility] or call [phone] for assistance.        Care EveryWhere ID     This is your Care EveryWhere ID. This could be used by other organizations to access your [facility] medical records  [id number]        Your Vitals Were     Pulse Temperature Respirations Pulse Oximetry          67 98.3  F (36.8  C) (Oral) 18 99%         Blood Pressure from Last 3 Encounters:   12/12/17 (!) 137/94   11/02/17 110/79   10/17/17 122/87    Weight from Last 3 Encounters:   11/02/17 268 lb (121.6 kg)   10/17/17 268 lb (121.6 kg)   10/12/17 271 lb (122.9 kg)              Today, you had the following     No orders found for display         Today's Medication Changes           These changes are accurate as of: 12/12/17 10:00 AM.  If you have any questions, ask your nurse or doctor.               Start taking these medicines.        Dose/Directions    apixaban ANTICOAGULANT 5 MG tablet   Commonly known as:  ELIQUIS   Used for:  Paroxysmal atrial fibrillation (H)        Dose:  5 mg   Take 1 tablet (5 mg) by mouth 2 times daily   Quantity:  60 tablet   Refills:  11         These medicines have changed or have updated prescriptions.        Dose/Directions    furosemide 40 MG tablet   Commonly known as:  LASIX   This may have changed:  Another medication with the same name was removed. Continue taking this medication, and follow the directions you see here.   Used for:  Chronic diastolic heart failure (H)        TAKE 1 TABLET (40 MG) BY MOUTH DAILY   Quantity:  90 tablet   Refills:  3         Stop taking these medicines if you haven't already. Please contact your care team if you have questions.     rivaroxaban ANTICOAGULANT 20 MG Tabs tablet   Commonly known as:  XARELTO                Where to get your medicines      These medications were sent to [facility]pharm[facility] #[id number] - [place], [place] - [address], [place] [address]     Phone:  [phone]     apixaban ANTICOAGULANT 5 MG tablet                Primary Care Provider Office Phone # Fax #    Rabia Sarai Carmona [phone]       [facility] [address] [facility] [address]        Equal Access to Services     GERTRUDE RAJAN AH: Hadii corey huitrono Sodestiny, waaxda luqadaha, qaybta kaalmada adeegyada, latia schilling. So [facility] [phone].    ATENCIÓN: Si habla español, tiene a tapia disposición servicios gratuitos de asistencia lingüística. Llame al [phone].    We comply with applicable federal civil rights laws and [place] laws. We do not discriminate on the basis of race, color, national origin, age, disability, sex, sexual orientation, or  gender identity.            Thank you!     Thank you for choosing [facility]  for your care. Our goal is always to provide you with excellent care. Hearing back from our patients is one way we can continue to improve our services. Please take a few minutes to complete the written survey that you may receive in the mail after your visit with us. Thank you!             Your Updated Medication List - Protect others around you: Learn how to safely use, store and throw away your medicines at www.disposemymeds.org.          This list is accurate as of: 12/12/17 10:00 AM.  Always use your most recent med list.                   Brand Name Dispense Instructions for use Diagnosis    amLODIPine 10 MG tablet    NORVASC    30 tablet    Take 1 tablet (10 mg) by mouth daily    Essential hypertension with goal blood pressure less than 140/90       apixaban ANTICOAGULANT 5 MG tablet    ELIQUIS    60 tablet    Take 1 tablet (5 mg) by mouth 2 times daily    Paroxysmal atrial fibrillation (H)       blood glucose monitoring lancets     100 each    Use to test blood sugars 2 times daily or as directed.    Diabetes mellitus (H)       blood glucose monitoring test strip    no brand specified    100 each    Use to test blood sugars 2 times daily or as directed    Diabetes mellitus (H)       cycloSPORINE 0.05 % ophthalmic emulsion    RESTASIS    1 Box    Place 1 drop into both eyes 2 times daily    Dry eyes, bilateral, Punctate keratitis, bilateral       ferrous sulfate 325 (65 FE) MG tablet    IRON    90 tablet    Take 1 tablet (325 mg) by mouth 2 times daily    Iron deficiency anemia, unspecified iron deficiency anemia type       furosemide 40 MG tablet    LASIX    90 tablet    TAKE 1 TABLET (40 MG) BY MOUTH DAILY    Chronic diastolic heart failure (H)       lisinopril 20 MG tablet    PRINIVIL/ZESTRIL    180 tablet    Take 2 tablets (40 mg) by mouth daily    (HFpEF) heart failure with preserved ejection fraction (H)        metFORMIN 850 MG tablet    GLUCOPHAGE    90 tablet    Take 1 tablet (850 mg) by mouth daily (with breakfast) AM    Type 2 diabetes mellitus without complication, without long-term current use of insulin (H)       metoprolol 100 MG 24 hr tablet    TOPROL-XL    60 tablet    Take 2 tablets (200 mg) by mouth daily    Benign essential hypertension       omeprazole 40 MG capsule    priLOSEC    90 capsule    Take 1 capsule (40 mg) by mouth daily Take 30-60 minutes before a meal.    Acute gastrointestinal hemorrhage       order for DME     1 Units    Equipment being ordered: BP cuff, large    Hypertension, essential       potassium chloride SA 10 MEQ CR tablet    K-DUR/KLOR-CON M    180 tablet    Take 2 tablets (20 mEq) by mouth daily    (HFpEF) heart failure with preserved ejection fraction (H)       sildenafil 100 MG tablet    VIAGRA    30 tablet    Take 100mg tablet as directed.  -Rx prescribed via [employer] connection to care program-    Erectile dysfunction, unspecified erectile dysfunction type       solifenacin 10 MG tablet    VESICARE    60 tablet    Take 1 tablet (10 mg) by mouth daily AM    Urinary urgency       spironolactone 25 MG tablet    ALDACTONE    90 tablet    Take 1 tablet (25 mg) by mouth daily    Chronic diastolic heart failure (H)       STATIN NOT PRESCRIBED (INTENTIONAL)      1 each daily Please choose reason not prescribed, below    Type 2 diabetes mellitus without complication, without long-term current use of insulin (H)       vitamin D 2000 UNITS tablet     60 tablet    Take 2,000 Units by mouth daily    Vitamin D deficiency

## 2017-12-12 NOTE — PROGRESS NOTES
Clinical Pharmacy Note     Jazz was referred by Rabia Wolf  for pharmacy services for [facility]      MEDICATION REVIEW:  Discussed all medication indications, dosage and effectiveness, adverse effects, and adherence with patient/caregiver.    Pt had meds with them: yes  Pt had med list with them: no  Pt was knowledgeable about meds: yes  Medications set up by: self  Medications administered by someone else (e.g., LTCF): No  Pt uses a medication box or automated dispenser: no  Called pharmacy to obtain or clarify med list:  no  Called HHN or LTCF to obtain or clarify med list:  no    Medication Discrepancies  Medications on EMR med list that pt is NOT taking:  none  Medications pt IS taking that are NOT on EMR med list (e.g., from specialist, hospital): none  OTC meds/ dietary supplements pt taking on own that are NOT on EMR med list:  none  Dosage listed differently than how patient is taking: none  Frequency listed differently than how patient is taking: none  Duplicate medication on list (two occurrences of the same medication):  none  TOTAL NUMBER OF MEDICATION DISCREPANCIES:  0  ______________________________________________________________________      Subjective:  Jazz is here today to review his medications.        1)  HTN    Took meds this AM at 9AM    Takes home blood pressure readings    Furosemide does not seem to cause a large diuresis.   He tells me he takes the medication but does not describe a significant amount of urination after the dose.      Amlodipine 10 mg    Metoprolol 200 mg daily      2) anticoagulation     Riveroxaban:  Continues to take this daily.  No new problems.  There will be a change in his insurance that causes this Rx to change to a new product.     Patient reported being compliant all of the time   Patient reports no side effects.      Objective:    BP (!) 137/94  Pulse 67  Temp 98.3  F (36.8  C) (Oral)  Resp 18  SpO2 99%  BP Readings from Last 6 Encounters:    12/12/17 (!) 137/94   11/02/17 110/79   10/17/17 122/87   10/12/17 124/83   09/07/17 113/83   08/14/17 128/53     Estimated Creatinine Clearance: 77 mL/min (based on Cr of 1.3).  GFR Estimate   Date Value Ref Range Status   11/02/2017 59.5 (L) >60.0 mL/min/1.7 m2 Final   10/12/2017 65.2 >60.0 mL/min/1.7 m2 Final   09/05/2017 65 >60 mL/min/1.7m2 Final     Comment:     Non  GFR Calc     GFR Estimate If Black   Date Value Ref Range Status   11/02/2017 71.9 >60.0 mL/min/1.7 m2 Final   10/12/2017 78.9 >60.0 mL/min/1.7 m2 Final   09/05/2017 79 >60 mL/min/1.7m2 Final     Comment:      GFR Calc     Immunization History   Administered Date(s) Administered     Influenza (H1N1) 12/14/2008     Influenza (IIV3) PF 10/20/1998, 01/18/2005, 11/29/2005, 11/16/2006, 11/29/2007, 10/01/2008, 09/23/2009, 10/11/2010, 10/04/2011, 10/16/2013, 10/16/2014     Influenza Vaccine IM 3yrs+ 4 Valent IIV4 11/10/2016, 10/12/2017     Pneumococcal (PCV 13) 10/12/2017     Pneumococcal 23 valent 10/11/2010, 10/16/2013     TDAP Vaccine (Boostrix) 01/31/2012     Zoster vaccine, live 10/12/2017     BP (!) 137/94  Pulse 67  Temp 98.3  F (36.8  C) (Oral)  Resp 18  SpO2 99%    Drug Therapy Assessment:  Drug therapy problems identified:       1. Paroxysmal atrial fibrillation (H)  Will change therapy to match insurance formulary.    -      All medications were reviewed and found to be indicated, effective, safe and convenient unless drug therapy problem identified as described above.        Drug Therapy Plan and Follow up:    Plan  1. Paroxysmal atrial fibrillation (H)  - apixaban ANTICOAGULANT (ELIQUIS) 5 MG tablet; Take 1 tablet (5 mg) by mouth 2 times daily  Dispense: 60 tablet; Refill: 11        Follow up: with PCP   Patient was provided with written instructions/medication list via AVS        Options for treatment and/or follow-up care were reviewed with the patient. Patient was engaged and actively involved in the  decision making process.  Jazz Berman verbalized understanding of the options discussed and was satisfied with the final plan.      Dr. Carmona  was provided my action  in clinic today and Dr. Michael Barrientos was available for supervision during this visit and is the authorizing prescriber for this visit through the pharmacist collaborative practice agreement.      Jayson Hernandez, Pharm.D   35 minute encounter                   ,  ,  ,  ,  ,  ,  ,  ,     ,  ,  ,  ,  ,  ,  ,  ,     ,  ,  ,  ,  ,  ,  ,  ,     ,  ,  ,

## 2017-12-15 ENCOUNTER — TELEPHONE (OUTPATIENT)
Dept: PHARMACY | Facility: CLINIC | Age: 62
End: 2017-12-15

## 2017-12-15 NOTE — TELEPHONE ENCOUNTER
PHARMACY TELEPHONE ENCOUNTER:    Reason: Viagra rx      Rx has arrived.  Pt contacted to come and .    Brittany CrumpD.

## 2017-12-27 ENCOUNTER — DOCUMENTATION ONLY (OUTPATIENT)
Dept: FAMILY MEDICINE | Facility: CLINIC | Age: 62
End: 2017-12-27

## 2017-12-30 ENCOUNTER — MEDICAL CORRESPONDENCE (OUTPATIENT)
Dept: HEALTH INFORMATION MANAGEMENT | Facility: CLINIC | Age: 62
End: 2017-12-30

## 2018-01-08 ENCOUNTER — OFFICE VISIT (OUTPATIENT)
Dept: FAMILY MEDICINE | Facility: CLINIC | Age: 63
End: 2018-01-08
Payer: MEDICARE

## 2018-01-08 VITALS
BODY MASS INDEX: 38.51 KG/M2 | SYSTOLIC BLOOD PRESSURE: 126 MMHG | RESPIRATION RATE: 18 BRPM | TEMPERATURE: 98.3 F | DIASTOLIC BLOOD PRESSURE: 88 MMHG | HEART RATE: 57 BPM | WEIGHT: 268.4 LBS | OXYGEN SATURATION: 99 %

## 2018-01-08 DIAGNOSIS — I10 HYPERTENSION, ESSENTIAL: ICD-10-CM

## 2018-01-08 DIAGNOSIS — Z11.3 ROUTINE SCREENING FOR STI (SEXUALLY TRANSMITTED INFECTION): Primary | ICD-10-CM

## 2018-01-08 DIAGNOSIS — E11.9 TYPE 2 DIABETES MELLITUS WITHOUT COMPLICATION, WITHOUT LONG-TERM CURRENT USE OF INSULIN (H): ICD-10-CM

## 2018-01-08 LAB
HBA1C MFR BLD: 5.5 % (ref 4.1–5.7)
HIV 1+2 AB+HIV1 P24 AG SERPL QL IA: NONREACTIVE

## 2018-01-08 ASSESSMENT — ENCOUNTER SYMPTOMS
BLOOD IN STOOL: 0
WHEEZING: 0
MYALGIAS: 0
CHILLS: 0
SORE THROAT: 0
COLOR CHANGE: 0
DIARRHEA: 0
HEADACHES: 0
SHORTNESS OF BREATH: 0
ABDOMINAL PAIN: 0
FATIGUE: 0
ARTHRALGIAS: 0
SINUS PRESSURE: 0
FEVER: 0
ABDOMINAL DISTENTION: 0
NAUSEA: 0
COUGH: 0
DYSURIA: 0
CONSTIPATION: 0
APPETITE CHANGE: 0
HEMATURIA: 0
VOMITING: 0
WEAKNESS: 0

## 2018-01-08 NOTE — PROGRESS NOTES
HPI:       Jazz Berman is a 62 year old who presents for the following  Patient presents with:  Recheck Medication: follow up     Jazz states that he has started Eliquis about 2 weeks ago.  He completed the Xarelto and had to switch to Eliquis due to insurance formulary changes.  He saw Dr. Hernandez on 12/12 when switch switched to Eliquis was discussed.  He denies any active bleeding, bruising, skin changes, or side effects of the new medication.  States he takes it twice a day.    He reports that he has intermittent dizziness upon getting up from bed in the morning.  This dizziness resolves quite quickly.  Denies any recent changes to his medicines other than the anticoagulant.  He is due to see the core clinic on 2/14.    He reports his sugars have been ranging between 80s-130s.  He denies any highs or lows.  He had no trouble taking his medications.  He continues to try to be more active and have a healthier diet.      Problem, Medication and Allergy Lists were   reviewed and are current.     Patient Active Problem List    Diagnosis Date Noted     Impaired fasting glucose 05/07/2013     Priority: High     Hypertension, essential 08/30/2012     Priority: High     Class: Chronic     Use large BP cuff       Cardiomegaly 08/30/2012     Priority: High     GREG (obstructive sleep apnea) on CPAP 08/30/2012     Priority: High     Class: Chronic     Type 2 diabetes mellitus without complication, without long-term current use of insulin (H) 10/12/2017     Priority: Medium     Paroxysmal atrial fibrillation (H) 04/24/2017     Priority: Medium     Atrial fibrillation, rate controlled.       Anticoagulation goal of INR 2 to 3 03/21/2017     Priority: Medium     Current use of long term anticoagulation 03/21/2017     Priority: Medium     Will need to restart anticoagulation. Pharmacy consulted and will appreciate recs. 3/21/2017. 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 Favored to be a gastrointestinal stromal tumor. No evidence of obstruction. A few subcentimeter lymph nodes along the posterior margin.  No Remote metastases demonstrated.      Follow up: GI clinic at [facility], within 2-3 weeks for EUS and gastric tumor biopsy. GI nurse will and help schedule an appointment (Phone Nr: [phone])       Diverticulosis of large intestine 07/04/2016     Priority: Medium     Colonoscopy 7/2/16        Acute gastrointestinal hemorrhage 06/29/2016     Priority: Medium     Fracture of medial malleolus, left, closed 04/12/2014     Priority: Medium     [place] [facility] [place] [facility]       Closed fracture of part of tibia 04/12/2014     Priority: Medium     History of substance abuse 02/25/2014     Priority: Medium     Remote history of marijuana and crack cocaine. No IVDU       Mild recurrent major depression (H) 09/05/2013     Priority: Medium     Class: Chronic     CARDIOVASCULAR SCREENING; LDL GOAL LESS THAN 130 08/30/2013     Priority: Medium     August 30, 2013 CHD risk factors: +male age >45, +hypertension, +HDL <40, 10% [place] Risk Calculator       H/O colonoscopy with polypectomy 08/29/2013     Priority: Medium     Class: Chronic     7/2/16:  2 polyps and diverticulosis. Follow  up per pathology report   - One 2 mm polyp in the ascending colon. Resected andretrieved.  - One 6 mm polyp in the transverse colon. Resected and retrieved. Clip was placed.   - Diverticulosis in the sigmoid colon.    August 29, 2013  History of adenomatous polyp s/p resection, Recommendation repeat in one year.       Bilateral lower extremity edema 08/29/2013     Priority: Medium     BPH (benign prostatic hyperplasia) 08/29/2013     Priority: Medium     Erectile dysfunction 07/10/2013     Priority: Medium     Keloid 06/26/2013     Priority: Medium     Located on L ear s/p excision and R ear, follows with Dermatology       Psychosexual dysfunction with inhibited sexual excitement 08/30/2012     Priority: Medium     BMI greater than 40 08/30/2012     Priority: Medium     Class: Chronic     Ectropion 07/18/2011     Priority: Medium     Epiphora 07/18/2011     Priority: Medium     Mixed emotional features as adjustment reaction 10/06/2010     Priority: Medium   ,     Current Outpatient Prescriptions   Medication Sig Dispense Refill     apixaban ANTICOAGULANT (ELIQUIS) 5 MG tablet Take 1 tablet (5 mg) by mouth 2 times daily 60 tablet 11     furosemide (LASIX) 40 MG tablet TAKE 1 TABLET (40 MG) BY MOUTH DAILY 90 tablet 3     solifenacin (VESICARE) 10 MG tablet Take 1 tablet (10 mg) by mouth daily AM 60 tablet 3     STATIN NOT PRESCRIBED, INTENTIONAL, 1 each daily Please choose reason not prescribed, below       omeprazole (PRILOSEC) 40 MG capsule Take 1 capsule (40 mg) by mouth daily Take 30-60 minutes before a meal. 90 capsule 3     amLODIPine (NORVASC) 10 MG tablet Take 1 tablet (10 mg) by mouth daily 30 tablet 3     blood glucose monitoring (NO BRAND SPECIFIED) test strip Use to test blood sugars 2 times daily or as directed 100 each 3     blood glucose monitoring (ONE TOUCH DELICA) lancets Use to test blood sugars 2 times daily or as directed. 100 each 3     sildenafil (VIAGRA) 100 MG cap/tab Take 100mg tablet as  directed.  -Rx prescribed via [employer] connection to care program- 30 tablet 3     cycloSPORINE (RESTASIS) 0.05 % ophthalmic emulsion Place 1 drop into both eyes 2 times daily 1 Box 3     Cholecalciferol (VITAMIN D) 2000 UNITS tablet Take 2,000 Units by mouth daily 60 tablet 6     spironolactone (ALDACTONE) 25 MG tablet Take 1 tablet (25 mg) by mouth daily 90 tablet 3     ferrous sulfate (IRON) 325 (65 FE) MG tablet Take 1 tablet (325 mg) by mouth 2 times daily 90 tablet 2     metFORMIN (GLUCOPHAGE) 850 MG tablet Take 1 tablet (850 mg) by mouth daily (with breakfast) AM 90 tablet 3     metoprolol (TOPROL-XL) 100 MG 24 hr tablet Take 2 tablets (200 mg) by mouth daily 60 tablet 11     lisinopril (PRINIVIL/ZESTRIL) 20 MG tablet Take 2 tablets (40 mg) by mouth daily 180 tablet 3     potassium chloride SA (K-DUR/KLOR-CON M) 10 MEQ CR tablet Take 2 tablets (20 mEq) by mouth daily 180 tablet 3     order for DME Equipment being ordered: BP cuff, large 1 Units 0   ,     Allergies   Allergen Reactions     Dye [Contrast Dye] Rash     Dye left skin hyperpigmentation on his back     Patient is an established patient of this clinic.         Review of Systems:   Review of Systems   Constitutional: Negative for appetite change, chills, fatigue and fever.   HENT: Negative for congestion, sinus pressure and sore throat.    Eyes: Negative for visual disturbance.   Respiratory: Negative for cough, shortness of breath and wheezing.    Cardiovascular: Negative for chest pain and leg swelling.   Gastrointestinal: Negative for abdominal distention, abdominal pain, blood in stool, constipation, diarrhea, nausea and vomiting.   Genitourinary: Negative for dysuria and hematuria.   Musculoskeletal: Negative for arthralgias and myalgias.   Skin: Negative for color change and rash.   Neurological: Negative for weakness and headaches.             Physical Exam:   Patient Vitals for the past 24 hrs:   BP Temp Temp src Pulse Resp SpO2 Weight    01/08/18 1258 126/88 98.3  F (36.8  C) Oral 57 18 99 % 268 lb 6.4 oz (121.7 kg)     Body mass index is 38.51 kg/(m^2).  Vitals were reviewed and were normal     Physical Exam   Constitutional: He is oriented to person, place, and time. He appears well-developed and well-nourished. No distress.   HENT:   Head: Normocephalic.   Mouth/Throat: Oropharynx is clear and moist. No oropharyngeal exudate.   Eyes: Conjunctivae are normal. No scleral icterus.   Neck: Normal range of motion. Neck supple.   Cardiovascular: Normal rate and regular rhythm.    No murmur heard.  Pulmonary/Chest: Effort normal and breath sounds normal. No respiratory distress.   Abdominal: Soft. Bowel sounds are normal. He exhibits no distension. There is no tenderness.   Musculoskeletal: He exhibits no edema or tenderness.   Lymphadenopathy:     He has no cervical adenopathy.   Neurological: He is alert and oriented to person, place, and time.   Skin: Skin is warm. No rash noted. He is not diaphoretic.         Results:     Results for orders placed or performed in visit on 01/08/18   HIV Antigen Antibody Combo   Result Value Ref Range    HIV Antigen Antibody Combo Nonreactive NR^Nonreactive       Hemoglobin A1c ([facility])   Result Value Ref Range    Hemoglobin A1C 5.5 4.1 - 5.7 %         Assessment and Plan     Neal Berman is a 62-year-old male with a history of essential hypertension, status post gastric tumor resection, half-pack and proximal A. fib currently on Eliquis.  He recently was changed to Eliquis due to formulary changes in his insurance, has been doing well on the new anticoagulant.  He is currently sexually active, does use condoms, but not every time.  He would like to be tested for STDs at this time, and denies any current symptoms.    His diabetes seems to be well controlled.  He is currently on 850 mg of metformin daily with a more recent A1c of 5.5, A1c is 5.5 today as well.  Will likely need to decrease the metformin dose even  further, possibly removing metformin altogether.    He is due to see the [facility] clinic next month.  No recent changes to his medications per the CORE team.  Advised patient to follow-up with me after his next C[facility] visit.    1. Routine screening for STI (sexually transmitted infection)  - NEISSERIA GONORRHOEA PCR  - CHLAMYDIA TRACHOMATIS PCR  - HIV Antigen Antibody Combo  - Anti Treponema    2. Hypertension, essential  - ASPIRIN NOT PRESCRIBED (INTENTIONAL); Please choose reason not prescribed, below  Dispense: 1 each; Refill: 0    3. Type 2 diabetes mellitus without complication, without long-term current use of insulin (H)  - Hemoglobin A1c ([facility])  - ASPIRIN NOT PRESCRIBED (INTENTIONAL); Please choose reason not prescribed, below  Dispense: 1 each; Refill: 0    There are no discontinued medications.  Options for treatment and follow-up care were reviewed with the patient. Jazz Berman  engaged in the decision making process and verbalized understanding of the options discussed and agreed with the final plan.    Rabia Carmona MD  [facility]  [phone]

## 2018-01-08 NOTE — MR AVS SNAPSHOT
After Visit Summary   1/8/2018    Jazz Berman    MRN: [id number]           Patient Information     Date Of Birth          1955        Visit Information        Provider Department      1/8/2018 1:00 PM Rabia Dickey MD [facility]        Today's Diagnoses     Routine screening for STI (sexually transmitted infection)    -  1    Hypertension, essential        Type 2 diabetes mellitus without complication, without long-term current use of insulin (H)          Care Instructions    Here is the plan from today's visit    1. Routine screening for STI (sexually transmitted infection)  - NEISSERIA GONORRHOEA PCR  - CHLAMYDIA TRACHOMATIS PCR  - HIV Antigen Antibody Combo  - Anti Treponema    2. Hypertension, essential  - your blood pressure is at goal today.   - No changes to medications today.   - I would recommend continuing to check your blood pressure at home.     3. Type 2 diabetes mellitus without complication, without long-term current use of insulin (H)  - Hemoglobin A1c ([facility])  - when you feel the dizziness, check your blood sugar.     Please call or return to clinic if your symptoms don't go away.    Follow up plan  Please make a clinic appointment for follow up with me (RABIA DICKEY) in 6-8  weeks for follow up (after 2/14 when you see the [facility] clinic).    Thank you for coming to [facility] today.  Lab Testing:  **If you had lab testing today and your results are reassuring or normal they will be mailed to you or sent through [employer] within 7 days.   **If the lab tests need quick action we will call you with the results.  The phone number we will call with results is # [phone] (home) . If this is not the best number please call our clinic and change the number.  Medication Refills:  If you need any refills please call your pharmacy and they will contact us.   If you need to  your refill at a new pharmacy, please contact the new pharmacy  directly. The new pharmacy will help you get your medications transferred faster.   Scheduling:  If you have any concerns about today's visit or wish to schedule another appointment please call our office during normal business hours [phone] (8-5:00 M-F)  If a referral was made to a [facility] and you don't get a call from central scheduling please call [phone].  If a Mammogram was ordered for you at [facility] call [phone] to schedule or change your appointment.  If you had an XRay/CT/Ultrasound/MRI ordered the number is [phone] to schedule or change your radiology appointment.   Medical Concerns:  If you have urgent medical concerns please call [phone] at any time of the day.            Follow-ups after your visit        Your next 10 appointments already scheduled     Feb 14, 2018  8:00 AM CST   Lab with  LAB   [facility] ([facility])    [address]  1st Floor  [place] [id number] [phone]            Feb 14, 2018  8:30 AM CST   (Arrive by 8:15 AM)   CORE RETURN with DULCE Pimentel CNP   [facility]    [address]  [address]  [place] [id number] [phone]            Mar 20, 2018 10:00 AM CDT   [facility] Draw with  [facility] LAB DRAW   [facility] Draw ([facility])    [address]  [address]  [place] [id number] [phone]            Mar 20, 2018 10:20 AM CDT   (Arrive by 10:05 AM)   CT ABDOMEN PELVIS W/O & W CONTRAST with UCCT2   [facility] Imaging [place] [facility] ([facility])    [address]  1st Floor  [place] [id number] [phone]           Please bring any scans or X-rays taken at other hospitals, if similar tests were done. Also bring a list of your medicines, including vitamins, minerals and over-the-counter drugs. It is safest to leave  personal items at home.  Be sure to tell your doctor:   If you have any allergies.   If there s any chance you are pregnant.   If you are breastfeeding.   If you have any special needs.  You may have contrast for this exam. To prepare:   Do not eat or drink for 2 hours before your exam. If you need to take medicine, you may take it with small sips of water. (We may ask you to take liquid medicine as well.)   The day before your exam, drink extra fluids at least six 8-ounce glasses (unless your doctor tells you to restrict your fluids).  Patients over 70 or patients with diabetes or kidney problems:   If you haven t had a blood test (creatinine test) within the last 30 days, go to your clinic or Diagnostic Imaging Department for this test.  If you have diabetes:   If your kidney function is normal, continue taking your metformin (Avandamet, Glucophage, Glucovance, Metaglip) on the day of your exam.   If your kidney function is abnormal, wait 48 hours before restarting this medicine.  You will have oral contrast for this exam:   You will drink the contrast at home. Get this from your clinic or Diagnostic Imaging Department. Please follow the directions given.  Please wear loose clothing, such as a sweat suit or jogging clothes. Avoid snaps, zippers and other metal. We may ask you to undress and put on a hospital gown.  If you have any questions, please call the Imaging Department where you will have your exam.            Mar 22, 2018 10:30 AM CDT   (Arrive by 10:15 AM)   Return Visit with Sean Freed MD   [facility] ([facility])    [address] [place] [address]  [address]  [facility] [address] [phone]              Who to contact     Please call your clinic at [phone] to:    Ask questions about your health    Make or cancel appointments    Discuss your medicines    Learn about your test results    Speak to your doctor   If you have compliments or concerns  about an experience at your clinic, or if you wish to file a complaint, please contact [facility] Patient Relations at [phone] or email us at [email or web address]         Additional Information About Your Visit        [employer] Information     [address] gives you secure access to your electronic health record. If you see a primary care provider, you can also send messages to your care team and make appointments. If you have questions, please call your primary care clinic.  If you do not have a primary care provider, please call [phone] and they will assist you.      [employer] is an electronic gateway that provides easy, online access to your medical records. With [employer], you can request a clinic appointment, read your test results, renew a prescription or communicate with your care team.     To access your existing account, please contact your [facility] or call [phone] for assistance.        Care EveryWhere ID     This is your Care EveryWhere ID. This could be used by other organizations to access your [facility] medical records  [id number]        Your Vitals Were     Pulse Temperature Respirations Pulse Oximetry BMI (Body Mass Index)       57 98.3  F (36.8  C) (Oral) 18 99% 38.51 kg/m2        Blood Pressure from Last 3 Encounters:   01/08/18 126/88   12/12/17 (!) 137/94   11/02/17 110/79    Weight from Last 3 Encounters:   01/08/18 268 lb 6.4 oz (121.7 kg)   11/02/17 268 lb (121.6 kg)   10/17/17 268 lb (121.6 kg)              We Performed the Following     Anti Treponema     CHLAMYDIA TRACHOMATIS PCR     Hemoglobin A1c ([facility])     HIV Antigen Antibody Combo     NEISSERIA GONORRHOEA PCR        Primary Care Provider Office Phone # Fax #    Rabia Carmona [phone]       [facility] [address]  [facility] [address]        Equal Access to Services     GERTRUDE RAJAN AH: Karol campos  Sylviadestiny, ireneda luqadaha, qaclotildeta kamichelle ceron, latia simpson lyndsayjud lamundojanet tonie. So [facility] [phone].    ATENCIÓN: Si sabas farrar, tiene a tapia disposición servicios gratuitos de asistencia lingüística. Kayy al [phone].    We comply with applicable federal civil rights laws and [place] laws. We do not discriminate on the basis of race, color, national origin, age, disability, sex, sexual orientation, or gender identity.            Thank you!     Thank you for choosing [facility]  for your care. Our goal is always to provide you with excellent care. Hearing back from our patients is one way we can continue to improve our services. Please take a few minutes to complete the written survey that you may receive in the mail after your visit with us. Thank you!             Your Updated Medication List - Protect others around you: Learn how to safely use, store and throw away your medicines at www.disposemymeds.org.          This list is accurate as of: 1/8/18  1:29 PM.  Always use your most recent med list.                   Brand Name Dispense Instructions for use Diagnosis    amLODIPine 10 MG tablet    NORVASC    30 tablet    Take 1 tablet (10 mg) by mouth daily    Essential hypertension with goal blood pressure less than 140/90       apixaban ANTICOAGULANT 5 MG tablet    ELIQUIS    60 tablet    Take 1 tablet (5 mg) by mouth 2 times daily    Paroxysmal atrial fibrillation (H)       blood glucose monitoring lancets     100 each    Use to test blood sugars 2 times daily or as directed.    Diabetes mellitus (H)       blood glucose monitoring test strip    no brand specified    100 each    Use to test blood sugars 2 times daily or as directed    Diabetes mellitus (H)       cycloSPORINE 0.05 % ophthalmic emulsion    RESTASIS    1 Box    Place 1 drop into both eyes 2 times daily    Dry eyes, bilateral, Punctate keratitis, bilateral       ferrous sulfate 325 (65 FE) MG tablet     IRON    90 tablet    Take 1 tablet (325 mg) by mouth 2 times daily    Iron deficiency anemia, unspecified iron deficiency anemia type       furosemide 40 MG tablet    LASIX    90 tablet    TAKE 1 TABLET (40 MG) BY MOUTH DAILY    Chronic diastolic heart failure (H)       lisinopril 20 MG tablet    PRINIVIL/ZESTRIL    180 tablet    Take 2 tablets (40 mg) by mouth daily    (HFpEF) heart failure with preserved ejection fraction (H)       metFORMIN 850 MG tablet    GLUCOPHAGE    90 tablet    Take 1 tablet (850 mg) by mouth daily (with breakfast) AM    Type 2 diabetes mellitus without complication, without long-term current use of insulin (H)       metoprolol 100 MG 24 hr tablet    TOPROL-XL    60 tablet    Take 2 tablets (200 mg) by mouth daily    Benign essential hypertension       omeprazole 40 MG capsule    priLOSEC    90 capsule    Take 1 capsule (40 mg) by mouth daily Take 30-60 minutes before a meal.    Acute gastrointestinal hemorrhage       order for DME     1 Units    Equipment being ordered: BP cuff, large    Hypertension, essential       potassium chloride SA 10 MEQ CR tablet    K-DUR/KLOR-CON M    180 tablet    Take 2 tablets (20 mEq) by mouth daily    (HFpEF) heart failure with preserved ejection fraction (H)       sildenafil 100 MG tablet    VIAGRA    30 tablet    Take 100mg tablet as directed.  -Rx prescribed via "[employer] connection to care program-    Erectile dysfunction, unspecified erectile dysfunction type       solifenacin 10 MG tablet    VESICARE    60 tablet    Take 1 tablet (10 mg) by mouth daily AM    Urinary urgency       spironolactone 25 MG tablet    ALDACTONE    90 tablet    Take 1 tablet (25 mg) by mouth daily    Chronic diastolic heart failure (H)       STATIN NOT PRESCRIBED (INTENTIONAL)      1 each daily Please choose reason not prescribed, below    Type 2 diabetes mellitus without complication, without long-term current use of insulin (H)       vitamin D 2000 UNITS tablet     60 tablet     Take 2,000 Units by mouth daily    Vitamin D deficiency

## 2018-01-08 NOTE — PROGRESS NOTES
Preceptor Attestation:   Patient seen and discussed with the resident. Assessment and plan reviewed with resident and agreed upon.   Supervising Physician:  [facility]

## 2018-01-08 NOTE — PATIENT INSTRUCTIONS
Here is the plan from today's visit    1. Routine screening for STI (sexually transmitted infection)  - NEISSERIA GONORRHOEA PCR  - CHLAMYDIA TRACHOMATIS PCR  - HIV Antigen Antibody Combo  - Anti Treponema    2. Hypertension, essential  - your blood pressure is at goal today.   - No changes to medications today.   - I would recommend continuing to check your blood pressure at home.     3. Type 2 diabetes mellitus without complication, without long-term current use of insulin (H)  - Hemoglobin A1c ([place])  - when you feel the dizziness, check your blood sugar.     Please call or return to clinic if your symptoms don't go away.    Follow up plan  Please make a clinic appointment for follow up with me (TRINITY DICKEY) in 6-8  weeks for follow up (after 2/14 when you see the [facility] clinic).    Thank you for coming to [facility] today.  Lab Testing:  **If you had lab testing today and your results are reassuring or normal they will be mailed to you or sent through [employer] within 7 days.   **If the lab tests need quick action we will call you with the results.  The phone number we will call with results is # [phone] (home) . If this is not the best number please call our clinic and change the number.  Medication Refills:  If you need any refills please call your pharmacy and they will contact us.   If you need to  your refill at a new pharmacy, please contact the new pharmacy directly. The new pharmacy will help you get your medications transferred faster.   Scheduling:  If you have any concerns about today's visit or wish to schedule another appointment please call our office during normal business hours [phone] (8-5:00 M-F)  If a referral was made to a [facility] and you don't get a call from central scheduling please call [phone].  If a Mammogram was ordered for you at [facility] call [phone] to schedule or change your appointment.  If you had an  XRay/CT/Ultrasound/MRI ordered the number is [phone] to schedule or change your radiology appointment.   Medical Concerns:  If you have urgent medical concerns please call [phone] at any time of the day.

## 2018-01-09 LAB
C TRACH DNA SPEC QL NAA+PROBE: NEGATIVE
N GONORRHOEA DNA SPEC QL NAA+PROBE: NEGATIVE
SPECIMEN SOURCE: NORMAL
SPECIMEN SOURCE: NORMAL
T PALLIDUM IGG+IGM SER QL: NEGATIVE

## 2018-01-15 DIAGNOSIS — I10 BENIGN ESSENTIAL HYPERTENSION: ICD-10-CM

## 2018-01-15 DIAGNOSIS — I50.30 (HFPEF) HEART FAILURE WITH PRESERVED EJECTION FRACTION (H): ICD-10-CM

## 2018-01-15 RX ORDER — POTASSIUM CHLORIDE 750 MG/1
20 TABLET, EXTENDED RELEASE ORAL DAILY
Qty: 180 TABLET | Refills: 3 | Status: SHIPPED | OUTPATIENT
Start: 2018-01-15 | End: 2018-01-17

## 2018-01-15 RX ORDER — METOPROLOL SUCCINATE 100 MG/1
200 TABLET, EXTENDED RELEASE ORAL DAILY
Qty: 60 TABLET | Refills: 11 | Status: SHIPPED | OUTPATIENT
Start: 2018-01-15 | End: 2018-02-27

## 2018-01-15 NOTE — TELEPHONE ENCOUNTER
[facility] phone call message- patient requesting a refill:    Full Medication Name: metoprolol and potassium    Dose: see chart    Pharmacy confirmed as   [id number] - [place], [place] - [address] [place] [address]  Phone: [phone] Fax: [phone]  : Yes    Additional Comments: Please refill both medications. HOme care nurse is there today setting up last dose and says the pharmacy has been calling us and asking for refill     OK to leave a message on voice mail? Yes    Primary language: English      needed? No    Call taken on January 15, 2018 at 10:54 AM by Felicia Morales

## 2018-01-17 DIAGNOSIS — D50.9 IRON DEFICIENCY ANEMIA, UNSPECIFIED IRON DEFICIENCY ANEMIA TYPE: ICD-10-CM

## 2018-01-17 DIAGNOSIS — I50.30 (HFPEF) HEART FAILURE WITH PRESERVED EJECTION FRACTION (H): ICD-10-CM

## 2018-01-17 RX ORDER — FERROUS SULFATE 325(65) MG
325 TABLET ORAL 2 TIMES DAILY
Qty: 90 TABLET | Refills: 2 | Status: SHIPPED | OUTPATIENT
Start: 2018-01-17 | End: 2018-02-27

## 2018-01-17 RX ORDER — POTASSIUM CHLORIDE 750 MG/1
20 TABLET, EXTENDED RELEASE ORAL DAILY
Qty: 180 TABLET | Refills: 3 | Status: SHIPPED | OUTPATIENT
Start: 2018-01-17 | End: 2018-02-14

## 2018-01-17 NOTE — TELEPHONE ENCOUNTER
Request for medication refill:    Date of last visit at clinic: 1-8-18    Please complete refill if appropriate and CLOSE ENCOUNTER.    Closing the encounter signifies the refill is complete.    If refill has been denied, please complete the smart phrase .smirefuse and route it to the [facility] RN TRIAGE pool to inform the patient and the pharmacy.    Shira Nicole, CMA

## 2018-02-05 DIAGNOSIS — I48.0 PAROXYSMAL ATRIAL FIBRILLATION (H): ICD-10-CM

## 2018-02-08 ENCOUNTER — CARE COORDINATION (OUTPATIENT)
Dept: CARDIOLOGY | Facility: CLINIC | Age: 63
End: 2018-02-08

## 2018-02-08 DIAGNOSIS — I50.32 CHRONIC DIASTOLIC HEART FAILURE (H): Primary | ICD-10-CM

## 2018-02-14 ENCOUNTER — OFFICE VISIT (OUTPATIENT)
Dept: CARDIOLOGY | Facility: CLINIC | Age: 63
End: 2018-02-14
Attending: NURSE PRACTITIONER
Payer: MEDICARE

## 2018-02-14 VITALS
HEART RATE: 69 BPM | WEIGHT: 271.8 LBS | SYSTOLIC BLOOD PRESSURE: 131 MMHG | BODY MASS INDEX: 38.91 KG/M2 | OXYGEN SATURATION: 98 % | DIASTOLIC BLOOD PRESSURE: 83 MMHG | HEIGHT: 70 IN

## 2018-02-14 DIAGNOSIS — I50.32 CHRONIC DIASTOLIC HEART FAILURE (H): ICD-10-CM

## 2018-02-14 LAB
ANION GAP SERPL CALCULATED.3IONS-SCNC: 6 MMOL/L (ref 3–14)
BUN SERPL-MCNC: 22 MG/DL (ref 7–30)
CALCIUM SERPL-MCNC: 9.2 MG/DL (ref 8.5–10.1)
CHLORIDE SERPL-SCNC: 106 MMOL/L (ref 94–109)
CO2 SERPL-SCNC: 29 MMOL/L (ref 20–32)
CREAT SERPL-MCNC: 1.3 MG/DL (ref 0.66–1.25)
GFR SERPL CREATININE-BSD FRML MDRD: 56 ML/MIN/1.7M2
GLUCOSE SERPL-MCNC: 103 MG/DL (ref 70–99)
POTASSIUM SERPL-SCNC: 4.3 MMOL/L (ref 3.4–5.3)
SODIUM SERPL-SCNC: 141 MMOL/L (ref 133–144)

## 2018-02-14 PROCEDURE — 36415 COLL VENOUS BLD VENIPUNCTURE: CPT | Performed by: NURSE PRACTITIONER

## 2018-02-14 PROCEDURE — G0463 HOSPITAL OUTPT CLINIC VISIT: HCPCS | Mod: ZF

## 2018-02-14 PROCEDURE — 80048 BASIC METABOLIC PNL TOTAL CA: CPT | Performed by: NURSE PRACTITIONER

## 2018-02-14 PROCEDURE — 99214 OFFICE O/P EST MOD 30 MIN: CPT | Mod: ZP | Performed by: NURSE PRACTITIONER

## 2018-02-14 RX ORDER — SPIRONOLACTONE 25 MG/1
50 TABLET ORAL DAILY
Qty: 90 TABLET | Refills: 3 | COMMUNITY
Start: 2018-02-14 | End: 2018-02-20

## 2018-02-14 ASSESSMENT — PAIN SCALES - GENERAL: PAINLEVEL: NO PAIN (0)

## 2018-02-14 NOTE — MR AVS SNAPSHOT
"              After Visit Summary   2018    Arti Nguyen    MRN: [id number]           Patient Information     Date Of Birth          1955        Visit Information        Provider Department      2018 8:30 AM Kiera Woodson APRN CNP [facility]        Today's Diagnoses     Chronic diastolic heart failure (H)          Care Instructions    You were seen today in the Cardiovascular Clinic at the [facility].     Cardiology Providers you saw during your visit: Kiera Woodson NP     1. Stop Potassium medication.   2. Start taking Aldactone 50 mg daily  3. Repeat Basic Metabolic Panel lab in one week.   4. Follow up with Kiera in 6 months.         Results for ARTI NGUYEN (MRN [id number]) as of 2018 08:39   Ref. Range 2018 07:47   Sodium Latest Ref Range: 133 - 144 mmol/L 141   Potassium Latest Ref Range: 3.4 - 5.3 mmol/L 4.3   Chloride Latest Ref Range: 94 - 109 mmol/L 106   Carbon Dioxide Latest Ref Range: 20 - 32 mmol/L 29   Urea Nitrogen Latest Ref Range: 7 - 30 mg/dL 22   Creatinine Latest Ref Range: 0.66 - 1.25 mg/dL 1.30 (H)   GFR Estimate Latest Ref Range: >60 mL/min/1.7m2 56 (L)   GFR Estimate If Black Latest Ref Range: >60 mL/min/1.7m2 67   Calcium Latest Ref Range: 8.5 - 10.1 mg/dL 9.2   Anion Gap Latest Ref Range: 3 - 14 mmol/L 6   Glucose Latest Ref Range: 70 - 99 mg/dL 103 (H)       Please limit your fluid intake to 2 L (64 ounces) daily.  2 Liters a day = 8.5 cups, or 72 ounces.  Please limit your salt intake to 2 grams a day or less.     If you gain 2# in 24 hours or 5# in one week call Kecia Rodriguez RN so we can adjust your medications as needed over the phone.     Please feel free to call me with any questions or concerns.       Kecia Rodriguez RN  [facility]  Cardiology Care Coordinator-Heart Failure Clinic     Questions and [phone].   First press #1 for the [employer] and then press #3 for \"Medical Questions\" " to reach us Cardiology Nurses.      On Call Cardiologist for after hours or on weekends: [phone]   option #4 and ask to speak to the on-call Cardiologist. Inform them you are a CORE/heart failure patient at the [place].           If you need a medication refill please contact your pharmacy.  Please allow 3 business days for your refill to be completed.  _______________________________________________________  C.O.R.E. CLINIC Cardiomyopathy, Optimization, Rehabilitation, Education   The C.O.R.E. CLINIC is a heart failure specialty clinic within the [facility] where you will work with specialized nurse practitioners dedicated to helping patients with heart failure carefully adjust medications, receive education, and learn who and when to call if symptoms develop. They specialize in helping you better understand your condition, slow the progression of your disease, improve the length and quality of your life, help you detect future heart problems before they become life threatening, and avoid hospitalizations.  As always, thank you for trusting us with your health care needs!             Follow-ups after your visit        Your next 10 appointments already scheduled     Feb 27, 2018 10:20 AM CST   Return Visit with MD [facility] ([facility])    [address] [place] [address],  [address]  [facility] [address]   [phone]            Mar 20, 2018 10:00 AM CDT   [facility] Lab Draw with  [facility]AS[facility]IC LAB DRAW   [facility] Lab Draw ([facility] [place])    [address]  [address]  [facility] [id number] [phone]            Mar 20, 2018 10:20 AM CDT   (Arrive by 10:05 AM)   CT ABDOMEN PELVIS W/O & W CONTRAST with UCCT2   [facility] CT ([facility] [place])    [address]  1st Floor  [facility] [id number] [phone]           Please bring any scans or X-rays  taken at other hospitals, if similar tests were done. Also bring a list of your medicines, including vitamins, minerals and over-the-counter drugs. It is safest to leave personal items at home.  Be sure to tell your doctor:   If you have any allergies.   If there s any chance you are pregnant.   If you are breastfeeding.  You may have contrast for this exam. To prepare:   Do not eat or drink for 2 hours before your exam. If you need to take medicine, you may take it with small sips of water. (We may ask you to take liquid medicine as well.)   The day before your exam, drink extra fluids at least six 8-ounce glasses (unless your doctor tells you to restrict your fluids).   You will be given instructions on how to drink the contrast.  Patients over 70 or patients with diabetes or kidney problems:   If you haven t had a blood test (creatinine test) within the last 30 days, the Cardiologist/Radiologist may require you to get this test prior to your exam.  If you have diabetes:   Continue to take your metformin medication on the day of your exam  Please wear loose clothing, such as a sweat suit or jogging clothes. Avoid snaps, zippers and other metal. We may ask you to undress and put on a hospital gown.  If you have any questions, please call the Imaging Department where you will have your exam.            Mar 22, 2018 10:30 AM CDT   (Arrive by 10:15 AM)   Return Visit with Sean Freed MD   [facility] ([facility])    [address] [place] [address]  [address]  [facility] [place] [address] [phone]              Future tests that were ordered for you today     Open Future Orders        Priority Expected Expires Ordered    Basic metabolic panel Routine 2/21/2018 2/14/2019 2/14/2018            Who to contact     If you have questions or need follow up information about today's clinic visit or your schedule please contact [facility] directly at [phone].  Normal  "or non-critical lab and imaging results will be communicated to you by [employer], letter or phone within 4 business days after the clinic has received the results. If you do not hear from us within 7 days, please contact the clinic through [employer] or phone. If you have a critical or abnormal lab result, we will notify you by phone as soon as possible.  Submit refill requests through [employer] or call your pharmacy and they will forward the refill request to us. Please allow 3 business days for your refill to be completed.          Additional Information About Your Visit        [employer] Information     [employer] gives you secure access to your electronic health record. If you see a primary care provider, you can also send messages to your care team and make appointments. If you have questions, please call your primary care clinic.  If you do not have a primary care provider, please call [phone] and they will assist you.        Care EveryWhere ID     This is your Care EveryWhere ID. This could be used by other organizations to access your [place] medical records  [id number]        Your Vitals Were     Pulse Height Pulse Oximetry BMI (Body Mass Index)          69 1.778 m (5' 10\") 98% 39 kg/m2         Blood Pressure from Last 3 Encounters:   02/14/18 131/83   01/08/18 126/88   12/12/17 (!) 137/94    Weight from Last 3 Encounters:   02/14/18 123.3 kg (271 lb 12.8 oz)   01/08/18 121.7 kg (268 lb 6.4 oz)   11/02/17 121.6 kg (268 lb)                 Today's Medication Changes          These changes are accurate as of 2/14/18  9:12 AM.  If you have any questions, ask your nurse or doctor.               These medicines have changed or have updated prescriptions.        Dose/Directions    spironolactone 25 MG tablet   Commonly known as:  ALDACTONE   This may have changed:  how much to take   Used for:  Chronic diastolic heart failure (H)   Changed by:  Kiera Woodson APRN CNP        Dose:  50 mg   Take 2 tablets (50 mg) " by mouth daily   Quantity:  90 tablet   Refills:  3         Stop taking these medicines if you haven't already. Please contact your care team if you have questions.     potassium chloride SA 10 MEQ CR tablet   Commonly known as:  K-DUR/KLOR-CON M   Stopped by:  Kiera Woodson APRN CNP                    Primary Care Provider Office Phone # Fax #    Rabia Sarai Carmona [phone]       [facility] [address] [facility] [address]        Equal Access to Services     [facility] : Hadii aad ku hadasho Soomaali, waaxda luqadaha, qaybta kaalmada adeegyada, waxay idiin hayaan adeeg kharash chris . So [facility] [phone].    ATENCIÓN: Si habla español, tiene a tapia disposición servicios gratuitos de asistencia lingüística. Ll[facility] [phone].    We comply with applicable federal civil rights laws and [place] laws. We do not discriminate on the basis of race, color, national origin, age, disability, sex, sexual orientation, or gender identity.            Thank you!     Thank you for choosing [facility]  for your care. Our goal is always to provide you with excellent care. Hearing back from our patients is one way we can continue to improve our services. Please take a few minutes to complete the written survey that you may receive in the mail after your visit with us. Thank you!             Your Updated Medication List - Protect others around you: Learn how to safely use, store and throw away your medicines at www.disposemymeds.org.          This list is accurate as of 2/14/18  9:12 AM.  Always use your most recent med list.                   Brand Name Dispense Instructions for use Diagnosis    amLODIPine 10 MG tablet    NORVASC    30 tablet    Take 1 tablet (10 mg) by mouth daily    Essential hypertension with goal blood pressure less than 140/90       apixaban ANTICOAGULANT 5 MG tablet    ELIQUIS    60 tablet    Take 1 tablet (5 mg) by mouth 2 times daily     Paroxysmal atrial fibrillation (H)       ASPIRIN NOT PRESCRIBED    INTENTIONAL    1 each    Please choose reason not prescribed, below    Hypertension, essential, Type 2 diabetes mellitus without complication, without long-term current use of insulin (H)       blood glucose monitoring lancets     100 each    Use to test blood sugars 2 times daily or as directed.    Diabetes mellitus (H)       blood glucose monitoring test strip    no brand specified    100 each    Use to test blood sugars 2 times daily or as directed    Diabetes mellitus (H)       cycloSPORINE 0.05 % ophthalmic emulsion    RESTASIS    1 Box    Place 1 drop into both eyes 2 times daily    Dry eyes, bilateral, Punctate keratitis, bilateral       ferrous sulfate 325 (65 FE) MG tablet    IRON    90 tablet    Take 1 tablet (325 mg) by mouth 2 times daily    Iron deficiency anemia, unspecified iron deficiency anemia type       furosemide 40 MG tablet    LASIX    90 tablet    TAKE 1 TABLET (40 MG) BY MOUTH DAILY    Chronic diastolic heart failure (H)       lisinopril 20 MG tablet    PRINIVIL/ZESTRIL    180 tablet    Take 2 tablets (40 mg) by mouth daily    (HFpEF) heart failure with preserved ejection fraction (H)       metFORMIN 850 MG tablet    GLUCOPHAGE    90 tablet    Take 1 tablet (850 mg) by mouth daily (with breakfast) AM    Type 2 diabetes mellitus without complication, without long-term current use of insulin (H)       metoprolol succinate 100 MG 24 hr tablet    TOPROL-XL    60 tablet    Take 2 tablets (200 mg) by mouth daily    Benign essential hypertension       omeprazole 40 MG capsule    priLOSEC    90 capsule    Take 1 capsule (40 mg) by mouth daily Take 30-60 minutes before a meal.    Acute gastrointestinal hemorrhage       order for DME     1 Units    Equipment being ordered: BP cuff, large    Hypertension, essential       sildenafil 100 MG tablet    VIAGRA    30 tablet    Take 100mg tablet as directed.  -Rx prescribed via [employer]  connection to care program-    Erectile dysfunction, unspecified erectile dysfunction type       solifenacin 10 MG tablet    VESICARE    60 tablet    Take 1 tablet (10 mg) by mouth daily AM    Urinary urgency       spironolactone 25 MG tablet    ALDACTONE    90 tablet    Take 2 tablets (50 mg) by mouth daily    Chronic diastolic heart failure (H)       STATIN NOT PRESCRIBED (INTENTIONAL)      1 each daily Please choose reason not prescribed, below    Type 2 diabetes mellitus without complication, without long-term current use of insulin (H)       vitamin D 2000 UNITS tablet     60 tablet    Take 2,000 Units by mouth daily    Vitamin D deficiency

## 2018-02-14 NOTE — NURSING NOTE
Diet: Patient instructed regarding a heart healthy diet, including discussion of reduced fat and sodium intake. Patient demonstrated understanding of this information and agreed to call with further questions or concerns.  Labs: Patient was given results of the laboratory testing obtained today. Patient was instructed to return for the next laboratory testing in 1 week. Patient demonstrated understanding of this information and agreed to call with further questions or concerns.   New Medication: Patient was educated regarding newly prescribed medication, including discussion of  the indication, administration, side effects, and when to report to MD or RN. Patient demonstrated understanding of this information and agreed to call with further questions or concerns.  Return Appointment: Patient given instructions regarding scheduling next clinic visit. Patient demonstrated understanding of this information and agreed to call with further questions or concerns.  Medication Change: Patient was educated regarding prescribed medication change, including discussion of the indication, administration, side effects, and when to report to MD or RN. Patient demonstrated understanding of this information and agreed to call with further questions or concerns.  Patient stated he understood all health information given and agreed to call with further questions or concerns.    Health Maintenance Summary     Topic Due On Due Status Completed On Postpone Until Reason    IMMUNIZATION - HPV Jan 16, 2016 Postponed Nov 21, 2015 Jan 6, 2017 Patient Refused    PAP SMEAR - CERVICAL CANCER SCREENING Jan 1, 2017 Due On Jan 1, 2014      Immunization - Td/Tdap Jun 16, 2019 Not Due Jun 16, 2009      Immunization - TDAP Pregnancy  Hidden       Immunization-Influenza Sep 1, 2016 Postponed  Apr 1, 2017 Patient Refused          Patient is due for topics as listed above, she wishes to decline at this time .

## 2018-02-14 NOTE — PATIENT INSTRUCTIONS
"You were seen today in the Cardiovascular Clinic at the [facility].     Cardiology Providers you saw during your visit: Kiera Woodson NP     1. Stop Potassium medication.   2. Increase Aldactone to 50 mg daily  3. Repeat Basic Metabolic Panel lab in one week.   4. Follow up with Kiera in 6 months.         Results for ARTI NGUYEN (MRN [id number]) as of 2018 08:39   Ref. Range 2018 07:47   Sodium Latest Ref Range: 133 - 144 mmol/L 141   Potassium Latest Ref Range: 3.4 - 5.3 mmol/L 4.3   Chloride Latest Ref Range: 94 - 109 mmol/L 106   Carbon Dioxide Latest Ref Range: 20 - 32 mmol/L 29   Urea Nitrogen Latest Ref Range: 7 - 30 mg/dL 22   Creatinine Latest Ref Range: 0.66 - 1.25 mg/dL 1.30 (H)   GFR Estimate Latest Ref Range: >60 mL/min/1.7m2 56 (L)   GFR Estimate If Black Latest Ref Range: >60 mL/min/1.7m2 67   Calcium Latest Ref Range: 8.5 - 10.1 mg/dL 9.2   Anion Gap Latest Ref Range: 3 - 14 mmol/L 6   Glucose Latest Ref Range: 70 - 99 mg/dL 103 (H)       Please limit your fluid intake to 2 L (64 ounces) daily.  2 Liters a day = 8.5 cups, or 72 ounces.  Please limit your salt intake to 2 grams a day or less.     If you gain 2# in 24 hours or 5# in one week call Kecia Rodriguez RN so we can adjust your medications as needed over the phone.     Please feel free to call me with any questions or concerns.       Kecia Rodriguez RN  [facility]  Cardiology Care Coordinator-Heart Failure Clinic     Questions and [phone].   First press #1 for the [employer] and then press #3 for \"Medical Questions\" to reach us Cardiology Nurses.      On Call Cardiologist for after hours or on weekends: [phone]   option #4 and ask to speak to the on-call Cardiologist. Inform them you are a CORE/heart failure patient at the [place].           If you need a medication refill please contact your pharmacy.  Please allow 3 business days for your refill to be " completed.  _______________________________________________________  C.O.R.E. CLINIC Cardiomyopathy, Optimization, Rehabilitation, Education   The C.O.R.E. CLINIC is a heart failure specialty clinic within the [facility] where you will work with specialized nurse practitioners dedicated to helping patients with heart failure carefully adjust medications, receive education, and learn who and when to call if symptoms develop. They specialize in helping you better understand your condition, slow the progression of your disease, improve the length and quality of your life, help you detect future heart problems before they become life threatening, and avoid hospitalizations.  As always, thank you for trusting us with your health care needs!

## 2018-02-14 NOTE — LETTER
2/14/2018      RE: Jazz Berman  [address]     [facility] [address]       Dear Colleague,    Thank you for the opportunity to participate in the care of your patient, Jazz Berman, at the [facility]. Please see a copy of my visit note below.    HPI:   Mr. Berman is a 63 year old male with a past medical history including HFpEF, HTN, DM Type II, Obesity, and Afib. He presents to [facility] clinic for routine follow up.     He states he has been feeling quite well. He denies fever, chills, lightheadedness, dizziness, chest pain, palpitations, SOB, CALERO, PND, orthopnea, nausea, vomiting, diarrhea, or LE edema. His weight remains stable at 268 lbs. He continues to follow a low sodium diet. BG remains well controlled at 115 home. He is able to walk 2 blocks prior to needing rest.     PAST MEDICAL HISTORY:  Past Medical History:   Diagnosis Date     Arthritis      Atrial fibrillation (H)      BPH (benign prostatic hyperplasia) 8/29/2013     Cardiomegaly 8/30/2012     Crushing injury of foot 8/30/2012     Depressive disorder, not elsewhere classified 8/30/2012     Diabetes mellitus type 2 in obese (H)      Diverticulosis of large intestine 7/4/2016    Colonoscopy 7/2/16       Former smoker      Gastric mass      GI bleed      History of blood transfusion      Hypertension      Hypertension      Keloid 6/11/2012     GREG on CPAP        FAMILY HISTORY:  Family History   Problem Relation Age of Onset     Hypertension Father      DIABETES Mother      Colon Cancer No family hx of      Crohn Disease No family hx of      Ulcerative Colitis No family hx of      CANCER No family hx of      no skin cancer     Glaucoma No family hx of      Macular Degeneration No family hx of        SOCIAL HISTORY:  Social History     Social History     Marital status: Single     Spouse name: N/A     Number of children: N/A     Years of education: N/A     Social History Main Topics      Smoking status: Former Smoker     Years: 30.00     Types: Cigarettes     Quit date: 6/2/2011     Smokeless tobacco: Never Used     Alcohol use No      Comment: twice per week and 6 pack per sitting, quit about 6 months ago     Drug use: No      Comment: +marijuana and crack cocaine     Sexual activity: Yes     Other Topics Concern     Not on file     Social History Narrative       CURRENT MEDICATIONS:  Outpatient Medications Prior to Visit   Medication Sig Dispense Refill     apixaban ANTICOAGULANT (ELIQUIS) 5 MG tablet Take 1 tablet (5 mg) by mouth 2 times daily 60 tablet 11     ferrous sulfate (IRON) 325 (65 FE) MG tablet Take 1 tablet (325 mg) by mouth 2 times daily 90 tablet 2     potassium chloride SA (K-DUR/KLOR-CON M) 10 MEQ CR tablet Take 2 tablets (20 mEq) by mouth daily 180 tablet 3     metoprolol succinate (TOPROL-XL) 100 MG 24 hr tablet Take 2 tablets (200 mg) by mouth daily 60 tablet 11     furosemide (LASIX) 40 MG tablet TAKE 1 TABLET (40 MG) BY MOUTH DAILY 90 tablet 3     solifenacin (VESICARE) 10 MG tablet Take 1 tablet (10 mg) by mouth daily AM 60 tablet 3     omeprazole (PRILOSEC) 40 MG capsule Take 1 capsule (40 mg) by mouth daily Take 30-60 minutes before a meal. 90 capsule 3     amLODIPine (NORVASC) 10 MG tablet Take 1 tablet (10 mg) by mouth daily 30 tablet 3     blood glucose monitoring (NO BRAND SPECIFIED) test strip Use to test blood sugars 2 times daily or as directed 100 each 3     blood glucose monitoring (ONE TOUCH DELICA) lancets Use to test blood sugars 2 times daily or as directed. 100 each 3     sildenafil (VIAGRA) 100 MG cap/tab Take 100mg tablet as directed.  -Rx prescribed via [facility] connection to care program- 30 tablet 3     cycloSPORINE (RESTASIS) 0.05 % ophthalmic emulsion Place 1 drop into both eyes 2 times daily 1 Box 3     Cholecalciferol (VITAMIN D) 2000 UNITS tablet Take 2,000 Units by mouth daily 60 tablet 6     spironolactone (ALDACTONE) 25 MG tablet Take 1 tablet  "(25 mg) by mouth daily 90 tablet 3     metFORMIN (GLUCOPHAGE) 850 MG tablet Take 1 tablet (850 mg) by mouth daily (with breakfast) AM 90 tablet 3     lisinopril (PRINIVIL/ZESTRIL) 20 MG tablet Take 2 tablets (40 mg) by mouth daily 180 tablet 3     order for DME Equipment being ordered: BP cuff, large 1 Units 0     ASPIRIN NOT PRESCRIBED (INTENTIONAL) Please choose reason not prescribed, below (Patient not taking: Reported on 2/14/2018) 1 each 0     STATIN NOT PRESCRIBED, INTENTIONAL, 1 each daily Please choose reason not prescribed, below (Patient not taking: Reported on 2/14/2018)       No facility-administered medications prior to visit.        ROS:   CONSTITUTIONAL: Denies fever, chills, fatigue, or weight fluctuations.   HEENT: Denies headache, vision changes, and changes in speech.   CV: Refer to HPI.   PULMONARY:Denies shortness of breath, cough, or previous TB exposure.   GI:Denies nausea, vomiting, diarrhea, and abdominal pain. Bowel movements are regular.   :Denies urinary alterations, dysuria, urinary frequency, hematuria, and abnormal drainage.   EXT:Denies lower extremity edema.   SKIN:Denies abnormal rashes or lesions.   MUSCULOSKELETAL:Denies upper or lower extremity weakness and pain.   NEUROLOGIC:Denies lightheadedness, dizziness, seizures, or upper or lower extremity paresthesia.     EXAM:  /83 (BP Location: Left arm, Patient Position: Chair, Cuff Size: Adult Large)  Pulse 69  Ht 1.778 m (5' 10\")  Wt 123.3 kg (271 lb 12.8 oz)  SpO2 98%  BMI 39 kg/m2  GENERAL: Appears alert and oriented times three.   HEENT: Eye symmetrical and free of discharge bilaterally. Mucous membranes moist and without lesions.  NECK: Supple and without lymphadenopathy. JVD 8 cm.    CV: RRR, S1S2 present without murmur, rub, or gallop.   RESPIRATORY: Respirations regular, even, and unlabored. Lungs CTA throughout.   GI: Soft and non distended with normoactive bowel sounds present in all quadrants. No tenderness, " rebound, guarding. No organomegaly.   EXTREMITIES: No peripheral edema. 2+ bilateral pedal pulses.   NEUROLOGIC: Alert and orientated x 3. CN II-XII grossly intact. No focal deficits.   MUSCULOSKELETAL: No joint swelling or tenderness.   SKIN: No jaundice. No rashes or lesions.     Labs:  CBC RESULTS:  Lab Results   Component Value Date    WBC 6.3 09/05/2017    RBC 4.94 09/05/2017    HGB 14.9 09/05/2017    HCT 44.7 09/05/2017    MCV 91 09/05/2017    MCH 30.2 09/05/2017    MCHC 33.3 09/05/2017    RDW 13.6 09/05/2017     09/05/2017       CMP RESULTS:  Lab Results   Component Value Date     02/14/2018    POTASSIUM 4.3 02/14/2018    CHLORIDE 106 02/14/2018    CO2 29 02/14/2018    ANIONGAP 6 02/14/2018     (H) 02/14/2018    BUN 22 02/14/2018    CR 1.30 (H) 02/14/2018    GFRESTIMATED 56 (L) 02/14/2018    GFRESTBLACK 67 02/14/2018    NATALYA 9.2 02/14/2018    BILITOTAL 0.7 09/05/2017    ALBUMIN 3.6 09/05/2017    ALKPHOS 97 09/05/2017    ALT 20 09/05/2017    AST 13 09/05/2017        INR RESULTS:  Lab Results   Component Value Date    INR 1.98 (H) 08/14/2017       Lab Results   Component Value Date    MAG 2.1 08/23/2016     Lab Results   Component Value Date    NTBNPI 2283 (H) 07/15/2016     Lab Results   Component Value Date    NTBNP 929 (H) 11/02/2017       Diagnostics:  Echo 7/4/16:  Interpretation Summary  The patient's rhythm is atrial fibrillation.  Global and regional left ventricular function is normal with an EF of 55-60%.    Assessment and Plan:   Mr. Berman is a 63 year old male with a past medical history including HFpEF, HTN, DM Type II, Obesity, and Afib. He presents to [facility] for routine follow up and is feeling well.     Heart failure with preserved ejection fraction. Likely, secondary to Afib.   NYHA Class B, AHA/ACC Stage II  Rate control: HR-69.  volume status: Euvolemic. Lasix 40 mg po daily  Blood pressure control: /83.  Aldosterone antagonist: Aldactone 25 mg po daily    Evaluation of coronary arteries: No prior ischemic workup noted per documentation.   Sleep apnea screening: GREG on CPAP.   - Consider Nuclear stress test vs Coronary CT at next visit to rule out any concern for ischemic etiology.      HTN  - BP controlled on current regimen.   - Continue Norvasc 10 mg po daily, Lisinopril 40 mg po daily, and Toprol  mg po daily.     Afib. CHADSVASC-2. Note history of GI bleed in setting of gastric mass.   - Continue Eliquis.   - HR controlled at 69 on Toprol  mg po daily.     Obesity. BMI 39.  - Encouraged activity and weight loss.     DM Type II, Controlled.   -  at home.   - Management per PCP.     Follow up with Dr. Ortez in 6 months. Repeat BMP in 1 week.       Kiera Woodson  2/13/2018        CC  LINDA TOBIAS

## 2018-02-14 NOTE — PROGRESS NOTES
HPI:   Mr. Berman is a 63 year old male with a past medical history including HFpEF, HTN, DM Type II, Obesity, and Afib. He presents to [facility] for routine follow up.     He states he has been feeling quite well. He denies fever, chills, lightheadedness, dizziness, chest pain, palpitations, SOB, CALERO, PND, orthopnea, nausea, vomiting, diarrhea, or LE edema. His weight remains stable at 268 lbs. He continues to follow a low sodium diet. BG remains well controlled at 115 home. He is able to walk 2 blocks prior to needing rest.     PAST MEDICAL HISTORY:  Past Medical History:   Diagnosis Date     Arthritis      Atrial fibrillation (H)      BPH (benign prostatic hyperplasia) 8/29/2013     Cardiomegaly 8/30/2012     Crushing injury of foot 8/30/2012     Depressive disorder, not elsewhere classified 8/30/2012     Diabetes mellitus type 2 in obese (H)      Diverticulosis of large intestine 7/4/2016    Colonoscopy 7/2/16       Former smoker      Gastric mass      GI bleed      History of blood transfusion      Hypertension      Hypertension      Keloid 6/11/2012     GREG on CPAP        FAMILY HISTORY:  Family History   Problem Relation Age of Onset     Hypertension Father      DIABETES Mother      Colon Cancer No family hx of      Crohn Disease No family hx of      Ulcerative Colitis No family hx of      CANCER No family hx of      no skin cancer     Glaucoma No family hx of      Macular Degeneration No family hx of        SOCIAL HISTORY:  Social History     Social History     Marital status: Single     Spouse name: N/A     Number of children: N/A     Years of education: N/A     Social History Main Topics     Smoking status: Former Smoker     Years: 30.00     Types: Cigarettes     Quit date: 6/2/2011     Smokeless tobacco: Never Used     Alcohol use No      Comment: twice per week and 6 pack per sitting, quit about 6 months ago     Drug use: No      Comment: +marijuana and crack cocaine     Sexual activity: Yes     Other  Topics Concern     Not on file     Social History Narrative       CURRENT MEDICATIONS:  Outpatient Medications Prior to Visit   Medication Sig Dispense Refill     apixaban ANTICOAGULANT (ELIQUIS) 5 MG tablet Take 1 tablet (5 mg) by mouth 2 times daily 60 tablet 11     ferrous sulfate (IRON) 325 (65 FE) MG tablet Take 1 tablet (325 mg) by mouth 2 times daily 90 tablet 2     potassium chloride SA (K-DUR/KLOR-CON M) 10 MEQ CR tablet Take 2 tablets (20 mEq) by mouth daily 180 tablet 3     metoprolol succinate (TOPROL-XL) 100 MG 24 hr tablet Take 2 tablets (200 mg) by mouth daily 60 tablet 11     furosemide (LASIX) 40 MG tablet TAKE 1 TABLET (40 MG) BY MOUTH DAILY 90 tablet 3     solifenacin (VESICARE) 10 MG tablet Take 1 tablet (10 mg) by mouth daily AM 60 tablet 3     omeprazole (PRILOSEC) 40 MG capsule Take 1 capsule (40 mg) by mouth daily Take 30-60 minutes before a meal. 90 capsule 3     amLODIPine (NORVASC) 10 MG tablet Take 1 tablet (10 mg) by mouth daily 30 tablet 3     blood glucose monitoring (NO BRAND SPECIFIED) test strip Use to test blood sugars 2 times daily or as directed 100 each 3     blood glucose monitoring (ONE TOUCH DELICA) lancets Use to test blood sugars 2 times daily or as directed. 100 each 3     sildenafil (VIAGRA) 100 MG cap/tab Take 100mg tablet as directed.  -Rx prescribed via [facility] connection to care program- 30 tablet 3     cycloSPORINE (RESTASIS) 0.05 % ophthalmic emulsion Place 1 drop into both eyes 2 times daily 1 Box 3     Cholecalciferol (VITAMIN D) 2000 UNITS tablet Take 2,000 Units by mouth daily 60 tablet 6     spironolactone (ALDACTONE) 25 MG tablet Take 1 tablet (25 mg) by mouth daily 90 tablet 3     metFORMIN (GLUCOPHAGE) 850 MG tablet Take 1 tablet (850 mg) by mouth daily (with breakfast) AM 90 tablet 3     lisinopril (PRINIVIL/ZESTRIL) 20 MG tablet Take 2 tablets (40 mg) by mouth daily 180 tablet 3     order for DME Equipment being ordered: BP cuff, large 1 Units 0     ASPIRIN  "NOT PRESCRIBED (INTENTIONAL) Please choose reason not prescribed, below (Patient not taking: Reported on 2/14/2018) 1 each 0     STATIN NOT PRESCRIBED, INTENTIONAL, 1 each daily Please choose reason not prescribed, below (Patient not taking: Reported on 2/14/2018)       No facility-administered medications prior to visit.        ROS:   CONSTITUTIONAL: Denies fever, chills, fatigue, or weight fluctuations.   HEENT: Denies headache, vision changes, and changes in speech.   CV: Refer to HPI.   PULMONARY:Denies shortness of breath, cough, or previous TB exposure.   GI:Denies nausea, vomiting, diarrhea, and abdominal pain. Bowel movements are regular.   :Denies urinary alterations, dysuria, urinary frequency, hematuria, and abnormal drainage.   EXT:Denies lower extremity edema.   SKIN:Denies abnormal rashes or lesions.   MUSCULOSKELETAL:Denies upper or lower extremity weakness and pain.   NEUROLOGIC:Denies lightheadedness, dizziness, seizures, or upper or lower extremity paresthesia.     EXAM:  /83 (BP Location: Left arm, Patient Position: Chair, Cuff Size: Adult Large)  Pulse 69  Ht 1.778 m (5' 10\")  Wt 123.3 kg (271 lb 12.8 oz)  SpO2 98%  BMI 39 kg/m2  GENERAL: Appears alert and oriented times three.   HEENT: Eye symmetrical and free of discharge bilaterally. Mucous membranes moist and without lesions.  NECK: Supple and without lymphadenopathy. JVD 8 cm.    CV: RRR, S1S2 present without murmur, rub, or gallop.   RESPIRATORY: Respirations regular, even, and unlabored. Lungs CTA throughout.   GI: Soft and non distended with normoactive bowel sounds present in all quadrants. No tenderness, rebound, guarding. No organomegaly.   EXTREMITIES: No peripheral edema. 2+ bilateral pedal pulses.   NEUROLOGIC: Alert and orientated x 3. CN II-XII grossly intact. No focal deficits.   MUSCULOSKELETAL: No joint swelling or tenderness.   SKIN: No jaundice. No rashes or lesions.     Labs:  CBC RESULTS:  Lab Results "   Component Value Date    WBC 6.3 09/05/2017    RBC 4.94 09/05/2017    HGB 14.9 09/05/2017    HCT 44.7 09/05/2017    MCV 91 09/05/2017    MCH 30.2 09/05/2017    MCHC 33.3 09/05/2017    RDW 13.6 09/05/2017     09/05/2017       CMP RESULTS:  Lab Results   Component Value Date     02/14/2018    POTASSIUM 4.3 02/14/2018    CHLORIDE 106 02/14/2018    CO2 29 02/14/2018    ANIONGAP 6 02/14/2018     (H) 02/14/2018    BUN 22 02/14/2018    CR 1.30 (H) 02/14/2018    GFRESTIMATED 56 (L) 02/14/2018    GFRESTBLACK 67 02/14/2018    NATALYA 9.2 02/14/2018    BILITOTAL 0.7 09/05/2017    ALBUMIN 3.6 09/05/2017    ALKPHOS 97 09/05/2017    ALT 20 09/05/2017    AST 13 09/05/2017        INR RESULTS:  Lab Results   Component Value Date    INR 1.98 (H) 08/14/2017       Lab Results   Component Value Date    MAG 2.1 08/23/2016     Lab Results   Component Value Date    NTBNPI 2283 (H) 07/15/2016     Lab Results   Component Value Date    NTBNP 929 (H) 11/02/2017       Diagnostics:  Echo 7/4/16:  Interpretation Summary  The patient's rhythm is atrial fibrillation.  Global and regional left ventricular function is normal with an EF of 55-60%.    Assessment and Plan:   Mr. Berman is a 63 year old male with a past medical history including HFpEF, HTN, DM Type II, Obesity, and Afib. He presents to [facility] for routine follow up and is feeling well.     Heart failure with preserved ejection fraction. Likely, secondary to Afib.   NYHA Class B, AHA/ACC Stage II  Rate control: HR-69.  volume status: Euvolemic. Lasix 40 mg po daily  Blood pressure control: /83.  Aldosterone antagonist: Aldactone 25 mg po daily   Evaluation of coronary arteries: No prior ischemic workup noted per documentation.   Sleep apnea screening: GREG on CPAP.   - Consider Nuclear stress test vs Coronary CT at next visit to rule out any concern for ischemic etiology.      HTN  - BP controlled on current regimen.   - Continue Norvasc 10 mg po daily,  Lisinopril 40 mg po daily, and Toprol  mg po daily.     Afib. CHADSVASC-2. Note history of GI bleed in setting of gastric mass.   - Continue Eliquis.   - HR controlled at 69 on Toprol  mg po daily.     Obesity. BMI 39.  - Encouraged activity and weight loss.     DM Type II, Controlled.   -  at home.   - Management per PCP.     Follow up with Dr. Ortez in 6 months. Repeat BMP in 1 week.       Kiera Woodson  2/13/2018          CC  LINDA TOBIAS

## 2018-02-14 NOTE — NURSING NOTE
Chief Complaint   Patient presents with     Follow Up For     6 month return [facility], labs prior     Vitals were taken and medications were reconciled.  Kilo Buckner, BHAVIK  8:16 AM

## 2018-02-20 DIAGNOSIS — K92.2 ACUTE GASTROINTESTINAL HEMORRHAGE: ICD-10-CM

## 2018-02-20 DIAGNOSIS — I50.32 CHRONIC DIASTOLIC HEART FAILURE (H): ICD-10-CM

## 2018-02-20 LAB
ANION GAP SERPL CALCULATED.3IONS-SCNC: 6 MMOL/L (ref 3–14)
BUN SERPL-MCNC: 20 MG/DL (ref 7–30)
CALCIUM SERPL-MCNC: 9.4 MG/DL (ref 8.5–10.1)
CHLORIDE SERPL-SCNC: 106 MMOL/L (ref 94–109)
CO2 SERPL-SCNC: 28 MMOL/L (ref 20–32)
CREAT SERPL-MCNC: 1.26 MG/DL (ref 0.66–1.25)
GFR SERPL CREATININE-BSD FRML MDRD: 58 ML/MIN/1.7M2
GLUCOSE SERPL-MCNC: 91 MG/DL (ref 70–99)
INR PPP: 1.24 (ref 0.86–1.14)
POTASSIUM SERPL-SCNC: 4.4 MMOL/L (ref 3.4–5.3)
SODIUM SERPL-SCNC: 140 MMOL/L (ref 133–144)

## 2018-02-20 RX ORDER — SPIRONOLACTONE 25 MG/1
50 TABLET ORAL DAILY
Qty: 90 TABLET | Refills: 3 | Status: SHIPPED | OUTPATIENT
Start: 2018-02-20 | End: 2018-09-13

## 2018-02-22 DIAGNOSIS — I10 ESSENTIAL HYPERTENSION WITH GOAL BLOOD PRESSURE LESS THAN 140/90: ICD-10-CM

## 2018-02-22 RX ORDER — AMLODIPINE BESYLATE 10 MG/1
10 TABLET ORAL DAILY
Qty: 30 TABLET | Refills: 3 | Status: SHIPPED | OUTPATIENT
Start: 2018-02-22 | End: 2018-02-27

## 2018-02-22 NOTE — TELEPHONE ENCOUNTER
Request for medication refill:    Date of last visit at clinic: 01/08/2018    Please complete refill if appropriate and CLOSE ENCOUNTER.    Closing the encounter signifies the refill is complete.    If refill has been denied, please complete the smart phrase .smirefuse and route it to the [facility] RN TRIAGE pool to inform the patient and the pharmacy.    Isac Sanchez, CMA

## 2018-02-27 ENCOUNTER — OFFICE VISIT (OUTPATIENT)
Dept: FAMILY MEDICINE | Facility: CLINIC | Age: 63
End: 2018-02-27
Payer: MEDICARE

## 2018-02-27 VITALS
OXYGEN SATURATION: 98 % | SYSTOLIC BLOOD PRESSURE: 118 MMHG | RESPIRATION RATE: 20 BRPM | BODY MASS INDEX: 39.17 KG/M2 | HEART RATE: 66 BPM | WEIGHT: 273 LBS | TEMPERATURE: 97.5 F | DIASTOLIC BLOOD PRESSURE: 83 MMHG

## 2018-02-27 DIAGNOSIS — R39.15 URINARY URGENCY: ICD-10-CM

## 2018-02-27 DIAGNOSIS — K92.2 ACUTE GASTROINTESTINAL HEMORRHAGE: ICD-10-CM

## 2018-02-27 DIAGNOSIS — Z00.00 HEALTH CARE MAINTENANCE: ICD-10-CM

## 2018-02-27 DIAGNOSIS — I48.0 PAROXYSMAL ATRIAL FIBRILLATION (H): ICD-10-CM

## 2018-02-27 DIAGNOSIS — D50.9 IRON DEFICIENCY ANEMIA, UNSPECIFIED IRON DEFICIENCY ANEMIA TYPE: ICD-10-CM

## 2018-02-27 DIAGNOSIS — I50.30 (HFPEF) HEART FAILURE WITH PRESERVED EJECTION FRACTION (H): Primary | ICD-10-CM

## 2018-02-27 DIAGNOSIS — I10 ESSENTIAL HYPERTENSION WITH GOAL BLOOD PRESSURE LESS THAN 140/90: ICD-10-CM

## 2018-02-27 DIAGNOSIS — E55.9 VITAMIN D DEFICIENCY: ICD-10-CM

## 2018-02-27 DIAGNOSIS — E11.9 TYPE 2 DIABETES MELLITUS WITHOUT COMPLICATION, WITHOUT LONG-TERM CURRENT USE OF INSULIN (H): ICD-10-CM

## 2018-02-27 LAB
BUN SERPL-MCNC: 22 MG/DL (ref 7–21)
CALCIUM SERPL-MCNC: 9.2 MG/DL (ref 8.5–10.1)
CHLORIDE SERPLBLD-SCNC: 104.3 MMOL/L (ref 98–110)
CO2 SERPL-SCNC: 26.1 MMOL/L (ref 20–32)
CREAT SERPL-MCNC: 1.2 MG/DL (ref 0.7–1.3)
CREAT UR-MCNC: 48 MG/DL
GFR SERPL CREATININE-BSD FRML MDRD: 65 ML/MIN/1.7 M2
GLUCOSE SERPL-MCNC: 90.3 MG'DL (ref 70–99)
MICROALBUMIN UR-MCNC: <5 MG/L
MICROALBUMIN/CREAT UR: NORMAL MG/G CR (ref 0–17)
POTASSIUM SERPL-SCNC: 4 MMOL/DL (ref 3.3–4.5)
SODIUM SERPL-SCNC: 143.8 MMOL/L (ref 132.6–141.4)
TSH SERPL DL<=0.005 MIU/L-ACNC: 2.54 MU/L (ref 0.4–4)

## 2018-02-27 RX ORDER — CHOLECALCIFEROL (VITAMIN D3) 50 MCG
2000 TABLET ORAL DAILY
Qty: 90 TABLET | Refills: 3 | Status: SHIPPED | OUTPATIENT
Start: 2018-02-27 | End: 2019-03-06

## 2018-02-27 RX ORDER — METOPROLOL SUCCINATE 100 MG/1
200 TABLET, EXTENDED RELEASE ORAL DAILY
Qty: 180 TABLET | Refills: 3 | Status: SHIPPED | OUTPATIENT
Start: 2018-02-27 | End: 2019-05-15

## 2018-02-27 RX ORDER — OMEPRAZOLE 40 MG/1
40 CAPSULE, DELAYED RELEASE ORAL DAILY
Qty: 90 CAPSULE | Refills: 3 | Status: SHIPPED | OUTPATIENT
Start: 2018-02-27 | End: 2019-02-20 | Stop reason: ALTCHOICE

## 2018-02-27 RX ORDER — FERROUS SULFATE 325(65) MG
325 TABLET ORAL 2 TIMES DAILY
Qty: 180 TABLET | Refills: 3 | Status: SHIPPED | OUTPATIENT
Start: 2018-02-27 | End: 2019-03-13

## 2018-02-27 RX ORDER — LISINOPRIL 20 MG/1
40 TABLET ORAL DAILY
Qty: 180 TABLET | Refills: 3 | Status: SHIPPED | OUTPATIENT
Start: 2018-02-27 | End: 2019-01-02

## 2018-02-27 RX ORDER — FUROSEMIDE 40 MG
TABLET ORAL
Qty: 90 TABLET | Refills: 3 | Status: SHIPPED | OUTPATIENT
Start: 2018-02-27 | End: 2019-05-22

## 2018-02-27 RX ORDER — AMLODIPINE BESYLATE 10 MG/1
10 TABLET ORAL DAILY
Qty: 90 TABLET | Refills: 3 | Status: SHIPPED | OUTPATIENT
Start: 2018-02-27 | End: 2018-11-14 | Stop reason: SINTOL

## 2018-02-27 RX ORDER — SOLIFENACIN SUCCINATE 10 MG/1
10 TABLET, FILM COATED ORAL DAILY
Qty: 90 TABLET | Refills: 3 | Status: SHIPPED | OUTPATIENT
Start: 2018-02-27 | End: 2019-04-17

## 2018-02-27 ASSESSMENT — ENCOUNTER SYMPTOMS
NAUSEA: 0
SHORTNESS OF BREATH: 0
UNEXPECTED WEIGHT CHANGE: 0
ABDOMINAL PAIN: 0
CONSTIPATION: 0
CHILLS: 0
FEVER: 0
VOMITING: 0
FATIGUE: 0

## 2018-02-27 NOTE — MR AVS SNAPSHOT
After Visit Summary   2/27/2018    Jazz Berman    MRN: [id number]           Patient Information     Date Of Birth          1955        Visit Information        Provider Department      2/27/2018 10:20 AM Rabia Carmona MD [facility]        Today's Diagnoses     Hypertension, essential    -  1    Chronic diastolic heart failure (H)        Type 2 diabetes mellitus without complication, without long-term current use of insulin (H)        Essential hypertension with goal blood pressure less than 140/90        Chronic diastolic heart failure (H)        Paroxysmal atrial fibrillation (H)        Iron deficiency anemia, unspecified iron deficiency anemia type        Benign essential hypertension        Urinary urgency        Acute gastrointestinal hemorrhage        Vitamin D deficiency        (HFpEF) heart failure with preserved ejection fraction (H)        Health care maintenance          Care Instructions    Here is the plan from today's visit    1. Hypertension, essential  2. Chronic diastolic heart failure (H)  - Basic Metabolic Panel ([facility])  - furosemide (LASIX) 40 MG tablet; TAKE 1 TABLET (40 MG) BY MOUTH DAILY  Dispense: 90 tablet; Refill: 3    3. Type 2 diabetes mellitus without complication, without long-term current use of insulin (H)  - Albumin Random Urine Quantitative with Creat Ratio  - metFORMIN (GLUCOPHAGE) 850 MG tablet; Take every AM  Dispense: 90 tablet; Refill: 3    4. Essential hypertension with goal blood pressure less than 140/90  - amLODIPine (NORVASC) 10 MG tablet; Take 1 tablet (10 mg) by mouth daily  Dispense: 90 tablet; Refill: 3    5. Chronic diastolic heart failure (H)  - Basic Metabolic Panel ([facility])  - furosemide (LASIX) 40 MG tablet; TAKE 1 TABLET (40 MG) BY MOUTH DAILY  Dispense: 90 tablet; Refill: 3    6. Paroxysmal atrial fibrillation (H)  - apixaban ANTICOAGULANT (ELIQUIS) 5 MG tablet; Take 1 tablet (5 mg) by mouth 2 times daily   Dispense: 180 tablet; Refill: 3    7. Iron deficiency anemia, unspecified iron deficiency anemia type  - ferrous sulfate (IRON) 325 (65 FE) MG tablet; Take 1 tablet (325 mg) by mouth 2 times daily  Dispense: 180 tablet; Refill: 3    8. Benign essential hypertension  - metoprolol succinate (TOPROL-XL) 100 MG 24 hr tablet; Take 2 tablets (200 mg) by mouth daily  Dispense: 180 tablet; Refill: 3    9. Urinary urgency  - solifenacin (VESICARE) 10 MG tablet; Take 1 tablet (10 mg) by mouth daily AM  Dispense: 90 tablet; Refill: 3    10. Acute gastrointestinal hemorrhage  - omeprazole (PRILOSEC) 40 MG capsule; Take 1 capsule (40 mg) by mouth daily Take 30-60 minutes before a meal.  Dispense: 90 capsule; Refill: 3    11. Vitamin D deficiency  - Cholecalciferol (VITAMIN D) 2000 UNITS tablet; Take 2,000 Units by mouth daily  Dispense: 90 tablet; Refill: 3    12. (HFpEF) heart failure with preserved ejection fraction (H)  - lisinopril (PRINIVIL/ZESTRIL) 20 MG tablet; Take 2 tablets (40 mg) by mouth daily  Dispense: 180 tablet; Refill: 3    13. Health care maintenance  - TSH with free T4 reflex    I have refilled all your medications with 90 day supplies with 3 refills. If you have any issues, please let me knoe.       Please call or return to clinic if your symptoms don't go away.    Follow up plan  Please make a clinic appointment for follow up with me (TRIN[facility] DICKEY) in 3-4  months for follow up.    Thank you for coming to [facility] today.  Lab Testing:  **If you had lab testing today and your results are reassuring or normal they will be mailed to you or sent through [facility] within 7 days.   **If the lab tests need quick action we will call you with the results.  The phone number we will call with results is # [phone] (home) . If this is not the best number please call our clinic and change the number.  Medication Refills:  If you need any refills please call your pharmacy and they will contact us.   If  you need to  your refill at a new pharmacy, please contact the new pharmacy directly. The new pharmacy will help you get your medications transferred faster.   Scheduling:  If you have any concerns about today's visit or wish to schedule another appointment please call our office during normal business hours [phone] (8-5:00 M-F)  If a referral was made to a [facility] and you don't get a call from central scheduling please call [phone].  If a Mammogram was ordered for you at [facility] call [phone] to schedule or change your appointment.  If you had an XRay/CT/Ultrasound/MRI ordered the number is [phone] to schedule or change your radiology appointment.   Medical Concerns:  If you have urgent medical concerns please call [phone] at any time of the day.    Raiba Carmona MD            Follow-ups after your visit        Your next 10 appointments already scheduled     Mar 20, 2018 10:00 AM CDT   [facility] with  [facility]   [facility] ([facility])    [address]  [address]  [facility] [address] [phone]            Mar 20, 2018 10:20 AM CDT   (Arrive by 10:05 AM)   CT ABDOMEN PELVIS W/O & W CONTRAST with CT2   [facility] [place] [facility] ([facility])    [address]  1st Floor  [facility] [id number] [phone]           Please bring any scans or X-rays taken at other hospitals, if similar tests were done. Also bring a list of your medicines, including vitamins, minerals and over-the-counter drugs. It is safest to leave personal items at home.  Be sure to tell your doctor:   If you have any allergies.   If there s any chance you are pregnant.   If you are breastfeeding.  You may have contrast for this exam. To prepare:   Do not eat or drink for 2 hours before your exam. If you need to take medicine, you may take it with small sips of  water. (We may ask you to take liquid medicine as well.)   The day before your exam, drink extra fluids at least six 8-ounce glasses (unless your doctor tells you to restrict your fluids).   You will be given instructions on how to drink the contrast.  Patients over 70 or patients with diabetes or kidney problems:   If you haven t had a blood test (creatinine test) within the last 30 days, the Cardiologist/Radiologist may require you to get this test prior to your exam.  If you have diabetes:   Continue to take your metformin medication on the day of your exam  Please wear loose clothing, such as a sweat suit or jogging clothes. Avoid snaps, zippers and other metal. We may ask you to undress and put on a hospital gown.  If you have any questions, please call the Imaging Department where you will have your exam.            Mar 22, 2018 10:30 AM CDT   (Arrive by 10:15 AM)   Return Visit with Sean Freed MD   [facility] ([facility])    [address]  [address]  [facility] [address] [phone]            Aug 22, 2018 10:30 AM CDT   Lab with  LAB   [facility] ([facility])    [address]  1st Floor  [facility] [address] [phone]            Aug 22, 2018 11:00 AM CDT   (Arrive by 10:45 AM)   [facility] RETURN with DULCE Pimentel CNP   [facility] ([facility])    [address] [place] [address]  [address]  [facility] [address] [phone]              Who to contact     Please call your clinic at [phone] to:    Ask questions about your health    Make or cancel appointments    Discuss your medicines    Learn about your test results    Speak to your doctor            Additional Information About Your Visit        [employer] Information     [employer] gives you secure access to your electronic health record. If you see a primary care provider, you can also send messages to  your care team and make appointments. If you have questions, please call your primary care clinic.  If you do not have a primary care provider, please call [phone] and they will assist you.      [employer] is an electronic gateway that provides easy, online access to your medical records. With [employer], you can request a clinic appointment, read your test results, renew a prescription or communicate with your care team.     To access your existing account, please contact your [facility] or call [phone] for assistance.        Care EveryWhere ID     This is your Care EveryWhere ID. This could be used by other organizations to access your [facility] medical records  [id number]        Your Vitals Were     Pulse Temperature Respirations Pulse Oximetry BMI (Body Mass Index)       66 97.5  F (36.4  C) (Oral) 20 98% 39.17 kg/m2        Blood Pressure from Last 3 Encounters:   02/27/18 118/83   02/14/18 131/83   01/08/18 126/88    Weight from Last 3 Encounters:   02/27/18 273 lb (123.8 kg)   02/14/18 271 lb 12.8 oz (123.3 kg)   01/08/18 268 lb 6.4 oz (121.7 kg)              We Performed the Following     Albumin Random Urine Quantitative with Creat Ratio     Basic Metabolic Panel ([facility])     TSH with free T4 reflex          Today's Medication Changes          These changes are accurate as of 2/27/18 11:04 AM.  If you have any questions, ask your nurse or doctor.               These medicines have changed or have updated prescriptions.        Dose/Directions    furosemide 40 MG tablet   Commonly known as:  LASIX   This may have changed:  See the new instructions.   Used for:  Chronic diastolic heart failure (H)   Changed by:  Rabia Carmona MD        TAKE 1 TABLET (40 MG) BY MOUTH DAILY   Quantity:  90 tablet   Refills:  3       metFORMIN 850 MG tablet   Commonly known as:  GLUCOPHAGE   This may have changed:    - how much to take  - how to take this  - when to take this  -  additional instructions   Used for:  Type 2 diabetes mellitus without complication, without long-term current use of insulin (H)   Changed by:  Rabia Carmona MD        Take every AM   Quantity:  90 tablet   Refills:  3            Where to get your medicines      These medications were sent to [facility]/pharmacy #[id number] - [place], [place] - [address]  [address], [place] [address]     Phone:  [phone]     amLODIPine 10 MG tablet    apixaban ANTICOAGULANT 5 MG tablet    ferrous sulfate 325 (65 FE) MG tablet    furosemide 40 MG tablet    lisinopril 20 MG tablet    metFORMIN 850 MG tablet    metoprolol succinate 100 MG 24 hr tablet    omeprazole 40 MG capsule    solifenacin 10 MG tablet    vitamin D 2000 UNITS tablet                Primary Care Provider Office Phone # Fax #    Rabia Carmona [phone]       [facility] [address] [facility] [address]        Equal Access to Services     [facility]: Hadii aad ku hadasho Soomaali, waaxda luqadaha, qaybta kaalmada adeegyada, waxay idiin hayaan adealana perez . So [facility] [phone].    ATENCIÓN: Si habla español, tiene a tapia disposición servicios gratuitos de asistencia lingüística. Kayy al [phone].    We comply with applicable federal civil rights laws and [place] laws. We do not discriminate on the basis of race, color, national origin, age, disability, sex, sexual orientation, or gender identity.            Thank you!     Thank you for choosing [facility]  for your care. Our goal is always to provide you with excellent care. Hearing back from our patients is one way we can continue to improve our services. Please take a few minutes to complete the written survey that you may receive in the mail after your visit with us. Thank you!             Your Updated Medication List - Protect others around you: Learn how to safely use, store and throw away your medicines at  www.disposemymeds.org.          This list is accurate as of 2/27/18 11:04 AM.  Always use your most recent med list.                   Brand Name Dispense Instructions for use Diagnosis    amLODIPine 10 MG tablet    NORVASC    90 tablet    Take 1 tablet (10 mg) by mouth daily    Essential hypertension with goal blood pressure less than 140/90       apixaban ANTICOAGULANT 5 MG tablet    ELIQUIS    180 tablet    Take 1 tablet (5 mg) by mouth 2 times daily    Paroxysmal atrial fibrillation (H)       ASPIRIN NOT PRESCRIBED    INTENTIONAL    1 each    Please choose reason not prescribed, below    Hypertension, essential, Type 2 diabetes mellitus without complication, without long-term current use of insulin (H)       blood glucose monitoring lancets     100 each    Use to test blood sugars 2 times daily or as directed.    Diabetes mellitus (H)       blood glucose monitoring test strip    no brand specified    100 each    Use to test blood sugars 2 times daily or as directed    Diabetes mellitus (H)       cycloSPORINE 0.05 % ophthalmic emulsion    RESTASIS    1 Box    Place 1 drop into both eyes 2 times daily    Dry eyes, bilateral, Punctate keratitis, bilateral       ferrous sulfate 325 (65 FE) MG tablet    IRON    180 tablet    Take 1 tablet (325 mg) by mouth 2 times daily    Iron deficiency anemia, unspecified iron deficiency anemia type       furosemide 40 MG tablet    LASIX    90 tablet    TAKE 1 TABLET (40 MG) BY MOUTH DAILY    Chronic diastolic heart failure (H)       lisinopril 20 MG tablet    PRINIVIL/ZESTRIL    180 tablet    Take 2 tablets (40 mg) by mouth daily    (HFpEF) heart failure with preserved ejection fraction (H)       metFORMIN 850 MG tablet    GLUCOPHAGE    90 tablet    Take every AM    Type 2 diabetes mellitus without complication, without long-term current use of insulin (H)       metoprolol succinate 100 MG 24 hr tablet    TOPROL-XL    180 tablet    Take 2 tablets (200 mg) by mouth daily    Benign  essential hypertension       omeprazole 40 MG capsule    priLOSEC    90 capsule    Take 1 capsule (40 mg) by mouth daily Take 30-60 minutes before a meal.    Acute gastrointestinal hemorrhage       order for DME     1 Units    Equipment being ordered: BP cuff, large    Hypertension, essential       sildenafil 100 MG tablet    VIAGRA    30 tablet    Take 100mg tablet as directed.  -Rx prescribed via [facility] connection to care program-    Erectile dysfunction, unspecified erectile dysfunction type       solifenacin 10 MG tablet    VESICARE    90 tablet    Take 1 tablet (10 mg) by mouth daily AM    Urinary urgency       spironolactone 25 MG tablet    ALDACTONE    90 tablet    Take 2 tablets (50 mg) by mouth daily    Chronic diastolic heart failure (H)       STATIN NOT PRESCRIBED (INTENTIONAL)      1 each daily Please choose reason not prescribed, below    Type 2 diabetes mellitus without complication, without long-term current use of insulin (H)       vitamin D 2000 UNITS tablet     90 tablet    Take 2,000 Units by mouth daily    Vitamin D deficiency

## 2018-02-27 NOTE — PROGRESS NOTES
HPI:       Jazz Berman is a 63 year old who presents for a BMP follow-up after recent visit at the [facility] clinic.    He has no complaints and his heart failure symptoms are under good control. He reports being able to walk reasonable distances without feeling short of breath. No dizziness. Leg swelling has been mild and controlled with compression stockings, which he is wearing today.     His blood sugars have been between  recently.    He reports intention to implement positive diet changes recommended from the [facility] clinic to lose weight, but has not been eating as well has he would like.    I informed him of his recent negative STD screening.       Problem, Medication and Allergy Lists were reviewed and are current.  Patient is an established patient of this clinic.         Review of Systems:   Review of Systems   Constitutional: Negative for chills, fatigue, fever and unexpected weight change.   Respiratory: Negative for shortness of breath.    Cardiovascular: Positive for leg swelling.   Gastrointestinal: Negative for abdominal pain, constipation, nausea and vomiting.             Physical Exam:   Patient Vitals for the past 24 hrs:   BP Temp Temp src Pulse Resp SpO2 Weight   02/27/18 1014 118/83 - - - - - -   02/27/18 1011 129/90 97.5  F (36.4  C) Oral 66 20 98 % 273 lb (123.8 kg)     Body mass index is 39.17 kg/(m^2).  Vitals were reviewed and were normal     Physical Exam   Constitutional: He is oriented to person, place, and time. He appears well-developed and well-nourished.   HENT:   Mouth/Throat: Oropharynx is clear and moist.   Eyes: No scleral icterus.   Neck: No JVD present.   Cardiovascular: Normal rate, S1 normal and S2 normal.  An irregularly irregular rhythm present.   No murmur heard.  Pulmonary/Chest: Effort normal. He has no wheezes. He has no rales.   Abdominal: Soft.   Neurological: He is alert and oriented to person, place, and time.   Skin: Skin is warm and dry.   Psychiatric: He  has a normal mood and affect.         Results:      Results from the last 24 hoursNo results found for this or any previous visit (from the past 24 hour(s)).  Assessment and Plan     Jazz Berman is a 63 year old male with history of HFpEF, likely from a fib (on apixaban), HTN, and T2DM, who presents with well controlled symptoms for BMP follow-up after his recent [facility] clinic visit.    (HFpEF) heart failure with preserved ejection fraction (H) - Class B, stage II, likely from a fib  Managed at the [facility] clinic with recent visit on 2/14 including normal electrolytes on BMP and increased creatinine 1.3, also noted in November but not previously. Normal BUN.   He takes Lasix 40mg and Aldactone (recently increased from 25mg to 50 mg daily).  - Repeat Basic Metabolic Panel ([facility]) with recent increase in Aldactone  - furosemide (LASIX) 40 MG tablet; TAKE 1 TABLET (40 MG) BY MOUTH DAILY  Dispense: 90 tablet; Refill: 3    Type 2 diabetes mellitus without complication, without long-term current use of insulin (H)  Well-controlled on Metformin monotherapy at a dose that has been decreasing, now taking 850mg daily. He reports sugars ranging between . We can likely decrease this dose in the future. Discussed this with patient and he would like to continue taking the metformin at this time.   - Albumin Random Urine Quantitative with Creat Ratio due to recent Cr increase  - metFORMIN (GLUCOPHAGE) 850 MG tablet; Take every AM  Dispense: 90 tablet; Refill: 3    Essential hypertension with goal blood pressure less than 140/90  At goal today with /83. Reordered current medications.   - amLODIPine (NORVASC) 10 MG tablet; Take 1 tablet (10 mg) by mouth daily  Dispense: 90 tablet; Refill: 3  - metoprolol succinate (TOPROL-XL) 100 MG 24 hr tablet; Take 2 tablets (200 mg) by mouth daily  Dispense: 180 tablet; Refill: 3  - lisinopril (PRINIVIL/ZESTRIL) 20 MG tablet; Take 2 tablets (40 mg) by mouth daily  Dispense: 180  tablet; Refill: 3    Paroxysmal atrial fibrillation (H) on anticoagulation  Continue  - apixaban ANTICOAGULANT (ELIQUIS) 5 MG tablet; Take 1 tablet (5 mg) by mouth 2 times daily  Dispense: 180 tablet; Refill: 3    Iron deficiency anemia, unspecified iron deficiency anemia type  Continue  - ferrous sulfate (IRON) 325 (65 FE) MG tablet; Take 1 tablet (325 mg) by mouth 2 times daily  Dispense: 180 tablet; Refill: 3    Urinary urgency  Continue  - solifenacin (VESICARE) 10 MG tablet; Take 1 tablet (10 mg) by mouth daily AM  Dispense: 90 tablet; Refill: 3    Acute gastrointestinal hemorrhage  Continue  - omeprazole (PRILOSEC) 40 MG capsule; Take 1 capsule (40 mg) by mouth daily Take 30-60 minutes before a meal.  Dispense: 90 capsule; Refill: 3    Vitamin D deficiency  Continue  - Cholecalciferol (VITAMIN D) 2000 UNITS tablet; Take 2,000 Units by mouth daily  Dispense: 90 tablet; Refill: 3    Health care maintenance  He is due for screening TSH.   - TSH with free T4 reflex      I reordered all his medications at this visit:  Medications Discontinued During This Encounter   Medication Reason     amLODIPine (NORVASC) 10 MG tablet Reorder     apixaban ANTICOAGULANT (ELIQUIS) 5 MG tablet Reorder     ferrous sulfate (IRON) 325 (65 FE) MG tablet Reorder     metoprolol succinate (TOPROL-XL) 100 MG 24 hr tablet Reorder     furosemide (LASIX) 40 MG tablet Reorder     solifenacin (VESICARE) 10 MG tablet Reorder     omeprazole (PRILOSEC) 40 MG capsule Reorder     Cholecalciferol (VITAMIN D) 2000 UNITS tablet Reorder     metFORMIN (GLUCOPHAGE) 850 MG tablet Reorder     lisinopril (PRINIVIL/ZESTRIL) 20 MG tablet Reorder     Options for treatment and follow-up care were reviewed with the patient. Jazz Berman  engaged in the decision making process and verbalized understanding of the options discussed and agreed with the final plan.    This note is scribed by Florin Sandoval, medical student on behalf of [facility].    The  medical student acted as scribe and the encounter documented above was completely performed by myself and the documentation reflects the work I have performed today. Rabia Carmona MD

## 2018-02-27 NOTE — PROGRESS NOTES
Preceptor Attestation:   Patient seen and discussed with the resident.   Assessment and plan reviewed with resident and agreed upon.  Supervising Physician:  Dinesh Zarate MD  [facility]

## 2018-02-27 NOTE — PATIENT INSTRUCTIONS
Here is the plan from today's visit    1. Hypertension, essential  2. Chronic diastolic heart failure (H)  - Basic Metabolic Panel ([facility])  - furosemide (LASIX) 40 MG tablet; TAKE 1 TABLET (40 MG) BY MOUTH DAILY  Dispense: 90 tablet; Refill: 3    3. Type 2 diabetes mellitus without complication, without long-term current use of insulin (H)  - Albumin Random Urine Quantitative with Creat Ratio  - metFORMIN (GLUCOPHAGE) 850 MG tablet; Take every AM  Dispense: 90 tablet; Refill: 3    4. Essential hypertension with goal blood pressure less than 140/90  - amLODIPine (NORVASC) 10 MG tablet; Take 1 tablet (10 mg) by mouth daily  Dispense: 90 tablet; Refill: 3    5. Chronic diastolic heart failure (H)  - Basic Metabolic Panel ([facility])  - furosemide (LASIX) 40 MG tablet; TAKE 1 TABLET (40 MG) BY MOUTH DAILY  Dispense: 90 tablet; Refill: 3    6. Paroxysmal atrial fibrillation (H)  - apixaban ANTICOAGULANT (ELIQUIS) 5 MG tablet; Take 1 tablet (5 mg) by mouth 2 times daily  Dispense: 180 tablet; Refill: 3    7. Iron deficiency anemia, unspecified iron deficiency anemia type  - ferrous sulfate (IRON) 325 (65 FE) MG tablet; Take 1 tablet (325 mg) by mouth 2 times daily  Dispense: 180 tablet; Refill: 3    8. Benign essential hypertension  - metoprolol succinate (TOPROL-XL) 100 MG 24 hr tablet; Take 2 tablets (200 mg) by mouth daily  Dispense: 180 tablet; Refill: 3    9. Urinary urgency  - solifenacin (VESICARE) 10 MG tablet; Take 1 tablet (10 mg) by mouth daily AM  Dispense: 90 tablet; Refill: 3    10. Acute gastrointestinal hemorrhage  - omeprazole (PRILOSEC) 40 MG capsule; Take 1 capsule (40 mg) by mouth daily Take 30-60 minutes before a meal.  Dispense: 90 capsule; Refill: 3    11. Vitamin D deficiency  - Cholecalciferol (VITAMIN D) 2000 UNITS tablet; Take 2,000 Units by mouth daily  Dispense: 90 tablet; Refill: 3    12. (HFpEF) heart failure with preserved ejection fraction (H)  - lisinopril (PRINIVIL/ZESTRIL) 20 MG  tablet; Take 2 tablets (40 mg) by mouth daily  Dispense: 180 tablet; Refill: 3    13. Health care maintenance  - TSH with free T4 reflex    I have refilled all your medications with 90 day supplies with 3 refills. If you have any issues, please let me knoe.       Please call or return to clinic if your symptoms don't go away.    Follow up plan  Please make a clinic appointment for follow up with me (RABIA CARMONA) in 3-4  months for follow up.    Thank you for coming to [facility] today.  Lab Testing:  **If you had lab testing today and your results are reassuring or normal they will be mailed to you or sent through [employer] within 7 days.   **If the lab tests need quick action we will call you with the results.  The phone number we will call with results is # [phone] (home) . If this is not the best number please call our clinic and change the number.  Medication Refills:  If you need any refills please call your pharmacy and they will contact us.   If you need to  your refill at a new pharmacy, please contact the new pharmacy directly. The new pharmacy will help you get your medications transferred faster.   Scheduling:  If you have any concerns about today's visit or wish to schedule another appointment please call our office during normal business hours [phone] (8-5:00 M-F)  If a referral was made to a [facility] and you don't get a call from central scheduling please call [phone].  If a Mammogram was ordered for you at [facility] call [phone] to schedule or change your appointment.  If you had an XRay/CT/Ultrasound/MRI ordered the number is [phone] to schedule or change your radiology appointment.   Medical Concerns:  If you have urgent medical concerns please call [phone] at any time of the day.    Rabia Carmona MD

## 2018-03-01 ENCOUNTER — CARE COORDINATION (OUTPATIENT)
Dept: CARDIOLOGY | Facility: CLINIC | Age: 63
End: 2018-03-01

## 2018-03-01 NOTE — PROGRESS NOTES
Talked to Jazz to review recent lab results. Were done at PCP and we had wanted to review them after increasing Aldactone. Pamela Mason NP reviewed and labs are stable. Talked to Jazz, he feels good. Denies SOB. Weight is stable. Informed to please call us if he has weight gain of 2 lbs in a day or 5 lbs in a week, any increased swelling, or shortness of breath.   Kecia Rodriguez RN

## 2018-03-19 ENCOUNTER — TELEPHONE (OUTPATIENT)
Dept: ONCOLOGY | Facility: CLINIC | Age: 63
End: 2018-03-19

## 2018-03-19 DIAGNOSIS — C49.A2: Primary | ICD-10-CM

## 2018-03-19 DIAGNOSIS — C49.A0 GIST (GASTROINTESTINAL STROMAL TUMOR), MALIGNANT (H): ICD-10-CM

## 2018-03-19 RX ORDER — METHYLPREDNISOLONE 32 MG/1
TABLET ORAL
Qty: 2 TABLET | Refills: 1 | Status: SHIPPED | OUTPATIENT
Start: 2018-03-19 | End: 2018-04-10

## 2018-03-19 NOTE — TELEPHONE ENCOUNTER
I spoke to patient to remind him to take oral methylprednisolone 32mg at 12hours and 2hours before CT scan due 3/20/18 at 10:20am; this will minimize potential allergic reaction to CT contrast. Pt agrees & verbalizes understanding of this plan; [facility]. [facility] & [facility] team updated.

## 2018-03-20 ENCOUNTER — RADIANT APPOINTMENT (OUTPATIENT)
Dept: CT IMAGING | Facility: CLINIC | Age: 63
End: 2018-03-20
Attending: INTERNAL MEDICINE
Payer: MEDICARE

## 2018-03-20 DIAGNOSIS — I50.32 CHRONIC DIASTOLIC HEART FAILURE (H): ICD-10-CM

## 2018-03-20 DIAGNOSIS — F33.0 MILD RECURRENT MAJOR DEPRESSION (H): ICD-10-CM

## 2018-03-20 DIAGNOSIS — I51.7 CARDIOMEGALY: ICD-10-CM

## 2018-03-20 DIAGNOSIS — K57.30 DIVERTICULOSIS OF LARGE INTESTINE WITHOUT HEMORRHAGE: ICD-10-CM

## 2018-03-20 DIAGNOSIS — G47.33 OSA (OBSTRUCTIVE SLEEP APNEA): ICD-10-CM

## 2018-03-20 DIAGNOSIS — F43.29 MIXED EMOTIONAL FEATURES AS ADJUSTMENT REACTION: ICD-10-CM

## 2018-03-20 DIAGNOSIS — N40.0 BENIGN PROSTATIC HYPERPLASIA WITHOUT LOWER URINARY TRACT SYMPTOMS: ICD-10-CM

## 2018-03-20 DIAGNOSIS — Z79.01 CURRENT USE OF LONG TERM ANTICOAGULATION: ICD-10-CM

## 2018-03-20 DIAGNOSIS — C49.A2: ICD-10-CM

## 2018-03-20 DIAGNOSIS — I48.0 PAROXYSMAL ATRIAL FIBRILLATION (H): ICD-10-CM

## 2018-03-20 DIAGNOSIS — E66.9 OBESITY, UNSPECIFIED OBESITY SEVERITY, UNSPECIFIED OBESITY TYPE: ICD-10-CM

## 2018-03-20 DIAGNOSIS — I10 HYPERTENSION, ESSENTIAL: ICD-10-CM

## 2018-03-20 DIAGNOSIS — K92.2 ACUTE GASTROINTESTINAL HEMORRHAGE: ICD-10-CM

## 2018-03-20 LAB
ALBUMIN SERPL-MCNC: 3.6 G/DL (ref 3.4–5)
ALP SERPL-CCNC: 95 U/L (ref 40–150)
ALT SERPL W P-5'-P-CCNC: 16 U/L (ref 0–70)
ANION GAP SERPL CALCULATED.3IONS-SCNC: 7 MMOL/L (ref 3–14)
AST SERPL W P-5'-P-CCNC: 12 U/L (ref 0–45)
BASOPHILS # BLD AUTO: 0 10E9/L (ref 0–0.2)
BASOPHILS NFR BLD AUTO: 0.2 %
BILIRUB SERPL-MCNC: 0.6 MG/DL (ref 0.2–1.3)
BUN SERPL-MCNC: 30 MG/DL (ref 7–30)
CALCIUM SERPL-MCNC: 9.8 MG/DL (ref 8.5–10.1)
CHLORIDE SERPL-SCNC: 106 MMOL/L (ref 94–109)
CO2 SERPL-SCNC: 28 MMOL/L (ref 20–32)
CREAT SERPL-MCNC: 1.29 MG/DL (ref 0.66–1.25)
DIFFERENTIAL METHOD BLD: ABNORMAL
EOSINOPHIL # BLD AUTO: 0 10E9/L (ref 0–0.7)
EOSINOPHIL NFR BLD AUTO: 0 %
ERYTHROCYTE [DISTWIDTH] IN BLOOD BY AUTOMATED COUNT: 13.2 % (ref 10–15)
GFR SERPL CREATININE-BSD FRML MDRD: 56 ML/MIN/1.7M2
GLUCOSE SERPL-MCNC: 114 MG/DL (ref 70–99)
HCT VFR BLD AUTO: 43.5 % (ref 40–53)
HGB BLD-MCNC: 14.8 G/DL (ref 13.3–17.7)
IMM GRANULOCYTES # BLD: 0 10E9/L (ref 0–0.4)
IMM GRANULOCYTES NFR BLD: 0.2 %
INR PPP: 1.13 (ref 0.86–1.14)
LYMPHOCYTES # BLD AUTO: 0.7 10E9/L (ref 0.8–5.3)
LYMPHOCYTES NFR BLD AUTO: 8.5 %
MCH RBC QN AUTO: 30.3 PG (ref 26.5–33)
MCHC RBC AUTO-ENTMCNC: 34 G/DL (ref 31.5–36.5)
MCV RBC AUTO: 89 FL (ref 78–100)
MONOCYTES # BLD AUTO: 0.3 10E9/L (ref 0–1.3)
MONOCYTES NFR BLD AUTO: 3.7 %
NEUTROPHILS # BLD AUTO: 7.5 10E9/L (ref 1.6–8.3)
NEUTROPHILS NFR BLD AUTO: 87.4 %
NRBC # BLD AUTO: 0 10*3/UL
NRBC BLD AUTO-RTO: 0 /100
PLATELET # BLD AUTO: 215 10E9/L (ref 150–450)
POTASSIUM SERPL-SCNC: 4.5 MMOL/L (ref 3.4–5.3)
PROT SERPL-MCNC: 8.1 G/DL (ref 6.8–8.8)
RBC # BLD AUTO: 4.89 10E12/L (ref 4.4–5.9)
SODIUM SERPL-SCNC: 140 MMOL/L (ref 133–144)
WBC # BLD AUTO: 8.6 10E9/L (ref 4–11)

## 2018-03-20 PROCEDURE — 85025 COMPLETE CBC W/AUTO DIFF WBC: CPT | Performed by: INTERNAL MEDICINE

## 2018-03-20 PROCEDURE — 80053 COMPREHEN METABOLIC PANEL: CPT | Performed by: INTERNAL MEDICINE

## 2018-03-20 PROCEDURE — 85610 PROTHROMBIN TIME: CPT | Performed by: INTERNAL MEDICINE

## 2018-03-20 RX ORDER — IOPAMIDOL 755 MG/ML
135 INJECTION, SOLUTION INTRAVASCULAR ONCE
Status: COMPLETED | OUTPATIENT
Start: 2018-03-20 | End: 2018-03-20

## 2018-03-20 RX ADMIN — IOPAMIDOL 135 ML: 755 INJECTION, SOLUTION INTRAVASCULAR at 10:40

## 2018-03-20 NOTE — DISCHARGE INSTRUCTIONS

## 2018-03-20 NOTE — NURSING NOTE
Chief Complaint   Patient presents with     Lab Only     Pt here for venipuncture blood draw.     Dianne Boyer MA

## 2018-03-22 ENCOUNTER — ONCOLOGY VISIT (OUTPATIENT)
Dept: ONCOLOGY | Facility: CLINIC | Age: 63
End: 2018-03-22
Attending: INTERNAL MEDICINE
Payer: MEDICARE

## 2018-03-22 VITALS
TEMPERATURE: 96.1 F | WEIGHT: 270 LBS | SYSTOLIC BLOOD PRESSURE: 114 MMHG | HEIGHT: 70 IN | BODY MASS INDEX: 38.65 KG/M2 | RESPIRATION RATE: 18 BRPM | HEART RATE: 68 BPM | DIASTOLIC BLOOD PRESSURE: 74 MMHG | OXYGEN SATURATION: 99 %

## 2018-03-22 DIAGNOSIS — Z98.890 H/O COLONOSCOPY WITH POLYPECTOMY: ICD-10-CM

## 2018-03-22 DIAGNOSIS — I51.7 CARDIOMEGALY: ICD-10-CM

## 2018-03-22 DIAGNOSIS — I10 HYPERTENSION, ESSENTIAL: ICD-10-CM

## 2018-03-22 DIAGNOSIS — Z79.01 CURRENT USE OF LONG TERM ANTICOAGULATION: ICD-10-CM

## 2018-03-22 DIAGNOSIS — E11.9 TYPE 2 DIABETES MELLITUS WITHOUT COMPLICATION, WITHOUT LONG-TERM CURRENT USE OF INSULIN (H): ICD-10-CM

## 2018-03-22 DIAGNOSIS — C49.A0 MALIGNANT GASTROINTESTINAL STROMAL TUMOR, UNSPECIFIED SITE (H): ICD-10-CM

## 2018-03-22 DIAGNOSIS — Z86.0100 H/O COLONOSCOPY WITH POLYPECTOMY: ICD-10-CM

## 2018-03-22 DIAGNOSIS — G47.33 OSA (OBSTRUCTIVE SLEEP APNEA): ICD-10-CM

## 2018-03-22 PROCEDURE — G0463 HOSPITAL OUTPT CLINIC VISIT: HCPCS | Mod: ZF

## 2018-03-22 PROCEDURE — 99214 OFFICE O/P EST MOD 30 MIN: CPT | Mod: ZP | Performed by: INTERNAL MEDICINE

## 2018-03-22 ASSESSMENT — PAIN SCALES - GENERAL: PAINLEVEL: NO PAIN (0)

## 2018-03-22 NOTE — MR AVS SNAPSHOT
After Visit Summary   3/22/2018    Jazz Berman    MRN: [id number]           Patient Information     Date Of Birth          1955        Visit Information        Provider Department      3/22/2018 10:30 AM Sean Freed MD [facility]        Today's Diagnoses     Hypertension, essential        Cardiomegaly        GREG (obstructive sleep apnea) on CPAP        H/O colonoscopy with polypectomy        Malignant gastrointestinal stromal tumor, unspecified site (H)        Current use of long term anticoagulation        Type 2 diabetes mellitus without complication, without long-term current use of insulin (H)           Follow-ups after your visit        Your next 10 appointments already scheduled     Aug 22, 2018 10:30 AM CDT   Lab with  LAB   [facility] ([facility])    [address] [place] [address]  1st [facility] [id number] [phone]            Aug 22, 2018 11:00 AM CDT   (Arrive by 10:45 AM)   CORE RETURN with DULCE Pimentel [facility] ([facility])    [address] [place] [id number] [phone]            Sep 18, 2018  9:30 AM CDT   LAB with  LAB   [facility] ([facility])    [address] [place] [id number] [phone]           Please do not eat 10-12 hours before your appointment if you are coming in fasting for labs on lipids, cholesterol, or glucose (sugar). This does not apply to pregnant women. Water, hot tea and black coffee (with nothing added) are okay. Do not drink other fluids, diet soda or chew gum.            Sep 18, 2018 10:40 AM CDT   (Arrive by 10:25 AM)   CT ABDOMEN PELVIS W/O & W CONTRAST with UCCT2   [facility] [place] [address] ([facility])    [address] [id number] [phone]           Please bring any scans or X-rays  taken at other hospitals, if similar tests were done. Also bring a list of your medicines, including vitamins, minerals and over-the-counter drugs. It is safest to leave personal items at home.  Be sure to tell your doctor:   If you have any allergies.   If there s any chance you are pregnant.   If you are breastfeeding.  You may have contrast for this exam. To prepare:   Do not eat or drink for 2 hours before your exam. If you need to take medicine, you may take it with small sips of water. (We may ask you to take liquid medicine as well.)   The day before your exam, drink extra fluids at least six 8-ounce glasses (unless your doctor tells you to restrict your fluids).   You will be given instructions on how to drink the contrast.  Patients over 70 or patients with diabetes or kidney problems:   If you haven t had a blood test (creatinine test) within the last 30 days, the Cardiologist/Radiologist may require you to get this test prior to your exam.  If you have diabetes:   Continue to take your metformin medication on the day of your exam  Please wear loose clothing, such as a sweat suit or jogging clothes. Avoid snaps, zippers and other metal. We may ask you to undress and put on a hospital gown.  If you have any questions, please call the Imaging Department where you will have your exam.            Sep 20, 2018 10:00 AM CDT   (Arrive by 9:45 AM)   Return Visit with Sean Freed MD   [facility] ([facility])    [address] [place] [address]  [address]  [facility] [id number] [phone]              Future tests that were ordered for you today     Open Future Orders        Priority Expected Expires Ordered    CT Abdomen Pelvis w/o & w Contrast Routine 9/18/2018 12/22/2018 3/22/2018    CBC with platelets differential Routine 9/18/2018 12/22/2018 3/22/2018    Comprehensive metabolic panel Routine 9/18/2018 12/22/2018 3/22/2018            Who to contact     If you  "have questions or need follow up information about today's clinic visit or your schedule please contact [facility] directly at [phone].  Normal or non-critical lab and imaging results will be communicated to you by MyChart, letter or phone within 4 business days after the clinic has received the results. If you do not hear from us within 7 days, please contact the clinic through MyChart or phone. If you have a critical or abnormal lab result, we will notify you by phone as soon as possible.  Submit refill requests through [employer] or call your pharmacy and they will forward the refill request to us. Please allow 3 business days for your refill to be completed.          Additional Information About Your Visit        link [employer] Information     [employer] gives you secure access to your electronic health record. If you see a primary care provider, you can also send messages to your care team and make appointments. If you have questions, please call your primary care clinic.  If you do not have a primary care provider, please call [phone] and they will assist you.        Care EveryWhere ID     This is your Care EveryWhere ID. This could be used by other organizations to access your [facility] medical records  [id number]        Your Vitals Were     Pulse Temperature Respirations Height Pulse Oximetry BMI (Body Mass Index)    68 96.1  F (35.6  C) (Oral) 18 1.778 m (5' 10\") 99% 38.74 kg/m2       Blood Pressure from Last 3 Encounters:   03/22/18 114/74   02/27/18 118/83   02/14/18 131/83    Weight from Last 3 Encounters:   03/22/18 122.5 kg (270 lb)   02/27/18 123.8 kg (273 lb)   02/14/18 123.3 kg (271 lb 12.8 oz)               Primary Care Provider Office Phone # Fax #    Rabia Carmona [phone]       [facility] [address]  [facility] [address]        Equal Access to Services     GERTRUDE RAJAN AH: Hadii corey Valentin, janet sal, qaybta " latia thakuralana perez ah. Sylvia [facility] [phone].    ATENCIÓN: Si sabas farrar, tiene a tapia disposición servicios gratuitos de asistencia lingüística. Kayy al [phone].    We comply with applicable federal civil rights laws and [place] laws. We do not discriminate on the basis of race, color, national origin, age, disability, sex, sexual orientation, or gender identity.            Thank you!     Thank you for choosing [facility]  for your care. Our goal is always to provide you with excellent care. Hearing back from our patients is one way we can continue to improve our services. Please take a few minutes to complete the written survey that you may receive in the mail after your visit with us. Thank you!             Your Updated Medication List - Protect others around you: Learn how to safely use, store and throw away your medicines at www.disposemymeds.org.          This list is accurate as of 3/22/18 11:10 AM.  Always use your most recent med list.                   Brand Name Dispense Instructions for use Diagnosis    amLODIPine 10 MG tablet    NORVASC    90 tablet    Take 1 tablet (10 mg) by mouth daily    Essential hypertension with goal blood pressure less than 140/90       apixaban ANTICOAGULANT 5 MG tablet    ELIQUIS    180 tablet    Take 1 tablet (5 mg) by mouth 2 times daily    Paroxysmal atrial fibrillation (H)       ASPIRIN NOT PRESCRIBED    INTENTIONAL    1 each    Please choose reason not prescribed, below    Hypertension, essential, Type 2 diabetes mellitus without complication, without long-term current use of insulin (H)       blood glucose monitoring lancets     100 each    Use to test blood sugars 2 times daily or as directed.    Diabetes mellitus (H)       blood glucose monitoring test strip    no brand specified    100 each    Use to test blood sugars 2 times daily or as directed    Diabetes mellitus (H)       cycloSPORINE 0.05 %  ophthalmic emulsion    RESTASIS    1 Box    Place 1 drop into both eyes 2 times daily    Dry eyes, bilateral, Punctate keratitis, bilateral       ferrous sulfate 325 (65 FE) MG tablet    IRON    180 tablet    Take 1 tablet (325 mg) by mouth 2 times daily    Iron deficiency anemia, unspecified iron deficiency anemia type       furosemide 40 MG tablet    LASIX    90 tablet    TAKE 1 TABLET (40 MG) BY MOUTH DAILY    (HFpEF) heart failure with preserved ejection fraction (H)       lisinopril 20 MG tablet    PRINIVIL/ZESTRIL    180 tablet    Take 2 tablets (40 mg) by mouth daily    (HFpEF) heart failure with preserved ejection fraction (H)       metFORMIN 850 MG tablet    GLUCOPHAGE    90 tablet    Take every AM    Type 2 diabetes mellitus without complication, without long-term current use of insulin (H)       methylPREDNISolone 32 MG tablet    MEDROL    2 tablet    Take 1 tab (32 mg) at 12hours before CT scan and take 1 tab 2hours before CT scan.    Gastrointestinal stromal sarcoma of stomach (H)       metoprolol succinate 100 MG 24 hr tablet    TOPROL-XL    180 tablet    Take 2 tablets (200 mg) by mouth daily    Essential hypertension with goal blood pressure less than 140/90       omeprazole 40 MG capsule    priLOSEC    90 capsule    Take 1 capsule (40 mg) by mouth daily Take 30-60 minutes before a meal.    Acute gastrointestinal hemorrhage       order for DME     1 Units    Equipment being ordered: BP cuff, large    Hypertension, essential       sildenafil 100 MG tablet    VIAGRA    30 tablet    Take 100mg tablet as directed.  -Rx prescribed via [employer] connection to care program-    Erectile dysfunction, unspecified erectile dysfunction type       solifenacin 10 MG tablet    VESICARE    90 tablet    Take 1 tablet (10 mg) by mouth daily AM    Urinary urgency       spironolactone 25 MG tablet    ALDACTONE    90 tablet    Take 2 tablets (50 mg) by mouth daily    Chronic diastolic heart failure (H)       STATIN NOT  PRESCRIBED (INTENTIONAL)      1 each daily Please choose reason not prescribed, below    Type 2 diabetes mellitus without complication, without long-term current use of insulin (H)       vitamin D 2000 UNITS tablet     90 tablet    Take 2,000 Units by mouth daily    Vitamin D deficiency

## 2018-03-22 NOTE — NURSING NOTE
"Oncology Rooming Note    March 22, 2018 10:06 AM   Jazz Berman is a 63 year old male who presents for:    Chief Complaint   Patient presents with     Oncology Clinic Visit     Return visit related to Gist Tumor     Initial Vitals: Resp 18  Ht 1.778 m (5' 10\")  Wt 122.5 kg (270 lb)  SpO2 99%  BMI 38.74 kg/m2 Estimated body mass index is 38.74 kg/(m^2) as calculated from the following:    Height as of this encounter: 1.778 m (5' 10\").    Weight as of this encounter: 122.5 kg (270 lb). Body surface area is 2.46 meters squared.  Data Unavailable Comment: Data Unavailable   No LMP for male patient.  Allergies reviewed: Yes  Medications reviewed: Yes    Medications: Medication refills not needed today.  Pharmacy name entered into [email or web address]: [id number] - [place], [place] - [address]    Clinical concerns: No new concerns. Provider was notified.    10 minutes for nursing intake (face to face time)     Sarai Buckner LPN            "

## 2018-03-22 NOTE — PROGRESS NOTES
3-22-18      I saw Mr. 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       Background  In brief, he was in his usual state of health but noted some black stools in 06/2016. In early July 2016 he was admitted for anemia and GI bleeding and had orthostatic symptoms. 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 Endoscopy before that showed some punctate oozing and it is possible that some of the bleeding was coming from the tumor.     walks only for about 15 minutes. He denies being bothered by shortness of breath right now though he has had that problem and is felt to likely have HFpEF. He does not go up stairs due to difficulty with his left leg which has a geo that was put in following a fracture. He has sleep apnea and uses a CPAP machine and also has a history of hypertension, depression, diabetes, cardiomegaly, and he was found to be in atrial fibrillation when he was hospitalized for the GI bleed.  -  Surgery was August 2016 and pathology report showed 1 mitosis per 50 high-power fields, a tumor size of 15 cm and negative margins. 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   -         Interval history.      He is doing pretty well overall.    He does not get out of the house every day and is not that active.  Sometimes he gets a bit lightheaded when he first gets up.     He does have a history of chronic congestive heart failure and is on multiple medications.    He had some tiny nonspecific lung nodules, some adrenal nodules, and nonspecific splenic lesions on the imaging.       A 10-point ROS is otherwise unremarkable.       -  Background PMH, FH, SH  His past history is notable for keloid formation, resection of a rectal polyp, and a crushing foot injury.   He feels that contrast dye created a bit of a rash that left some skin hyperpigmentation on his back.   He is currently single and lives alone. He stopped smoking in 2011, though he was a polysubstance abuser in  "the past. He no longer drinks alcohol. He is retired from working in a furniture store.   Family history is positive for diabetes and hypertension.   -      On exam he appeared comfortable with a quiet affect.   /74 (BP Location: Left arm, Patient Position: Sitting, Cuff Size: Adult Large)  Pulse 68  Temp 96.1  F (35.6  C) (Oral)  Resp 18  Ht 1.778 m (5' 10\")  Wt 122.5 kg (270 lb)  SpO2 99%  BMI 38.74 kg/m2  HEENT: Normal oropharynx.  lymphadenopathy.   CHEST: Clear.   HEART: There was irreg rhythm and faint heart sounds.   ABDOMEN: There was an obese abdomen with a well-healed surgical scar , no HSMT.   NEURO: Normal Romberg, (broke both ankles in the past).  EXT: Tr edema L>R          -  I reviewed his imaging and there is no suggestion of PD though he has some stable lung nodules. There are some nonspecific splenic lesions that are stable.  The adrenal nodules are stable.       -  GNE, LFT, CBC OK though ALC sl low and creat 1.29.     No KIT mutation back yet it seems.  We will resend.         -     It appears his recurrence risk is on the order of 10-15% over a few years.  I do not think that risk warrants the toxicities of Gleevec, especially given his multiple other problems and medications.       We will check again on the sending his sample to [place] for KIT and reflex PDGFRA, SDH, etc., testing, as this might give us some further information.      He had an echo that showed no structural heart disease, and while he was felt to be at high risk for stroke with a CHADS2 score of 4, and he is on xarelto.  He will f/u w his PCP on that. His atrial fibrillation, anticoagulation and other issues will be followed by his primary care team and Cardiology.      We will see him about 9-20 w CT and labs 2 d before.      All his questions were addressed and he will call if he has others.  -  Sean Freed M.D.  Professor  Hematology, Oncology and Transplantation  "

## 2018-03-22 NOTE — LETTER
3/22/2018       RE: Jazz Berman  [address]     [facility] [address]     Dear Colleague,    Thank you for referring your patient, Jazz Berman, to the [facility]. Please see a copy of my visit note below.    3-22-18      I saw Mr. 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       Background  In brief, he was in his usual state of health but noted some black stools in 06/2016. In early July 2016 he was admitted for anemia and GI bleeding and had orthostatic symptoms. 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 Endoscopy before that showed some punctate oozing and it is possible that some of the bleeding was coming from the tumor.     walks only for about 15 minutes. He denies being bothered by shortness of breath right now though he has had that problem and is felt to likely have HFpEF. He does not go up stairs due to difficulty with his left leg which has a geo that was put in following a fracture. He has sleep apnea and uses a CPAP machine and also has a history of hypertension, depression, diabetes, cardiomegaly, and he was found to be in atrial fibrillation when he was hospitalized for the GI bleed.  -  Surgery was August 2016 and pathology report showed 1 mitosis per 50 high-power fields, a tumor size of 15 cm and negative margins. 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   -         Interval history.      He is doing pretty well overall.    He does not get out of the house every day and is not that active.  Sometimes he gets a bit lightheaded when he first gets up.     He does have a history of chronic congestive heart failure and is on multiple medications.    He had some tiny nonspecific lung nodules, some adrenal nodules, and nonspecific splenic lesions on the imaging.       A 10-point ROS is otherwise unremarkable.       -  Background PMH, FH, SH  His past history is notable for keloid formation, resection of a rectal  "polyp, and a crushing foot injury.   He feels that contrast dye created a bit of a rash that left some skin hyperpigmentation on his back.   He is currently single and lives alone. He stopped smoking in 2011, though he was a polysubstance abuser in the past. He no longer drinks alcohol. He is retired from working in a furniture store.   Family history is positive for diabetes and hypertension.   -      On exam he appeared comfortable with a quiet affect.   /74 (BP Location: Left arm, Patient Position: Sitting, Cuff Size: Adult Large)  Pulse 68  Temp 96.1  F (35.6  C) (Oral)  Resp 18  Ht 1.778 m (5' 10\")  Wt 122.5 kg (270 lb)  SpO2 99%  BMI 38.74 kg/m2  HEENT: Normal oropharynx.  lymphadenopathy.   CHEST: Clear.   HEART: There was irreg rhythm and faint heart sounds.   ABDOMEN: There was an obese abdomen with a well-healed surgical scar , no HSMT.   NEURO: Normal Romberg, (broke both ankles in the past).  EXT: Tr edema L>R          -  I reviewed his imaging and there is no suggestion of PD though he has some stable lung nodules. There are some nonspecific splenic lesions that are stable.  The adrenal nodules are stable.       -  GNE, LFT, CBC OK though ALC sl low and creat 1.29.     No KIT mutation back yet it seems.  We will resend.         -     It appears his recurrence risk is on the order of 10-15% over a few years.  I do not think that risk warrants the toxicities of Gleevec, especially given his multiple other problems and medications.       We will check again on the sending his sample to [place] for KIT and reflex PDGFRA, SDH, etc., testing, as this might give us some further information.      He had an echo that showed no structural heart disease, and while he was felt to be at high risk for stroke with a CHADS2 score of 4, and he is on xarelto.  He will f/u w his PCP on that. His atrial fibrillation, anticoagulation and other issues will be followed by his primary care team and Cardiology. "      We will see him about 9-20 w CT and labs 2 d before.      All his questions were addressed and he will call if he has others.  -  Sean Freed M.D.  Professor  Hematology, Oncology and Transplantation

## 2018-03-23 ENCOUNTER — TRANSFERRED RECORDS (OUTPATIENT)
Dept: HEALTH INFORMATION MANAGEMENT | Facility: CLINIC | Age: 63
End: 2018-03-23

## 2018-04-09 ENCOUNTER — DOCUMENTATION ONLY (OUTPATIENT)
Dept: FAMILY MEDICINE | Facility: CLINIC | Age: 63
End: 2018-04-09

## 2018-04-09 NOTE — PROGRESS NOTES
"When opening a documentation only encounter, be sure to enter in \"Chief Complaint\" Forms and in \" Comments\" Title of form, description if needed.    Jazz is a 63 year old  male  Form received via: Fax  Form now resides in: Provider Ready    Isac Sanchez CMA                Form has been completed by provider.     Form sent out via: Fax to [phone]  Patient informed: No, Reason:confirmed by fax confirmation  Output date: May 30, 2018    Isac Sanchez CMA      **Please close the encounter**      "

## 2018-04-10 ENCOUNTER — DOCUMENTATION ONLY (OUTPATIENT)
Dept: FAMILY MEDICINE | Facility: CLINIC | Age: 63
End: 2018-04-10

## 2018-04-10 ENCOUNTER — OFFICE VISIT (OUTPATIENT)
Dept: PHARMACY | Facility: CLINIC | Age: 63
End: 2018-04-10
Payer: MEDICARE

## 2018-04-10 VITALS
BODY MASS INDEX: 38.71 KG/M2 | RESPIRATION RATE: 20 BRPM | OXYGEN SATURATION: 99 % | WEIGHT: 269.8 LBS | SYSTOLIC BLOOD PRESSURE: 128 MMHG | TEMPERATURE: 98.4 F | HEART RATE: 70 BPM | DIASTOLIC BLOOD PRESSURE: 92 MMHG

## 2018-04-10 DIAGNOSIS — N52.9 VASCULOGENIC ERECTILE DYSFUNCTION, UNSPECIFIED VASCULOGENIC ERECTILE DYSFUNCTION TYPE: Primary | ICD-10-CM

## 2018-04-10 NOTE — PROGRESS NOTES
"When opening a documentation only encounter, be sure to enter in \"Chief Complaint\" Forms and in \" Comments\" Title of form, description if needed.    Jazz is a 63 year old  male  Form received via: Fax  Form now resides in: Provider Ready    Isac Sanchez CMA                Form has been completed by provider.     Form sent out via: Fax to [phone]  Patient informed: No, Reason:confirmed by fax confirmation  Output date: March 30, 2018    Isac Sanchez CMA      **Please close the encounter**      "

## 2018-04-10 NOTE — MR AVS SNAPSHOT
After Visit Summary   4/10/2018    Jazz Berman    MRN: [id number]           Patient Information     Date Of Birth          1955        Visit Information        Provider Department      4/10/2018 2:20 PM [facility]        Today's Diagnoses     Vasculogenic erectile dysfunction, unspecified vasculogenic erectile dysfunction type    -  1       Follow-ups after your visit        Your next 10 appointments already scheduled     Aug 22, 2018 10:30 AM CDT   Lab with  LAB   [facility] ([facility])    [address] [place] [id number]   [phone]            Aug 22, 2018 11:00 AM CDT   (Arrive by 10:45 AM)   CORE RETURN with DULCE Pimentel [facility] ([facility])    [address] [place] [id number] [phone]            Sep 18, 2018  9:30 AM CDT   LAB with  LAB   [facility] ([facility])    [address] [place] [id number]   [phone]           Please do not eat 10-12 hours before your appointment if you are coming in fasting for labs on lipids, cholesterol, or glucose (sugar). This does not apply to pregnant women. Water, hot tea and black coffee (with nothing added) are okay. Do not drink other fluids, diet soda or chew gum.            Sep 18, 2018 10:40 AM CDT   (Arrive by 10:25 AM)   CT ABDOMEN PELVIS W/O & W CONTRAST with UCCT2   [facility] ([facility])    [address] [place] [id number] [phone]           Please bring any scans or X-rays taken at other hospitals, if similar tests were done. Also bring a list of your medicines, including vitamins, minerals and over-the-counter drugs. It is safest to leave personal items at home.  Be sure to tell your doctor:   If you have any allergies.   If there s  any chance you are pregnant.   If you are breastfeeding.  How to prepare:   Do not eat or drink for 2 hours before your exam. If you need to take medicine, you may take it with small sips of water. (We may ask you to take liquid medicine as well.)   Please wear loose clothing, such as a sweat suit or jogging clothes. Avoid snaps, zippers and other metal. We may ask you to undress and put on a hospital gown.  Please arrive 30 minutes early for your CT. Once in the department you might be asked to drink water 15-20 minutes prior to your exam.  If indicated you may be asked to drink an oral contrast in advance of your CT.  If this is the case, the imaging team will let you know or be in contact with you prior to your appointment  Patients over 70 or patients with diabetes or kidney problems:   If you haven t had a blood test (creatinine test) within the last 30 days, the Cardiologist/Radiologist may require you to get this test prior to your exam.  If you have diabetes:   Continue to take your metformin medication on the day of your exam  If you have any questions, please call the Imaging Department where you will have your exam.            Sep 20, 2018 10:00 AM CDT   (Arrive by 9:45 AM)   Return Visit with Sean Freed MD   [facility] ([facility] [place])    [address]  [address]  [facility] [address] [phone]              Who to contact     Please call your clinic at [phone] to:    Ask questions about your health    Make or cancel appointments    Discuss your medicines    Learn about your test results    Speak to your doctor            Additional Information About Your Visit        [email or web address] Information     [employer] gives you secure access to your electronic health record. If you see a primary care provider, you can also send messages to your care team and make appointments. If you have questions, please call your primary care clinic.  If you do  not have a primary care provider, please call [phone] and they will assist you.      [employer] is an electronic gateway that provides easy, online access to your medical records. With [employer]O, you can request a clinic appointment, read your test results, renew a prescription or communicate with your care team.     To access your existing account, please contact your [facility] or call [phone] for assistance.        Care EveryWhere ID     This is your Care EveryWhere ID. This could be used by other organizations to access your [facility] medical records  [id number]        Your Vitals Were     Pulse Temperature Respirations Pulse Oximetry BMI (Body Mass Index)       70 98.4  F (36.9  C) (Oral) 20 99% 38.71 kg/m2        Blood Pressure from Last 3 Encounters:   04/10/18 (!) 128/92   03/22/18 114/74   02/27/18 118/83    Weight from Last 3 Encounters:   04/10/18 269 lb 12.8 oz (122.4 kg)   03/22/18 270 lb (122.5 kg)   02/27/18 273 lb (123.8 kg)              Today, you had the following     No orders found for display       Primary Care Provider Office Phone # Fax #    Rabia Carmona [phone]       [facility] [address] [facility] [address]        Equal Access to Services     GERTRUDE RAJAN AH: Hadii aad ku hadasho Soomaali, waaxda luqadaha, qaybta kaalmada adeegyada, waxmarkos dixon hayhiginio schilling. So [facility] [phone].    ATENCIÓN: Si habla español, tiene a tapia disposición servicios gratuitos de asistencia lingüística. Llame al [phone].    We comply with applicable federal civil rights laws and [place] laws. We do not discriminate on the basis of race, color, national origin, age, disability, sex, sexual orientation, or gender identity.            Thank you!     Thank you for choosing [facility]  for your care. Our goal is always to provide you with excellent care. Hearing back from our patients is one  way we can continue to improve our services. Please take a few minutes to complete the written survey that you may receive in the mail after your visit with us. Thank you!             Your Updated Medication List - Protect others around you: Learn how to safely use, store and throw away your medicines at www.disposemymeds.org.          This list is accurate as of 4/10/18 11:59 PM.  Always use your most recent med list.                   Brand Name Dispense Instructions for use Diagnosis    amLODIPine 10 MG tablet    NORVASC    90 tablet    Take 1 tablet (10 mg) by mouth daily    Essential hypertension with goal blood pressure less than 140/90       apixaban ANTICOAGULANT 5 MG tablet    ELIQUIS    180 tablet    Take 1 tablet (5 mg) by mouth 2 times daily    Paroxysmal atrial fibrillation (H)       ASPIRIN NOT PRESCRIBED    INTENTIONAL    1 each    Please choose reason not prescribed, below    Hypertension, essential, Type 2 diabetes mellitus without complication, without long-term current use of insulin (H)       blood glucose monitoring lancets     100 each    Use to test blood sugars 2 times daily or as directed.    Diabetes mellitus (H)       blood glucose monitoring test strip    no brand specified    100 each    Use to test blood sugars 2 times daily or as directed    Diabetes mellitus (H)       cycloSPORINE 0.05 % ophthalmic emulsion    RESTASIS    1 Box    Place 1 drop into both eyes 2 times daily    Dry eyes, bilateral, Punctate keratitis, bilateral       ferrous sulfate 325 (65 Fe) MG tablet    IRON    180 tablet    Take 1 tablet (325 mg) by mouth 2 times daily    Iron deficiency anemia, unspecified iron deficiency anemia type       furosemide 40 MG tablet    LASIX    90 tablet    TAKE 1 TABLET (40 MG) BY MOUTH DAILY    (HFpEF) heart failure with preserved ejection fraction (H)       lisinopril 20 MG tablet    PRINIVIL/ZESTRIL    180 tablet    Take 2 tablets (40 mg) by mouth daily    (HFpEF) heart failure  with preserved ejection fraction (H)       metFORMIN 850 MG tablet    GLUCOPHAGE    90 tablet    Take every AM    Type 2 diabetes mellitus without complication, without long-term current use of insulin (H)       metoprolol succinate 100 MG 24 hr tablet    TOPROL-XL    180 tablet    Take 2 tablets (200 mg) by mouth daily    Essential hypertension with goal blood pressure less than 140/90       omeprazole 40 MG capsule    priLOSEC    90 capsule    Take 1 capsule (40 mg) by mouth daily Take 30-60 minutes before a meal.    Acute gastrointestinal hemorrhage       order for DME     1 Units    Equipment being ordered: BP cuff, large    Hypertension, essential       sildenafil 100 MG tablet    VIAGRA    30 tablet    Take 100mg tablet as directed.  -Rx prescribed via [employer] connection to care program-    Erectile dysfunction, unspecified erectile dysfunction type       solifenacin 10 MG tablet    VESICARE    90 tablet    Take 1 tablet (10 mg) by mouth daily AM    Urinary urgency       spironolactone 25 MG tablet    ALDACTONE    90 tablet    Take 2 tablets (50 mg) by mouth daily    Chronic diastolic heart failure (H)       STATIN NOT PRESCRIBED (INTENTIONAL)      1 each daily Please choose reason not prescribed, below    Type 2 diabetes mellitus without complication, without long-term current use of insulin (H)       vitamin D 2000 units tablet     90 tablet    Take 2,000 Units by mouth daily    Vitamin D deficiency

## 2018-04-11 NOTE — PROGRESS NOTES
Clinical Pharmacy Note     Mr. Berman is here today for his [employer] patient assistance forms.  These forms will be filled out once per year.  He has not filled out his part of the forms, nor has brought in his income proof.  He would like this filled out by the clinic.    Today I have filled out the forms and had them signed by his authorizing provider.  I have called the patient and informed him that these are available for him at the .  He is responsible to mail the forms to [employer], and to include all the necessary documents.  I have highlighted those requirements.    Jayson Hernandez, Pharm.D             Total Time: 15  # DTPs Identified: 0     ,  ,  ,  ,  ,  ,  ,  ,     ,  ,  ,  ,  ,  ,  ,  ,     ,  ,  ,  ,  ,  ,  ,  ,     ,  ,  ,

## 2018-04-25 ENCOUNTER — TELEPHONE (OUTPATIENT)
Dept: FAMILY MEDICINE | Facility: CLINIC | Age: 63
End: 2018-04-25

## 2018-04-25 PROBLEM — H52.203 ASTIGMATISM, BILATERAL: Status: ACTIVE | Noted: 2018-04-25

## 2018-04-25 PROBLEM — H52.4 PRESBYOPIA: Status: ACTIVE | Noted: 2018-04-25

## 2018-04-25 PROBLEM — E11.9 TYPE 2 DIABETES MELLITUS WITHOUT COMPLICATION, WITHOUT LONG-TERM CURRENT USE OF INSULIN (H): Status: ACTIVE | Noted: 2017-10-12

## 2018-04-25 PROBLEM — H11.153 PINGUECULA OF BOTH EYES: Status: ACTIVE | Noted: 2018-04-25

## 2018-04-29 ENCOUNTER — MEDICAL CORRESPONDENCE (OUTPATIENT)
Dept: HEALTH INFORMATION MANAGEMENT | Facility: CLINIC | Age: 63
End: 2018-04-29

## 2018-05-01 LAB — LAB SCANNED RESULT: NORMAL

## 2018-05-31 ENCOUNTER — OFFICE VISIT (OUTPATIENT)
Dept: FAMILY MEDICINE | Facility: CLINIC | Age: 63
End: 2018-05-31
Payer: MEDICARE

## 2018-05-31 VITALS
HEART RATE: 70 BPM | OXYGEN SATURATION: 99 % | TEMPERATURE: 98.5 F | SYSTOLIC BLOOD PRESSURE: 111 MMHG | WEIGHT: 269.8 LBS | BODY MASS INDEX: 38.71 KG/M2 | DIASTOLIC BLOOD PRESSURE: 87 MMHG

## 2018-05-31 DIAGNOSIS — E11.9 TYPE 2 DIABETES MELLITUS WITHOUT COMPLICATION, WITHOUT LONG-TERM CURRENT USE OF INSULIN (H): Primary | ICD-10-CM

## 2018-05-31 DIAGNOSIS — I10 HYPERTENSION, ESSENTIAL: ICD-10-CM

## 2018-05-31 ASSESSMENT — ENCOUNTER SYMPTOMS
CHILLS: 0
ABDOMINAL DISTENTION: 0
DIZZINESS: 0
HEADACHES: 0
CHEST TIGHTNESS: 0
APPETITE CHANGE: 0
VOMITING: 0
NAUSEA: 0
LIGHT-HEADEDNESS: 1
FEVER: 0
SHORTNESS OF BREATH: 0
EYE REDNESS: 0
JOINT SWELLING: 0
DIARRHEA: 0
PALPITATIONS: 0
DECREASED CONCENTRATION: 0
BACK PAIN: 0
WHEEZING: 0
MYALGIAS: 0
EYE PAIN: 0
RHINORRHEA: 0
CONSTIPATION: 0
ABDOMINAL PAIN: 0
DYSPHORIC MOOD: 0

## 2018-05-31 NOTE — PROGRESS NOTES
HPI:       Jazz Berman is a 63 year old who presents for the following  Patient presents with:  Diabetes: follow up    Jazz presents to clinic for routine follow-up and has no acute concerns. He has had no issues with his medications. He takes his blood pressure everyday, usually between 130-140s/70-80s. His blood glucose has been well-controlled and is usually 110s in the morning before he eats. He is interested in decreasing the metformin he is on. He has been walking 3 miles/day recently and is working on losing weight. He also notes the swelling in his legs has improved with lisinopril and furosemide. He gets some lightheadedness upon standing occasionally so he tries to stand up slowly and finds this works.    Problem, Medication and Allergy Lists were reviewed and are current.  Patient is an established patient of this clinic.         Review of Systems:   Review of Systems   Constitutional: Negative for appetite change, chills and fever.   HENT: Negative for congestion, rhinorrhea and sneezing.    Eyes: Negative for pain and redness.   Respiratory: Negative for chest tightness, shortness of breath and wheezing.    Cardiovascular: Negative for chest pain and palpitations.   Gastrointestinal: Negative for abdominal distention, abdominal pain, constipation, diarrhea, nausea and vomiting.   Musculoskeletal: Negative for back pain, joint swelling and myalgias.   Neurological: Positive for light-headedness. Negative for dizziness and headaches.   Psychiatric/Behavioral: Negative for decreased concentration and dysphoric mood.             Physical Exam:   No data found.  /87  Pulse 70  Temp 98.5  F (36.9  C) (Oral)  Wt 269 lb 12.8 oz (122.4 kg)  SpO2 99%  BMI 38.71 kg/m2    Body mass index is 38.71 kg/(m^2).  Vital signs normal except pulse was initially 41 but on re-measurement it corrected to 70 bpm.     Physical Exam   Constitutional: He is oriented to person, place, and time. He appears  well-developed and well-nourished. No distress.   HENT:   Head: Normocephalic and atraumatic.   Nose: Nose normal.   Eyes: Conjunctivae and EOM are normal.   Neck: Normal range of motion. Neck supple.   Cardiovascular: Normal rate, regular rhythm and normal heart sounds.  Exam reveals no gallop and no friction rub.    No murmur heard.  Pulmonary/Chest: Effort normal and breath sounds normal. No respiratory distress. He has no wheezes. He has no rales.   Abdominal: Soft. Bowel sounds are normal. He exhibits no distension. There is no tenderness.   Neurological: He is alert and oriented to person, place, and time.   Skin: Skin is warm and dry. No rash noted. No erythema.   Psychiatric: He has a normal mood and affect. His behavior is normal. Judgment and thought content normal.         Results:   No results found for this or any previous visit (from the past 24 hour(s)).     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 fib currently on apixaban who presents for diabetes and hypertension follow up.     1. Type 2 diabetes mellitus without complication, without long-term current use of insulin (H)  Blood glucose usually 110s in the morning before he eats. Wants to decrease metformin but worried about this resulting in increased blood glucose levels.  - metFORMIN (GLUCOPHAGE) 500 MG tablet; Take 1 tablet (500mg) every AM  Dispense: 90 tablet; Refill: 1  - A1c at next visit  - Lipid panel at next visit  - Will contact Hem/Onc about starting aspirin    2. Hypertension, essential  Takes blood pressure daily, usually between 130-140s/70-80s.  - Continue lisinopril and furosemide  - BMP at next visit    3. BMI 38.0-38.9,adult  Walking 3 miles/day, working on losing weight. Knows weight loss will help with his diabetes and hypertension.    Medications Discontinued During This Encounter   Medication Reason      metFORMIN (GLUCOPHAGE) 850 MG tablet Reorder     Options for treatment and follow-up care were reviewed with the patient. Jazz Berman  engaged in the decision making process and verbalized understanding of the options discussed and agreed with the final plan.    Gaston Ortega, MS4    I was present with the medical student who participated in the service and in the documentation of this note. I have verified the history and personally performed the physical exam and medical decision making, and have verified the content of the note, which accurately reflects my assessment of the patient and the plan of care.   Rabia Carmona MD

## 2018-05-31 NOTE — PROGRESS NOTES
Preceptor Attestation:   Patient seen, evaluated and discussed with the resident. I have verified the content of the note, which accurately reflects my assessment of the patient and the plan of care.   Supervising Physician:  Josephine Olguin MD

## 2018-05-31 NOTE — PATIENT INSTRUCTIONS
Here is the plan from today's visit    1. Type 2 diabetes mellitus without complication, without long-term current use of insulin (H)  - metFORMIN (GLUCOPHAGE) 500 MG tablet; Take 1 tablet (500mg) every AM  Dispense: 90 tablet; Refill: 1  - we will decrease from 850mg to 500mg and will recheck your blood sugars to see if anything changes.     2. Hypertension, essential  - No changes to your medications. Your blood pressure is great!     3. BMI 38.0-38.9,adult  - continue walking and working on diet.     Please call or return to clinic if your symptoms don't go away.    Follow up plan  Please make a clinic appointment for follow up with me (TRINITY DICKEY) in 2-3  months for follow up.    Thank you for coming to [facility] today.  Lab Testing:  **If you had lab testing today and your results are reassuring or normal they will be mailed to you or sent through [employer] within 7 days.   **If the lab tests need quick action we will call you with the results.  The phone number we will call with results is # [phone] (home) . If this is not the best number please call our clinic and change the number.  Medication Refills:  If you need any refills please call your pharmacy and they will contact us.   If you need to  your refill at a new pharmacy, please contact the new pharmacy directly. The new pharmacy will help you get your medications transferred faster.   Scheduling:  If you have any concerns about today's visit or wish to schedule another appointment please call our office during normal business hours [phone] (8-5:00 M-F)  If a referral was made to a [facility] and you don't get a call from central scheduling please call [phone].  If a Mammogram was ordered for you at [facility] call [phone] to schedule or change your appointment.  If you had an XRay/CT/Ultrasound/MRI ordered the number is [phone] to schedule or change your radiology appointment.    Medical Concerns:  If you have urgent medical concerns please call [phone] at any time of the day.    Rabia Carmona MD

## 2018-05-31 NOTE — MR AVS SNAPSHOT
After Visit Summary   5/31/2018    Jazz Berman    MRN: [id number]           Patient Information     Date Of Birth          1955        Visit Information        Provider Department      5/31/2018 1:20 PM Rabia Dickey MD [facility]        Today's Diagnoses     Type 2 diabetes mellitus without complication, without long-term current use of insulin (H)    -  1    Hypertension, essential        BMI 38.0-38.9,adult          Care Instructions    Here is the plan from today's visit    1. Type 2 diabetes mellitus without complication, without long-term current use of insulin (H)  - metFORMIN (GLUCOPHAGE) 500 MG tablet; Take 1 tablet (500mg) every AM  Dispense: 90 tablet; Refill: 1  - we will decrease from 850mg to 500mg and will recheck your blood sugars to see if anything changes.     2. Hypertension, essential  - No changes to your medications. Your blood pressure is great!     3. BMI 38.0-38.9,adult  - continue walking and working on diet.     Please call or return to clinic if your symptoms don't go away.    Follow up plan  Please make a clinic appointment for follow up with me (RABIA DICKEY) in 2-3  months for follow up.    Thank you for coming to [facility] today.  Lab Testing:  **If you had lab testing today and your results are reassuring or normal they will be mailed to you or sent through ePrep within 7 days.   **If the lab tests need quick action we will call you with the results.  The phone number we will call with results is # [phone] (home) . If this is not the best number please call our clinic and change the number.  Medication Refills:  If you need any refills please call your pharmacy and they will contact us.   If you need to  your refill at a new pharmacy, please contact the new pharmacy directly. The new pharmacy will help you get your medications transferred faster.   Scheduling:  If you have any concerns about today's visit  or wish to schedule another appointment please call our office during normal business hours [phone] (8-5:00 M-F)  If a referral was made to a [facility] and you don't get a call from central scheduling please call [phone].  If a Mammogram was ordered for you at [facility] call [phone] to schedule or change your appointment.  If you had an XRay/CT/Ultrasound/MRI ordered the number is [phone] to schedule or change your radiology appointment.   Medical Concerns:  If you have urgent medical concerns please call [phone] at any time of the day.    Rabia Carmona MD            Follow-ups after your visit        Your next 10 appointments already scheduled     Aug 22, 2018 10:30 AM CDT   Lab with  LAB   [facility] ([facility])    [address] [place] [id number] [phone]            Aug 22, 2018 11:00 AM CDT   (Arrive by 10:45 AM)   CORE RETURN with DULCE Pimentel [facility] ([facility])    [address] [place] [address]  [address]  [facility] [id number] [phone]            Sep 18, 2018  9:30 AM CDT   LAB with  LAB   [facility] ([facility])    [address] [place] [id number] [phone]           Please do not eat 10-12 hours before your appointment if you are coming in fasting for labs on lipids, cholesterol, or glucose (sugar). This does not apply to pregnant women. Water, hot tea and black coffee (with nothing added) are okay. Do not drink other fluids, diet soda or chew gum.            Sep 18, 2018 10:40 AM CDT   CT ABDOMEN PELVIS W/O & W CONTRAST with UCCT2   [facility] ([facility])    [address] [id number] [phone]           Please bring any scans or X-rays taken at other hospitals, if similar tests were done.  Also bring a list of your medicines, including vitamins, minerals and over-the-counter drugs. It is safest to leave personal items at home.  Be sure to tell your doctor:   If you have any allergies.   If there s any chance you are pregnant.   If you are breastfeeding.  How to prepare:   Do not eat or drink for 2 hours before your exam. If you need to take medicine, you may take it with small sips of water. (We may ask you to take liquid medicine as well.)   Please wear loose clothing, such as a sweat suit or jogging clothes. Avoid snaps, zippers and other metal. We may ask you to undress and put on a hospital gown.  Please arrive 30 minutes early for your CT. Once in the department you might be asked to drink water 15-20 minutes prior to your exam.  If indicated you may be asked to drink an oral contrast in advance of your CT.  If this is the case, the imaging team will let you know or be in contact with you prior to your appointment  Patients over 70 or patients with diabetes or kidney problems:   If you haven t had a blood test (creatinine test) within the last 30 days, the Cardiologist/Radiologist may require you to get this test prior to your exam.  If you have diabetes:   Continue to take your metformin medication on the day of your exam  If you have any questions, please call the Imaging Department where you will have your exam.            Sep 20, 2018 10:00 AM CDT   (Arrive by 9:45 AM)   Return Visit with Sean Freed MD   [facility] ([facility])    [address]  [address]  [facility] [address] [phone]              Who to contact     Please call your clinic at [phone] to:    Ask questions about your health    Make or cancel appointments    Discuss your medicines    Learn about your test results    Speak to your doctor            Additional Information About Your Visit        Bik[employer] Information     [employer] gives you secure access  to your electronic health record. If you see a primary care provider, you can also send messages to your care team and make appointments. If you have questions, please call your primary care clinic.  If you do not have a primary care provider, please call [phone] and they will assist you.      [employer] is an electronic gateway that provides easy, online access to your medical records. With [employer], you can request a clinic appointment, read your test results, renew a prescription or communicate with your care team.     To access your existing account, please contact your [facility] or call [phone] for assistance.        Care EveryWhere ID     This is your Care EveryWhere ID. This could be used by other organizations to access your [place] medical records  [id number]        Your Vitals Were     Pulse Temperature Pulse Oximetry BMI (Body Mass Index)          70 98.5  F (36.9  C) (Oral) 99% 38.71 kg/m2         Blood Pressure from Last 3 Encounters:   05/31/18 111/87   04/10/18 (!) 128/92   03/22/18 114/74    Weight from Last 3 Encounters:   05/31/18 269 lb 12.8 oz (122.4 kg)   04/10/18 269 lb 12.8 oz (122.4 kg)   03/22/18 270 lb (122.5 kg)              Today, you had the following     No orders found for display         Today's Medication Changes          These changes are accurate as of 5/31/18  1:55 PM.  If you have any questions, ask your nurse or doctor.               These medicines have changed or have updated prescriptions.        Dose/Directions    metFORMIN 500 MG tablet   Commonly known as:  GLUCOPHAGE   This may have changed:    - medication strength  - additional instructions   Used for:  Type 2 diabetes mellitus without complication, without long-term current use of insulin (H)   Changed by:  Rabia Carmona MD        Take 1 tablet (500mg) every AM   Quantity:  90 tablet   Refills:  1            Where to get your medicines      These medications were sent  to [id number] - [place], [place] - [address]  [address], [place] [address]     Phone:  [phone]     metFORMIN 500 MG tablet                Primary Care Provider Office Phone # Fax #    Rabia Carmona [phone]       [facility] [address]  [facility] [address]        Equal Access to Services     GERTRUDE RAJAN : Hadii aad ku hadasho Soomaali, waaxda luqadaha, qaybta kaalmada adeegyada, waxay idiin hayaan adeeg kharash la'aan ah. So [facility] [phone].    ATENCIÓN: Si habla español, tiene a tapia disposición servicios gratuitos de asistencia lingüística. Kayy al [phone].    We comply with applicable federal civil rights laws and [place] laws. We do not discriminate on the basis of race, color, national origin, age, disability, sex, sexual orientation, or gender identity.            Thank you!     Thank you for choosing [facility]  for your care. Our goal is always to provide you with excellent care. Hearing back from our patients is one way we can continue to improve our services. Please take a few minutes to complete the written survey that you may receive in the mail after your visit with us. Thank you!             Your Updated Medication List - Protect others around you: Learn how to safely use, store and throw away your medicines at www.disposemymeds.org.          This list is accurate as of 5/31/18  1:55 PM.  Always use your most recent med list.                   Brand Name Dispense Instructions for use Diagnosis    amLODIPine 10 MG tablet    NORVASC    90 tablet    Take 1 tablet (10 mg) by mouth daily    Essential hypertension with goal blood pressure less than 140/90       apixaban ANTICOAGULANT 5 MG tablet    ELIQUIS    180 tablet    Take 1 tablet (5 mg) by mouth 2 times daily    Paroxysmal atrial fibrillation (H)       ASPIRIN NOT PRESCRIBED    INTENTIONAL    1 each    Please choose reason not prescribed,  below    Hypertension, essential, Type 2 diabetes mellitus without complication, without long-term current use of insulin (H)       blood glucose monitoring lancets     100 each    Use to test blood sugars 2 times daily or as directed.    Diabetes mellitus (H)       blood glucose monitoring test strip    no brand specified    100 each    Use to test blood sugars 2 times daily or as directed    Diabetes mellitus (H)       cycloSPORINE 0.05 % ophthalmic emulsion    RESTASIS    1 Box    Place 1 drop into both eyes 2 times daily    Dry eyes, bilateral, Punctate keratitis, bilateral       ferrous sulfate 325 (65 Fe) MG tablet    IRON    180 tablet    Take 1 tablet (325 mg) by mouth 2 times daily    Iron deficiency anemia, unspecified iron deficiency anemia type       furosemide 40 MG tablet    LASIX    90 tablet    TAKE 1 TABLET (40 MG) BY MOUTH DAILY    (HFpEF) heart failure with preserved ejection fraction (H)       lisinopril 20 MG tablet    PRINIVIL/ZESTRIL    180 tablet    Take 2 tablets (40 mg) by mouth daily    (HFpEF) heart failure with preserved ejection fraction (H)       metFORMIN 500 MG tablet    GLUCOPHAGE    90 tablet    Take 1 tablet (500mg) every AM    Type 2 diabetes mellitus without complication, without long-term current use of insulin (H)       metoprolol succinate 100 MG 24 hr tablet    TOPROL-XL    180 tablet    Take 2 tablets (200 mg) by mouth daily    Essential hypertension with goal blood pressure less than 140/90       omeprazole 40 MG capsule    priLOSEC    90 capsule    Take 1 capsule (40 mg) by mouth daily Take 30-60 minutes before a meal.    Acute gastrointestinal hemorrhage       order for DME     1 Units    Equipment being ordered: BP cuff, large    Hypertension, essential       sildenafil 100 MG tablet    VIAGRA    30 tablet    Take 100mg tablet as directed.  -Rx prescribed via [employer] connection to care program-    Erectile dysfunction, unspecified erectile dysfunction type        solifenacin 10 MG tablet    VESICARE    90 tablet    Take 1 tablet (10 mg) by mouth daily AM    Urinary urgency       spironolactone 25 MG tablet    ALDACTONE    90 tablet    Take 2 tablets (50 mg) by mouth daily    Chronic diastolic heart failure (H)       STATIN NOT PRESCRIBED (INTENTIONAL)      1 each daily Please choose reason not prescribed, below    Type 2 diabetes mellitus without complication, without long-term current use of insulin (H)       vitamin D 2000 units tablet     90 tablet    Take 2,000 Units by mouth daily    Vitamin D deficiency

## 2018-06-19 ENCOUNTER — TELEPHONE (OUTPATIENT)
Dept: FAMILY MEDICINE | Facility: CLINIC | Age: 63
End: 2018-06-19

## 2018-06-19 DIAGNOSIS — N52.9 ERECTILE DYSFUNCTION, UNSPECIFIED ERECTILE DYSFUNCTION TYPE: ICD-10-CM

## 2018-06-19 NOTE — TELEPHONE ENCOUNTER
[facility] phone call message- patient requesting a refill:    Full Medication Name: sildenafil (VIAGRA) 100 MG cap/tab        Additional Comments: Patient states that every 45 days the pharmacist puts in a refill request for this medication and he has not heard anything from Jayson MCGEE.  He would like it filled and ready to  at [facility] please. Please call when ready for .    OK to leave a message on voice mail? Yes    Primary language: English      needed? No    Call taken on June 19, 2018 at 9:09 AM by Emilia Mac

## 2018-06-20 RX ORDER — SILDENAFIL 100 MG/1
TABLET, FILM COATED ORAL
Qty: 30 TABLET | Refills: 3 | Status: SHIPPED | OUTPATIENT
Start: 2018-06-20 | End: 2019-09-09

## 2018-06-20 NOTE — TELEPHONE ENCOUNTER

## 2018-06-22 NOTE — TELEPHONE ENCOUNTER
Patient called to check the status of this medication request. Please call back, okay to leave message.    Luz Celeste, Patient Representative

## 2018-06-26 NOTE — TELEPHONE ENCOUNTER
PATIENT STILL HAS NOT RECEIVED  MEDICATION REFILL (SUPPOSED TO BE SENT TO [facility] NOT [facility]  PLEASE NOTIFY PATIENT WHEN SENT.

## 2018-06-26 NOTE — TELEPHONE ENCOUNTER
Medication order has been sent to [facility]    [id number] order #    Pt called and told the order was to be placed.    Jayson Hernandez Pharm.D.

## 2018-06-28 ENCOUNTER — MEDICAL CORRESPONDENCE (OUTPATIENT)
Dept: HEALTH INFORMATION MANAGEMENT | Facility: CLINIC | Age: 63
End: 2018-06-28

## 2018-07-03 ENCOUNTER — TELEPHONE (OUTPATIENT)
Dept: FAMILY MEDICINE | Facility: CLINIC | Age: 63
End: 2018-07-03

## 2018-07-03 NOTE — TELEPHONE ENCOUNTER
PHARMACY TELEPHONE ENCOUNTER:    Reason: [facility] CONNECTION TO CARE   Medication arrived.  I have called the patient and he will  the medication later today.    Jayson Hernandez Pharm.D.

## 2018-07-10 ENCOUNTER — DOCUMENTATION ONLY (OUTPATIENT)
Dept: FAMILY MEDICINE | Facility: CLINIC | Age: 63
End: 2018-07-10

## 2018-07-10 NOTE — PROGRESS NOTES
"When opening a documentation only encounter, be sure to enter in \"Chief Complaint\" Forms and in \" Comments\" Title of form, description if needed.    Jazz is a 63 year old  male  Form received via: Fax  Form now resides in: Provider Ready      Form has been completed by provider.     Form sent out via: Fax to Kilo Kaur at Fax Number:   [phone]  Patient informed: no  Output date: July 10, 2018    BHAVIK Bustillos 2:29 PM July 10, 2018    **Please close the encounter**      "

## 2018-07-16 ENCOUNTER — DOCUMENTATION ONLY (OUTPATIENT)
Dept: FAMILY MEDICINE | Facility: CLINIC | Age: 63
End: 2018-07-16

## 2018-07-16 ENCOUNTER — TELEPHONE (OUTPATIENT)
Dept: FAMILY MEDICINE | Facility: CLINIC | Age: 63
End: 2018-07-16

## 2018-07-16 NOTE — PROGRESS NOTES
"When opening a documentation only encounter, be sure to enter in \"Chief Complaint\" Forms and in \" Comments\" Title of form, description if needed.    Jazz is a 63 year old  male  Form received via: Fax  Form now resides in: Provider Ready      Form has been completed by provider.     Form sent out via: Fax to THOMAS Portillo at Fax Number: [phone]  Patient informed: no  Output date: July 16, 2018    BHAVIK Bustillos 1:45 PM July 16, 2018    **Please close the encounter**      "

## 2018-07-23 ENCOUNTER — DOCUMENTATION ONLY (OUTPATIENT)
Dept: FAMILY MEDICINE | Facility: CLINIC | Age: 63
End: 2018-07-23

## 2018-07-23 NOTE — PROGRESS NOTES
"When opening a documentation only encounter, be sure to enter in \"Chief Complaint\" Forms and in \" Comments\" Title of form, description if needed.    Jazz is a 63 year old  male  Form received via: Fax  Form now resides in: Provider Ready      Form has been completed by provider.     Form sent out via: Fax to Kilo Kaur at Fax Number:   [phone]  Patient informed: no  Output date: July 23, 2018    BHAVIK Bustillos 2:57 PM July 23, 2018    **Please close the encounter**      "

## 2018-08-20 DIAGNOSIS — I50.32 CHRONIC DIASTOLIC HEART FAILURE (H): Primary | ICD-10-CM

## 2018-08-27 ENCOUNTER — MEDICAL CORRESPONDENCE (OUTPATIENT)
Dept: HEALTH INFORMATION MANAGEMENT | Facility: CLINIC | Age: 63
End: 2018-08-27

## 2018-08-28 ENCOUNTER — OFFICE VISIT (OUTPATIENT)
Dept: CARDIOLOGY | Facility: CLINIC | Age: 63
End: 2018-08-28
Attending: NURSE PRACTITIONER
Payer: MEDICARE

## 2018-08-28 VITALS
WEIGHT: 271 LBS | OXYGEN SATURATION: 99 % | BODY MASS INDEX: 38.8 KG/M2 | DIASTOLIC BLOOD PRESSURE: 76 MMHG | HEIGHT: 70 IN | HEART RATE: 67 BPM | SYSTOLIC BLOOD PRESSURE: 107 MMHG

## 2018-08-28 DIAGNOSIS — I50.32 CHRONIC DIASTOLIC HEART FAILURE (H): ICD-10-CM

## 2018-08-28 LAB
ANION GAP SERPL CALCULATED.3IONS-SCNC: 6 MMOL/L (ref 3–14)
BUN SERPL-MCNC: 23 MG/DL (ref 7–30)
CALCIUM SERPL-MCNC: 9.4 MG/DL (ref 8.5–10.1)
CHLORIDE SERPL-SCNC: 106 MMOL/L (ref 94–109)
CO2 SERPL-SCNC: 28 MMOL/L (ref 20–32)
CREAT SERPL-MCNC: 1.38 MG/DL (ref 0.66–1.25)
GFR SERPL CREATININE-BSD FRML MDRD: 52 ML/MIN/1.7M2
GLUCOSE SERPL-MCNC: 94 MG/DL (ref 70–99)
POTASSIUM SERPL-SCNC: 4.2 MMOL/L (ref 3.4–5.3)
SODIUM SERPL-SCNC: 140 MMOL/L (ref 133–144)

## 2018-08-28 PROCEDURE — 36415 COLL VENOUS BLD VENIPUNCTURE: CPT | Performed by: NURSE PRACTITIONER

## 2018-08-28 PROCEDURE — 80048 BASIC METABOLIC PNL TOTAL CA: CPT | Performed by: NURSE PRACTITIONER

## 2018-08-28 PROCEDURE — G0463 HOSPITAL OUTPT CLINIC VISIT: HCPCS

## 2018-08-28 PROCEDURE — 99213 OFFICE O/P EST LOW 20 MIN: CPT | Mod: ZP | Performed by: NURSE PRACTITIONER

## 2018-08-28 ASSESSMENT — PAIN SCALES - GENERAL: PAINLEVEL: NO PAIN (0)

## 2018-08-28 NOTE — MR AVS SNAPSHOT
"              After Visit Summary   2018    Jazz Berman    MRN: [id number]           Patient Information     Date Of Birth          1955        Visit Information        Provider Department      2018 1:30 PM Kiera Woodson APRN CNP [facility]        Today's Diagnoses     Chronic diastolic heart failure (H)          Care Instructions    You were seen today in the Cardiovascular Clinic at the [facility].     Cardiology Providers you saw during your visit: Kiera ACRDONA CNP      Follow up and medication changes:    1. Follow up with Dr Ortez in 6 months.   2. We will follow up with your labs and call you with results.   3. Follow up with Primary Care Doctor for belly pain.             Please limit your fluid intake to 2 L (68 ounces) daily.  2 Liters a day = 8.5 cups, or 68 ounces.  Please limit your salt intake to 2 grams a day or less.     If you gain 2# in 24 hours or 5# in one week call Kecia Rodriguez RN so we can adjust your medications as needed over the phone.     Please feel free to call me with any questions or concerns.       Kecia Rodriguez RN CHFN  [facility]  Cardiology Care Coordinator-Heart Failure Clinic     Questions and [phone].   First press #1 for the University and then press #3 for \"Medical Questions\" to reach us Cardiology Nurses.      On Call Cardiologist for after hours or on weekends: [phone]   option #4 and ask to speak to the on-call Cardiologist. Inform them you are a CORE/heart failure patient at the [facility].           If you need a medication refill please contact your pharmacy.  Please allow 3 business days for your refill to be completed.  _______________________________________________________  C.O.R.E. CLINIC Cardiomyopathy, Optimization, Rehabilitation, Education   The C.O.R.E. CLINIC is a heart failure specialty clinic within the [facility] " where you will work with specialized nurse practitioners dedicated to helping patients with heart failure carefully adjust medications, receive education, and learn who and when to call if symptoms develop. They specialize in helping you better understand your condition, slow the progression of your disease, improve the length and quality of your life, help you detect future heart problems before they become life threatening, and avoid hospitalizations.  As always, thank you for trusting us with your health care needs!             Follow-ups after your visit        Additional Services     Follow-Up with Advanced Heart Failure Cardiologist       Pérez 6 months                  Your next 10 appointments already scheduled     Sep 18, 2018  9:30 AM CDT   LAB with  LAB   [facility] ([facility])    [address] [place] [id number] [phone]           Please do not eat 10-12 hours before your appointment if you are coming in fasting for labs on lipids, cholesterol, or glucose (sugar). This does not apply to pregnant women. Water, hot tea and black coffee (with nothing added) are okay. Do not drink other fluids, diet soda or chew gum.            Sep 18, 2018 10:40 AM CDT   CT ABDOMEN PELVIS W/O & W CONTRAST with UCCT2   [facility] CT ([facility])    [address] [place] [address] [phone]           Please bring any scans or X-rays taken at other hospitals, if similar tests were done. Also bring a list of your medicines, including vitamins, minerals and over-the-counter drugs. It is safest to leave personal items at home.  Be sure to tell your doctor:   If you have any allergies.   If there s any chance you are pregnant.   If you are breastfeeding.  How to prepare:   Do not eat or drink for 2 hours before your exam. If you need to take medicine, you may take it with small sips of water. (We  may ask you to take liquid medicine as well.)   Please wear loose clothing, such as a sweat suit or jogging clothes. Avoid snaps, zippers and other metal. We may ask you to undress and put on a hospital gown.  Please arrive 30 minutes early for your CT. Once in the department you might be asked to drink water 15-20 minutes prior to your exam.  If indicated you may be asked to drink an oral contrast in advance of your CT.  If this is the case, the imaging team will let you know or be in contact with you prior to your appointment  Patients over 70 or patients with diabetes or kidney problems:   If you haven t had a blood test (creatinine test) within the last 30 days, the Cardiologist/Radiologist may require you to get this test prior to your exam.  If you have diabetes:   Continue to take your metformin medication on the day of your exam  If you have any questions, please call the Imaging Department where you will have your exam.            Sep 20, 2018 10:00 AM CDT   (Arrive by 9:45 AM)   Return Visit with Sean Freed MD   [facility] ([facility])    [address] [place] [address]  [address]  [facility] [address] [phone]            Feb 28, 2019 10:00 AM CST   (Arrive by 9:45 AM)   NEW HEART FAILURE with Florinda Ortez MD   [facility] ([facility])    [address] [place] [address]  [address]  [facility] [address] [phone]              Future tests that were ordered for you today     Open Future Orders        Priority Expected Expires Ordered    Follow-Up with Advanced Heart Failure Cardiologist Routine 2/28/2019 2/28/2019 8/28/2018            Who to contact     If you have questions or need follow up information about today's clinic visit or your schedule please contact [facility] directly at [phone].  Normal or non-critical lab and imaging results will be communicated to you by MyChart, letter or phone  "within 4 business days after the clinic has received the results. If you do not hear from us within 7 days, please contact the clinic through [employer] or phone. If you have a critical or abnormal lab result, we will notify you by phone as soon as possible.  Submit refill requests through [employer] or call your pharmacy and they will forward the refill request to us. Please allow 3 business days for your refill to be completed.          Additional Information About Your Visit        [employer] Information     [employer] gives you secure access to your electronic health record. If you see a primary care provider, you can also send messages to your care team and make appointments. If you have questions, please call your primary care clinic.  If you do not have a primary care provider, please call [phone] and they will assist you.        Care EveryWhere ID     This is your Care EveryWhere ID. This could be used by other organizations to access your [facility] medical records  [id number]        Your Vitals Were     Pulse Height Pulse Oximetry BMI (Body Mass Index)          67 1.778 m (5' 10\") 99% 38.88 kg/m2         Blood Pressure from Last 3 Encounters:   08/28/18 107/76   05/31/18 111/87   04/10/18 (!) 128/92    Weight from Last 3 Encounters:   08/28/18 122.9 kg (271 lb)   05/31/18 122.4 kg (269 lb 12.8 oz)   04/10/18 122.4 kg (269 lb 12.8 oz)              We Performed the Following     Follow-Up with [facility]        Primary Care Provider Office Phone # Fax #    Kenyon Singh [phone]       [address] [address]        Equal Access to Services     GERTRUDE RAJAN : Hadii corey Valentin, janet sal, latia galvan. So [facility] [phone].    ATENCIÓN: Si habla español, tiene a tapia disposición servicios gratuitos de asistencia lingüística. Llame al [phone].    We comply with applicable federal civil " rights laws and [place] laws. We do not discriminate on the basis of race, color, national origin, age, disability, sex, sexual orientation, or gender identity.            Thank you!     Thank you for choosing [facility]  for your care. Our goal is always to provide you with excellent care. Hearing back from our patients is one way we can continue to improve our services. Please take a few minutes to complete the written survey that you may receive in the mail after your visit with us. Thank you!             Your Updated Medication List - Protect others around you: Learn how to safely use, store and throw away your medicines at www.disposemymeds.org.          This list is accurate as of 8/28/18  2:09 PM.  Always use your most recent med list.                   Brand Name Dispense Instructions for use Diagnosis    amLODIPine 10 MG tablet    NORVASC    90 tablet    Take 1 tablet (10 mg) by mouth daily    Essential hypertension with goal blood pressure less than 140/90       apixaban ANTICOAGULANT 5 MG tablet    ELIQUIS    180 tablet    Take 1 tablet (5 mg) by mouth 2 times daily    Paroxysmal atrial fibrillation (H)       ASPIRIN NOT PRESCRIBED    INTENTIONAL    1 each    Please choose reason not prescribed, below    Hypertension, essential, Type 2 diabetes mellitus without complication, without long-term current use of insulin (H)       blood glucose monitoring lancets     100 each    Use to test blood sugars 2 times daily or as directed.    Diabetes mellitus (H)       blood glucose monitoring test strip    no brand specified    100 each    Use to test blood sugars 2 times daily or as directed    Diabetes mellitus (H)       cycloSPORINE 0.05 % ophthalmic emulsion    RESTASIS    1 Box    Place 1 drop into both eyes 2 times daily    Dry eyes, bilateral, Punctate keratitis, bilateral       ferrous sulfate 325 (65 Fe) MG tablet    IRON    180 tablet    Take 1 tablet (325 mg) by mouth 2 times daily    Iron  deficiency anemia, unspecified iron deficiency anemia type       furosemide 40 MG tablet    LASIX    90 tablet    TAKE 1 TABLET (40 MG) BY MOUTH DAILY    (HFpEF) heart failure with preserved ejection fraction (H)       lisinopril 20 MG tablet    PRINIVIL/ZESTRIL    180 tablet    Take 2 tablets (40 mg) by mouth daily    (HFpEF) heart failure with preserved ejection fraction (H)       metFORMIN 500 MG tablet    GLUCOPHAGE    90 tablet    Take 1 tablet (500mg) every AM    Type 2 diabetes mellitus without complication, without long-term current use of insulin (H)       metoprolol succinate 100 MG 24 hr tablet    TOPROL-XL    180 tablet    Take 2 tablets (200 mg) by mouth daily    Essential hypertension with goal blood pressure less than 140/90       omeprazole 40 MG capsule    priLOSEC    90 capsule    Take 1 capsule (40 mg) by mouth daily Take 30-60 minutes before a meal.    Acute gastrointestinal hemorrhage       order for DME     1 Units    Equipment being ordered: BP cuff, large    Hypertension, essential       sildenafil 100 MG tablet    VIAGRA    30 tablet    Take 100mg tablet as directed.  -Rx prescribed via [employer] connection to care program-    Erectile dysfunction, unspecified erectile dysfunction type       solifenacin 10 MG tablet    VESICARE    90 tablet    Take 1 tablet (10 mg) by mouth daily AM    Urinary urgency       spironolactone 25 MG tablet    ALDACTONE    90 tablet    Take 2 tablets (50 mg) by mouth daily    Chronic diastolic heart failure (H)       STATIN NOT PRESCRIBED (INTENTIONAL)      1 each daily Please choose reason not prescribed, below    Type 2 diabetes mellitus without complication, without long-term current use of insulin (H)       vitamin D 2000 units tablet     90 tablet    Take 2,000 Units by mouth daily    Vitamin D deficiency

## 2018-08-28 NOTE — LETTER
8/28/2018      RE: Jazz Berman  [address]   [address]  [place] [facility] [address]       Dear Colleague,    Thank you for the opportunity to participate in the care of your patient, Jazz Berman, at the [facility]. Please see a copy of my visit note below.    HPI:   Mr. Berman is a 63 year old male with a past medical history including HFpEF, HTN, DM Type II, Obesity, and Afib. He presents to CORE clinic for routine follow up.     He complains of transient LLQ tenderness for the past 3 months associated with green stool discoloration. He notes occasional lightheadedness and dizziness lasting up to 5 minutes at the longest, which he tolerates well. He denies fever, chills, chest pain, palpitations, SOB, CALERO, PND, orthopnea, nausea, vomiting, diarrhea, hematochezia, melena, or LE edema. His weight remains stable between 265-270 lbs. He continues to follow a low sodium diet. He continues to ambulate up to 2 blocks prior to need rest, but has been limited this summer due to the heat.       PAST MEDICAL HISTORY:  Past Medical History:   Diagnosis Date     Arthritis      Atrial fibrillation (H)      BPH (benign prostatic hyperplasia) 8/29/2013     Cardiomegaly 8/30/2012     Crushing injury of foot 8/30/2012     Depressive disorder, not elsewhere classified 8/30/2012     Diabetes mellitus type 2 in obese (H)      Diverticulosis of large intestine 7/4/2016    Colonoscopy 7/2/16       Former smoker      Gastric mass      GI bleed      History of blood transfusion      Hypertension      Hypertension      Keloid 6/11/2012     GREG on CPAP        FAMILY HISTORY:  Family History   Problem Relation Age of Onset     Hypertension Father      Diabetes Mother      Colon Cancer No family hx of      Crohn Disease No family hx of      Ulcerative Colitis No family hx of      Cancer No family hx of      no skin cancer     Glaucoma No family hx of      Macular Degeneration No  family hx of        SOCIAL HISTORY:  Social History     Social History     Marital status: Single     Spouse name: N/A     Number of children: N/A     Years of education: N/A     Social History Main Topics     Smoking status: Former Smoker     Years: 30.00     Types: Cigarettes     Quit date: 6/2/2011     Smokeless tobacco: Never Used     Alcohol use No      Comment: twice per week and 6 pack per sitting, quit about 6 months ago     Drug use: No      Comment: +marijuana and crack cocaine     Sexual activity: Yes     Other Topics Concern     None     Social History Narrative       CURRENT MEDICATIONS:  Outpatient Medications Prior to Visit   Medication Sig Dispense Refill     amLODIPine (NORVASC) 10 MG tablet Take 1 tablet (10 mg) by mouth daily 90 tablet 3     apixaban ANTICOAGULANT (ELIQUIS) 5 MG tablet Take 1 tablet (5 mg) by mouth 2 times daily 180 tablet 3     blood glucose monitoring (NO BRAND SPECIFIED) test strip Use to test blood sugars 2 times daily or as directed 100 each 3     blood glucose monitoring (ONE TOUCH DELICA) lancets Use to test blood sugars 2 times daily or as directed. 100 each 3     Cholecalciferol (VITAMIN D) 2000 UNITS tablet Take 2,000 Units by mouth daily 90 tablet 3     cycloSPORINE (RESTASIS) 0.05 % ophthalmic emulsion Place 1 drop into both eyes 2 times daily 1 Box 3     ferrous sulfate (IRON) 325 (65 FE) MG tablet Take 1 tablet (325 mg) by mouth 2 times daily 180 tablet 3     furosemide (LASIX) 40 MG tablet TAKE 1 TABLET (40 MG) BY MOUTH DAILY 90 tablet 3     lisinopril (PRINIVIL/ZESTRIL) 20 MG tablet Take 2 tablets (40 mg) by mouth daily 180 tablet 3     metFORMIN (GLUCOPHAGE) 500 MG tablet Take 1 tablet (500mg) every AM 90 tablet 1     metoprolol succinate (TOPROL-XL) 100 MG 24 hr tablet Take 2 tablets (200 mg) by mouth daily 180 tablet 3     omeprazole (PRILOSEC) 40 MG capsule Take 1 capsule (40 mg) by mouth daily Take 30-60 minutes before a meal. 90 capsule 3     order for DME  "Equipment being ordered: BP cuff, large 1 Units 0     sildenafil (VIAGRA) 100 MG tablet Take 100mg tablet as directed.  -Rx prescribed via [employer] connection to care program- 30 tablet 3     solifenacin (VESICARE) 10 MG tablet Take 1 tablet (10 mg) by mouth daily AM 90 tablet 3     spironolactone (ALDACTONE) 25 MG tablet Take 2 tablets (50 mg) by mouth daily 90 tablet 3     ASPIRIN NOT PRESCRIBED (INTENTIONAL) Please choose reason not prescribed, below (Patient not taking: Reported on 5/31/2018) 1 each 0     STATIN NOT PRESCRIBED, INTENTIONAL, 1 each daily Please choose reason not prescribed, below (Patient not taking: Reported on 5/31/2018)       No facility-administered medications prior to visit.        ROS:   CONSTITUTIONAL: Denies fever, chills, fatigue, or weight fluctuations.   HEENT: Denies headache, vision changes, and changes in speech.   CV: Refer to HPI.   PULMONARY:Denies shortness of breath, cough, or previous TB exposure.   GI:Denies nausea, vomiting, diarrhea, and abdominal pain. Bowel movements are regular.   :Denies urinary alterations, dysuria, urinary frequency, hematuria, and abnormal drainage.   EXT:Denies lower extremity edema.   SKIN:Denies abnormal rashes or lesions.   MUSCULOSKELETAL:Denies upper or lower extremity weakness and pain.   NEUROLOGIC:Denies lightheadedness, dizziness, seizures, or upper or lower extremity paresthesia.     EXAM:  /76 (BP Location: Right arm, Patient Position: Chair, Cuff Size: Adult Large)  Pulse 67  Ht 1.778 m (5' 10\")  Wt 122.9 kg (271 lb)  SpO2 99%  BMI 38.88 kg/m2  GENERAL: Appears alert and oriented times three.   HEENT: Eye symmetrical and free of discharge bilaterally. Mucous membranes moist and without lesions.  NECK: Supple and without lymphadenopathy. JVD 8 cm.   CV: Irregular, controlled. S1S2 present without murmur, rub, or gallop.   RESPIRATORY: Respirations regular, even, and unlabored. Lungs CTA throughout.   GI: Soft and non distended " with normoactive bowel sounds present in all quadrants. No tenderness, rebound, guarding. No organomegaly.   EXTREMITIES: No peripheral edema. 2+ bilateral pedal pulses.   NEUROLOGIC: Alert and orientated x 3. CN II-XII grossly intact. No focal deficits.   MUSCULOSKELETAL: No joint swelling or tenderness.   SKIN: No jaundice. No rashes or lesions.     Labs:  CBC RESULTS:  Lab Results   Component Value Date    WBC 8.6 03/20/2018    RBC 4.89 03/20/2018    HGB 14.8 03/20/2018    HCT 43.5 03/20/2018    MCV 89 03/20/2018    MCH 30.3 03/20/2018    MCHC 34.0 03/20/2018    RDW 13.2 03/20/2018     03/20/2018       CMP RESULTS:  Lab Results   Component Value Date     03/20/2018    POTASSIUM 4.5 03/20/2018    CHLORIDE 106 03/20/2018    CO2 28 03/20/2018    ANIONGAP 7 03/20/2018     (H) 03/20/2018    BUN 30 03/20/2018    CR 1.29 (H) 03/20/2018    GFRESTIMATED 56 (L) 03/20/2018    GFRESTBLACK 68 03/20/2018    NATALYA 9.8 03/20/2018    BILITOTAL 0.6 03/20/2018    ALBUMIN 3.6 03/20/2018    ALKPHOS 95 03/20/2018    ALT 16 03/20/2018    AST 12 03/20/2018        INR RESULTS:  Lab Results   Component Value Date    INR 1.13 03/20/2018       Lab Results   Component Value Date    MAG 2.1 08/23/2016     Lab Results   Component Value Date    NTBNPI 2283 (H) 07/15/2016     Lab Results   Component Value Date    NTBNP 929 (H) 11/02/2017       Diagnostics:  Echo 7/4/16:  Interpretation Summary  The patient's rhythm is atrial fibrillation.  Global and regional left ventricular function is normal with an EF of 55-60%.    Assessment and Plan:   Mr. Berman is a 63 year old male with a past medical history including HFpEF, HTN, DM Type II, Obesity, and Afib. He presents to CORE clinic for routine follow up and is feeling well.      Heart failure with preserved ejection fraction. Likely, secondary to Afib.   NYHA Class B, AHA/ACC Stage II  Rate control: HR-67.  volume status: Euvolemic. Lasix 40 mg po daily  Blood pressure control:  /76.  Aldosterone antagonist: Aldactone 50 mg po daily   Evaluation of coronary arteries: No prior ischemic workup noted per documentation. Deferred in the past in setting of gastric CA. Given feeling well with no symptomatic changes defer at this time. He will follow up with Dr. Ortez in 6 months with further management per her discretion.   Sleep apnea screening: GREG on CPAP.   - Given patient has remained stable without need for intervention per CORE team. Will discharge from [facility] and recommend he continue bi annual vs annual follow up with Dr. Ortez.      HTN  - BP controlled on current regimen.   - Continue Norvasc 10 mg po daily, Lisinopril 40 mg po daily, and Toprol  mg po daily.      Afib. CHADSVASC-2. Note history of GI bleed in setting of gastric mass.   - Continue Eliquis.   - HR controlled on Toprol  mg po daily.      Obesity. BMI 39.  - Encouraged activity and weight loss.      DM Type II, Controlled.   - Management per PCP.     LLQ abdominal tenderness.   - Follow up with PCP ASAP.     Repeat BMP in 2 weeks. If remains stable will discharge from [facility] with follow up with Dr. Ortez in 6 months.     Kiera Woodson  8/28/2018      LINDA GARCIA

## 2018-08-28 NOTE — PATIENT INSTRUCTIONS
"You were seen today in the Cardiovascular Clinic at the [facility].     Cardiology Providers you saw during your visit: Kiera CARDONA CNP      Follow up and medication changes:    1. Follow up with Dr Ortez in 6 months.   2. We will follow up with your labs and call you with results.   3. Follow up with Primary Care Doctor for belly pain.             Please limit your fluid intake to 2 L (68 ounces) daily.  2 Liters a day = 8.5 cups, or 68 ounces.  Please limit your salt intake to 2 grams a day or less.     If you gain 2# in 24 hours or 5# in one week call Kecia Rodriguez RN so we can adjust your medications as needed over the phone.     Please feel free to call me with any questions or concerns.       Kecia Rodriguez RN CHFN  [facility]  Cardiology Care Coordinator-Heart Failure Clinic     Questions and [phone].   First press #1 for the [facility] and then press #3 for \"Medical Questions\" to reach us Cardiology Nurses.      On Call Cardiologist for after hours or on weekends: [phone]   option #4 and ask to speak to the on-call Cardiologist. Inform them you are a CORE/heart failure patient at the [facility].           If you need a medication refill please contact your pharmacy.  Please allow 3 business days for your refill to be completed.  _______________________________________________________  C.O.R.E. CLINIC Cardiomyopathy, Optimization, Rehabilitation, Education   The C.O.R.E. CLINIC is a heart failure specialty clinic within the [facility] where you will work with specialized nurse practitioners dedicated to helping patients with heart failure carefully adjust medications, receive education, and learn who and when to call if symptoms develop. They specialize in helping you better understand your condition, slow the progression of your disease, improve the length and quality of your life, help you detect " future heart problems before they become life threatening, and avoid hospitalizations.  As always, thank you for trusting us with your health care needs!

## 2018-08-28 NOTE — NURSING NOTE
Diet: Patient instructed regarding a heart healthy diet, including discussion of reduced fat and sodium intake. Patient demonstrated understanding of this information and agreed to call with further questions or concerns.  Return Appointment: Patient given instructions regarding scheduling next clinic visit. Patient demonstrated understanding of this information and agreed to call with further questions or concerns.  Labs not resulted at this time and will call him with results.     Patient stated he understood all health information given and agreed to call with further questions or concerns.   Kecia Rodriguez RN

## 2018-08-28 NOTE — PROGRESS NOTES
HPI:   Mr. Berman is a 63 year old male with a past medical history including HFpEF, HTN, DM Type II, Obesity, and Afib. He presents to [facility] clinic for routine follow up.     He complains of transient LLQ tenderness for the past 3 months associated with green stool discoloration. He notes occasional lightheadedness and dizziness lasting up to 5 minutes at the longest, which he tolerates well. He denies fever, chills, chest pain, palpitations, SOB, CALERO, PND, orthopnea, nausea, vomiting, diarrhea, hematochezia, melena, or LE edema. His weight remains stable between 265-270 lbs. He continues to follow a low sodium diet. He continues to ambulate up to 2 blocks prior to need rest, but has been limited this summer due to the heat.       PAST MEDICAL HISTORY:  Past Medical History:   Diagnosis Date     Arthritis      Atrial fibrillation (H)      BPH (benign prostatic hyperplasia) 8/29/2013     Cardiomegaly 8/30/2012     Crushing injury of foot 8/30/2012     Depressive disorder, not elsewhere classified 8/30/2012     Diabetes mellitus type 2 in obese (H)      Diverticulosis of large intestine 7/4/2016    Colonoscopy 7/2/16       Former smoker      Gastric mass      GI bleed      History of blood transfusion      Hypertension      Hypertension      Keloid 6/11/2012     GREG on CPAP        FAMILY HISTORY:  Family History   Problem Relation Age of Onset     Hypertension Father      Diabetes Mother      Colon Cancer No family hx of      Crohn Disease No family hx of      Ulcerative Colitis No family hx of      Cancer No family hx of      no skin cancer     Glaucoma No family hx of      Macular Degeneration No family hx of        SOCIAL HISTORY:  Social History     Social History     Marital status: Single     Spouse name: N/A     Number of children: N/A     Years of education: N/A     Social History Main Topics     Smoking status: Former Smoker     Years: 30.00     Types: Cigarettes     Quit date: 6/2/2011     Smokeless tobacco:  Never Used     Alcohol use No      Comment: twice per week and 6 pack per sitting, quit about 6 months ago     Drug use: No      Comment: +marijuana and crack cocaine     Sexual activity: Yes     Other Topics Concern     None     Social History Narrative       CURRENT MEDICATIONS:  Outpatient Medications Prior to Visit   Medication Sig Dispense Refill     amLODIPine (NORVASC) 10 MG tablet Take 1 tablet (10 mg) by mouth daily 90 tablet 3     apixaban ANTICOAGULANT (ELIQUIS) 5 MG tablet Take 1 tablet (5 mg) by mouth 2 times daily 180 tablet 3     blood glucose monitoring (NO BRAND SPECIFIED) test strip Use to test blood sugars 2 times daily or as directed 100 each 3     blood glucose monitoring (ONE TOUCH DELICA) lancets Use to test blood sugars 2 times daily or as directed. 100 each 3     Cholecalciferol (VITAMIN D) 2000 UNITS tablet Take 2,000 Units by mouth daily 90 tablet 3     cycloSPORINE (RESTASIS) 0.05 % ophthalmic emulsion Place 1 drop into both eyes 2 times daily 1 Box 3     ferrous sulfate (IRON) 325 (65 FE) MG tablet Take 1 tablet (325 mg) by mouth 2 times daily 180 tablet 3     furosemide (LASIX) 40 MG tablet TAKE 1 TABLET (40 MG) BY MOUTH DAILY 90 tablet 3     lisinopril (PRINIVIL/ZESTRIL) 20 MG tablet Take 2 tablets (40 mg) by mouth daily 180 tablet 3     metFORMIN (GLUCOPHAGE) 500 MG tablet Take 1 tablet (500mg) every AM 90 tablet 1     metoprolol succinate (TOPROL-XL) 100 MG 24 hr tablet Take 2 tablets (200 mg) by mouth daily 180 tablet 3     omeprazole (PRILOSEC) 40 MG capsule Take 1 capsule (40 mg) by mouth daily Take 30-60 minutes before a meal. 90 capsule 3     order for DME Equipment being ordered: BP cuff, large 1 Units 0     sildenafil (VIAGRA) 100 MG tablet Take 100mg tablet as directed.  -Rx prescribed via [facility] connection to care program- 30 tablet 3     solifenacin (VESICARE) 10 MG tablet Take 1 tablet (10 mg) by mouth daily AM 90 tablet 3     spironolactone (ALDACTONE) 25 MG tablet Take 2  "tablets (50 mg) by mouth daily 90 tablet 3     ASPIRIN NOT PRESCRIBED (INTENTIONAL) Please choose reason not prescribed, below (Patient not taking: Reported on 5/31/2018) 1 each 0     STATIN NOT PRESCRIBED, INTENTIONAL, 1 each daily Please choose reason not prescribed, below (Patient not taking: Reported on 5/31/2018)       No facility-administered medications prior to visit.        ROS:   CONSTITUTIONAL: Denies fever, chills, fatigue, or weight fluctuations.   HEENT: Denies headache, vision changes, and changes in speech.   CV: Refer to HPI.   PULMONARY:Denies shortness of breath, cough, or previous TB exposure.   GI:Denies nausea, vomiting, diarrhea, and abdominal pain. Bowel movements are regular.   :Denies urinary alterations, dysuria, urinary frequency, hematuria, and abnormal drainage.   EXT:Denies lower extremity edema.   SKIN:Denies abnormal rashes or lesions.   MUSCULOSKELETAL:Denies upper or lower extremity weakness and pain.   NEUROLOGIC:Denies lightheadedness, dizziness, seizures, or upper or lower extremity paresthesia.     EXAM:  /76 (BP Location: Right arm, Patient Position: Chair, Cuff Size: Adult Large)  Pulse 67  Ht 1.778 m (5' 10\")  Wt 122.9 kg (271 lb)  SpO2 99%  BMI 38.88 kg/m2  GENERAL: Appears alert and oriented times three.   HEENT: Eye symmetrical and free of discharge bilaterally. Mucous membranes moist and without lesions.  NECK: Supple and without lymphadenopathy. JVD 8 cm.   CV: Irregular, controlled. S1S2 present without murmur, rub, or gallop.   RESPIRATORY: Respirations regular, even, and unlabored. Lungs CTA throughout.   GI: Soft and non distended with normoactive bowel sounds present in all quadrants. No tenderness, rebound, guarding. No organomegaly.   EXTREMITIES: No peripheral edema. 2+ bilateral pedal pulses.   NEUROLOGIC: Alert and orientated x 3. CN II-XII grossly intact. No focal deficits.   MUSCULOSKELETAL: No joint swelling or tenderness.   SKIN: No jaundice. " No rashes or lesions.     Labs:  CBC RESULTS:  Lab Results   Component Value Date    WBC 8.6 03/20/2018    RBC 4.89 03/20/2018    HGB 14.8 03/20/2018    HCT 43.5 03/20/2018    MCV 89 03/20/2018    MCH 30.3 03/20/2018    MCHC 34.0 03/20/2018    RDW 13.2 03/20/2018     03/20/2018       CMP RESULTS:  Lab Results   Component Value Date     03/20/2018    POTASSIUM 4.5 03/20/2018    CHLORIDE 106 03/20/2018    CO2 28 03/20/2018    ANIONGAP 7 03/20/2018     (H) 03/20/2018    BUN 30 03/20/2018    CR 1.29 (H) 03/20/2018    GFRESTIMATED 56 (L) 03/20/2018    GFRESTBLACK 68 03/20/2018    NATALYA 9.8 03/20/2018    BILITOTAL 0.6 03/20/2018    ALBUMIN 3.6 03/20/2018    ALKPHOS 95 03/20/2018    ALT 16 03/20/2018    AST 12 03/20/2018        INR RESULTS:  Lab Results   Component Value Date    INR 1.13 03/20/2018       Lab Results   Component Value Date    MAG 2.1 08/23/2016     Lab Results   Component Value Date    NTBNPI 2283 (H) 07/15/2016     Lab Results   Component Value Date    NTBNP 929 (H) 11/02/2017       Diagnostics:  Echo 7/4/16:  Interpretation Summary  The patient's rhythm is atrial fibrillation.  Global and regional left ventricular function is normal with an EF of 55-60%.    Assessment and Plan:   Mr. Berman is a 63 year old male with a past medical history including HFpEF, HTN, DM Type II, Obesity, and Afib. He presents to [facility] clinic for routine follow up and is feeling well.      Heart failure with preserved ejection fraction. Likely, secondary to Afib.   NYHA Class B, AHA/ACC Stage II  Rate control: HR-67.  volume status: Euvolemic. Lasix 40 mg po daily  Blood pressure control: /76.  Aldosterone antagonist: Aldactone 50 mg po daily   Evaluation of coronary arteries: No prior ischemic workup noted per documentation. Deferred in the past in setting of gastric CA. Given feeling well with no symptomatic changes defer at this time. He will follow up with Dr. Ortez in 6 months with further  management per her discretion.   Sleep apnea screening: GREG on CPAP.   - Given patient has remained stable without need for intervention per [facility] team. Will discharge from [facility] and recommend he continue bi annual vs annual follow up with Dr. Ortez.      HTN  - BP controlled on current regimen.   - Continue Norvasc 10 mg po daily, Lisinopril 40 mg po daily, and Toprol  mg po daily.      Afib. CHADSVASC-2. Note history of GI bleed in setting of gastric mass.   - Continue Eliquis.   - HR controlled on Toprol  mg po daily.      Obesity. BMI 39.  - Encouraged activity and weight loss.      DM Type II, Controlled.   - Management per PCP.     LLQ abdominal tenderness.   - Follow up with PCP ASAP.     Repeat BMP in 2 weeks. If remains stable will discharge from [facility] with follow up with Dr. Ortez in 6 months.     Kiera Woodson  8/28/2018          CC  LINDA TOBIAS

## 2018-08-28 NOTE — NURSING NOTE
Chief Complaint   Patient presents with     Follow Up For      Return [facility] appt: 63 yr old male with a h/o chronic diastolic heart failure presenting for follow up with labs prior     Vitals performed and medications reviewed.   Tiki Hansen MA

## 2018-08-29 ENCOUNTER — CARE COORDINATION (OUTPATIENT)
Dept: CARDIOLOGY | Facility: CLINIC | Age: 63
End: 2018-08-29

## 2018-08-29 DIAGNOSIS — I50.32 CHRONIC DIASTOLIC HEART FAILURE (H): Primary | ICD-10-CM

## 2018-08-29 NOTE — PROGRESS NOTES
Called Jazz to review labs. Recheck BMP in 2 weeks. He sees his PCP on 9/12 and they are going to get labs. States he doesn't need appt, they will check. Will place order for BMP.     Kecia Rodriguez RN     Date: 8/29/2018    Time of Call: 9:13 AM     Diagnosis:  Heart failure     [ VORB ] Ordering provider: Kiera Woodson NP  Order: BMP in 2 weeks.      Order received by: Kecia Rodriguez RN      Follow-up/additional notes:

## 2018-09-05 ENCOUNTER — DOCUMENTATION ONLY (OUTPATIENT)
Dept: FAMILY MEDICINE | Facility: CLINIC | Age: 63
End: 2018-09-05

## 2018-09-05 NOTE — PROGRESS NOTES
"When opening a documentation only encounter, be sure to enter in \"Chief Complaint\" Forms and in \" Comments\" Title of form, description if needed.    Jazz is a 63 year old  male  Form received via: Fax  Form now resides in: Provider Ready      Form has been completed by provider.     Form sent out via: Fax to Hellen Larkin RN at Fax Number: [phone]  Patient informed: No  Output date: September 5, 2018    BHAVIK Bustillos 5:11 PM September 5, 2018    **Please close the encounter**      "

## 2018-09-11 DIAGNOSIS — N52.9 ERECTILE DYSFUNCTION, UNSPECIFIED ERECTILE DYSFUNCTION TYPE: ICD-10-CM

## 2018-09-11 NOTE — TELEPHONE ENCOUNTER
Verify that the refill encounter hasn't been started Yes    [facility] phone call message- patient requesting a refill:    Full Medication Name: sildenafil (VIAGRA) 100 MG tablet    Dose: Take 100mg tablet as directed.  -Rx prescribed via [facility] connection to care program-     Pharmacy confirmed as   Med delivered to [facility]  : Yes    Medication tab checked to see if medication has been sent  Yes    Additional Comments: none     OK to leave a message on voice mail? Yes    Advised patient refill may take up to 2 business days? Yes    Primary language: English      needed? No    Call taken on September 11, 2018 at 10:55 AM by Azra Nava    Route to [facility] MED REFILL

## 2018-09-12 ENCOUNTER — OFFICE VISIT (OUTPATIENT)
Dept: FAMILY MEDICINE | Facility: CLINIC | Age: 63
End: 2018-09-12
Payer: MEDICARE

## 2018-09-12 VITALS
WEIGHT: 270.4 LBS | SYSTOLIC BLOOD PRESSURE: 110 MMHG | HEART RATE: 49 BPM | OXYGEN SATURATION: 99 % | BODY MASS INDEX: 38.8 KG/M2 | RESPIRATION RATE: 16 BRPM | TEMPERATURE: 98.9 F | DIASTOLIC BLOOD PRESSURE: 76 MMHG

## 2018-09-12 DIAGNOSIS — N18.30 CKD (CHRONIC KIDNEY DISEASE) STAGE 3, GFR 30-59 ML/MIN (H): ICD-10-CM

## 2018-09-12 DIAGNOSIS — I50.32 CHRONIC DIASTOLIC HEART FAILURE (H): ICD-10-CM

## 2018-09-12 DIAGNOSIS — I50.32 CHRONIC DIASTOLIC CONGESTIVE HEART FAILURE (H): Primary | ICD-10-CM

## 2018-09-12 LAB
BILIRUBIN UR: NEGATIVE
BLOOD UR: NEGATIVE
BUN SERPL-MCNC: 22.6 MG/DL (ref 7–21)
CALCIUM SERPL-MCNC: 9.8 MG/DL (ref 8.5–10.1)
CHLORIDE SERPLBLD-SCNC: 99.8 MMOL/L (ref 98–110)
CO2 SERPL-SCNC: 29.1 MMOL/L (ref 20–32)
CREAT SERPL-MCNC: 1.4 MG/DL (ref 0.7–1.3)
GFR SERPL CREATININE-BSD FRML MDRD: 54.4 ML/MIN/1.7 M2
GLUCOSE SERPL-MCNC: 103.6 MG'DL (ref 70–99)
GLUCOSE URINE: NEGATIVE
KETONES UR QL: NEGATIVE
LEUKOCYTE ESTERASE UR: NEGATIVE
NITRITE UR QL STRIP: NEGATIVE
PH UR STRIP: 5 [PH] (ref 5–7)
POTASSIUM SERPL-SCNC: 4.4 MMOL/DL (ref 3.3–4.5)
PROTEIN UR: NEGATIVE
SODIUM SERPL-SCNC: 135.4 MMOL/L (ref 132.6–141.4)
SP GR UR STRIP: 1.01
UROBILINOGEN UR STRIP-ACNC: NORMAL

## 2018-09-12 NOTE — PROGRESS NOTES
RIGO Berman is a 63 year old  who presents for   Chief Complaint   Patient presents with     RECHECK     Labs     Hx of DM, HFpEF, Afib on elequis, GIST s/p distal gastrectomy 2016 coming in for worsening creatinine. He was seen in cardiology clinic 2 weeks ago and was contacted after noticing an in crease in his creatinine on a routine BMP. He was advised to f/u with PCP. He is completely asymptomatic and states he is taking his medications as prescribed and is very compliant. His last echo was 2 years ago and U/A was earlier this year without any significant.     No evidence of lower extremity edema or respiratory changes. Urine output has been unchanged from previous.     Creatinine today is 1.4 (up from baseline of 1.2-1.3). BUN is at baseline. He has known CKD III and has not seen a nephrologist.      Labs  Last Basic Metabolic Panel:    Last Renal Panel:  Sodium   Date Value Ref Range Status   09/12/2018 135.4 132.6 - 141.4 mmol/L Final     Potassium   Date Value Ref Range Status   09/12/2018 4.4 3.3 - 4.5 mmol/dL Final     Chloride   Date Value Ref Range Status   09/12/2018 99.8 98.0 - 110.0 mmol/L Final     Carbon Dioxide   Date Value Ref Range Status   09/12/2018 29.1 20.0 - 32.0 mmol/L Final     Anion Gap   Date Value Ref Range Status   08/28/2018 6 3 - 14 mmol/L Final     Glucose   Date Value Ref Range Status   09/12/2018 103.6 (H) 70.0 - 99.0 mg'dL Final     Urea Nitrogen   Date Value Ref Range Status   09/12/2018 22.6 (H) 7.0 - 21.0 mg/dL Final     Creatinine   Date Value Ref Range Status   09/12/2018 1.4 (H) 0.7 - 1.3 mg/dL Final     GFR Estimate   Date Value Ref Range Status   09/12/2018 54.4 (L) >60.0 mL/min/1.7 m2 Final     Calcium   Date Value Ref Range Status   09/12/2018 9.8 8.5 - 10.1 mg/dL Final     Phosphorus   Date Value Ref Range Status   08/17/2016 4.0 2.5 - 4.5 mg/dL Final     Albumin   Date Value Ref Range Status   03/20/2018 3.6 3.4 - 5.0 g/dL Final     Hemoglobin    Date Value Ref Range Status   03/20/2018 14.8 13.3 - 17.7 g/dL Final   09/05/2017 14.9 13.3 - 17.7 g/dL Final       Adherence and Exercise  Medication side effects: no  How often is a medication missed? Never  Exercise:walking      +++++++    Problem, Medication and Allergy Lists were      Patient Active Problem List    Diagnosis Date Noted     Hypertension, essential 08/30/2012     Priority: High     Class: Chronic     Use large BP cuff       Cardiomegaly 08/30/2012     Priority: High     GREG (obstructive sleep apnea) on CPAP 08/30/2012     Priority: High     Class: Chronic     Nuclear cataract 04/25/2018     Priority: Medium     3/23/2018 eye exam, stable, asymptomatic       Pinguecula of both eyes 04/25/2018     Priority: Medium     3/23/2018, asymptomatic, stable       Astigmatism, bilateral 04/25/2018     Priority: Medium     3/23/2018, mild, stable       Presbyopia 04/25/2018     Priority: Medium     3/23/2018, bilateral, moderately severe, stable       Type 2 diabetes mellitus without complication, without long-term current use of insulin (H) 10/12/2017     Priority: Medium     No retinopathy noted on 3/23/2018 eye exam.        Paroxysmal atrial fibrillation (H) 04/24/2017     Priority: Medium     Atrial fibrillation, rate controlled.       Anticoagulation goal of INR 2 to 3 03/21/2017     Priority: Medium     Current use of long term anticoagulation 03/21/2017     Priority: Medium     Will need to restart anticoagulation. Pharmacy consulted and will appreciate recs. 3/21/2017. Rabia Carmona MD, [facility], [phone]         Chronic diastolic heart failure (H) 02/11/2017     Priority: Medium     HFpEF       GIST (gastrointestinal stromal tumor), malignant (H) 09/27/2016     Priority: Medium     recurrence risk is on the order of 10-15% over a few years per Oncology        Obesity 09/27/2016     Priority: Medium     Diverticulosis of large intestine 07/04/2016     Priority: Medium      Colonoscopy 7/2/16        History of substance abuse 02/25/2014     Priority: Medium     Remote history of marijuana and crack cocaine. No IVDU       Mild recurrent major depression (H) 09/05/2013     Priority: Medium     Class: Chronic     CARDIOVASCULAR SCREENING; LDL GOAL LESS THAN 130 08/30/2013     Priority: Medium     August 30, 2013 CHD risk factors: +male age >45, +hypertension, +HDL <40, 10% [place] Risk Calculator       H/O colonoscopy with polypectomy 08/29/2013     Priority: Medium     Class: Chronic     7/2/16:  2 polyps and diverticulosis. Follow up per pathology report   - One 2 mm polyp in the ascending colon. Resected andretrieved.  - One 6 mm polyp in the transverse colon. Resected and retrieved. Clip was placed.   - Diverticulosis in the sigmoid colon.    August 29, 2013  History of adenomatous polyp s/p resection, Recommendation repeat in one year.       Bilateral lower extremity edema 08/29/2013     Priority: Medium     BPH (benign prostatic hyperplasia) 08/29/2013     Priority: Medium     Erectile dysfunction 07/10/2013     Priority: Medium     Psychosexual dysfunction with inhibited sexual excitement 08/30/2012     Priority: Medium     BMI greater than 40 08/30/2012     Priority: Medium     Class: Chronic     Ectropion 07/18/2011     Priority: Medium     Epiphora 07/18/2011     Priority: Medium     Mixed emotional features as adjustment reaction 10/06/2010     Priority: Medium   ,     Current Outpatient Prescriptions   Medication Sig Dispense Refill     amLODIPine (NORVASC) 10 MG tablet Take 1 tablet (10 mg) by mouth daily 90 tablet 3     apixaban ANTICOAGULANT (ELIQUIS) 5 MG tablet Take 1 tablet (5 mg) by mouth 2 times daily 180 tablet 3     blood glucose monitoring (NO BRAND SPECIFIED) test strip Use to test blood sugars 2 times daily or as directed 100 each 3     blood glucose monitoring (ONE TOUCH DELICA) lancets Use to test blood sugars 2 times daily or as directed. 100 each 3      Cholecalciferol (VITAMIN D) 2000 UNITS tablet Take 2,000 Units by mouth daily 90 tablet 3     cycloSPORINE (RESTASIS) 0.05 % ophthalmic emulsion Place 1 drop into both eyes 2 times daily 1 Box 3     ferrous sulfate (IRON) 325 (65 FE) MG tablet Take 1 tablet (325 mg) by mouth 2 times daily 180 tablet 3     furosemide (LASIX) 40 MG tablet TAKE 1 TABLET (40 MG) BY MOUTH DAILY 90 tablet 3     lisinopril (PRINIVIL/ZESTRIL) 20 MG tablet Take 2 tablets (40 mg) by mouth daily 180 tablet 3     metFORMIN (GLUCOPHAGE) 500 MG tablet Take 1 tablet (500mg) every AM 90 tablet 1     metoprolol succinate (TOPROL-XL) 100 MG 24 hr tablet Take 2 tablets (200 mg) by mouth daily 180 tablet 3     omeprazole (PRILOSEC) 40 MG capsule Take 1 capsule (40 mg) by mouth daily Take 30-60 minutes before a meal. 90 capsule 3     order for DME Equipment being ordered: BP cuff, large 1 Units 0     sildenafil (VIAGRA) 100 MG tablet Take 100mg tablet as directed.  -Rx prescribed via [employer] connection to care program- 30 tablet 3     solifenacin (VESICARE) 10 MG tablet Take 1 tablet (10 mg) by mouth daily AM 90 tablet 3     spironolactone (ALDACTONE) 25 MG tablet Take 2 tablets (50 mg) by mouth daily 90 tablet 3     ASPIRIN NOT PRESCRIBED (INTENTIONAL) Please choose reason not prescribed, below (Patient not taking: Reported on 9/12/2018) 1 each 0     STATIN NOT PRESCRIBED, INTENTIONAL, 1 each daily Please choose reason not prescribed, below (Patient not taking: Reported on 9/12/2018)     ,     Allergies   Allergen Reactions     Dye [Contrast Dye] Rash     Dye left skin hyperpigmentation on his back   .    Patient is   an established patient of this clinic.  Past Medical History:   Diagnosis Date     Arthritis      Atrial fibrillation (H)      BPH (benign prostatic hyperplasia) 8/29/2013     Cardiomegaly 8/30/2012     Crushing injury of foot 8/30/2012     Depressive disorder, not elsewhere classified 8/30/2012     Diabetes mellitus type 2 in obese (H)       Diverticulosis of large intestine 7/4/2016    Colonoscopy 7/2/16       Former smoker      Gastric mass      GI bleed      History of blood transfusion      Hypertension      Hypertension      Keloid 6/11/2012     GREG on CPAP      Family History   Problem Relation Age of Onset     Hypertension Father      Diabetes Mother      Colon Cancer No family hx of      Crohn Disease No family hx of      Ulcerative Colitis No family hx of      Cancer No family hx of      no skin cancer     Glaucoma No family hx of      Macular Degeneration No family hx of      Social History     Social History     Marital status: Single     Spouse name: N/A     Number of children: N/A     Years of education: N/A     Social History Main Topics     Smoking status: Former Smoker     Years: 30.00     Types: Cigarettes     Quit date: 6/2/2011     Smokeless tobacco: Never Used     Alcohol use No      Comment: twice per week and 6 pack per sitting, quit about 6 months ago     Drug use: No      Comment: +marijuana and crack cocaine     Sexual activity: Yes     Other Topics Concern     None     Social History Narrative   .         Review of Systems:   Review of Systems  Skin: negative except as above  Respiratory: negative except as above  Cardiovascular: negative except as above  Gastrointestinal: negative except as above  Genitourinary: negative except as above  Musculoskeletal: negative except as above  Neurologic: negative except as above  Psychiatric: negative except as above  Hematologic/Lymphatic/Immunologic: negative except as above  Endocrine: negative except as above         Physical Exam:     Vitals:    09/12/18 1506   BP: 110/76   Pulse: (!) 49   Resp: 16   Temp: 98.9  F (37.2  C)   TempSrc: Oral   SpO2: 99%   Weight: 270 lb 6.4 oz (122.7 kg)     Body mass index is 38.8 kg/(m^2).  Vitals were reviewed and were normal     Physical Exam  Constitutional: Oriented to person, place, and time. Appears well-developed and well-nourished.    Cardiovascular: Normal rate, regular rhythm, normal heart sounds and intact distal pulses.    Pulmonary/Chest: Effort normal and breath sounds normal. No respiratory distress. No wheezes.   Musculoskeletal: Normal range of motion.   Neurological: Alert and oriented to person, place, and time.   Skin: Skin is warm and dry.   Psychiatric: Has a normal mood and affect. Behavior is normal.       Results:      Results from this visit  Results for orders placed or performed in visit on 09/12/18   Basic Metabolic Panel ([facility])   Result Value Ref Range    Urea Nitrogen 22.6 (H) 7.0 - 21.0 mg/dL    Calcium 9.8 8.5 - 10.1 mg/dL    Chloride 99.8 98.0 - 110.0 mmol/L    Carbon Dioxide 29.1 20.0 - 32.0 mmol/L    Creatinine 1.4 (H) 0.7 - 1.3 mg/dL    Glucose 103.6 (H) 70.0 - 99.0 mg'dL    Potassium 4.4 3.3 - 4.5 mmol/dL    Sodium 135.4 132.6 - 141.4 mmol/L    GFR Estimate 54.4 (L) >60.0 mL/min/1.7 m2    GFR Estimate If Black 65.8 >60.0 mL/min/1.7 m2   Urinalysis, Micro If (UA) ([facility])   Result Value Ref Range    Specific Gravity Urine 1.015 1.005 - 1.030    pH Urine 5.0 4.5 - 8.0    Leukocyte Esterase UR Negative NEGATIVE    Nitrite Urine Negative NEGATIVE    Protein UR Negative NEGATIVE    Glucose Urine Negative NEGATIVE    Ketones Urine Negative NEGATIVE    Urobilinogen mg/dL 0.2 E.U./dL 0.2 E.U./dL    Bilirubin UR Negative NEGATIVE    Blood UR Negative NEGATIVE       Assessment and Plan          1. Chronic diastolic heart failure   Compliant with medications and [facility]. Last seen 2 weeks ago. Last echo 2 years ago. No evidence of clinical decompensation.   - Basic Metabolic Panel ([facility])  - Echocardiogram Complete; Future  - Will repeat BMP in 1.5 weeks to follow creatinine trend.     2. CKD (chronic kidney disease) stage 3, GFR 30-59 ml/min  Baseline Cr: 1.2-1.3 now with elevated levels to 1.4. BUN is at baseline. Blood pressure is stable and otherwise asymptomatic.   -  Urinalysis, Micro If (UA) ([facility])  - Considered nephrology consult and after d/w patient, will hold off for now until creatinine is confirmed to be worsening. Will f/u up above w/up and base decision on that.     Please call or return to clinic if your symptoms don't go away.    Follow up plan  Please make a clinic appointment for follow up with your primary physician Kenyon Singh MD in 1.5 weeks for review of echo and urinalysis.     Thank you for coming to [facility] today.     There are no discontinued medications.    Options for treatment and follow-up care were reviewed with the patient. Jazz Berman  engaged in the decision making process and verbalized understanding of the options discussed and agreed with the final plan.    Kenyon Singh MD

## 2018-09-12 NOTE — LETTER
September 12, 2018      Jazz Berman  [address] [address]  [place] [address]        Dear Jazz,    Thank you for getting your care at [facility]. Please see below for your test results.    Resulted Orders   Basic Metabolic Panel (\[facility]\"")   Result Value Ref Range    Urea Nitrogen 22.6 (H) 7.0 - 21.0 mg/dL    Calcium 9.8 8.5 - 10.1 mg/dL    Chloride 99.8 98.0 - 110.0 mmol/L    Carbon Dioxide 29.1 20.0 - 32.0 mmol/L    Creatinine 1.4 (H) 0.7 - 1.3 mg/dL    Glucose 103.6 (H) 70.0 - 99.0 mg'dL    Potassium 4.4 3.3 - 4.5 mmol/dL    Sodium 135.4 132.6 - 141.4 mmol/L    GFR Estimate 54.4 (L) >60.0 mL/min/1.7 m2    GFR Estimate If Black 65.8 >60.0 mL/min/1.7 m2   Urinalysis, Micro If (UA) (\[facility]\"")   Result Value Ref Range    Specific Gravity Urine 1.015 1.005 - 1.030    pH Urine 5.0 4.5 - 8.0    Leukocyte Esterase UR Negative NEGATIVE    Nitrite Urine Negative NEGATIVE    Protein UR Negative NEGATIVE    Glucose Urine Negative NEGATIVE    Ketones Urine Negative NEGATIVE    Urobilinogen mg/dL 0.2 E.U./dL 0.2 E.U./dL    Bilirubin UR Negative NEGATIVE    Blood UR Negative NEGATIVE       If you have any concerns about these results please call and leave a message for me or send a MyChart message to the clinic.    Sincerely,    Kenyon Singh MD

## 2018-09-12 NOTE — PATIENT INSTRUCTIONS
Here is the plan from today's visit    1. Chronic diastolic heart failure (H)  Compliant with medications and follows up with cardiology at the [facility] regularly. Last seen 2 weeks ago. Last echo 2 years ago. No evidence of clinical decompensation.   - Basic Metabolic Panel ([facility])  - Echocardiogram Complete; Future  - Will repeat BMP in 1.5 weeks to follow creatinine trend.     2. CKD (chronic kidney disease) stage 3, GFR 30-59 ml/min  Baseline Cr: 1.2-1.3 now with elevated levels to 1.4. BUN is at baseline. Blood pressure is stable and otherwise asymptomatic.   - Urinalysis, Micro If (UA) ([facility])  - Considered nephrology consult and after d/w patient, will hold off for now until creatinine is confirmed to be worsening. Will f/u up above w/up and base decision on that.     Please call or return to clinic if your symptoms don't go away.    Follow up plan  Please make a clinic appointment for follow up with your primary physician Kenyon Singh MD in 1.5 weeks for review of echo and urinalysis.     Thank you for coming to [facility] today.  Lab Testing:  **If you had lab testing today and your results are reassuring or normal they will be mailed to you or sent through Unype within 7 days.   **If the lab tests need quick action we will call you with the results.  The phone number we will call with results is # [phone] (home) . If this is not the best number please call our clinic and change the number.  Medication Refills:  If you need any refills please call your pharmacy and they will contact us.   If you need to  your refill at a new pharmacy, please contact the new pharmacy directly. The new pharmacy will help you get your medications transferred faster.   Scheduling:  If you have any concerns about today's visit or wish to schedule another appointment please call our office during normal business hours [phone] (8-5:00 M-F)  If a referral was made to a [facility]  and you don't get a call from central scheduling please call [phone].  If a Mammogram was ordered for you at [facility] call [phone] to schedule or change your appointment.  If you had an XRay/CT/Ultrasound/MRI ordered the number is [phone] to schedule or change your radiology appointment.   Medical Concerns:  If you have urgent medical concerns please call [phone] at any time of the day.    Kenyon Singh MD

## 2018-09-12 NOTE — MR AVS SNAPSHOT
After Visit Summary   9/12/2018    Jazz Berman    MRN: [id number]           Patient Information     Date Of Birth          1955        Visit Information        Provider Department      9/12/2018 3:20 PM Kenyon Singh MD [facility]        Today's Diagnoses     Chronic diastolic congestive heart failure (H)    -  1    Chronic diastolic heart failure (H)        CKD (chronic kidney disease) stage 3, GFR 30-59 ml/min          Care Instructions    Here is the plan from today's visit    1. Chronic diastolic heart failure (H)  - Basic Metabolic Panel ([facility])  - Echocardiogram Complete; Future    2. CKD (chronic kidney disease) stage 3, GFR 30-59 ml/min  - Urinalysis, Micro If (UA) ([facility])      Please call or return to clinic if your symptoms don't go away.    Follow up plan  Please make a clinic appointment for follow up with your primary physician Kenyon Singh MD in 1.5 weeks for review of echo and urinalysis.     Thank you for coming to [facility] today.  Lab Testing:  **If you had lab testing today and your results are reassuring or normal they will be mailed to you or sent through Innohat within 7 days.   **If the lab tests need quick action we will call you with the results.  The phone number we will call with results is # [phone] (home) . If this is not the best number please call our clinic and change the number.  Medication Refills:  If you need any refills please call your pharmacy and they will contact us.   If you need to  your refill at a new pharmacy, please contact the new pharmacy directly. The new pharmacy will help you get your medications transferred faster.   Scheduling:  If you have any concerns about today's visit or wish to schedule another appointment please call our office during normal business hours [phone] (8-5:00 M-F)  If a referral was made to a [facility] and you don't get a call from [place]  scheduling please call [phone].  If a Mammogram was ordered for you at [facility] call [phone] to schedule or change your appointment.  If you had an XRay/CT/Ultrasound/MRI ordered the number is [phone] to schedule or change your radiology appointment.   Medical Concerns:  If you have urgent medical concerns please call [phone] at any time of the day.    Kenyon Singh MD            Follow-ups after your visit        Your next 10 appointments already scheduled     Sep 18, 2018  9:30 AM CDT   LAB with  LAB   [facility] ([facility])    [address] [place] [address] [phone]           Please do not eat 10-12 hours before your appointment if you are coming in fasting for labs on lipids, cholesterol, or glucose (sugar). This does not apply to pregnant women. Water, hot tea and black coffee (with nothing added) are okay. Do not drink other fluids, diet soda or chew gum.            Sep 18, 2018 10:40 AM CDT   CT ABDOMEN PELVIS W/O & W CONTRAST with UCCT2   [facility] CT ([facility])    [address] [place] [address] [phone]           How do I prepare for my exam? (Food and drink instructions) To prepare: Do not eat or drink for 2 hours before your exam. If you need to take medicine, you may take it with small sips of water. (We may ask you to take liquid medicine as well.)  How do I prepare for my exam? (Other instructions) Please arrive 30 minutes early for your CT.  Once in the department you might be asked to drink water 15-20 minutes prior to your exam.  If indicated you may be asked to drink an oral contrast in advance of your CT.  If this is the case, the imaging team will let you know or be in contact with you prior to your appointment  Patients over 70 or patients with diabetes or kidney problems: If you haven t had a blood test (creatinine test)  within the last 30 days, the Cardiologist/Radiologist may require you to get this test prior to your exam.  If you have diabetes:  Continue to take your metformin medication on the day of your exam  What should I wear: Please wear loose clothing, such as a sweat suit or jogging clothes. Avoid snaps, zippers and other metal. We may ask you to undress and put on a hospital gown.  How long does the exam take: Most scans take less than 20 minutes.  What should I bring: Please bring any scans or X-rays taken at other hospitals, if similar tests were done. Also bring a list of your medicines, including vitamins, minerals and over-the-counter drugs. It is safest to leave personal items at home.  Do I need a : No  is needed.  What do I need to tell my doctor? Be sure to tell your doctor: * If you have any allergies. * If there s any chance you are pregnant. * If you are breastfeeding.  What should I do after the exam: No restrictions, You may resume normal activities.  What is this test: A CT (computed tomography) scan is a series of pictures that allows us to look inside your body. The scanner creates images of the body in cross sections, much like slices of bread. This helps us see any problems more clearly. You may receive contrast (X-ray dye) before or during your scan. You will be asked to drink the contrast.  Who should I call with questions: If you have any questions, please call the Imaging Department where you will have your exam. Directions, parking instructions, and other information is available on our website, [email or web address].            Sep 20, 2018 10:00 AM CDT   (Arrive by 9:45 AM)   Return Visit with Sean Freed MD   [facility] ([facility])    [address] [place] [address]  [address]  [facility] [address] [phone]            Feb 28, 2019 10:00 AM CST   (Arrive by 9:45 AM)   NEW HEART FAILURE with Florinda Ortez MD   [facility] ([facility])    [address]  [address]  [facility] [address] [phone]              Future tests that were ordered for you today     Open Future Orders        Priority Expected Expires Ordered    Echocardiogram Complete Routine  9/12/2019 9/12/2018            Who to contact     Please call your clinic at [phone] to:    Ask questions about your health    Make or cancel appointments    Discuss your medicines    Learn about your test results    Speak to your doctor            Additional Information About Your Visit        [employer] Information     St[employer]isher gives you secure access to your electronic health record. If you see a primary care provider, you can also send messages to your care team and make appointments. If you have questions, please call your primary care clinic.  If you do not have a primary care provider, please call [phone] and they will assist you.      Stublisher is an electronic gateway that provides easy, online access to your medical records. With [employer], you can request a clinic appointment, read your test results, renew a prescription or communicate with your care team.     To access your existing account, please contact your [facility] or call [phone] for assistance.        Care EveryWhere ID     This is your Care EveryWhere ID. This could be used by other organizations to access your [place] medical records  [id number]        Your Vitals Were     Pulse Temperature Respirations Pulse Oximetry BMI (Body Mass Index)       49 98.9  F (37.2  C) (Oral) 16 99% 38.8 kg/m2        Blood Pressure from Last 3 Encounters:   09/12/18 110/76   08/28/18 107/76   05/31/18 111/87    Weight from Last 3 Encounters:   09/12/18 270 lb 6.4 oz (122.7 kg)   08/28/18 271 lb (122.9 kg)   05/31/18 269 lb 12.8 oz (122.4 kg)              We Performed the Following     Basic Metabolic Panel ([facility])     Urinalysis, Micro If  (UA) ([facility])        Primary Care Provider Office Phone # Fax #    Kenyon Singh [phone]       [address] [address]        Equal Access to Services     GERTRUDE RAJAN : Hadii aad ku hadmistio Somaddyali, waaxda luqadaha, qaybta kaalmada adeegyada, waxmarkos idiin hayaan mallyalana loboquinton schilling. So [facility] [phone].    ATENCIÓN: Si habla español, tiene a tapia disposición servicios gratuitos de asistencia lingüística. Llame al [phone].    We comply with applicable federal civil rights laws and [place] laws. We do not discriminate on the basis of race, color, national origin, age, disability, sex, sexual orientation, or gender identity.            Thank you!     Thank you for choosing [facility]  for your care. Our goal is always to provide you with excellent care. Hearing back from our patients is one way we can continue to improve our services. Please take a few minutes to complete the written survey that you may receive in the mail after your visit with us. Thank you!             Your Updated Medication List - Protect others around you: Learn how to safely use, store and throw away your medicines at www.disposemymeds.org.          This list is accurate as of 9/12/18  3:58 PM.  Always use your most recent med list.                   Brand Name Dispense Instructions for use Diagnosis    amLODIPine 10 MG tablet    NORVASC    90 tablet    Take 1 tablet (10 mg) by mouth daily    Essential hypertension with goal blood pressure less than 140/90       apixaban ANTICOAGULANT 5 MG tablet    ELIQUIS    180 tablet    Take 1 tablet (5 mg) by mouth 2 times daily    Paroxysmal atrial fibrillation (H)       ASPIRIN NOT PRESCRIBED    INTENTIONAL    1 each    Please choose reason not prescribed, below    Hypertension, essential, Type 2 diabetes mellitus without complication, without long-term current use of insulin (H)       blood glucose monitoring lancets      100 each    Use to test blood sugars 2 times daily or as directed.    Diabetes mellitus (H)       blood glucose monitoring test strip    no brand specified    100 each    Use to test blood sugars 2 times daily or as directed    Diabetes mellitus (H)       cycloSPORINE 0.05 % ophthalmic emulsion    RESTASIS    1 Box    Place 1 drop into both eyes 2 times daily    Dry eyes, bilateral, Punctate keratitis, bilateral       ferrous sulfate 325 (65 Fe) MG tablet    IRON    180 tablet    Take 1 tablet (325 mg) by mouth 2 times daily    Iron deficiency anemia, unspecified iron deficiency anemia type       furosemide 40 MG tablet    LASIX    90 tablet    TAKE 1 TABLET (40 MG) BY MOUTH DAILY    (HFpEF) heart failure with preserved ejection fraction (H)       lisinopril 20 MG tablet    PRINIVIL/ZESTRIL    180 tablet    Take 2 tablets (40 mg) by mouth daily    (HFpEF) heart failure with preserved ejection fraction (H)       metFORMIN 500 MG tablet    GLUCOPHAGE    90 tablet    Take 1 tablet (500mg) every AM    Type 2 diabetes mellitus without complication, without long-term current use of insulin (H)       metoprolol succinate 100 MG 24 hr tablet    TOPROL-XL    180 tablet    Take 2 tablets (200 mg) by mouth daily    Essential hypertension with goal blood pressure less than 140/90       omeprazole 40 MG capsule    priLOSEC    90 capsule    Take 1 capsule (40 mg) by mouth daily Take 30-60 minutes before a meal.    Acute gastrointestinal hemorrhage       order for DME     1 Units    Equipment being ordered: BP cuff, large    Hypertension, essential       sildenafil 100 MG tablet    VIAGRA    30 tablet    Take 100mg tablet as directed.  -Rx prescribed via [facility] connection to care program-    Erectile dysfunction, unspecified erectile dysfunction type       solifenacin 10 MG tablet    VESICARE    90 tablet    Take 1 tablet (10 mg) by mouth daily AM    Urinary urgency       spironolactone 25 MG tablet    ALDACTONE    90 tablet     Take 2 tablets (50 mg) by mouth daily    Chronic diastolic heart failure (H)       STATIN NOT PRESCRIBED (INTENTIONAL)      1 each daily Please choose reason not prescribed, below    Type 2 diabetes mellitus without complication, without long-term current use of insulin (H)       vitamin D 2000 units tablet     90 tablet    Take 2,000 Units by mouth daily    Vitamin D deficiency

## 2018-09-13 DIAGNOSIS — I50.32 CHRONIC DIASTOLIC HEART FAILURE (H): ICD-10-CM

## 2018-09-13 RX ORDER — SILDENAFIL 100 MG/1
TABLET, FILM COATED ORAL
Qty: 30 TABLET | Refills: 3 | Status: CANCELLED | OUTPATIENT
Start: 2018-09-13

## 2018-09-13 NOTE — TELEPHONE ENCOUNTER

## 2018-09-14 ENCOUNTER — CARE COORDINATION (OUTPATIENT)
Dept: ONCOLOGY | Facility: CLINIC | Age: 63
End: 2018-09-14

## 2018-09-14 DIAGNOSIS — C49.A0 GIST (GASTROINTESTINAL STROMAL TUMOR), MALIGNANT (H): Primary | ICD-10-CM

## 2018-09-14 RX ORDER — METHYLPREDNISOLONE 32 MG/1
32 TABLET ORAL DAILY
Qty: 2 TABLET | Refills: 3 | Status: SHIPPED | OUTPATIENT
Start: 2018-09-14 | End: 2018-11-14

## 2018-09-14 RX ORDER — SPIRONOLACTONE 25 MG/1
50 TABLET ORAL DAILY
Qty: 90 TABLET | Refills: 3 | Status: SHIPPED | OUTPATIENT
Start: 2018-09-14 | End: 2018-09-26

## 2018-09-14 NOTE — TELEPHONE ENCOUNTER
[employer] Viagra refill requested  09/12/18     Will deliver in 7-10 days    Jayson Hernandez Pharm.D.

## 2018-09-17 NOTE — PROGRESS NOTES
Spoke to Jazz and let him know that a dose of methylprednisolone 32 mg was sent to his local [facility] pharmacy on [address].  Advised him to take one tablet 12 hours prior to his CT-scan on Tuesday and one tablet 2 hours prior to the scan.  He verbalized understanding of the plan of care.

## 2018-09-18 ENCOUNTER — RADIANT APPOINTMENT (OUTPATIENT)
Dept: CT IMAGING | Facility: CLINIC | Age: 63
End: 2018-09-18
Attending: INTERNAL MEDICINE
Payer: MEDICARE

## 2018-09-18 DIAGNOSIS — I10 HYPERTENSION, ESSENTIAL: ICD-10-CM

## 2018-09-18 DIAGNOSIS — G47.33 OSA (OBSTRUCTIVE SLEEP APNEA): ICD-10-CM

## 2018-09-18 DIAGNOSIS — Z79.01 CURRENT USE OF LONG TERM ANTICOAGULATION: ICD-10-CM

## 2018-09-18 DIAGNOSIS — Z86.0100 H/O COLONOSCOPY WITH POLYPECTOMY: ICD-10-CM

## 2018-09-18 DIAGNOSIS — C49.A0 MALIGNANT GASTROINTESTINAL STROMAL TUMOR, UNSPECIFIED SITE (H): ICD-10-CM

## 2018-09-18 DIAGNOSIS — I51.7 CARDIOMEGALY: ICD-10-CM

## 2018-09-18 DIAGNOSIS — E11.9 TYPE 2 DIABETES MELLITUS WITHOUT COMPLICATION, WITHOUT LONG-TERM CURRENT USE OF INSULIN (H): ICD-10-CM

## 2018-09-18 DIAGNOSIS — Z98.890 H/O COLONOSCOPY WITH POLYPECTOMY: ICD-10-CM

## 2018-09-18 LAB
ALBUMIN SERPL-MCNC: 3.6 G/DL (ref 3.4–5)
ALP SERPL-CCNC: 91 U/L (ref 40–150)
ALT SERPL W P-5'-P-CCNC: 20 U/L (ref 0–70)
ANION GAP SERPL CALCULATED.3IONS-SCNC: 6 MMOL/L (ref 3–14)
AST SERPL W P-5'-P-CCNC: 14 U/L (ref 0–45)
BASOPHILS # BLD AUTO: 0 10E9/L (ref 0–0.2)
BASOPHILS NFR BLD AUTO: 0.2 %
BILIRUB SERPL-MCNC: 0.6 MG/DL (ref 0.2–1.3)
BUN SERPL-MCNC: 20 MG/DL (ref 7–30)
CALCIUM SERPL-MCNC: 9.6 MG/DL (ref 8.5–10.1)
CHLORIDE SERPL-SCNC: 108 MMOL/L (ref 94–109)
CO2 SERPL-SCNC: 26 MMOL/L (ref 20–32)
CREAT SERPL-MCNC: 1.27 MG/DL (ref 0.66–1.25)
DIFFERENTIAL METHOD BLD: ABNORMAL
EOSINOPHIL # BLD AUTO: 0 10E9/L (ref 0–0.7)
EOSINOPHIL NFR BLD AUTO: 0 %
ERYTHROCYTE [DISTWIDTH] IN BLOOD BY AUTOMATED COUNT: 13.2 % (ref 10–15)
GFR SERPL CREATININE-BSD FRML MDRD: 57 ML/MIN/1.7M2
GLUCOSE SERPL-MCNC: 140 MG/DL (ref 70–99)
HCT VFR BLD AUTO: 45.2 % (ref 40–53)
HGB BLD-MCNC: 14.8 G/DL (ref 13.3–17.7)
IMM GRANULOCYTES # BLD: 0 10E9/L (ref 0–0.4)
IMM GRANULOCYTES NFR BLD: 0.2 %
LYMPHOCYTES # BLD AUTO: 0.5 10E9/L (ref 0.8–5.3)
LYMPHOCYTES NFR BLD AUTO: 9.7 %
MCH RBC QN AUTO: 29.7 PG (ref 26.5–33)
MCHC RBC AUTO-ENTMCNC: 32.7 G/DL (ref 31.5–36.5)
MCV RBC AUTO: 91 FL (ref 78–100)
MONOCYTES # BLD AUTO: 0.1 10E9/L (ref 0–1.3)
MONOCYTES NFR BLD AUTO: 1.1 %
NEUTROPHILS # BLD AUTO: 4.8 10E9/L (ref 1.6–8.3)
NEUTROPHILS NFR BLD AUTO: 88.8 %
NRBC # BLD AUTO: 0 10*3/UL
NRBC BLD AUTO-RTO: 0 /100
PLATELET # BLD AUTO: 222 10E9/L (ref 150–450)
POTASSIUM SERPL-SCNC: 4.5 MMOL/L (ref 3.4–5.3)
PROT SERPL-MCNC: 8.2 G/DL (ref 6.8–8.8)
RBC # BLD AUTO: 4.99 10E12/L (ref 4.4–5.9)
SODIUM SERPL-SCNC: 140 MMOL/L (ref 133–144)
WBC # BLD AUTO: 5.4 10E9/L (ref 4–11)

## 2018-09-18 RX ORDER — IOPAMIDOL 755 MG/ML
135 INJECTION, SOLUTION INTRAVASCULAR ONCE
Status: COMPLETED | OUTPATIENT
Start: 2018-09-18 | End: 2018-09-18

## 2018-09-18 RX ADMIN — IOPAMIDOL 135 ML: 755 INJECTION, SOLUTION INTRAVASCULAR at 10:46

## 2018-09-18 NOTE — DISCHARGE INSTRUCTIONS

## 2018-09-19 ENCOUNTER — CARE COORDINATION (OUTPATIENT)
Dept: CARDIOLOGY | Facility: CLINIC | Age: 63
End: 2018-09-19

## 2018-09-19 NOTE — PROGRESS NOTES
Called Jazz to follow up on lab results. Reviewed latest numbers including Creatinine trend with him. Creatinine downtrending. He sees his PCP next week who he states will also get labs. Told him I would follow up on those and review with Kiera Woodson NP. Will give recommendations for recheck after those results.   He states he is feeling well except for some intermittent dizziness. Doesn't happen every day. He states it mostly happens when he goes from sitting to standing, then goes away. Checks his blood pressures at home and his systolics have run 130-140s. Reviewed with Kiera Woodson. Continue to monitor.     Kecia Rodriguez RN

## 2018-09-20 ENCOUNTER — ONCOLOGY VISIT (OUTPATIENT)
Dept: ONCOLOGY | Facility: CLINIC | Age: 63
End: 2018-09-20
Attending: INTERNAL MEDICINE
Payer: MEDICARE

## 2018-09-20 VITALS
TEMPERATURE: 98.6 F | WEIGHT: 274 LBS | BODY MASS INDEX: 39.22 KG/M2 | RESPIRATION RATE: 16 BRPM | HEART RATE: 64 BPM | HEIGHT: 70 IN | SYSTOLIC BLOOD PRESSURE: 112 MMHG | OXYGEN SATURATION: 96 % | DIASTOLIC BLOOD PRESSURE: 69 MMHG

## 2018-09-20 DIAGNOSIS — K57.30 DIVERTICULOSIS OF LARGE INTESTINE WITHOUT HEMORRHAGE: ICD-10-CM

## 2018-09-20 DIAGNOSIS — C49.A2 MALIGNANT GASTROINTESTINAL STROMAL TUMOR (GIST) OF STOMACH (H): ICD-10-CM

## 2018-09-20 DIAGNOSIS — I51.7 CARDIOMEGALY: ICD-10-CM

## 2018-09-20 DIAGNOSIS — I10 HYPERTENSION, ESSENTIAL: ICD-10-CM

## 2018-09-20 DIAGNOSIS — G47.33 OSA (OBSTRUCTIVE SLEEP APNEA): ICD-10-CM

## 2018-09-20 DIAGNOSIS — F33.0 MILD RECURRENT MAJOR DEPRESSION (H): ICD-10-CM

## 2018-09-20 DIAGNOSIS — I48.0 PAROXYSMAL ATRIAL FIBRILLATION (H): ICD-10-CM

## 2018-09-20 DIAGNOSIS — E11.9 TYPE 2 DIABETES MELLITUS WITHOUT COMPLICATION, WITHOUT LONG-TERM CURRENT USE OF INSULIN (H): ICD-10-CM

## 2018-09-20 DIAGNOSIS — N40.0 BENIGN PROSTATIC HYPERPLASIA, UNSPECIFIED WHETHER LOWER URINARY TRACT SYMPTOMS PRESENT: ICD-10-CM

## 2018-09-20 DIAGNOSIS — Z79.01 CURRENT USE OF LONG TERM ANTICOAGULATION: ICD-10-CM

## 2018-09-20 PROCEDURE — G0463 HOSPITAL OUTPT CLINIC VISIT: HCPCS | Mod: ZF

## 2018-09-20 PROCEDURE — 99214 OFFICE O/P EST MOD 30 MIN: CPT | Mod: ZP | Performed by: INTERNAL MEDICINE

## 2018-09-20 ASSESSMENT — PAIN SCALES - GENERAL: PAINLEVEL: NO PAIN (0)

## 2018-09-20 NOTE — PROGRESS NOTES
9-20-18      I saw Mr. 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       Background  In brief, he was in his usual state of health but noted some black stools in 06/2016. In early July 2016 he was admitted for anemia and GI bleeding and had orthostatic symptoms. 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 Endoscopy before that showed some punctate oozing and it is possible that some of the bleeding was coming from the tumor.   -  Surgery was August 2016 and pathology report showed 1 mitosis per 50 high-power fields, a tumor size of 15 cm and negative margins. 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   -    Interval history.      He is doing pretty well overall but is not very active.  He does not get out every day and watches a lot of tv.    He does not go up stairs much due to difficulty with his left leg which has a geo that was put in following a fracture.     He has sleep apnea and uses a CPAP machine and also has a history of hypertension, depression, diabetes, cardiomegaly, and he was found to be in atrial fibrillation when he was hospitalized for the GI bleed.    He does have a history of chronic congestive heart failure and is on multiple medications.   He denies being bothered by shortness of breath right now though he has had that problem and is felt to likely have HFpEF.      He had some tiny nonspecific lung nodules, some adrenal nodules, and nonspecific splenic lesions on the imaging.    No GERD.  Mood good.     A 10-point ROS is otherwise unremarkable.       -  Background PMH, FH, SH  His past history is notable for keloid formation, resection of a rectal polyp, and a crushing foot injury.   He feels that contrast dye created a bit of a rash that left some skin hyperpigmentation on his back.   He is currently single and lives alone. He stopped smoking in 2011, though he was a polysubstance abuser in the past. He no longer drinks alcohol. He is retired  "from working in a furniture store.   Family history is positive for diabetes and hypertension.   -      On exam he appeared comfortable with a quiet affect.   /69  Pulse 64  Temp 98.6  F (37  C) (Oral)  Resp 16  Ht 1.778 m (5' 10\")  Wt 124.3 kg (274 lb)  SpO2 96%  BMI 39.31 kg/m2  HEENT: No icterus  LYMPH: No lymphadenopathy.   CHEST: Clear.   HEART: There was irreg rhythm and faint heart sounds.   ABDOMEN: There was an obese abdomen with a well-healed surgical scar , no HSMT.   NEURO: Normal Romberg, (broke both ankles in the past).  EXT: 0-Tr edema        -  I reviewed his imaging and there is no suggestion of PD though he has some stable lung nodules. There are some nonspecific splenic lesions that are stable.  The adrenal nodules are stable.       -  GNE, LFT, CBC OK though ALC sl low and creat 1.27.    No KIT mutation back yet it seems.  We will resend.     -      It appears his recurrence risk is on the order of 10-15% over a few years.  I do not think that risk warrants the toxicities of Gleevec, especially given his multiple other problems and medications.       We will check again on the sending his sample to [place] for KIT and reflex PDGFRA, SDH, etc., testing, as this might give us some further information.      He had an echo that showed no structural heart disease, and while he was felt to be at high risk for stroke with a CHADS2 score of 4, and he is on xarelto.  He will f/u w his PCP on that. His atrial fibrillation, anticoagulation and other issues will be followed by his primary care team and Cardiology.       We will see him about 9-20 w CT and labs 2 d before.      All his questions were addressed and he will call if he has others.    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  Interestingly, in contrast to the D842V, in-frame deletions of D842 to H845 are typically sensitive to imatinib  -  Sean Freed, " M.D.  Professor  Hematology, Oncology and Transplantation

## 2018-09-20 NOTE — LETTER
9/20/2018       RE: Jazz Berman  [address]   [address]  [facility] [address]     Dear Colleague,    Thank you for referring your patient, Jazz Berman, to the [facility]. Please see a copy of my visit note below.    9-20-18      I saw Mr. 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       Background  In brief, he was in his usual state of health but noted some black stools in 06/2016. In early July 2016 he was admitted for anemia and GI bleeding and had orthostatic symptoms. 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 Endoscopy before that showed some punctate oozing and it is possible that some of the bleeding was coming from the tumor.   -  Surgery was August 2016 and pathology report showed 1 mitosis per 50 high-power fields, a tumor size of 15 cm and negative margins. 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   -    Interval history.      He is doing pretty well overall but is not very active.  He does not get out every day and watches a lot of tv.    He does not go up stairs much due to difficulty with his left leg which has a geo that was put in following a fracture.     He has sleep apnea and uses a CPAP machine and also has a history of hypertension, depression, diabetes, cardiomegaly, and he was found to be in atrial fibrillation when he was hospitalized for the GI bleed.    He does have a history of chronic congestive heart failure and is on multiple medications.   He denies being bothered by shortness of breath right now though he has had that problem and is felt to likely have HFpEF.      He had some tiny nonspecific lung nodules, some adrenal nodules, and nonspecific splenic lesions on the imaging.    No GERD.  Mood good.     A 10-point ROS is otherwise unremarkable.       -  Background PMH, FH, SH  His past history is notable for keloid formation, resection of a rectal polyp, and a crushing foot injury.   He feels that  "contrast dye created a bit of a rash that left some skin hyperpigmentation on his back.   He is currently single and lives alone. He stopped smoking in 2011, though he was a polysubstance abuser in the past. He no longer drinks alcohol. He is retired from working in a furniture store.   Family history is positive for diabetes and hypertension.   -      On exam he appeared comfortable with a quiet affect.   /69  Pulse 64  Temp 98.6  F (37  C) (Oral)  Resp 16  Ht 1.778 m (5' 10\")  Wt 124.3 kg (274 lb)  SpO2 96%  BMI 39.31 kg/m2  HEENT: No icterus  LYMPH: No lymphadenopathy.   CHEST: Clear.   HEART: There was irreg rhythm and faint heart sounds.   ABDOMEN: There was an obese abdomen with a well-healed surgical scar , no HSMT.   NEURO: Normal Romberg, (broke both ankles in the past).  EXT: 0-Tr edema        -  I reviewed his imaging and there is no suggestion of PD though he has some stable lung nodules. There are some nonspecific splenic lesions that are stable.  The adrenal nodules are stable.       -  GNE, LFT, CBC OK though ALC sl low and creat 1.27.    No KIT mutation back yet it seems.  We will resend.     -      It appears his recurrence risk is on the order of 10-15% over a few years.  I do not think that risk warrants the toxicities of Gleevec, especially given his multiple other problems and medications.       We will check again on the sending his sample to [place] for KIT and reflex PDGFRA, SDH, etc., testing, as this might give us some further information.      He had an echo that showed no structural heart disease, and while he was felt to be at high risk for stroke with a CHADS2 score of 4, and he is on xarelto.  He will f/u w his PCP on that. His atrial fibrillation, anticoagulation and other issues will be followed by his primary care team and Cardiology.       We will see him about 9-20 w CT and labs 2 d before.      All his questions were addressed and he will call if he has " others.    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  Interestingly, in contrast to the D842V, in-frame deletions of D842 to H845 are typically sensitive to imatinib  -  Sean Freed M.D.  Professor  Hematology, Oncology and Transplantation

## 2018-09-20 NOTE — NURSING NOTE
"Oncology Rooming Note    September 20, 2018 9:51 AM   Jazz Berman is a 63 year old male who presents for:    Chief Complaint   Patient presents with     Oncology Clinic Visit     GIST     Initial Vitals: /69  Pulse 64  Temp 98.6  F (37  C) (Oral)  Resp 16  Ht 1.778 m (5' 10\")  Wt 124.3 kg (274 lb)  SpO2 96%  BMI 39.31 kg/m2 Estimated body mass index is 39.31 kg/(m^2) as calculated from the following:    Height as of this encounter: 1.778 m (5' 10\").    Weight as of this encounter: 124.3 kg (274 lb). Body surface area is 2.48 meters squared.  No Pain (0) Comment: Data Unavailable   No LMP for male patient.  Allergies reviewed: Yes  Medications reviewed: Yes    Medications: Medication refills not needed today.  Pharmacy name entered into [employer]: [id number] - [place], [place] - [address]    Clinical concerns: No New Concerns    5 minutes for nursing intake (face to face time)     BHAVIK oLpez      "

## 2018-09-20 NOTE — MR AVS SNAPSHOT
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 [place] [address],  [address]  [facility] [address]   [phone]            Feb 28, 2019 10:00 AM CST   (Arrive by 9:45 AM)   NEW HEART FAILURE with Florinda Ortez MD   [facility] ([facility])    [address] [place] [address]  [address]  [facility] [address] [phone]            Mar 19, 2019  9:30 AM CDT   LAB with  LAB   [facility])    [address] [place] [address]  1st Floor  [facility] [id number] [phone]           Please do not eat 10-12 hours before your appointment if you are coming in fasting for labs on lipids, cholesterol, or glucose (sugar). This does not apply to pregnant women. Water, hot tea and black coffee (with nothing added) are okay. Do not drink other fluids, diet soda or chew gum.            Mar 19, 2019 10:40 AM CDT   CT ABDOMEN PELVIS W/O & W CONTRAST  with UCCT2   [facility] CT ([facility])    [address] [facility] [address]  1st Floor  [facility] [address] [phone]           How do I prepare for my exam? (Food and drink instructions) To prepare: Do not eat or drink for 2 hours before your exam. If you need to take medicine, you may take it with small sips of water. (We may ask you to take liquid medicine as well.)  How do I prepare for my exam? (Other instructions) Please arrive 30 minutes early for your CT.  Once in the department you might be asked to drink water 15-20 minutes prior to your exam.  If indicated you may be asked to drink an oral contrast in advance of your CT.  If this is the case, the imaging team will let you know or be in contact with you prior to your appointment  Patients over 70 or patients with diabetes or kidney problems: If you haven t had a blood test (creatinine test) within the last 30 days, the Cardiologist/Radiologist may require you to get this test prior to your exam.  If you have diabetes:  Continue to take your metformin medication on the day of your exam  What should I wear: Please wear loose clothing, such as a sweat suit or jogging clothes. Avoid snaps, zippers and other metal. We may ask you to undress and put on a hospital gown.  How long does the exam take: Most scans take less than 20 minutes.  What should I bring: Please bring any scans or X-rays taken at other hospitals, if similar tests were done. Also bring a list of your medicines, including vitamins, minerals and over-the-counter drugs. It is safest to leave personal items at home.  Do I need a : No  is needed.  What do I need to tell my doctor? Be sure to tell your doctor: * If you have any allergies. * If there s any chance you are pregnant. * If you are breastfeeding.  What should I do after the exam: No restrictions, You may resume normal activities.  What is this test: A CT (computed tomography) scan is a  series of pictures that allows us to look inside your body. The scanner creates images of the body in cross sections, much like slices of bread. This helps us see any problems more clearly. You may receive contrast (X-ray dye) before or during your scan. You will be asked to drink the contrast.  Who should I call with questions: If you have any questions, please call the Imaging Department where you will have your exam. Directions, parking instructions, and other information is available on our website, [facility] [place] [facility]imaging.            Mar 21, 2019 10:00 AM CDT   (Arrive by 9:45 AM)   Return Visit with Sean Freed MD   [facility] ([facility])    [address]  [address]  [facility] [address] [phone]              Future tests that were ordered for you today     Open Future Orders        Priority Expected Expires Ordered    CT Abdomen Pelvis w/o & w Contrast Routine 3/19/2019 8/20/2019 9/20/2018    CBC with platelets differential Routine 3/19/2019 8/20/2019 9/20/2018    Comprehensive metabolic panel Routine 3/19/2019 8/20/2019 9/20/2018            Who to contact     If you have questions or need follow up information about today's clinic visit or your schedule please contact [facility] directly at [phone].  Normal or non-critical lab and imaging results will be communicated to you by MyChart, letter or phone within 4 business days after the clinic has received the results. If you do not hear from us within 7 days, please contact the clinic through MyChart or phone. If you have a critical or abnormal lab result, we will notify you by phone as soon as possible.  Submit refill requests through [employer] or call your pharmacy and they will forward the refill request to us. Please allow 3 business days for your refill to be completed.          Additional Information About Your Visit        MyChart Information     [employer]  "gives you secure access to your electronic health record. If you see a primary care provider, you can also send messages to your care team and make appointments. If you have questions, please call your primary care clinic.  If you do not have a primary care provider, please call [phone] and they will assist you.        Care EveryWhere ID     This is your Care EveryWhere ID. This could be used by other organizations to access your [facility] medical records  [id number]        Your Vitals Were     Pulse Temperature Respirations Height Pulse Oximetry BMI (Body Mass Index)    64 98.6  F (37  C) (Oral) 16 1.778 m (5' 10\") 96% 39.31 kg/m2       Blood Pressure from Last 3 Encounters:   09/20/18 112/69   09/12/18 110/76   08/28/18 107/76    Weight from Last 3 Encounters:   09/20/18 124.3 kg (274 lb)   09/12/18 122.7 kg (270 lb 6.4 oz)   08/28/18 122.9 kg (271 lb)               Primary Care Provider Office Phone # Fax #    Kenyon Singh [phone]       [address] [address]        Equal Access to Services     GERTRUDE RAJAN AH: Hadii corey huitrono Sodestiny, waaxda luqadaha, qaybta kaalmada adeegyada, latia schilling. So [facility] [phone].    ATENCIÓN: Si habla español, tiene a tapia disposición servicios gratuitos de asistencia lingüística. Kayy al [phone].    We comply with applicable federal civil rights laws and [place] laws. We do not discriminate on the basis of race, color, national origin, age, disability, sex, sexual orientation, or gender identity.            Thank you!     Thank you for choosing [facility]  for your care. Our goal is always to provide you with excellent care. Hearing back from our patients is one way we can continue to improve our services. Please take a few minutes to complete the written survey that you may receive in the mail after your visit with us. Thank you!             Your Updated " Medication List - Protect others around you: Learn how to safely use, store and throw away your medicines at www.disposemymeds.org.          This list is accurate as of 9/20/18 10:30 AM.  Always use your most recent med list.                   Brand Name Dispense Instructions for use Diagnosis    amLODIPine 10 MG tablet    NORVASC    90 tablet    Take 1 tablet (10 mg) by mouth daily    Essential hypertension with goal blood pressure less than 140/90       apixaban ANTICOAGULANT 5 MG tablet    ELIQUIS    180 tablet    Take 1 tablet (5 mg) by mouth 2 times daily    Paroxysmal atrial fibrillation (H)       ASPIRIN NOT PRESCRIBED    INTENTIONAL    1 each    Please choose reason not prescribed, below    Hypertension, essential, Type 2 diabetes mellitus without complication, without long-term current use of insulin (H)       blood glucose monitoring lancets     100 each    Use to test blood sugars 2 times daily or as directed.    Diabetes mellitus (H)       blood glucose monitoring test strip    no brand specified    100 each    Use to test blood sugars 2 times daily or as directed    Diabetes mellitus (H)       cycloSPORINE 0.05 % ophthalmic emulsion    RESTASIS    1 Box    Place 1 drop into both eyes 2 times daily    Dry eyes, bilateral, Punctate keratitis, bilateral       ferrous sulfate 325 (65 Fe) MG tablet    IRON    180 tablet    Take 1 tablet (325 mg) by mouth 2 times daily    Iron deficiency anemia, unspecified iron deficiency anemia type       furosemide 40 MG tablet    LASIX    90 tablet    TAKE 1 TABLET (40 MG) BY MOUTH DAILY    (HFpEF) heart failure with preserved ejection fraction (H)       lisinopril 20 MG tablet    PRINIVIL/ZESTRIL    180 tablet    Take 2 tablets (40 mg) by mouth daily    (HFpEF) heart failure with preserved ejection fraction (H)       metFORMIN 500 MG tablet    GLUCOPHAGE    90 tablet    Take 1 tablet (500mg) every AM    Type 2 diabetes mellitus without complication, without long-term  current use of insulin (H)       methylPREDNISolone 32 MG tablet    MEDROL    2 tablet    Take 1 tablet (32 mg) by mouth daily Take one tablet 12 hrs prior to scan and 1 tablet 2 hrs prior to scan    GIST (gastrointestinal stromal tumor), malignant (H)       metoprolol succinate 100 MG 24 hr tablet    TOPROL-XL    180 tablet    Take 2 tablets (200 mg) by mouth daily    Essential hypertension with goal blood pressure less than 140/90       omeprazole 40 MG capsule    priLOSEC    90 capsule    Take 1 capsule (40 mg) by mouth daily Take 30-60 minutes before a meal.    Acute gastrointestinal hemorrhage       order for DME     1 Units    Equipment being ordered: BP cuff, large    Hypertension, essential       sildenafil 100 MG tablet    VIAGRA    30 tablet    Take 100mg tablet as directed.  -Rx prescribed via [facility] connection to care program-    Erectile dysfunction, unspecified erectile dysfunction type       solifenacin 10 MG tablet    VESICARE    90 tablet    Take 1 tablet (10 mg) by mouth daily AM    Urinary urgency       spironolactone 25 MG tablet    ALDACTONE    90 tablet    Take 2 tablets (50 mg) by mouth daily    Chronic diastolic heart failure (H)       STATIN NOT PRESCRIBED (INTENTIONAL)      1 each daily Please choose reason not prescribed, below    Type 2 diabetes mellitus without complication, without long-term current use of insulin (H)       vitamin D 2000 units tablet     90 tablet    Take 2,000 Units by mouth daily    Vitamin D deficiency

## 2018-09-25 ENCOUNTER — DOCUMENTATION ONLY (OUTPATIENT)
Dept: FAMILY MEDICINE | Facility: CLINIC | Age: 63
End: 2018-09-25

## 2018-09-25 ENCOUNTER — OFFICE VISIT (OUTPATIENT)
Dept: FAMILY MEDICINE | Facility: CLINIC | Age: 63
End: 2018-09-25
Payer: MEDICARE

## 2018-09-25 VITALS
TEMPERATURE: 98.4 F | DIASTOLIC BLOOD PRESSURE: 82 MMHG | SYSTOLIC BLOOD PRESSURE: 117 MMHG | OXYGEN SATURATION: 99 % | BODY MASS INDEX: 38.17 KG/M2 | RESPIRATION RATE: 16 BRPM | WEIGHT: 266 LBS | HEART RATE: 71 BPM

## 2018-09-25 DIAGNOSIS — E11.9 TYPE 2 DIABETES MELLITUS WITHOUT COMPLICATION, WITHOUT LONG-TERM CURRENT USE OF INSULIN (H): ICD-10-CM

## 2018-09-25 DIAGNOSIS — N18.30 CKD (CHRONIC KIDNEY DISEASE) STAGE 3, GFR 30-59 ML/MIN (H): Primary | ICD-10-CM

## 2018-09-25 LAB
% GRANULOCYTES: 71.9 %G (ref 40–75)
ALBUMIN SERPL-MCNC: 4.6 MG/DL (ref 3.5–4.7)
ALP SERPL-CCNC: 80.5 U/L (ref 31.7–110.7)
ALT SERPL-CCNC: 11.6 U/L (ref 0–45)
AST SERPL-CCNC: 14.9 U/L (ref 0–55)
BILIRUB SERPL-MCNC: 0.8 MG/DL (ref 0.2–1.3)
BUN SERPL-MCNC: 24 MG/DL (ref 7–21)
CALCIUM SERPL-MCNC: 9.7 MG/DL (ref 8.5–10.1)
CHLORIDE SERPLBLD-SCNC: 105.3 MMOL/L (ref 98–110)
CHOLEST SERPL-MCNC: 163.9 MG/DL (ref 0–200)
CHOLEST/HDLC SERPL: 3.8 {RATIO} (ref 0–5)
CO2 SERPL-SCNC: 28.9 MMOL/L (ref 20–32)
CREAT SERPL-MCNC: 1.7 MG/DL (ref 0.7–1.3)
GFR SERPL CREATININE-BSD FRML MDRD: 43.5 ML/MIN/1.7 M2
GLUCOSE SERPL-MCNC: 119.1 MG'DL (ref 70–99)
GRANULOCYTES #: 5.5 K/UL (ref 1.6–8.3)
HCT VFR BLD AUTO: 46.5 % (ref 40–53)
HDLC SERPL-MCNC: 42.7 MG/DL
HEMOGLOBIN: 14 G/DL (ref 13.3–17.7)
LDLC SERPL CALC-MCNC: 99 MG/DL (ref 0–129)
LYMPHOCYTES # BLD AUTO: 1.5 K/UL (ref 0.8–5.3)
LYMPHOCYTES NFR BLD AUTO: 19.8 %L (ref 20–48)
MCH RBC QN AUTO: 29.1 PG (ref 26.5–35)
MCHC RBC AUTO-ENTMCNC: 30.1 G/DL (ref 32–36)
MCV RBC AUTO: 96.6 FL (ref 78–100)
MID #: 0.6 K/UL (ref 0–2.2)
MID %: 8.3 %M (ref 0–20)
PLATELET # BLD AUTO: 233 K/UL (ref 150–450)
POTASSIUM SERPL-SCNC: 4.3 MMOL/DL (ref 3.3–4.5)
PROT SERPL-MCNC: 7.3 G/DL (ref 6.8–8.8)
RBC # BLD AUTO: 4.81 M/UL (ref 4.4–5.9)
SODIUM SERPL-SCNC: 140.6 MMOL/L (ref 132.6–141.4)
TRIGL SERPL-MCNC: 109.5 MG/DL (ref 0–150)
VLDL CHOLESTEROL: 21.9 MG/DL (ref 7–32)
WBC # BLD AUTO: 7.7 K/UL (ref 4–11)

## 2018-09-25 ASSESSMENT — ANXIETY QUESTIONNAIRES
GAD7 TOTAL SCORE: 0
3. WORRYING TOO MUCH ABOUT DIFFERENT THINGS: NOT AT ALL
2. NOT BEING ABLE TO STOP OR CONTROL WORRYING: NOT AT ALL
5. BEING SO RESTLESS THAT IT IS HARD TO SIT STILL: NOT AT ALL
1. FEELING NERVOUS, ANXIOUS, OR ON EDGE: NOT AT ALL
IF YOU CHECKED OFF ANY PROBLEMS ON THIS QUESTIONNAIRE, HOW DIFFICULT HAVE THESE PROBLEMS MADE IT FOR YOU TO DO YOUR WORK, TAKE CARE OF THINGS AT HOME, OR GET ALONG WITH OTHER PEOPLE: NOT DIFFICULT AT ALL
7. FEELING AFRAID AS IF SOMETHING AWFUL MIGHT HAPPEN: NOT AT ALL
6. BECOMING EASILY ANNOYED OR IRRITABLE: NOT AT ALL

## 2018-09-25 ASSESSMENT — PATIENT HEALTH QUESTIONNAIRE - PHQ9: 5. POOR APPETITE OR OVEREATING: NOT AT ALL

## 2018-09-25 NOTE — PROGRESS NOTES
RIGO       Jazz Berman is a 63 year old  who presents for   Chief Complaint   Patient presents with     RECHECK     lab work     Reason for visit:   -Repeat creatinine check (noted to have slow uptrend from baseline 1.2 to recently 1.4. Has known stage 3 CKD and CHF however concerned about worsening CKD vs. CHF causing a change in creatinine. He is otherwise asymptomatic and was recommended to have Echo repeat. Has not had the echo yet and will help schedule prior to leaving today. He also undergoes viagara therapy for ED and was hoping for a refill.     CKD Follow-up    CKD Stage 3A : associated with the following complications  CHF    Outpatient blood pressures are not being checked.    Low Salt Diet: low salt    NSAID Use?no other than aspirin    Did patient take their HTN pills today?  Yes     Labs  Last Basic Metabolic Panel:    Last Renal Panel:  Sodium   Date Value Ref Range Status   09/25/2018 140.6 132.6 - 141.4 mmol/L Final     Potassium   Date Value Ref Range Status   09/25/2018 4.3 3.3 - 4.5 mmol/dL Final     Chloride   Date Value Ref Range Status   09/25/2018 105.3 98.0 - 110.0 mmol/L Final     Carbon Dioxide   Date Value Ref Range Status   09/25/2018 28.9 20.0 - 32.0 mmol/L Final     Anion Gap   Date Value Ref Range Status   09/18/2018 6 3 - 14 mmol/L Final     Glucose   Date Value Ref Range Status   09/25/2018 119.1 (H) 70.0 - 99.0 mg'dL Final     Urea Nitrogen   Date Value Ref Range Status   09/25/2018 24.0 (H) 7.0 - 21.0 mg/dL Final     Creatinine   Date Value Ref Range Status   09/25/2018 1.7 (H) 0.7 - 1.3 mg/dL Final     GFR Estimate   Date Value Ref Range Status   09/25/2018 43.5 (L) >60.0 mL/min/1.7 m2 Final     Calcium   Date Value Ref Range Status   09/25/2018 9.7 8.5 - 10.1 mg/dL Final     Phosphorus   Date Value Ref Range Status   08/17/2016 4.0 2.5 - 4.5 mg/dL Final     Albumin   Date Value Ref Range Status   09/18/2018 3.6 3.4 - 5.0 g/dL Final     Hemoglobin   Date Value Ref Range  Status   09/25/2018 14.0 13.3 - 17.7 g/dL Final   09/18/2018 14.8 13.3 - 17.7 g/dL Final       Adherence and Exercise  Medication side effects: no  How often is a medication missed? Never  Exercise:walking  4-5 days/week for an average of 15-30 minutes        +++++++      Problem, Medication and Allergy Lists were      Patient Active Problem List    Diagnosis Date Noted     Hypertension, essential 08/30/2012     Priority: High     Class: Chronic     Use large BP cuff       GREG (obstructive sleep apnea) on CPAP 08/30/2012     Priority: High     Class: Chronic     Type 2 diabetes mellitus without complication, without long-term current use of insulin (H) 10/12/2017     Priority: Medium     No retinopathy noted on 3/23/2018 eye exam.        Paroxysmal atrial fibrillation (H) 04/24/2017     Priority: Medium     Atrial fibrillation, rate controlled.       Anticoagulation goal of INR 2 to 3 03/21/2017     Priority: Medium     Chronic diastolic heart failure (H) 02/11/2017     Priority: Medium     HFpEF       GIST (gastrointestinal stromal tumor), malignant (H) 09/27/2016     Priority: Medium     recurrence risk is on the order of 10-15% over a few years per Oncology        Obesity 09/27/2016     Priority: Medium     Diverticulosis of large intestine 07/04/2016     Priority: Medium     Colonoscopy 7/2/16        Mild recurrent major depression (H) 09/05/2013     Priority: Medium     Class: Chronic     BPH (benign prostatic hyperplasia) 08/29/2013     Priority: Medium     Erectile dysfunction 07/10/2013     Priority: Medium     BMI greater than 40 08/30/2012     Priority: Medium     Class: Chronic   ,     Current Outpatient Prescriptions   Medication Sig Dispense Refill     amLODIPine (NORVASC) 10 MG tablet Take 1 tablet (10 mg) by mouth daily 90 tablet 3     apixaban ANTICOAGULANT (ELIQUIS) 5 MG tablet Take 1 tablet (5 mg) by mouth 2 times daily 180 tablet 3     blood glucose monitoring (NO BRAND SPECIFIED) test strip Use  to test blood sugars 2 times daily or as directed 100 each 3     blood glucose monitoring (ONE TOUCH DELICA) lancets Use to test blood sugars 2 times daily or as directed. 100 each 3     Cholecalciferol (VITAMIN D) 2000 UNITS tablet Take 2,000 Units by mouth daily 90 tablet 3     cycloSPORINE (RESTASIS) 0.05 % ophthalmic emulsion Place 1 drop into both eyes 2 times daily 1 Box 3     ferrous sulfate (IRON) 325 (65 FE) MG tablet Take 1 tablet (325 mg) by mouth 2 times daily 180 tablet 3     furosemide (LASIX) 40 MG tablet TAKE 1 TABLET (40 MG) BY MOUTH DAILY 90 tablet 3     lisinopril (PRINIVIL/ZESTRIL) 20 MG tablet Take 2 tablets (40 mg) by mouth daily 180 tablet 3     metFORMIN (GLUCOPHAGE) 500 MG tablet Take 1 tablet (500mg) every AM 90 tablet 1     methylPREDNISolone (MEDROL) 32 MG tablet Take 1 tablet (32 mg) by mouth daily Take one tablet 12 hrs prior to scan and 1 tablet 2 hrs prior to scan 2 tablet 3     metoprolol succinate (TOPROL-XL) 100 MG 24 hr tablet Take 2 tablets (200 mg) by mouth daily 180 tablet 3     omeprazole (PRILOSEC) 40 MG capsule Take 1 capsule (40 mg) by mouth daily Take 30-60 minutes before a meal. 90 capsule 3     order for DME Equipment being ordered: BP cuff, large 1 Units 0     sildenafil (VIAGRA) 100 MG tablet Take 100mg tablet as directed.  -Rx prescribed via [employer] connection to care program- 30 tablet 3     solifenacin (VESICARE) 10 MG tablet Take 1 tablet (10 mg) by mouth daily AM 90 tablet 3     ASPIRIN NOT PRESCRIBED (INTENTIONAL) Please choose reason not prescribed, below (Patient not taking: Reported on 9/12/2018) 1 each 0     spironolactone (ALDACTONE) 25 MG tablet Take 1 tablet (25 mg) by mouth daily 90 tablet 3     STATIN NOT PRESCRIBED, INTENTIONAL, 1 each daily Please choose reason not prescribed, below (Patient not taking: Reported on 9/12/2018)     ,     Allergies   Allergen Reactions     Dye [Contrast Dye] Rash     Dye left skin hyperpigmentation on his back    .    Patient is   an established patient of this clinic.  Past Medical History:   Diagnosis Date     Arthritis      Atrial fibrillation (H)      BPH (benign prostatic hyperplasia) 8/29/2013     Cardiomegaly 8/30/2012     Crushing injury of foot 8/30/2012     Depressive disorder, not elsewhere classified 8/30/2012     Diabetes mellitus type 2 in obese (H)      Diverticulosis of large intestine 7/4/2016    Colonoscopy 7/2/16       Former smoker      Gastric mass      GI bleed      History of blood transfusion      Hypertension      Hypertension      Keloid 6/11/2012     GREG on CPAP      Family History   Problem Relation Age of Onset     Hypertension Father      Diabetes Mother      Colon Cancer No family hx of      Crohn Disease No family hx of      Ulcerative Colitis No family hx of      Cancer No family hx of      no skin cancer     Glaucoma No family hx of      Macular Degeneration No family hx of      Social History     Social History     Marital status: Single     Spouse name: N/A     Number of children: N/A     Years of education: N/A     Social History Main Topics     Smoking status: Former Smoker     Years: 30.00     Types: Cigarettes     Quit date: 6/2/2011     Smokeless tobacco: Never Used     Alcohol use No      Comment: twice per week and 6 pack per sitting, quit about 6 months ago     Drug use: No      Comment: +marijuana and crack cocaine     Sexual activity: Yes     Other Topics Concern     None     Social History Narrative   .         Review of Systems:   Review of Systems  Skin: negative except as above  Respiratory: negative except as above  Cardiovascular: negative except as above  Gastrointestinal: negative except as above  Genitourinary: negative except as above  Musculoskeletal: negative except as above  Neurologic: negative except as above  Psychiatric: negative except as above  Hematologic/Lymphatic/Immunologic: negative except as above  Endocrine: negative except as above         Physical  Exam:     Vitals:    09/25/18 0953   BP: 117/82   BP Location: Left arm   Patient Position: Sitting   Cuff Size: Adult Large   Pulse: 71   Resp: 16   Temp: 98.4  F (36.9  C)   TempSrc: Oral   SpO2: 99%   Weight: 266 lb (120.7 kg)     Body mass index is 38.17 kg/(m^2).  Vitals were reviewed and were normal     Physical Exam  Constitutional: Oriented to person, place, and time. Appears well-developed and well-nourished.   Cardiovascular: Normal rate, regular rhythm, normal heart sounds and intact distal pulses.    Pulmonary/Chest: Effort normal and breath sounds normal. No respiratory distress. No wheezes.   Musculoskeletal: Normal range of motion.   Neurological: Alert and oriented to person, place, and time.   Skin: Skin is warm and dry.   Psychiatric: Has a normal mood and affect. Behavior is normal.       Results:      Results from this visit  Results for orders placed or performed in visit on 09/25/18   CBC with Diff Plt ([facility])   Result Value Ref Range    WBC 7.7 4.0 - 11.0 K/uL    Lymphocytes # 1.5 0.8 - 5.3 K/uL    % Lymphocytes 19.8 (L) 20.0 - 48.0 %L    Mid # 0.6 0.0 - 2.2 K/uL    Mid % 8.3 0.0 - 20.0 %M    GRANULOCYTES # 5.5 1.6 - 8.3 K/uL    % Granulocytes 71.9 40.0 - 75.0 %G    RBC 4.81 4.40 - 5.90 M/uL    Hemoglobin 14.0 13.3 - 17.7 g/dL    Hematocrit 46.5 40.0 - 53.0 %    MCV 96.6 78.0 - 100.0 fL    MCH 29.1 26.5 - 35.0 pg    MCHC 30.1 (L) 32.0 - 36.0 g/dL    Platelets 233.0 150.0 - 450.0 K/uL   Comprehensive Metabolic Panel ([facility])   Result Value Ref Range    Albumin 4.6 3.5 - 4.7 mg/dL    Alkaline Phosphatase 80.5 31.7 - 110.7 U/L    ALT 11.6 0.0 - 45.0 U/L    AST 14.9 0.0 - 55.0 U/L    Bilirubin Total 0.8 0.2 - 1.3 mg/dL    Urea Nitrogen 24.0 (H) 7.0 - 21.0 mg/dL    Calcium 9.7 8.5 - 10.1 mg/dL    Chloride 105.3 98.0 - 110.0 mmol/L    Carbon Dioxide 28.9 20.0 - 32.0 mmol/L    Creatinine 1.7 (H) 0.7 - 1.3 mg/dL    Glucose 119.1 (H) 70.0 - 99.0 mg'dL    Potassium 4.3 3.3 - 4.5 mmol/dL    Sodium  140.6 132.6 - 141.4 mmol/L    Protein Total 7.3 6.8 - 8.8 g/dL    GFR Estimate 43.5 (L) >60.0 mL/min/1.7 m2    GFR Estimate If Black 52.6 (L) >60.0 mL/min/1.7 m2   Lipid [facility] ([facility])   Result Value Ref Range    Cholesterol 163.9 0.0 - 200.0 mg/dL    Cholesterol/HDL Ratio 3.8 0.0 - 5.0    HDL Cholesterol 42.7 >40.0 mg/dL    LDL Cholesterol Calculated 99 0 - 129 mg/dL    Triglycerides 109.5 0.0 - 150.0 mg/dL    VLDL Cholesterol 21.9 7.0 - 32.0 mg/dL       Assessment and Plan        1. Diabetes mellitus, type 2 (H)  Controlled on metformin 500mg daily and diet/lifestyle. At this time will perform labs that were recommended by his hematologist including CBC and CMP. Lipid panel will be performed for routine health maintenance.   - CBC with Diff Plt ([facility]) - order placed by hematology  - Comprehensive Metabolic Panel ([facility]) - order placed by hematology   - Lipid [place] ([facility])    2. CKD (chronic kidney disease) stage 3, GFR 30-59 ml/min  Worsening creatinine today 1.7 from baseline of 1.2-1.3 over 6 months. Med review performed with clinical pharmacist and all medications are appropriate including lisinopril and avoidance of NSAIDs. At this time blood pressure is controlled however unable to point out etiology whether this is worsening CKD from worsening CHF.   - Scheduled Echocardiogram in 4 days.   - NEPHROLOGY ADULT REFERRAL - INTERNAL      Please call or return to clinic if your symptoms don't go away.    Follow up plan  Please make a clinic appointment for follow up with your primary physician Kenyon Singh MD after nephrology consult.   Thank you for coming to [facility] today.     There are no discontinued medications.    Options for treatment and follow-up care were reviewed with the patient. Jazz Berman  engaged in the decision making process and verbalized understanding of the options discussed and agreed with the final plan.    Kenyon Singh MD

## 2018-09-25 NOTE — PROGRESS NOTES
Preceptor Attestation:   Patient seen, evaluated and discussed with the resident. I have verified the content of the note, which accurately reflects my assessment of the patient and the plan of care.  Assume that orders placed by hematology/oncology ( CBC and Liver function) for monitoring of GIST.   Supervising Physician:  Jennyfer Guerra MD

## 2018-09-25 NOTE — MR AVS SNAPSHOT
After Visit Summary   9/25/2018    Jazz Berman    MRN: [id number]           Patient Information     Date Of Birth          1955        Visit Information        Provider Department      9/25/2018 10:00 AM Kenyon Singh MD [facility]        Today's Diagnoses     Diabetes mellitus, type 2 (H)    -  1    CKD (chronic kidney disease) stage 3, GFR 30-59 ml/min          Care Instructions    Here is the plan from today's visit    1. Diabetes mellitus, type 2 (H)  - CBC with Diff Plt ([facility])  - Comprehensive Metabolic Panel ([facility])  - Lipid Cascade ([facility])    2. CKD (chronic kidney disease) stage 3, GFR 30-59 ml/min  Worsening creatinine today 1.7 from baseline of 1.2-1.3 over 6 months. Med review performed with clinical pharmacist and all medications are appropriate including lisinopril and avoidance of NSAIDs. At this time blood pressure is controlled however unable to point out etiology whether this is worsening CKD from worsening CHF.   - Scheduled Echo cardiogram in 4 days.   - NEPHROLOGY ADULT REFERRAL - INTERNAL      Please call or return to clinic if your symptoms don't go away.    Follow up plan  Please make a clinic appointment for follow up with your primary physician Kenyon Singh MD after nephrology consult.   Thank you for coming to [facility] today.  Lab Testing:  **If you had lab testing today and your results are reassuring or normal they will be mailed to you or sent through [employer] within 7 days.   **If the lab tests need quick action we will call you with the results.  The phone number we will call with results is # [phone] (home) . If this is not the best number please call our clinic and change the number.  Medication Refills:  If you need any refills please call your pharmacy and they will contact us.   If you need to  your refill at a new pharmacy, please contact the new pharmacy directly. The new pharmacy will help you get your  medications transferred faster.   Scheduling:  If you have any concerns about today's visit or wish to schedule another appointment please call our office during normal business hours [phone] (8-5:00 M-F)  If a referral was made to a [facility] and you don't get a call from central scheduling please call [phone].  If a Mammogram was ordered for you at [facility] call [phone] to schedule or change your appointment.  If you had an XRay/CT/Ultrasound/MRI ordered the number is [phone] to schedule or change your radiology appointment.   Medical Concerns:  If you have urgent medical concerns please call [phone] at any time of the day.    Kenyon Singh MD            Follow-ups after your visit        Additional Services     NEPHROLOGY ADULT REFERRAL - INTERNAL       Your provider has referred you to: [facility]: Kidney (Nephrology) Clinic - [place] [phone]   [email or web address]    Please be aware that coverage of these services is subject to the terms and limitations of your health insurance plan.  Call member services at your health plan with any benefit or coverage questions.      Reason for referral:  Worsening GFR and creatinine in the setting of stage 3 CKD.   Please bring the following to your appointment:    >>   Any x-rays, CTs or MRIs which have been performed.  Contact the facility where they were done to arrange for  prior to your scheduled appointment.   >>   List of current medications   >>   This referral request   >>   Any documents/labs given to you for this referral                  Follow-up notes from your care team     Return in about 3 months (around 12/25/2018).      Your next 10 appointments already scheduled     Oct 01, 2018  9:00 AM CDT   Ech Complete with UCECHCR4   [facility] ([facility])    [address]  3rd Floor  [facility] [address]    [phone]           1.  Please bring or wear a comfortable two-piece outfit. 2.  You may eat, drink and take your normal medicines. 3.  For any questions that cannot be answered, please contact the ordering physician 4.  Please do not wear perfumes or scented lotions on the day of your exam.            Feb 28, 2019 10:00 AM CST   (Arrive by 9:45 AM)   NEW HEART FAILURE with Florinda Ortez MD   [facility] ([facility])    [address]  [address] [place] [id number] [phone]            Mar 19, 2019  9:30 AM CDT   LAB with  LAB   [facility] ([facility])    [address] [place] [id number] [phone]           Please do not eat 10-12 hours before your appointment if you are coming in fasting for labs on lipids, cholesterol, or glucose (sugar). This does not apply to pregnant women. Water, hot tea and black coffee (with nothing added) are okay. Do not drink other fluids, diet soda or chew gum.            Mar 19, 2019 10:40 AM CDT   CT ABDOMEN PELVIS W/O & W CONTRAST with UCCT2   [facility] ([facility])    [address] [place] [id number] [phone]           How do I prepare for my exam? (Food and drink instructions) To prepare: Do not eat or drink for 2 hours before your exam. If you need to take medicine, you may take it with small sips of water. (We may ask you to take liquid medicine as well.)  How do I prepare for my exam? (Other instructions) Please arrive 30 minutes early for your CT.  Once in the department you might be asked to drink water 15-20 minutes prior to your exam.  If indicated you may be asked to drink an oral contrast in advance of your CT.  If this is the case, the imaging team will let you know or be in contact with you prior to your appointment  Patients over 70 or patients with diabetes or kidney  problems: If you haven t had a blood test (creatinine test) within the last 30 days, the Cardiologist/Radiologist may require you to get this test prior to your exam.  If you have diabetes:  Continue to take your metformin medication on the day of your exam  What should I wear: Please wear loose clothing, such as a sweat suit or jogging clothes. Avoid snaps, zippers and other metal. We may ask you to undress and put on a hospital gown.  How long does the exam take: Most scans take less than 20 minutes.  What should I bring: Please bring any scans or X-rays taken at other hospitals, if similar tests were done. Also bring a list of your medicines, including vitamins, minerals and over-the-counter drugs. It is safest to leave personal items at home.  Do I need a : No  is needed.  What do I need to tell my doctor? Be sure to tell your doctor: * If you have any allergies. * If there s any chance you are pregnant. * If you are breastfeeding.  What should I do after the exam: No restrictions, You may resume normal activities.  What is this test: A CT (computed tomography) scan is a series of pictures that allows us to look inside your body. The scanner creates images of the body in cross sections, much like slices of bread. This helps us see any problems more clearly. You may receive contrast (X-ray dye) before or during your scan. You will be asked to drink the contrast.  Who should I call with questions: If you have any questions, please call the Imaging Department where you will have your exam. Directions, parking instructions, and other information is available on our website, [email or web address].            Mar 21, 2019 10:00 AM CDT   (Arrive by 9:45 AM)   Return Visit with Sean Freed MD   [facility] ([facility] [place])    [address]  [address]  [facility] [address] [phone]              Who to contact     Please call your clinic at  [phone] to:    Ask questions about your health    Make or cancel appointments    Discuss your medicines    Learn about your test results    Speak to your doctor            Additional Information About Your Visit        [employer] Information     [employer] gives you secure access to your electronic health record. If you see a primary care provider, you can also send messages to your care team and make appointments. If you have questions, please call your primary care clinic.  If you do not have a primary care provider, please call [phone] and they will assist you.      [employer] is an electronic gateway that provides easy, online access to your medical records. With [employer], you can request a clinic appointment, read your test results, renew a prescription or communicate with your care team.     To access your existing account, please contact your [facility] or call [phone] for assistance.        Care EveryWhere ID     This is your Care EveryWhere ID. This could be used by other organizations to access your [facility] medical records  [id number]        Your Vitals Were     Pulse Temperature Respirations Pulse Oximetry BMI (Body Mass Index)       71 98.4  F (36.9  C) (Oral) 16 99% 38.17 kg/m2        Blood Pressure from Last 3 Encounters:   09/25/18 117/82   09/20/18 112/69   09/12/18 110/76    Weight from Last 3 Encounters:   09/25/18 266 lb (120.7 kg)   09/20/18 274 lb (124.3 kg)   09/12/18 270 lb 6.4 oz (122.7 kg)              We Performed the Following     CBC with Diff Plt ([facility])     Comprehensive Metabolic Panel ([facility])     Lipid [place] ([facility])     NEPHROLOGY ADULT REFERRAL - INTERNAL        Primary Care Provider Office Phone # Fax #    Kenyon Singh [phone]       [address] [address]        Equal Access to Services     GERTRUDE RAJAN : janet Ng qaybta kaalmada  latia ceronalana gamino'aan ah. So [facility] [phone].    ATENCIÓN: Si antoniala leni, tiene a tapia disposición servicios gratuitos de asistencia lingüística. Kayy al [phone].    We comply with applicable federal civil rights laws and [place] laws. We do not discriminate on the basis of race, color, national origin, age, disability, sex, sexual orientation, or gender identity.            Thank you!     Thank you for choosing [facility]  for your care. Our goal is always to provide you with excellent care. Hearing back from our patients is one way we can continue to improve our services. Please take a few minutes to complete the written survey that you may receive in the mail after your visit with us. Thank you!             Your Updated Medication List - Protect others around you: Learn how to safely use, store and throw away your medicines at www.disposemymeds.org.          This list is accurate as of 9/25/18 10:28 AM.  Always use your most recent med list.                   Brand Name Dispense Instructions for use Diagnosis    amLODIPine 10 MG tablet    NORVASC    90 tablet    Take 1 tablet (10 mg) by mouth daily    Essential hypertension with goal blood pressure less than 140/90       apixaban ANTICOAGULANT 5 MG tablet    ELIQUIS    180 tablet    Take 1 tablet (5 mg) by mouth 2 times daily    Paroxysmal atrial fibrillation (H)       ASPIRIN NOT PRESCRIBED    INTENTIONAL    1 each    Please choose reason not prescribed, below    Hypertension, essential, Type 2 diabetes mellitus without complication, without long-term current use of insulin (H)       blood glucose monitoring lancets     100 each    Use to test blood sugars 2 times daily or as directed.    Diabetes mellitus (H)       blood glucose monitoring test strip    no brand specified    100 each    Use to test blood sugars 2 times daily or as directed    Diabetes mellitus (H)       cycloSPORINE 0.05 % ophthalmic  emulsion    RESTASIS    1 Box    Place 1 drop into both eyes 2 times daily    Dry eyes, bilateral, Punctate keratitis, bilateral       ferrous sulfate 325 (65 Fe) MG tablet    IRON    180 tablet    Take 1 tablet (325 mg) by mouth 2 times daily    Iron deficiency anemia, unspecified iron deficiency anemia type       furosemide 40 MG tablet    LASIX    90 tablet    TAKE 1 TABLET (40 MG) BY MOUTH DAILY    (HFpEF) heart failure with preserved ejection fraction (H)       lisinopril 20 MG tablet    PRINIVIL/ZESTRIL    180 tablet    Take 2 tablets (40 mg) by mouth daily    (HFpEF) heart failure with preserved ejection fraction (H)       metFORMIN 500 MG tablet    GLUCOPHAGE    90 tablet    Take 1 tablet (500mg) every AM    Type 2 diabetes mellitus without complication, without long-term current use of insulin (H)       methylPREDNISolone 32 MG tablet    MEDROL    2 tablet    Take 1 tablet (32 mg) by mouth daily Take one tablet 12 hrs prior to scan and 1 tablet 2 hrs prior to scan    GIST (gastrointestinal stromal tumor), malignant (H)       metoprolol succinate 100 MG 24 hr tablet    TOPROL-XL    180 tablet    Take 2 tablets (200 mg) by mouth daily    Essential hypertension with goal blood pressure less than 140/90       omeprazole 40 MG capsule    priLOSEC    90 capsule    Take 1 capsule (40 mg) by mouth daily Take 30-60 minutes before a meal.    Acute gastrointestinal hemorrhage       order for DME     1 Units    Equipment being ordered: BP cuff, large    Hypertension, essential       sildenafil 100 MG tablet    VIAGRA    30 tablet    Take 100mg tablet as directed.  -Rx prescribed via [employer] connection to care program-    Erectile dysfunction, unspecified erectile dysfunction type       solifenacin 10 MG tablet    VESICARE    90 tablet    Take 1 tablet (10 mg) by mouth daily AM    Urinary urgency       spironolactone 25 MG tablet    ALDACTONE    90 tablet    Take 2 tablets (50 mg) by mouth daily    Chronic diastolic  heart failure (H)       STATIN NOT PRESCRIBED (INTENTIONAL)      1 each daily Please choose reason not prescribed, below    Type 2 diabetes mellitus without complication, without long-term current use of insulin (H)       vitamin D 2000 units tablet     90 tablet    Take 2,000 Units by mouth daily    Vitamin D deficiency

## 2018-09-25 NOTE — PATIENT INSTRUCTIONS
Here is the plan from today's visit    1. Diabetes mellitus, type 2 (H)  - CBC with Diff Plt ([facility])  - Comprehensive Metabolic Panel ([facility])  - Lipid Cascade ([facility])    2. CKD (chronic kidney disease) stage 3, GFR 30-59 ml/min  Worsening creatinine today 1.7 from baseline of 1.2-1.3 over 6 months. Med review performed with clinical pharmacist and all medications are appropriate including lisinopril and avoidance of NSAIDs. At this time blood pressure is controlled however unable to point out etiology whether this is worsening CKD from worsening CHF.   - Scheduled Echo cardiogram in 4 days.   - NEPHROLOGY ADULT REFERRAL - INTERNAL      Please call or return to clinic if your symptoms don't go away.    Follow up plan  Please make a clinic appointment for follow up with your primary physician Kenyon Singh MD after nephrology consult.   Thank you for coming to [facility] today.  Lab Testing:  **If you had lab testing today and your results are reassuring or normal they will be mailed to you or sent through [employer] within 7 days.   **If the lab tests need quick action we will call you with the results.  The phone number we will call with results is # [phone] (home) . If this is not the best number please call our clinic and change the number.  Medication Refills:  If you need any refills please call your pharmacy and they will contact us.   If you need to  your refill at a new pharmacy, please contact the new pharmacy directly. The new pharmacy will help you get your medications transferred faster.   Scheduling:  If you have any concerns about today's visit or wish to schedule another appointment please call our office during normal business hours [phone] (8-5:00 M-F)  If a referral was made to a [facility] and you don't get a call from central scheduling please call [phone].  If a Mammogram was ordered for you at [facility] call [phone] to  schedule or change your appointment.  If you had an XRay/CT/Ultrasound/MRI ordered the number is [phone] to schedule or change your radiology appointment.   Medical Concerns:  If you have urgent medical concerns please call [phone] at any time of the day.    Kenyon Singh MD      Nephrology referral    Eriberto Alaniz has scheduled in Nephrology dept on 11/14 with labs prior.     Thanks,          Katalina

## 2018-09-25 NOTE — PROGRESS NOTES
"When opening a documentation only encounter, be sure to enter in \"Chief Complaint\" Forms and in \" Comments\" Title of form, description if needed.    Jazz is a 63 year old  male  Form received via: Fax  Form now resides in: Provider Ready      Form has been completed by provider.     Form sent out via: Fax to Hellen Larkin RN at Fax Number: [phone]  Patient informed: no  Output date: September 25, 2018    BHAVIK Bustillos 10:01 AM September 25, 2018      **Please close the encounter**      "

## 2018-09-26 ENCOUNTER — CARE COORDINATION (OUTPATIENT)
Dept: CARDIOLOGY | Facility: CLINIC | Age: 63
End: 2018-09-26

## 2018-09-26 DIAGNOSIS — I50.32 CHRONIC DIASTOLIC HEART FAILURE (H): ICD-10-CM

## 2018-09-26 RX ORDER — SPIRONOLACTONE 25 MG/1
25 TABLET ORAL DAILY
Qty: 90 TABLET | Refills: 3 | Status: SHIPPED | OUTPATIENT
Start: 2018-09-26 | End: 2018-10-30

## 2018-09-26 NOTE — PROGRESS NOTES
Called Jazz to check in. Labs drawn yesterday at PCP visit. Worsening creatinine noted. He has been referred to nephrology. Reviewed labs with Kiera Woodson NP [facility]. She suggests decreasing Spironolactone and rechecking labs in 2 weeks. Called Jazz to communicate this order.     Kecia Rodriguez RN   Date: 9/26/2018    Time of Call: 12:43 PM     Diagnosis:  Heart failure     [ VORB ] Ordering provider: Kiera Woodson NP  Order: Decrease Spironolactone to 25 mg daily. BMP in 2 weeks.      Order received by: Kecia Rodriguez RN      Follow-up/additional notes: [place] states understanding and agrees with plan of care.

## 2018-09-27 PROBLEM — H52.4 PRESBYOPIA: Status: RESOLVED | Noted: 2018-04-25 | Resolved: 2018-09-27

## 2018-09-27 PROBLEM — Z79.01 CURRENT USE OF LONG TERM ANTICOAGULATION: Status: RESOLVED | Noted: 2017-03-21 | Resolved: 2018-09-27

## 2018-09-27 PROBLEM — H11.153 PINGUECULA OF BOTH EYES: Status: RESOLVED | Noted: 2018-04-25 | Resolved: 2018-09-27

## 2018-09-27 PROBLEM — H52.203 ASTIGMATISM, BILATERAL: Status: RESOLVED | Noted: 2018-04-25 | Resolved: 2018-09-27

## 2018-10-01 ENCOUNTER — RADIANT APPOINTMENT (OUTPATIENT)
Dept: CARDIOLOGY | Facility: CLINIC | Age: 63
End: 2018-10-01
Attending: FAMILY MEDICINE
Payer: MEDICARE

## 2018-10-01 DIAGNOSIS — I50.32 CHRONIC DIASTOLIC HEART FAILURE (H): ICD-10-CM

## 2018-10-05 ASSESSMENT — ANXIETY QUESTIONNAIRES: GAD7 TOTAL SCORE: 0

## 2018-10-05 ASSESSMENT — PATIENT HEALTH QUESTIONNAIRE - PHQ9: SUM OF ALL RESPONSES TO PHQ QUESTIONS 1-9: 4

## 2018-10-10 DIAGNOSIS — Z01.89 LABORATORY PROCEDURE: Primary | ICD-10-CM

## 2018-10-10 DIAGNOSIS — I50.32 CHRONIC DIASTOLIC HEART FAILURE (H): ICD-10-CM

## 2018-10-10 LAB
ANION GAP SERPL CALCULATED.3IONS-SCNC: 8 MMOL/L (ref 3–14)
BUN SERPL-MCNC: 21 MG/DL (ref 7–30)
CALCIUM SERPL-MCNC: 9.2 MG/DL (ref 8.5–10.1)
CHLORIDE SERPL-SCNC: 108 MMOL/L (ref 94–109)
CO2 SERPL-SCNC: 28 MMOL/L (ref 20–32)
CREAT SERPL-MCNC: 1.39 MG/DL (ref 0.66–1.25)
GFR SERPL CREATININE-BSD FRML MDRD: 51 ML/MIN/1.7M2
GLUCOSE SERPL-MCNC: 113 MG/DL (ref 70–99)
POTASSIUM SERPL-SCNC: 4.3 MMOL/L (ref 3.4–5.3)
SODIUM SERPL-SCNC: 143 MMOL/L (ref 133–144)

## 2018-10-10 PROCEDURE — 80048 BASIC METABOLIC PNL TOTAL CA: CPT | Performed by: NURSE PRACTITIONER

## 2018-10-11 ENCOUNTER — CARE COORDINATION (OUTPATIENT)
Dept: CARDIOLOGY | Facility: CLINIC | Age: 63
End: 2018-10-11

## 2018-10-11 DIAGNOSIS — I50.32 CHRONIC DIASTOLIC HEART FAILURE (H): Primary | ICD-10-CM

## 2018-10-11 NOTE — PROGRESS NOTES
Called Jazz to review labs. He states he is feeling well. Weight has been stable and denies SOB. Creatinine trending down. Repeat labs in another 2 weeks.   Date: 10/11/2018    Time of Call: 1:14 PM     Diagnosis:  Heart failure     [ VORB ] Ordering provider: LUI Calvin  Order: BMP in 2 weeks     Order received by: Kecia Rodriguez RN      Follow-up/additional notes: patient states understanding and agrees with plan of care.   Kecia Rodriguez RN

## 2018-10-26 ENCOUNTER — MEDICAL CORRESPONDENCE (OUTPATIENT)
Dept: HEALTH INFORMATION MANAGEMENT | Facility: CLINIC | Age: 63
End: 2018-10-26

## 2018-10-26 DIAGNOSIS — C49.A2 MALIGNANT GASTROINTESTINAL STROMAL TUMOR (GIST) OF STOMACH (H): ICD-10-CM

## 2018-10-26 DIAGNOSIS — Z79.01 CURRENT USE OF LONG TERM ANTICOAGULATION: ICD-10-CM

## 2018-10-26 DIAGNOSIS — N40.0 BENIGN PROSTATIC HYPERPLASIA, UNSPECIFIED WHETHER LOWER URINARY TRACT SYMPTOMS PRESENT: ICD-10-CM

## 2018-10-26 DIAGNOSIS — I48.0 PAROXYSMAL ATRIAL FIBRILLATION (H): ICD-10-CM

## 2018-10-26 DIAGNOSIS — I51.7 CARDIOMEGALY: ICD-10-CM

## 2018-10-26 DIAGNOSIS — F33.0 MILD RECURRENT MAJOR DEPRESSION (H): ICD-10-CM

## 2018-10-26 DIAGNOSIS — Z00.00 LABORATORY EXAMINATION ORDERED AS PART OF A ROUTINE GENERAL MEDICAL EXAMINATION: Primary | ICD-10-CM

## 2018-10-26 DIAGNOSIS — K57.30 DIVERTICULOSIS OF LARGE INTESTINE WITHOUT HEMORRHAGE: ICD-10-CM

## 2018-10-26 DIAGNOSIS — G47.33 OSA (OBSTRUCTIVE SLEEP APNEA): ICD-10-CM

## 2018-10-26 DIAGNOSIS — E11.9 TYPE 2 DIABETES MELLITUS WITHOUT COMPLICATION, WITHOUT LONG-TERM CURRENT USE OF INSULIN (H): ICD-10-CM

## 2018-10-26 DIAGNOSIS — I10 HYPERTENSION, ESSENTIAL: ICD-10-CM

## 2018-10-26 LAB
ALBUMIN SERPL-MCNC: 3.6 G/DL (ref 3.4–5)
ALP SERPL-CCNC: 93 U/L (ref 40–150)
ALT SERPL W P-5'-P-CCNC: 22 U/L (ref 0–70)
ANION GAP SERPL CALCULATED.3IONS-SCNC: 6 MMOL/L (ref 3–14)
AST SERPL W P-5'-P-CCNC: 12 U/L (ref 0–45)
BASOPHILS # BLD AUTO: 0 10E9/L (ref 0–0.2)
BASOPHILS NFR BLD AUTO: 0.3 %
BILIRUB SERPL-MCNC: 0.5 MG/DL (ref 0.2–1.3)
BUN SERPL-MCNC: 26 MG/DL (ref 7–30)
CALCIUM SERPL-MCNC: 9.3 MG/DL (ref 8.5–10.1)
CHLORIDE SERPL-SCNC: 106 MMOL/L (ref 94–109)
CO2 SERPL-SCNC: 28 MMOL/L (ref 20–32)
CREAT SERPL-MCNC: 1.48 MG/DL (ref 0.66–1.25)
DIFFERENTIAL METHOD BLD: NORMAL
EOSINOPHIL # BLD AUTO: 0.1 10E9/L (ref 0–0.7)
EOSINOPHIL NFR BLD AUTO: 1.9 %
ERYTHROCYTE [DISTWIDTH] IN BLOOD BY AUTOMATED COUNT: 14 % (ref 10–15)
GFR SERPL CREATININE-BSD FRML MDRD: 48 ML/MIN/1.7M2
GLUCOSE SERPL-MCNC: 100 MG/DL (ref 70–99)
HCT VFR BLD AUTO: 44.8 % (ref 40–53)
HGB BLD-MCNC: 14.3 G/DL (ref 13.3–17.7)
IMM GRANULOCYTES # BLD: 0 10E9/L (ref 0–0.4)
IMM GRANULOCYTES NFR BLD: 0.3 %
LYMPHOCYTES # BLD AUTO: 1.6 10E9/L (ref 0.8–5.3)
LYMPHOCYTES NFR BLD AUTO: 25 %
MCH RBC QN AUTO: 30.4 PG (ref 26.5–33)
MCHC RBC AUTO-ENTMCNC: 31.9 G/DL (ref 31.5–36.5)
MCV RBC AUTO: 95 FL (ref 78–100)
MONOCYTES # BLD AUTO: 0.5 10E9/L (ref 0–1.3)
MONOCYTES NFR BLD AUTO: 7.3 %
NEUTROPHILS # BLD AUTO: 4.1 10E9/L (ref 1.6–8.3)
NEUTROPHILS NFR BLD AUTO: 65.2 %
NRBC # BLD AUTO: 0 10*3/UL
NRBC BLD AUTO-RTO: 0 /100
PLATELET # BLD AUTO: 224 10E9/L (ref 150–450)
POTASSIUM SERPL-SCNC: 4.4 MMOL/L (ref 3.4–5.3)
PROT SERPL-MCNC: 7.6 G/DL (ref 6.8–8.8)
RBC # BLD AUTO: 4.71 10E12/L (ref 4.4–5.9)
SODIUM SERPL-SCNC: 140 MMOL/L (ref 133–144)
WBC # BLD AUTO: 6.3 10E9/L (ref 4–11)

## 2018-10-26 PROCEDURE — 85025 COMPLETE CBC W/AUTO DIFF WBC: CPT | Performed by: INTERNAL MEDICINE

## 2018-10-26 PROCEDURE — 80053 COMPREHEN METABOLIC PANEL: CPT | Performed by: INTERNAL MEDICINE

## 2018-10-30 ENCOUNTER — CARE COORDINATION (OUTPATIENT)
Dept: CARDIOLOGY | Facility: CLINIC | Age: 63
End: 2018-10-30

## 2018-10-30 NOTE — PROGRESS NOTES
Reviewed labs with Kiera Woodson NP in [facility]. Creatinine still elevated. Called [facility] with following recommendation:    Date: 10/30/2018    Time of Call: 12:23 PM     Diagnosis:  Heart failure     [ VORB ] Ordering provider: Kiera Woodson NP  Order: Stop taking Spironolactone.      Order received by: Kecia Rodriguez RN      Follow-up/additional notes: follows up with nephrology in 2 weeks and they will recheck labs at that time.

## 2018-11-06 ENCOUNTER — TELEPHONE (OUTPATIENT)
Dept: FAMILY MEDICINE | Facility: CLINIC | Age: 63
End: 2018-11-06

## 2018-11-06 NOTE — TELEPHONE ENCOUNTER
Verify that the refill encounter hasn't been started Yes    [facility] phone call message- patient requesting a refill:    Full Medication Name: Viagara    Dose: 100mg tablet Take 100mg tablet as directed.     Pharmacy confirmed as   [facility]pharmacy #[id number] - [place], [place] - [address] [place] [address]  Phone: [phone] Fax: [phone]  : Yes    Medication tab checked to see if medication has been sent  Yes    Additional Comments: please refill and call patient once ready to .     OK to leave a message on voice mail? Yes    Advised patient refill may take up to 2 business days? Yes    Primary language: English      needed? No    Call taken on November 6, 2018 at 8:40 AM by Felicia Morales    Route to [facility] MED REFILL

## 2018-11-07 ENCOUNTER — DOCUMENTATION ONLY (OUTPATIENT)
Dept: FAMILY MEDICINE | Facility: CLINIC | Age: 63
End: 2018-11-07

## 2018-11-07 NOTE — PROGRESS NOTES
"When opening a documentation only encounter, be sure to enter in \"Chief Complaint\" Forms and in \" Comments\" Title of form, description if needed.    Jazz is a 63 year old  male  Form received via: Patient Drop Off  Form now resides in: Provider Ready      Form has been completed by provider.     Form sent out via: Fax to Hellen Larkin RN at Fax Number: [phone]  Patient informed: No  Output date: November 7, 2018    BHAVIK Bustillos 10:23 AM November 7, 2018    **Please close the encounter**      "

## 2018-11-09 ENCOUNTER — DOCUMENTATION ONLY (OUTPATIENT)
Dept: FAMILY MEDICINE | Facility: CLINIC | Age: 63
End: 2018-11-09

## 2018-11-09 NOTE — PROGRESS NOTES
"When opening a documentation only encounter, be sure to enter in \"Chief Complaint\" Forms and in \" Comments\" Title of form, description if needed.    Jazz is a 63 year old  male  Form received via: Fax  Form now resides in: Provider Ready      Form has been completed by provider.     Form sent out via: Fax to [employer] at Fax Number: [phone]  Patient informed: no  Output date: November 9, 2018    BHAVIK Bustillos 2:57 PM November 9, 2018    **Please close the encounter**      "

## 2018-11-09 NOTE — PROGRESS NOTES
"When opening a documentation only encounter, be sure to enter in \"Chief Complaint\" Forms and in \" Comments\" Title of form, description if needed.    Jazz is a 63 year old  male  Form received via: Fax  Form now resides in: Provider Ready      Form has been completed by provider.     Form sent out via: Fax to [employer] at Fax Number: [phone]  Patient informed: no  Output date: November 9, 2018    BHAVIK Bustillos 2:57 PM November 9, 2018    **Please close the encounter**  "

## 2018-11-12 DIAGNOSIS — N18.30 CKD (CHRONIC KIDNEY DISEASE) STAGE 3, GFR 30-59 ML/MIN (H): Primary | ICD-10-CM

## 2018-11-13 ENCOUNTER — DOCUMENTATION ONLY (OUTPATIENT)
Dept: FAMILY MEDICINE | Facility: CLINIC | Age: 63
End: 2018-11-13

## 2018-11-13 ENCOUNTER — TELEPHONE (OUTPATIENT)
Dept: PHARMACY | Facility: CLINIC | Age: 63
End: 2018-11-13

## 2018-11-13 NOTE — PROGRESS NOTES
"When opening a documentation only encounter, be sure to enter in \"Chief Complaint\" Forms and in \" Comments\" Title of form, description if needed.     Jazz is a 63 year old  male  Form received via: Fax  Form now resides in: Provider Ready        Form has been completed by provider.      Form sent out via: Fax to [employer] at Fax Number: [phone]  Patient informed: No  Output date: November 13, 2018     BHAVIK Bustillos 11:11 AM November 13, 2018     **Please close the encounter**     "

## 2018-11-13 NOTE — TELEPHONE ENCOUNTER
Verify that the refill encounter hasn't been started Yes    [facility] phone call message- patient requesting a refill:    Full Medication Name: sildenafil (VIAGRA) 100 MG tablet    Dose: Take 100mg tablet as directed.  -Rx prescribed via [employer] connection to [facility] program-     Pharmacy confirmed as   [facility]  : Yes    Medication tab checked to see if medication has been sent  Yes    Additional Comments:      OK to leave a message on voice mail? Yes    Advised patient refill may take up to 2 business days? Yes    Primary language: English      needed? No    Call taken on November 13, 2018 at 10:20 AM by Luz Celeste    Route to  [facility] MED REFILL

## 2018-11-13 NOTE — PROGRESS NOTES
"When opening a documentation only encounter, be sure to enter in \"Chief Complaint\" Forms and in \" Comments\" Title of form, description if needed.    Jazz is a 63 year old  male  Form received via: Fax  Form now resides in: Provider Ready      Form has been completed by provider.     Form sent out via: Fax to [employer] at Fax Number: [phone]  Patient informed: No  Output date: November 13, 2018    BHAVIK Bustillos 11:11 AM November 13, 2018    **Please close the encounter**      "

## 2018-11-13 NOTE — PROGRESS NOTES
"When opening a documentation only encounter, be sure to enter in \"Chief Complaint\" Forms and in \" Comments\" Title of form, description if needed.     Jazz is a 63 year old  male  Form received via: Fax  Form now resides in: Provider Ready        Form has been completed by provider.      Form sent out via: Fax to [employer] at Fax Number: [phone]  Patient informed: No  Output date: November 13, 2018     BHAVIK Bustillos 11:11 AM November 13, 2018     **Please close the encounter**     "

## 2018-11-14 ENCOUNTER — OFFICE VISIT (OUTPATIENT)
Dept: NEPHROLOGY | Facility: CLINIC | Age: 63
End: 2018-11-14
Attending: INTERNAL MEDICINE
Payer: MEDICARE

## 2018-11-14 VITALS
HEIGHT: 70 IN | OXYGEN SATURATION: 97 % | HEART RATE: 72 BPM | BODY MASS INDEX: 38.91 KG/M2 | WEIGHT: 271.8 LBS | SYSTOLIC BLOOD PRESSURE: 99 MMHG | DIASTOLIC BLOOD PRESSURE: 73 MMHG

## 2018-11-14 DIAGNOSIS — I48.0 PAROXYSMAL ATRIAL FIBRILLATION (H): ICD-10-CM

## 2018-11-14 DIAGNOSIS — E11.9 TYPE 2 DIABETES MELLITUS WITHOUT COMPLICATION, WITHOUT LONG-TERM CURRENT USE OF INSULIN (H): ICD-10-CM

## 2018-11-14 DIAGNOSIS — C49.A2 MALIGNANT GASTROINTESTINAL STROMAL TUMOR (GIST) OF STOMACH (H): ICD-10-CM

## 2018-11-14 DIAGNOSIS — N18.30 CKD (CHRONIC KIDNEY DISEASE) STAGE 3, GFR 30-59 ML/MIN (H): ICD-10-CM

## 2018-11-14 DIAGNOSIS — I50.32 CHRONIC DIASTOLIC HEART FAILURE (H): ICD-10-CM

## 2018-11-14 DIAGNOSIS — I95.2 HYPOTENSION DUE TO DRUGS: ICD-10-CM

## 2018-11-14 DIAGNOSIS — I10 HYPERTENSION, ESSENTIAL: ICD-10-CM

## 2018-11-14 DIAGNOSIS — E61.1 IRON DEFICIENCY: ICD-10-CM

## 2018-11-14 DIAGNOSIS — N18.30 CKD (CHRONIC KIDNEY DISEASE) STAGE 3, GFR 30-59 ML/MIN (H): Primary | ICD-10-CM

## 2018-11-14 LAB
ALBUMIN SERPL-MCNC: 3.6 G/DL (ref 3.4–5)
ALBUMIN UR-MCNC: NEGATIVE MG/DL
ANION GAP SERPL CALCULATED.3IONS-SCNC: 6 MMOL/L (ref 3–14)
APPEARANCE UR: CLEAR
BILIRUB UR QL STRIP: NEGATIVE
BUN SERPL-MCNC: 21 MG/DL (ref 7–30)
CALCIUM SERPL-MCNC: 9 MG/DL (ref 8.5–10.1)
CHLORIDE SERPL-SCNC: 106 MMOL/L (ref 94–109)
CO2 SERPL-SCNC: 29 MMOL/L (ref 20–32)
COLOR UR AUTO: YELLOW
CREAT SERPL-MCNC: 1.6 MG/DL (ref 0.66–1.25)
CREAT UR-MCNC: 111 MG/DL
DEPRECATED CALCIDIOL+CALCIFEROL SERPL-MC: 49 UG/L (ref 20–75)
ERYTHROCYTE [DISTWIDTH] IN BLOOD BY AUTOMATED COUNT: 13.3 % (ref 10–15)
FERRITIN SERPL-MCNC: 179 NG/ML (ref 26–388)
GFR SERPL CREATININE-BSD FRML MDRD: 44 ML/MIN/1.7M2
GLUCOSE SERPL-MCNC: 103 MG/DL (ref 70–99)
GLUCOSE UR STRIP-MCNC: NEGATIVE MG/DL
HCT VFR BLD AUTO: 42.9 % (ref 40–53)
HGB BLD-MCNC: 14.2 G/DL (ref 13.3–17.7)
HGB UR QL STRIP: NEGATIVE
HYALINE CASTS #/AREA URNS LPF: 4 /LPF (ref 0–2)
IRON SATN MFR SERPL: 26 % (ref 15–46)
IRON SERPL-MCNC: 52 UG/DL (ref 35–180)
KETONES UR STRIP-MCNC: NEGATIVE MG/DL
LEUKOCYTE ESTERASE UR QL STRIP: NEGATIVE
MCH RBC QN AUTO: 30.2 PG (ref 26.5–33)
MCHC RBC AUTO-ENTMCNC: 33.1 G/DL (ref 31.5–36.5)
MCV RBC AUTO: 91 FL (ref 78–100)
MUCOUS THREADS #/AREA URNS LPF: PRESENT /LPF
NITRATE UR QL: NEGATIVE
PH UR STRIP: 5 PH (ref 5–7)
PHOSPHATE SERPL-MCNC: 2.6 MG/DL (ref 2.5–4.5)
PLATELET # BLD AUTO: 218 10E9/L (ref 150–450)
POTASSIUM SERPL-SCNC: 4 MMOL/L (ref 3.4–5.3)
PROT UR-MCNC: 0.05 G/L
PROT/CREAT 24H UR: 0.05 G/G CR (ref 0–0.2)
PTH-INTACT SERPL-MCNC: 106 PG/ML (ref 18–80)
RBC # BLD AUTO: 4.7 10E12/L (ref 4.4–5.9)
RBC #/AREA URNS AUTO: 0 /HPF (ref 0–2)
SODIUM SERPL-SCNC: 141 MMOL/L (ref 133–144)
SOURCE: ABNORMAL
SP GR UR STRIP: 1.01 (ref 1–1.03)
TIBC SERPL-MCNC: 197 UG/DL (ref 240–430)
UROBILINOGEN UR STRIP-MCNC: 0 MG/DL (ref 0–2)
WBC # BLD AUTO: 6.5 10E9/L (ref 4–11)
WBC #/AREA URNS AUTO: <1 /HPF (ref 0–5)

## 2018-11-14 PROCEDURE — 84156 ASSAY OF PROTEIN URINE: CPT | Performed by: INTERNAL MEDICINE

## 2018-11-14 PROCEDURE — 83970 ASSAY OF PARATHORMONE: CPT | Performed by: INTERNAL MEDICINE

## 2018-11-14 PROCEDURE — 36415 COLL VENOUS BLD VENIPUNCTURE: CPT | Performed by: INTERNAL MEDICINE

## 2018-11-14 PROCEDURE — 83550 IRON BINDING TEST: CPT | Performed by: INTERNAL MEDICINE

## 2018-11-14 PROCEDURE — 80069 RENAL FUNCTION PANEL: CPT | Performed by: INTERNAL MEDICINE

## 2018-11-14 PROCEDURE — 81001 URINALYSIS AUTO W/SCOPE: CPT | Performed by: INTERNAL MEDICINE

## 2018-11-14 PROCEDURE — 82728 ASSAY OF FERRITIN: CPT | Performed by: INTERNAL MEDICINE

## 2018-11-14 PROCEDURE — 82306 VITAMIN D 25 HYDROXY: CPT | Performed by: INTERNAL MEDICINE

## 2018-11-14 PROCEDURE — 85027 COMPLETE CBC AUTOMATED: CPT | Performed by: INTERNAL MEDICINE

## 2018-11-14 PROCEDURE — G0463 HOSPITAL OUTPT CLINIC VISIT: HCPCS | Mod: ZF

## 2018-11-14 PROCEDURE — 83540 ASSAY OF IRON: CPT | Performed by: INTERNAL MEDICINE

## 2018-11-14 PROCEDURE — 83540 ASSAY OF IRON: CPT | Performed by: STUDENT IN AN ORGANIZED HEALTH CARE EDUCATION/TRAINING PROGRAM

## 2018-11-14 ASSESSMENT — PAIN SCALES - GENERAL: PAINLEVEL: NO PAIN (0)
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
## 2018-11-14 NOTE — PATIENT INSTRUCTIONS
1. Patient can likely be taken off metformin, given his good A1c and random glucose control. Please check with your PCP about this    2. Please stop taking your amlodipine.    3. We will call you about your iron levels, and if you can stop taking your iron pills (or cut it to once daily).     4. We will emily you on Monday to ask about your blood pressure readings. Please measure it morning and night.   Admission

## 2018-11-14 NOTE — MR AVS SNAPSHOT
After Visit Summary   11/14/2018    Jazz Berman    MRN: [id number]           Patient Information     Date Of Birth          1955        Visit Information        Provider Department      11/14/2018 3:00 PM Francisco Arriaza MD [facility]        Today's Diagnoses     Hypotension due to drugs    -  1    CKD (chronic kidney disease) stage 3, GFR 30-59 ml/min (H)        Iron deficiency          Care Instructions    1. Patient can likely be taken off metformin, given his good A1c and random glucose control.    2. Please stop taking your amlodipine.    3. We will call you about your iron levels, and if you can stop taking your iron pills (or cut it to once daily)     4. We will emily you on Monday to ask about your blood pressure readings. Please measure it morning and night.          Follow-ups after your visit        Follow-up notes from your care team     Return in about 3 months (around 2/14/2019).      Your next 10 appointments already scheduled     Dec 18, 2018  1:00 PM CST   Return Visit with Kenyon Singh MD   [facility] ([facility])    [address] [place] [address],  [address]  [facility] [address]   [phone]            Feb 13, 2019  2:30 PM CST   (Arrive by 2:00 PM)   Return Visit with Francisco Arriaza MD   [facility] ([facility])    [address] [place] [address]  [address]  [facility] [id number] [phone]            Feb 28, 2019 10:00 AM CST   (Arrive by 9:45 AM)   NEW HEART FAILURE with Florinda Ortez MD   [facility] ([facility])    [address] [place] [address]  [address]  [facility] [id number] [phone]            Mar 19, 2019  9:30 AM CDT   LAB with  LAB   [facility])    [address] [place] [address]  1st Floor  [facility] [id number] [phone]           Please do not eat 10-12 hours before your appointment if you are coming in fasting for labs on  lipids, cholesterol, or glucose (sugar). This does not apply to pregnant women. Water, hot tea and black coffee (with nothing added) are okay. Do not drink other fluids, diet soda or chew gum.            Mar 19, 2019 10:40 AM CDT   CT ABDOMEN PELVIS W/O & W CONTRAST with UCCT2   [facility] CT ([facility])    [address]  [facility] [address] [phone]           How do I prepare for my exam? (Food and drink instructions) To prepare: Do not eat or drink for 2 hours before your exam. If you need to take medicine, you may take it with small sips of water. (We may ask you to take liquid medicine as well.)  How do I prepare for my exam? (Other instructions) Please arrive 30 minutes early for your CT.  Once in the department you might be asked to drink water 15-20 minutes prior to your exam.  If indicated you may be asked to drink an oral contrast in advance of your CT.  If this is the case, the imaging team will let you know or be in contact with you prior to your appointment  Patients over 70 or patients with diabetes or kidney problems: If you haven t had a blood test (creatinine test) within the last 30 days, the Cardiologist/Radiologist may require you to get this test prior to your exam.  If you have diabetes:  Continue to take your metformin medication on the day of your exam  What should I wear: Please wear loose clothing, such as a sweat suit or jogging clothes. Avoid snaps, zippers and other metal. We may ask you to undress and put on a hospital gown.  How long does the exam take: Most scans take less than 20 minutes.  What should I bring: Please bring any scans or X-rays taken at other hospitals, if similar tests were done. Also bring a list of your medicines, including vitamins, minerals and over-the-counter drugs. It is safest to leave personal items at home.  Do I need a : No  is needed.  What do I need to tell my doctor? Be sure  to tell your doctor: * If you have any allergies. * If there s any chance you are pregnant. * If you are breastfeeding.  What should I do after the exam: No restrictions, You may resume normal activities.  What is this test: A CT (computed tomography) scan is a series of pictures that allows us to look inside your body. The scanner creates images of the body in cross sections, much like slices of bread. This helps us see any problems more clearly. You may receive contrast (X-ray dye) before or during your scan. You will be asked to drink the contrast.  Who should I call with questions: If you have any questions, please call the Imaging Department where you will have your exam. Directions, parking instructions, and other information is available on our website, [email or web address].            Mar 21, 2019 10:00 AM CDT   (Arrive by 9:45 AM)   Return Visit with Sean Freed MD   [facility] ([facility])    [address] [place] [address]  [address] [place] [id number] [phone]              Future tests that were ordered for you today     Open Future Orders        Priority Expected Expires Ordered    Kappa and lambda light chain Add-On  12/14/2018 11/14/2018            Who to contact     If you have questions or need follow up information about today's clinic visit or your schedule please contact [facility] directly at [phone].  Normal or non-critical lab and imaging results will be communicated to you by MyChart, letter or phone within 4 business days after the clinic has received the results. If you do not hear from us within 7 days, please contact the clinic through MyChart or phone. If you have a critical or abnormal lab result, we will notify you by phone as soon as possible.  Submit refill requests through [employer] or call your pharmacy and they will forward the refill request to us. Please allow 3 business days for your refill to be completed.  "         Additional Information About Your Visit        MyChart Information     [employer] gives you secure access to your electronic health record. If you see a primary care provider, you can also send messages to your care team and make appointments. If you have questions, please call your primary care clinic.  If you do not have a primary care provider, please call [phone] and they will assist you.        Care EveryWhere ID     This is your Care EveryWhere ID. This could be used by other organizations to access your [facility] medical records  [id number]        Your Vitals Were     Pulse Height Pulse Oximetry BMI (Body Mass Index)          72 1.778 m (5' 10\") 97% 39 kg/m2         Blood Pressure from Last 3 Encounters:   11/14/18 99/73   09/25/18 117/82   09/20/18 112/69    Weight from Last 3 Encounters:   11/14/18 123.3 kg (271 lb 12.8 oz)   09/25/18 120.7 kg (266 lb)   09/20/18 124.3 kg (274 lb)              We Performed the Following     FERRITIN     IRON AND IRON BINDING CAPACITY     Vital signs          Today's Medication Changes          These changes are accurate as of 11/14/18  4:05 PM.  If you have any questions, ask your nurse or doctor.               Stop taking these medicines if you haven't already. Please contact your care team if you have questions.     amLODIPine 10 MG tablet   Commonly known as:  NORVASC   Stopped by:  Francisco Arriaza MD                    Primary Care Provider Office Phone # Fax #    Kenyon Singh [phone]       [address] [address]        Equal Access to Services     [facility] AH: Hadii corey huitrono Sodestiny, waaxda luqadaha, qaybta kaalmada osmany, latia schilling. So [facility] [phone].    ATENCIÓN: Si habla español, tiene a tapia disposición servicios gratuitos de asistencia lingüística. Llame al [phone].    We comply with applicable federal civil rights laws and [place] laws. We do not " discriminate on the basis of race, color, national origin, age, disability, sex, sexual orientation, or gender identity.            Thank you!     Thank you for choosing [facility]  for your care. Our goal is always to provide you with excellent care. Hearing back from our patients is one way we can continue to improve our services. Please take a few minutes to complete the written survey that you may receive in the mail after your visit with us. Thank you!             Your Updated Medication List - Protect others around you: Learn how to safely use, store and throw away your medicines at www.disposemymeds.org.          This list is accurate as of 11/14/18  4:05 PM.  Always use your most recent med list.                   Brand Name Dispense Instructions for use Diagnosis    apixaban ANTICOAGULANT 5 MG tablet    ELIQUIS    180 tablet    Take 1 tablet (5 mg) by mouth 2 times daily    Paroxysmal atrial fibrillation (H)       ASPIRIN NOT PRESCRIBED    INTENTIONAL    1 each    Please choose reason not prescribed, below    Hypertension, essential, Type 2 diabetes mellitus without complication, without long-term current use of insulin (H)       blood glucose monitoring lancets     100 each    Use to test blood sugars 2 times daily or as directed.    Diabetes mellitus (H)       blood glucose monitoring test strip    no brand specified    100 each    Use to test blood sugars 2 times daily or as directed    Diabetes mellitus (H)       cycloSPORINE 0.05 % ophthalmic emulsion    RESTASIS    1 Box    Place 1 drop into both eyes 2 times daily    Dry eyes, bilateral, Punctate keratitis, bilateral       ferrous sulfate 325 (65 Fe) MG tablet    IRON    180 tablet    Take 1 tablet (325 mg) by mouth 2 times daily    Iron deficiency anemia, unspecified iron deficiency anemia type       furosemide 40 MG tablet    LASIX    90 tablet    TAKE 1 TABLET (40 MG) BY MOUTH DAILY    (HFpEF) heart failure with preserved ejection  fraction (H)       lisinopril 20 MG tablet    PRINIVIL/ZESTRIL    180 tablet    Take 2 tablets (40 mg) by mouth daily    (HFpEF) heart failure with preserved ejection fraction (H)       metFORMIN 500 MG tablet    GLUCOPHAGE    90 tablet    Take 1 tablet (500mg) every AM    Type 2 diabetes mellitus without complication, without long-term current use of insulin (H)       metoprolol succinate 100 MG 24 hr tablet    TOPROL-XL    180 tablet    Take 2 tablets (200 mg) by mouth daily    Essential hypertension with goal blood pressure less than 140/90       omeprazole 40 MG capsule    priLOSEC    90 capsule    Take 1 capsule (40 mg) by mouth daily Take 30-60 minutes before a meal.    Acute gastrointestinal hemorrhage       order for DME     1 Units    Equipment being ordered: BP cuff, large    Hypertension, essential       sildenafil 100 MG tablet    VIAGRA    30 tablet    Take 100mg tablet as directed.  -Rx prescribed via [employer] connection to care program-    Erectile dysfunction, unspecified erectile dysfunction type       solifenacin 10 MG tablet    VESICARE    90 tablet    Take 1 tablet (10 mg) by mouth daily AM    Urinary urgency       STATIN NOT PRESCRIBED (INTENTIONAL)      1 each daily Please choose reason not prescribed, below    Type 2 diabetes mellitus without complication, without long-term current use of insulin (H)       vitamin D 2000 units tablet     90 tablet    Take 2,000 Units by mouth daily    Vitamin D deficiency
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
## 2018-11-14 NOTE — NURSING NOTE
"Chief Complaint   Patient presents with     Consult     CKD stage 3     /72  Pulse 72  Ht 1.778 m (5' 10\")  Wt 123.3 kg (271 lb 12.8 oz)  SpO2 97%  BMI 39 kg/m2  Katlyn Pizano    "

## 2018-11-16 ENCOUNTER — TELEPHONE (OUTPATIENT)
Dept: FAMILY MEDICINE | Facility: CLINIC | Age: 63
End: 2018-11-16

## 2018-11-16 ASSESSMENT — ENCOUNTER SYMPTOMS
DYSURIA: 0
CHILLS: 0
DECREASED APPETITE: 0
BACK PAIN: 0
ALTERED MENTAL STATUS: 0
ABDOMINAL PAIN: 0
FEVER: 0
PALPITATIONS: 0
SHORTNESS OF BREATH: 0
COUGH: 0
FLANK PAIN: 0

## 2018-11-16 NOTE — TELEPHONE ENCOUNTER
Verify that the refill encounter hasn't been started Yes    [facility] phone call message- patient requesting a refill:    Full Medication Name: sildenafil (VIAGRA) 100 MG tablet    Dose:      Pharmacy confirmed as   [id number] - [place], [place] - [address] [place] [address]  Phone: [phone] Fax: [phone]  : Yes    Medication tab checked to see if medication has been sent  Yes    Additional Comments: Appt scheduled 11/30/18 w Pharm to complete paper and refill     OK to leave a message on voice mail? Yes    Advised patient refill may take up to 2 business days? Yes    Primary language: English      needed? No    Call taken on November 16, 2018 at 10:37 AM by Claire Dye to P [facility] MED REFILL

## 2018-11-20 ENCOUNTER — TELEPHONE (OUTPATIENT)
Dept: NEPHROLOGY | Facility: CLINIC | Age: 63
End: 2018-11-20

## 2018-11-20 NOTE — TELEPHONE ENCOUNTER
Rx request sent to [facility].  Will send medication in 7/10 days    Pt called and informed.  He will bring in reorder forms at next visit.     Jayson Hernandez Pharm.D.

## 2018-11-20 NOTE — TELEPHONE ENCOUNTER
Patient reports BP:   11/15: 111/73 and 111/86  11/16: 104/68 and 96/84  11/17: 121/83 and 113/78  11/18: 114/79 and 131/63  11/20: 120/96    Patient did stop taking amlodipine. Has no other complaints. Will send to Dr. Arriaza for reocmmendations and follow up.  Nancy Grant LPN  Nephrology  [phone]

## 2018-11-21 NOTE — PROGRESS NOTES
"Attestation:  This patient has been seen and evaluated by me, Allan Santana MD on November 20, 2018 .  Discussed with Dr Arriaza and agree with the findings and plan in this note.  I have reviewed  Medications, Vital Signs and Labs.  BP 99/73  Pulse 72  Ht 1.778 m (5' 10\")  Wt 123.3 kg (271 lb 12.8 oz)  SpO2 97%  BMI 39 kg/m2  Recent Results (from the past 672 hour(s))   Comprehensive metabolic panel    Collection Time: 10/26/18  9:40 AM   Result Value Ref Range    Sodium 140 133 - 144 mmol/L    Potassium 4.4 3.4 - 5.3 mmol/L    Chloride 106 94 - 109 mmol/L    Carbon Dioxide 28 20 - 32 mmol/L    Anion Gap 6 3 - 14 mmol/L    Glucose 100 (H) 70 - 99 mg/dL    Urea Nitrogen 26 7 - 30 mg/dL    Creatinine 1.48 (H) 0.66 - 1.25 mg/dL    GFR Estimate 48 (L) >60 mL/min/1.7m2    GFR Estimate If Black 58 (L) >60 mL/min/1.7m2    Calcium 9.3 8.5 - 10.1 mg/dL    Bilirubin Total 0.5 0.2 - 1.3 mg/dL    Albumin 3.6 3.4 - 5.0 g/dL    Protein Total 7.6 6.8 - 8.8 g/dL    Alkaline Phosphatase 93 40 - 150 U/L    ALT 22 0 - 70 U/L    AST 12 0 - 45 U/L   CBC with platelets differential    Collection Time: 10/26/18  9:40 AM   Result Value Ref Range    WBC 6.3 4.0 - 11.0 10e9/L    RBC Count 4.71 4.4 - 5.9 10e12/L    Hemoglobin 14.3 13.3 - 17.7 g/dL    Hematocrit 44.8 40.0 - 53.0 %    MCV 95 78 - 100 fl    MCH 30.4 26.5 - 33.0 pg    MCHC 31.9 31.5 - 36.5 g/dL    RDW 14.0 10.0 - 15.0 %    Platelet Count 224 150 - 450 10e9/L    Diff Method Automated Method     % Neutrophils 65.2 %    % Lymphocytes 25.0 %    % Monocytes 7.3 %    % Eosinophils 1.9 %    % Basophils 0.3 %    % Immature Granulocytes 0.3 %    Nucleated RBCs 0 0 /100    Absolute Neutrophil 4.1 1.6 - 8.3 10e9/L    Absolute Lymphocytes 1.6 0.8 - 5.3 10e9/L    Absolute Monocytes 0.5 0.0 - 1.3 10e9/L    Absolute Eosinophils 0.1 0.0 - 0.7 10e9/L    Absolute Basophils 0.0 0.0 - 0.2 10e9/L    Abs Immature Granulocytes 0.0 0 - 0.4 10e9/L    Absolute Nucleated RBC 0.0    Renal panel    " Collection Time: 11/14/18  1:29 PM   Result Value Ref Range    Sodium 141 133 - 144 mmol/L    Potassium 4.0 3.4 - 5.3 mmol/L    Chloride 106 94 - 109 mmol/L    Carbon Dioxide 29 20 - 32 mmol/L    Anion Gap 6 3 - 14 mmol/L    Glucose 103 (H) 70 - 99 mg/dL    Urea Nitrogen 21 7 - 30 mg/dL    Creatinine 1.60 (H) 0.66 - 1.25 mg/dL    GFR Estimate 44 (L) >60 mL/min/1.7m2    GFR Estimate If Black 53 (L) >60 mL/min/1.7m2    Calcium 9.0 8.5 - 10.1 mg/dL    Phosphorus 2.6 2.5 - 4.5 mg/dL    Albumin 3.6 3.4 - 5.0 g/dL   CBC with platelets    Collection Time: 11/14/18  1:29 PM   Result Value Ref Range    WBC 6.5 4.0 - 11.0 10e9/L    RBC Count 4.70 4.4 - 5.9 10e12/L    Hemoglobin 14.2 13.3 - 17.7 g/dL    Hematocrit 42.9 40.0 - 53.0 %    MCV 91 78 - 100 fl    MCH 30.2 26.5 - 33.0 pg    MCHC 33.1 31.5 - 36.5 g/dL    RDW 13.3 10.0 - 15.0 %    Platelet Count 218 150 - 450 10e9/L   Parathyroid Hormone Intact    Collection Time: 11/14/18  1:29 PM   Result Value Ref Range    Parathyroid Hormone Intact 106 (H) 18 - 80 pg/mL   Vitamin D Deficiency    Collection Time: 11/14/18  1:29 PM   Result Value Ref Range    Vitamin D Deficiency screening 49 20 - 75 ug/L   Iron and iron binding capacity    Collection Time: 11/14/18  1:29 PM   Result Value Ref Range    Iron 52 35 - 180 ug/dL    Iron Binding Cap 197 (L) 240 - 430 ug/dL    Iron Saturation Index 26 15 - 46 %   Ferritin    Collection Time: 11/14/18  1:29 PM   Result Value Ref Range    Ferritin 179 26 - 388 ng/mL   Protein  random urine with Creat Ratio    Collection Time: 11/14/18  1:39 PM   Result Value Ref Range    Protein Random Urine 0.05 g/L    Protein Total Urine g/gr Creatinine 0.05 0 - 0.2 g/g Cr   UA with Microscopic reflex to Culture    Collection Time: 11/14/18  1:39 PM   Result Value Ref Range    Color Urine Yellow     Appearance Urine Clear     Glucose Urine Negative NEG^Negative mg/dL    Bilirubin Urine Negative NEG^Negative    Ketones Urine Negative NEG^Negative mg/dL     Specific Gravity Urine 1.013 1.003 - 1.035    Blood Urine Negative NEG^Negative    pH Urine 5.0 5.0 - 7.0 pH    Protein Albumin Urine Negative NEG^Negative mg/dL    Urobilinogen mg/dL 0.0 0.0 - 2.0 mg/dL    Nitrite Urine Negative NEG^Negative    Leukocyte Esterase Urine Negative NEG^Negative    Source Midstream Urine     WBC Urine <1 0 - 5 /HPF    RBC Urine 0 0 - 2 /HPF    Mucous Urine Present (A) NEG^Negative /LPF    Hyaline Casts 4 (H) 0 - 2 /LPF   Creatinine urine calculation only    Collection Time: 11/14/18  1:39 PM   Result Value Ref Range    Creatinine Urine 111 mg/dL       Allan Santana MD  [facility]  Department of Medicine  Division of Renal Disease and Hypertension  Pager

## 2018-11-28 ENCOUNTER — TELEPHONE (OUTPATIENT)
Dept: FAMILY MEDICINE | Facility: CLINIC | Age: 63
End: 2018-11-28

## 2018-11-28 NOTE — TELEPHONE ENCOUNTER
PHARMACY TELEPHONE ENCOUNTER:    Reason: Pf[employer] connection to care program      VIagra arrived today. Pt has been called.  He will come in to .      Jayson Hernandez PharmJamalD.

## 2018-12-03 ENCOUNTER — DOCUMENTATION ONLY (OUTPATIENT)
Dept: FAMILY MEDICINE | Facility: CLINIC | Age: 63
End: 2018-12-03

## 2018-12-03 NOTE — PROGRESS NOTES
"When opening a documentation only encounter, be sure to enter in \"Chief Complaint\" Forms and in \" Comments\" Title of form, description if needed.    Jazz is a 63 year old  male  Form received via: Fax  Form now resides in: Provider Ready      Form has been completed by provider.     Form sent out via: Fax to Hellen Larkin RN at Fax Number: [phone]  Patient informed: No  Output date: December 3, 2018    BHAVIK Bustillos 5:25 PM December 3, 2018      **Please close the encounter**      "

## 2018-12-11 NOTE — TELEPHONE ENCOUNTER
Patient calling to check status of Pf[employer] form. Patient advised that he gave it to  staff a couple of weeks ago for Jayson to complete. Unable to locate form at .

## 2018-12-13 NOTE — TELEPHONE ENCOUNTER
Forms signed and sent to [employer] connection to care.  I have called and informed the patient.    Jayson Hernandez PharmJamalD.

## 2018-12-25 ENCOUNTER — MEDICAL CORRESPONDENCE (OUTPATIENT)
Dept: HEALTH INFORMATION MANAGEMENT | Facility: CLINIC | Age: 63
End: 2018-12-25

## 2018-12-31 ENCOUNTER — OFFICE VISIT (OUTPATIENT)
Dept: FAMILY MEDICINE | Facility: CLINIC | Age: 63
End: 2018-12-31
Payer: MEDICARE

## 2018-12-31 VITALS
TEMPERATURE: 97.5 F | SYSTOLIC BLOOD PRESSURE: 140 MMHG | DIASTOLIC BLOOD PRESSURE: 100 MMHG | OXYGEN SATURATION: 97 % | WEIGHT: 271.4 LBS | BODY MASS INDEX: 38.94 KG/M2 | HEART RATE: 54 BPM

## 2018-12-31 DIAGNOSIS — Z13.9 SCREENING FOR CONDITION: Primary | ICD-10-CM

## 2018-12-31 DIAGNOSIS — I10 HYPERTENSION, ESSENTIAL: ICD-10-CM

## 2018-12-31 LAB
BUN SERPL-MCNC: 17.4 MG/DL (ref 7–21)
CALCIUM SERPL-MCNC: 9.4 MG/DL (ref 8.5–10.1)
CHLORIDE SERPLBLD-SCNC: 100.6 MMOL/L (ref 98–110)
CO2 SERPL-SCNC: 25.5 MMOL/L (ref 20–32)
CREAT SERPL-MCNC: 1.3 MG/DL (ref 0.7–1.3)
GFR SERPL CREATININE-BSD FRML MDRD: 59.3 ML/MIN/1.7 M2
GLUCOSE SERPL-MCNC: 108.7 MG'DL (ref 70–99)
HBA1C MFR BLD: 5.6 % (ref 4.1–5.7)
POTASSIUM SERPL-SCNC: 3.7 MMOL/DL (ref 3.3–4.5)
SODIUM SERPL-SCNC: 137.7 MMOL/L (ref 132.6–141.4)

## 2018-12-31 RX ORDER — AMLODIPINE BESYLATE 2.5 MG/1
2.5 TABLET ORAL DAILY
Qty: 90 TABLET | Refills: 3 | Status: SHIPPED | OUTPATIENT
Start: 2018-12-31 | End: 2019-02-20

## 2018-12-31 NOTE — PROGRESS NOTES
Preceptor Attestation:   Patient seen, evaluated and discussed with the resident. I have verified the content of the note, which accurately reflects my assessment of the patient and the plan of care.   Supervising Physician:  Cami Gil MD

## 2018-12-31 NOTE — PATIENT INSTRUCTIONS
Here is the plan from today's visit    1. CKD  Patients repeat BMP this afternoon showed significant improvement in his renal function.  Appears holding metformin has seemed to resolve his kidney disease.  Plan ultimately is to decrease a dose of lisinopril however keep on board for renal detection.  He would likely benefit from antihypertensive therapy from optimizing dose of amlodipine.    - At this time he is advised to continue his current dose of Lasix, 40 mg p.o. daily.  - Basic Metabolic Panel ([facility])    2. Hypertension, essential  Previously at 10 mg of Norvasc, he had discontinued as per nephrology recommendations nearly 1-1/2 months ago however due to concerns of musculoskeletal pain related to cessation of Norvasc the patient had decided to continue.  Shortly after he was contacted by nephrology to confirm current regimen of stopping Norvasc, he decided to discontinue it as this is not a cause of his musculoskeletal pains.  He has been off amlodipine for approximately 1 month and has been checking his blood pressures at home which have ranged from 140s systolic to 160s systolic.  He did not bring a blood pressure log to clinic today however we will keep track of home blood pressure readings.  At this time will initiate Norvasc therapy at lowest dose and slowly titrate up with an ultimate goal to half the lisinopril dose.  - amLODIPine (NORVASC) 2.5 MG tablet; Take 1 tablet (2.5 mg) by mouth daily  Dispense: 90 tablet; Refill: 3    3. DM - 2 (documented)  Previously started on metformin in 2015 after an isolated hemoglobin A1c reading of 6.5.  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  He was initially started on 1000 mg twice daily and had slowly titrated down to 500 mg daily recently.  Hemoglobin A1c is always remained in the 5.5 range while  on metformin.  This is likely the cause of his abnormal creatinine as his labs has seemed to improve after cessation of metformin.    - At this time will stay off metformin  - Continue periodic blood glucose checks  - Hemoglobin A1c ([facility])      Please call or return to clinic if your symptoms don't go away.    Follow up plan  Please make a clinic appointment for follow up with your primary physician Kenyon Singh MD in 2-3 weeks for HTN review.    Thank you for coming to [facility] today.  Lab Testing:  **If you had lab testing today and your results are reassuring or normal they will be mailed to you or sent through [facility] within 7 days.   **If the lab tests need quick action we will call you with the results.  The phone number we will call with results is # [phone] (home) . If this is not the best number please call our clinic and change the number.  Medication Refills:  If you need any refills please call your pharmacy and they will contact us.   If you need to  your refill at a new pharmacy, please contact the new pharmacy directly. The new pharmacy will help you get your medications transferred faster.   Scheduling:  If you have any concerns about today's visit or wish to schedule another appointment please call our office during normal business hours [phone] (8-5:00 M-F)  If a referral was made to a [facility] and you don't get a call from central scheduling please call [phone].  If a Mammogram was ordered for you at [facility] call [phone] to schedule or change your appointment.  If you had an XRay/CT/Ultrasound/MRI ordered the number is [phone] to schedule or change your radiology appointment.   Medical Concerns:  If you have urgent medical concerns please call [phone] at any time of the day.    Kenyon Singh MD

## 2018-12-31 NOTE — PROGRESS NOTES
HPI       Jazz Berman is a 63 year old  who presents for   Chief Complaint   Patient presents with     Recheck Medication     BP meds     Patient is a 63-year-old gentleman who has a history of questionable type 2 diabetes, hypertension, chronic diastolic heart failure, erectile dysfunction, with a recent diagnosis of chronic kidney disease who is presenting to clinic for a follow-up visit from his previous clinic appointment.  Last time seen in [facility] several months ago and was noted to have an uptrending BUN and creatinine concerning.  He was then referred to nephrology who advised to discontinue metformin as he currently has a hemoglobin A1c less than 6.  Plan at that time was to discontinue metformin, amlodipine due to some concerns for symptomatic hypotension and continuing lisinopril and potentially decreasing if possible.    Diabetes Follow-up  <7.0    Patient is checking blood sugars: 2-3 times a week         -Last A1C was   Lab Results   Component Value Date    A1C 5.6 12/31/2018    A1C 5.5 01/08/2018    A1C 5.5 10/12/2017    A1C 5.8 06/07/2017    A1C 5.5 02/22/2017        Diabetic concerns: None    Chest Pain or exercise related calf pain (claudication):no     Symptoms of hypoglycemia (low blood sugar): none     Paresthesias (numbness or burning in feet) or sores: No     Diabetic eye exam within the last year?: Yes      Hypertension Follow-up    <140/90    Outpatient blood pressures are being checked at home.  Results are 140-160's.    Low Salt Diet: no added salt    Daily NSAID Use?no     Did patient take their HTN pills today?  Yes  Last Basic Metabolic Panel:  Lab Results   Component Value Date     11/14/2018      Lab Results   Component Value Date    POTASSIUM 4.0 11/14/2018     Lab Results   Component Value Date    CHLORIDE 106 11/14/2018     Lab Results   Component Value Date    NATALYA 9.0 11/14/2018     Lab Results   Component Value Date    CO2 29 11/14/2018     Lab Results    Component Value Date    BUN 21 11/14/2018     Lab Results   Component Value Date    CR 1.60 11/14/2018     Lab Results   Component Value Date     11/14/2018         Adherence and Exercise  Medication side effects: no  How often is a medication missed? Discontinued amlodipine as per Nephrologist 1.5 months ago for symptomatic hypotension.   Exercise:walking  4-5 days/week for an average of 15-30 minutes        +++++++      Problem, Medication and Allergy Lists were      Patient Active Problem List    Diagnosis Date Noted     Hypertension, essential 08/30/2012     Priority: High     Class: Chronic     Use large BP cuff       GREG (obstructive sleep apnea) on CPAP 08/30/2012     Priority: High     Class: Chronic     Type 2 diabetes mellitus without complication, without long-term current use of insulin (H) 10/12/2017     Priority: Medium     No retinopathy noted on 3/23/2018 eye exam.        Paroxysmal atrial fibrillation (H) 04/24/2017     Priority: Medium     Atrial fibrillation, rate controlled.       Anticoagulation goal of INR 2 to 3 03/21/2017     Priority: Medium     Chronic diastolic heart failure (H) 02/11/2017     Priority: Medium     HFpEF       GIST (gastrointestinal stromal tumor), malignant (H) 09/27/2016     Priority: Medium     recurrence risk is on the order of 10-15% over a few years per Oncology        Obesity 09/27/2016     Priority: Medium     Diverticulosis of large intestine 07/04/2016     Priority: Medium     Colonoscopy 7/2/16        Mild recurrent major depression (H) 09/05/2013     Priority: Medium     Class: Chronic     BPH (benign prostatic hyperplasia) 08/29/2013     Priority: Medium     Erectile dysfunction 07/10/2013     Priority: Medium     Body mass index (BMI) of 40.0-44.9 in adult (H) 08/30/2012     Priority: Medium     Class: Chronic   ,     Current Outpatient Medications   Medication Sig Dispense Refill     amLODIPine (NORVASC) 2.5 MG tablet Take 1 tablet (2.5 mg) by mouth  daily 90 tablet 3     apixaban ANTICOAGULANT (ELIQUIS) 5 MG tablet Take 5 mg by mouth 2 times daily       apixaban ANTICOAGULANT (ELIQUIS) 5 MG tablet Take 1 tablet (5 mg) by mouth 2 times daily 180 tablet 3     blood glucose monitoring (NO BRAND SPECIFIED) test strip Use to test blood sugars 2 times daily or as directed 100 each 3     blood glucose monitoring (ONE TOUCH DELICA) lancets Use to test blood sugars 2 times daily or as directed. 100 each 3     Cholecalciferol (VITAMIN D) 2000 UNITS tablet Take 2,000 Units by mouth daily 90 tablet 3     cycloSPORINE (RESTASIS) 0.05 % ophthalmic emulsion Place 1 drop into both eyes 2 times daily 1 Box 3     ferrous sulfate (IRON) 325 (65 FE) MG tablet Take 1 tablet (325 mg) by mouth 2 times daily 180 tablet 3     furosemide (LASIX) 40 MG tablet TAKE 1 TABLET (40 MG) BY MOUTH DAILY 90 tablet 3     lisinopril (PRINIVIL/ZESTRIL) 20 MG tablet Take 2 tablets (40 mg) by mouth daily 180 tablet 3     metoprolol succinate (TOPROL-XL) 100 MG 24 hr tablet Take 2 tablets (200 mg) by mouth daily 180 tablet 3     order for DME Equipment being ordered: BP cuff, large 1 Units 0     sildenafil (VIAGRA) 100 MG tablet Take 100mg tablet as directed.  -Rx prescribed via [employer] connection to care program- 30 tablet 3     solifenacin (VESICARE) 10 MG tablet Take 1 tablet (10 mg) by mouth daily AM 90 tablet 3     ASPIRIN NOT PRESCRIBED (INTENTIONAL) Please choose reason not prescribed, below (Patient not taking: Reported on 9/12/2018) 1 each 0     metFORMIN (GLUCOPHAGE) 500 MG tablet Take 1 tablet (500mg) every AM (Patient not taking: Reported on 12/18/2018) 90 tablet 1     omeprazole (PRILOSEC) 40 MG capsule Take 1 capsule (40 mg) by mouth daily Take 30-60 minutes before a meal. (Patient not taking: Reported on 12/31/2018) 90 capsule 3     STATIN NOT PRESCRIBED, INTENTIONAL, 1 each daily Please choose reason not prescribed, below (Patient not taking: Reported on 12/18/2018)     ,     Allergies    Allergen Reactions     Dye [Contrast Dye] Rash     Dye left skin hyperpigmentation on his back   .    Patient is   an established patient of this clinic.  Past Medical History:   Diagnosis Date     Arthritis      Atrial fibrillation (H)      BPH (benign prostatic hyperplasia) 2013     Cardiomegaly 2012     Crushing injury of foot 2012     Depressive disorder, not elsewhere classified 2012     Diabetes mellitus type 2 in obese (H)      Diverticulosis of large intestine 2016    Colonoscopy 16       Former smoker      Gastric mass      GI bleed      History of blood transfusion      Hypertension      Hypertension      Keloid 2012     GREG on CPAP      Family History   Problem Relation Age of Onset     Hypertension Father      Diabetes Mother      Colon Cancer No family hx of      Crohn's Disease No family hx of      Ulcerative Colitis No family hx of      Cancer No family hx of         no skin cancer     Glaucoma No family hx of      Macular Degeneration No family hx of      Social History     Socioeconomic History     Marital status: Single     Spouse name: None     Number of children: None     Years of education: None     Highest education level: None   Social Needs     Financial resource strain: None     Food insecurity - worry: None     Food insecurity - inability: None     Transportation needs - medical: None     Transportation needs - non-medical: None   Occupational History     None   Tobacco Use     Smoking status: Former Smoker     Years: 30.00     Types: Cigarettes     Last attempt to quit: 2011     Years since quittin.5     Smokeless tobacco: Never Used   Substance and Sexual Activity     Alcohol use: No     Alcohol/week: 0.0 oz     Comment: twice per week and 6 pack per sitting, quit about 6 months ago     Drug use: No     Comment: +marijuana and crack cocaine     Sexual activity: Yes   Other Topics Concern     Parent/sibling w/ CABG, MI or angioplasty before 65F  55M? Not Asked   Social History Narrative     None   .         Review of Systems:   Review of Systems  Skin: negative except as above  Respiratory: negative except as above  Cardiovascular: negative except as above  Gastrointestinal: negative except as above  Genitourinary: negative except as above  Musculoskeletal: negative except as above  Neurologic: negative except as above  Psychiatric: negative except as above  Hematologic/Lymphatic/Immunologic: negative except as above  Endocrine: negative except as above         Physical Exam:     Vitals:    12/31/18 1320 12/31/18 1323   BP: (!) 146/93 (!) 140/100   Pulse: 54    Temp: 97.5  F (36.4  C)    TempSrc: Oral    SpO2: 97%    Weight: 123.1 kg (271 lb 6.4 oz)      Body mass index is 38.94 kg/m .  Vital signs normal except HTN     Physical Exam  Constitutional: Oriented to person, place, and time. Appears well-developed and well-nourished.   HENT:   Head: Normocephalic and atraumatic.   Eyes: Conjunctivae are normal.   Neck: Normal range of motion.   Cardiovascular: Normal rate, regular rhythm, normal heart sounds and intact distal pulses.    Pulmonary/Chest: Effort normal and breath sounds normal. No respiratory distress. No wheezes.   Neurological: Normal sensory and motor function in upper and lower extremity.   Skin: Skin is warm and dry.   Psychiatric: Has a normal mood and affect. Behavior is normal.       Results:      Results from this visit  Results for orders placed or performed in visit on 12/31/18   Basic Metabolic Panel ([facility])   Result Value Ref Range    Urea Nitrogen 17.4 7.0 - 21.0 mg/dL    Calcium 9.4 8.5 - 10.1 mg/dL    Chloride 100.6 98.0 - 110.0 mmol/L    Carbon Dioxide 25.5 20.0 - 32.0 mmol/L    Creatinine 1.3 0.7 - 1.3 mg/dL    Glucose 108.7 (H) 70.0 - 99.0 mg'dL    Potassium 3.7 3.3 - 4.5 mmol/dL    Sodium 137.7 132.6 - 141.4 mmol/L    GFR Estimate 59.3 (L) >60.0 mL/min/1.7 m2    GFR Estimate If Black 71.7 >60.0 mL/min/1.7 m2   Hemoglobin A1c  ([facility])   Result Value Ref Range    Hemoglobin A1C 5.6 4.1 - 5.7 %       Assessment and Plan        1. CKD  Patients repeat BMP this afternoon showed significant improvement in his renal function.  Appears holding metformin has seemed to resolve his kidney disease.  Plan ultimately is to decrease a dose of lisinopril however keep on board for renal detection.  He would likely benefit from antihypertensive therapy from optimizing dose of amlodipine.    - At this time he is advised to continue his current dose of Lasix, 40 mg p.o. daily.  - Basic Metabolic Panel ([facility])    2. Hypertension, essential  Previously at 10 mg of Norvasc, he had discontinued as per nephrology recommendations nearly 1-1/2 months ago however due to concerns of musculoskeletal pain related to cessation of Norvasc the patient had decided to continue.  Shortly after he was contacted by nephrology to confirm current regimen of stopping Norvasc, he decided to discontinue it as this is not a cause of his musculoskeletal pains.  He has been off amlodipine for approximately 1 month and has been checking his blood pressures at home which have ranged from 140s systolic to 160s systolic.  He did not bring a blood pressure log to clinic today however we will keep track of home blood pressure readings.  At this time will initiate Norvasc therapy at lowest dose and slowly titrate up with an ultimate goal to half the lisinopril dose.  - amLODIPine (NORVASC) 2.5 MG tablet; Take 1 tablet (2.5 mg) by mouth daily  Dispense: 90 tablet; Refill: 3    3. DM - 2 (documented)  Previously started on metformin in 2015 after an isolated hemoglobin A1c reading of 6.5.  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  He was initially started on 1000 mg twice daily and had slowly titrated down to 500 mg daily  recently.  Hemoglobin A1c is always remained in the 5.5 range while on metformin.  This is likely the cause of his abnormal creatinine as his labs has seemed to improve after cessation of metformin.    - At this time will stay off metformin  - Continue periodic blood glucose checks  - Hemoglobin A1c ([facility])      Please call or return to clinic if your symptoms don't go away.    Follow up plan  Please make a clinic appointment for follow up with your primary physician Kenyon Singh MD in 2-3 weeks for HTN review.    Thank you for coming to [facility] today.     There are no discontinued medications.    Options for treatment and follow-up care were reviewed with the patient. Jazz Berman  engaged in the decision making process and verbalized understanding of the options discussed and agreed with the final plan.    Kenyon Singh MD

## 2019-01-02 DIAGNOSIS — I50.30 (HFPEF) HEART FAILURE WITH PRESERVED EJECTION FRACTION (H): ICD-10-CM

## 2019-01-02 RX ORDER — LISINOPRIL 20 MG/1
40 TABLET ORAL DAILY
Qty: 180 TABLET | Refills: 3 | Status: SHIPPED | OUTPATIENT
Start: 2019-01-02 | End: 2019-02-20

## 2019-01-02 NOTE — TELEPHONE ENCOUNTER

## 2019-01-17 DIAGNOSIS — E11.9 TYPE 2 DIABETES MELLITUS WITHOUT COMPLICATION, WITHOUT LONG-TERM CURRENT USE OF INSULIN (H): ICD-10-CM

## 2019-01-17 NOTE — TELEPHONE ENCOUNTER

## 2019-01-25 ENCOUNTER — DOCUMENTATION ONLY (OUTPATIENT)
Dept: FAMILY MEDICINE | Facility: CLINIC | Age: 64
End: 2019-01-25

## 2019-01-25 NOTE — PROGRESS NOTES
"When opening a documentation only encounter, be sure to enter in \"Chief Complaint\" Forms and in \" Comments\" Title of form, description if needed.    Jazz is a 64 year old  male  Form received via: Fax  Form now resides in: Provider Ready      Form has been completed by provider.     Form sent out via: Fax to Kecia Herrera RN, A at Fax Number: [phone]  Patient informed: No  Output date: January 25, 2019    BHAVIK Bustillos 7:52 AM January 25, 2019    **Please close the encounter**      "

## 2019-02-18 DIAGNOSIS — I10 HYPERTENSION, ESSENTIAL: Primary | ICD-10-CM

## 2019-02-20 ENCOUNTER — OFFICE VISIT (OUTPATIENT)
Dept: NEPHROLOGY | Facility: CLINIC | Age: 64
End: 2019-02-20
Attending: STUDENT IN AN ORGANIZED HEALTH CARE EDUCATION/TRAINING PROGRAM
Payer: MEDICARE

## 2019-02-20 ENCOUNTER — TELEPHONE (OUTPATIENT)
Dept: NEPHROLOGY | Facility: CLINIC | Age: 64
End: 2019-02-20

## 2019-02-20 VITALS
WEIGHT: 277.5 LBS | SYSTOLIC BLOOD PRESSURE: 137 MMHG | OXYGEN SATURATION: 98 % | HEART RATE: 52 BPM | BODY MASS INDEX: 39.82 KG/M2 | TEMPERATURE: 97.8 F | DIASTOLIC BLOOD PRESSURE: 93 MMHG

## 2019-02-20 DIAGNOSIS — I10 HYPERTENSION, ESSENTIAL: ICD-10-CM

## 2019-02-20 DIAGNOSIS — N18.30 CKD (CHRONIC KIDNEY DISEASE) STAGE 3, GFR 30-59 ML/MIN (H): ICD-10-CM

## 2019-02-20 DIAGNOSIS — N18.30 CKD (CHRONIC KIDNEY DISEASE) STAGE 3, GFR 30-59 ML/MIN (H): Primary | ICD-10-CM

## 2019-02-20 DIAGNOSIS — R09.89 LABILE HYPERTENSION: ICD-10-CM

## 2019-02-20 DIAGNOSIS — K21.9 GASTROESOPHAGEAL REFLUX DISEASE WITHOUT ESOPHAGITIS: ICD-10-CM

## 2019-02-20 DIAGNOSIS — I50.30 (HFPEF) HEART FAILURE WITH PRESERVED EJECTION FRACTION (H): ICD-10-CM

## 2019-02-20 LAB
ALBUMIN SERPL-MCNC: 3.4 G/DL (ref 3.4–5)
ANION GAP SERPL CALCULATED.3IONS-SCNC: 6 MMOL/L (ref 3–14)
BUN SERPL-MCNC: 19 MG/DL (ref 7–30)
CALCIUM SERPL-MCNC: 8.9 MG/DL (ref 8.5–10.1)
CHLORIDE SERPL-SCNC: 107 MMOL/L (ref 94–109)
CO2 SERPL-SCNC: 28 MMOL/L (ref 20–32)
CREAT SERPL-MCNC: 1.62 MG/DL (ref 0.66–1.25)
CREAT UR-MCNC: 140 MG/DL
GFR SERPL CREATININE-BSD FRML MDRD: 44 ML/MIN/{1.73_M2}
GLUCOSE SERPL-MCNC: 102 MG/DL (ref 70–99)
HGB BLD-MCNC: 15.1 G/DL (ref 13.3–17.7)
PHOSPHATE SERPL-MCNC: 3.1 MG/DL (ref 2.5–4.5)
POTASSIUM SERPL-SCNC: 3.8 MMOL/L (ref 3.4–5.3)
PROT UR-MCNC: 0.16 G/L
PROT/CREAT 24H UR: 0.11 G/G CR (ref 0–0.2)
SODIUM SERPL-SCNC: 141 MMOL/L (ref 133–144)

## 2019-02-20 PROCEDURE — 36415 COLL VENOUS BLD VENIPUNCTURE: CPT | Performed by: STUDENT IN AN ORGANIZED HEALTH CARE EDUCATION/TRAINING PROGRAM

## 2019-02-20 PROCEDURE — 80069 RENAL FUNCTION PANEL: CPT | Performed by: STUDENT IN AN ORGANIZED HEALTH CARE EDUCATION/TRAINING PROGRAM

## 2019-02-20 PROCEDURE — 83883 ASSAY NEPHELOMETRY NOT SPEC: CPT | Performed by: STUDENT IN AN ORGANIZED HEALTH CARE EDUCATION/TRAINING PROGRAM

## 2019-02-20 PROCEDURE — 00000402 ZZHCL STATISTIC TOTAL PROTEIN: Performed by: STUDENT IN AN ORGANIZED HEALTH CARE EDUCATION/TRAINING PROGRAM

## 2019-02-20 PROCEDURE — G0463 HOSPITAL OUTPT CLINIC VISIT: HCPCS | Mod: ZF

## 2019-02-20 PROCEDURE — 85018 HEMOGLOBIN: CPT | Performed by: STUDENT IN AN ORGANIZED HEALTH CARE EDUCATION/TRAINING PROGRAM

## 2019-02-20 PROCEDURE — 84156 ASSAY OF PROTEIN URINE: CPT | Performed by: STUDENT IN AN ORGANIZED HEALTH CARE EDUCATION/TRAINING PROGRAM

## 2019-02-20 PROCEDURE — 84165 PROTEIN E-PHORESIS SERUM: CPT | Performed by: STUDENT IN AN ORGANIZED HEALTH CARE EDUCATION/TRAINING PROGRAM

## 2019-02-20 RX ORDER — AMLODIPINE BESYLATE 2.5 MG/1
5 TABLET ORAL DAILY
Qty: 90 TABLET | Refills: 3 | Status: SHIPPED | OUTPATIENT
Start: 2019-02-20 | End: 2019-08-30

## 2019-02-20 RX ORDER — LISINOPRIL 20 MG/1
20 TABLET ORAL DAILY
Qty: 180 TABLET | Refills: 3 | Status: SHIPPED | OUTPATIENT
Start: 2019-02-20 | End: 2019-08-30

## 2019-02-20 ASSESSMENT — ENCOUNTER SYMPTOMS
FEVER: 0
ABDOMINAL PAIN: 0
PALPITATIONS: 0
FLANK PAIN: 0
BACK PAIN: 0
DYSURIA: 0
SHORTNESS OF BREATH: 0
COUGH: 0
ALTERED MENTAL STATUS: 0
DECREASED APPETITE: 0
CHILLS: 0

## 2019-02-20 NOTE — PROGRESS NOTES
Outpatient Nephrology Followup Note  February 20th, 2019       INTERVAL HISTORY:    He has been feeling well and had no ED visits since we last saw him. His BP has improved, and he was restarted on amlodipine on 12/31 due to sBP >140. He is also no longer taking metformin. He has no lower extremity swelling, and  reports no shortness of breath or chest pains.     ASSESSMENT AND RECOMMENDATIONS:   Assessment and Plan:   1. CKD G3aA1  Baseline Cr 1.1-1.4 in 2017, but has been 1.5-1.7 at the end of 2018. He has a history of DMT2, but his UA did not show protein and his quantification was 0.05g/g. His UA showed no WBC or RBC, making GN less likely. Four hyaline casts were noted on UA, and sp grav 1.013. He is taking lisinopril 40mg daily, along with omeprazole 40mg daily.   - At the last visit, we recommended stopping his amlodipine (which we felt would help to raise his BP, and reduce with his LE swelling.) We called his several days after his visit and his sBP were 120-130 (compared to 100-110 before the change). His amlodipine was restarted at the end of 2018 when his BPs were noted to be 140's. Cr improved to 1.3 when amlodipine was stopped, and is now 1.6. I do not feel his amlodipine is the culprit, nor do I feel his metformin was the reason for his Cr rise. His BP are now stable (we felt his hypotension was the reason for his Cr rise).   - FLC assay was ordered from his last visit, and added on today (although UPC ratio was 0.05g/g)  - He was unable to give us a urine sample today.   - We will reduce his lisinopril to 20mg daily,which would help us understand if his Cr rise can be attributed to hemodynamic changes in his kidney.   - We will stop his omeprazole, and start ranitidine.    2. Anemia  Hgb 15.1, stable. Last iron panel shows replete state (ferritin 179, iron 52, isat 26)  - Can consider reducing iron to 1 pill daily in the future.     3. Electrolytes  Na 141, K 3.8. Recently stopped spironolactone  and potassium is better.  - Lisinopril now being lowered to 20mg, watch for signs of hypokalemia     4. Acid-Base disturbances  HCO3 28, no anion gap    5. CKD-MB  Ca 8.9, Phos 3.1, Vit D 49,   - No overt signs of dysfunction.    6. DMT2, diet controlled  His last HbA1c was 5.5 (January 2018) , and his BG today is 102. He is off metformin. He denies any hyperglycemic episodes. If he had diabetes in the past, it appears to be diet controlled now and he does not have any proteinuria.    7. Blood pressure/Volume  BPs have been higher (140's) during the last 3 months since we saw Jazz. He remains on metoprolol, lasix, lisinopril, and amlodipine   - We will reduce his lisinopril to 20mg daily, and increase his amlodipine to 5mg daily. (Amlodipine can be increased to 10mg if well tolerated).  - We will contact [place] member for medical supplies. The patient reports that a home nurse comes to his house for BP measurements, but his BP cuff is too small and he gets erroneous measurements. I will send a letter to [facility] to establish a statement of need and necessity for the proper sized cuff.  - We will order an US with doppler to evaluate for JORGE given his labile blood pressures.    Follow up in 3 months    Attestation:  This patient has been seen and evaluated by me, Preethi Cuevas MD on 2/20/19 .  Discussed with the fellow or resident and agree with the findings and plan in this note.  I have reviewed Medications, Vital Signs and Labs as well as provider notes.    Preethi Cuevas MD  [facility]  Department of Medicine  Division of Renal Disease and Hypertension  [phone]       Reason for Visit:  Jazz Berman is a 63 year old male with CKD from unknown cause, who presents for CKD management.     HPI on Initial Encounter:  Patient is a 63 year olf -American male with a PMHx significant for HTN (on multiple medications), GREG, GIST (s/p resection), HFpEF (55-60%), DMT2  (last HbA1c 5.5, on low-dose metformin), who is presenting for CKD management.    Mr Cullen reports that his blood pressure has been low for the last few months (he reports that it can sometimes go ~90 during the day, but denies falls, syncope). He becomes lightheaded easily, and reports orthostatic symptoms. Patient reports stopping his spironolactone on 10/30 because his PCP felt that medication may have been worsening his CKD.    Patient reports leg swelling that worsens through the day, and appears to be forming in a non-pitting, dependent fashion exacerbated by activity (present during the day, gone when he sleeps). He is on lasix 40mg daily which is helping with the edema. He denies any difficulty breathing, or orthopnea.    He reports that he is controlling his diabetes through diet and exercise, and is currently on metformin 500mg daily. This metformin dose has been decreasing over the last year.    He is not taking NSAIDs. He is on a PPI for his history of GI tumor and acute GI bleeding.    He has no personal history of kidney stones, and no family history of stones. He has no family history of kidney disease, and no family members on dialysis.    Home BP: 120-130/70-75    Baseline Cr: 1.5-1.7         Kidney Disease and Medical Hx:       h/o HTN: No  Usual BP: 110/70       h/o DM: on metofmrin, but HbA1c 5.5       h/o Protein in Urine: No       h/o Blood in Urine: No       h/o Kidney Stones:No       h/o UTI  No       h/o Chronic NSAID Use: No         Previous Transplant Hx:      No         Uremic Symptoms:       Fatigue: No; Cold: No; Nausea: No; Poor Appetite: No; Metallic Taste: No; Edema: No; Pruritis: No; Tremor: No;    ROS:  Review of Systems   Constitution: Negative for chills, decreased appetite, fever and malaise/fatigue.   Cardiovascular: Negative for chest pain, leg swelling and palpitations.   Respiratory: Negative for cough and shortness of breath.    Skin: Negative for rash.   Musculoskeletal:  Negative for back pain.   Gastrointestinal: Negative for abdominal pain.   Genitourinary: Negative for dysuria and flank pain.   Psychiatric/Behavioral: Negative for altered mental status.     Active Medical Problems:  Patient Active Problem List   Diagnosis     Hypertension, essential     Body mass index (BMI) of 40.0-44.9 in adult (H)     GREG (obstructive sleep apnea) on CPAP     Erectile dysfunction     BPH (benign prostatic hyperplasia)     Mild recurrent major depression (H)     Diverticulosis of large intestine     GIST (gastrointestinal stromal tumor), malignant (H)     Obesity     Chronic diastolic heart failure (H)     Anticoagulation goal of INR 2 to 3     Paroxysmal atrial fibrillation (H)     Type 2 diabetes mellitus without complication, without long-term current use of insulin (H)     PAST MEDICAL HISTORY:  Reviewed with patient on 02/23/2019     MEDICATIONS:     Medication Sig Start Date End Date Taking? Authorizing Provider   amLODIPine (NORVASC) 10 MG tablet Take 1 tablet (10 mg) by mouth daily 2/27/18  Yes Dinesh Zarate MD   apixaban ANTICOAGULANT (ELIQUIS) 5 MG tablet Take 1 tablet (5 mg) by mouth 2 times daily 2/27/18  Yes Dinesh Zarate MD   blood glucose monitoring (NO BRAND SPECIFIED) test strip Use to test blood sugars 2 times daily or as directed 8/23/17  Yes Moses Lind MD   blood glucose monitoring (ONE TOUCH DELICA) lancets Use to test blood sugars 2 times daily or as directed. 8/23/17  Yes Zachary Batres MD   Cholecalciferol (VITAMIN D) 2000 UNITS tablet Take 2,000 Units by mouth daily 2/27/18  Yes Dinesh Zarate MD   cycloSPORINE (RESTASIS) 0.05 % ophthalmic emulsion Place 1 drop into both eyes 2 times daily 6/22/17  Yes Andie Hyde OD   ferrous sulfate (IRON) 325 (65 FE) MG tablet Take 1 tablet (325 mg) by mouth 2 times daily 2/27/18  Yes Dinesh Zarate MD   furosemide (LASIX) 40 MG tablet TAKE 1 TABLET (40 MG) BY MOUTH DAILY 2/27/18  Yes Dinesh Zarate  MD Marcos   lisinopril (PRINIVIL/ZESTRIL) 20 MG tablet Take 2 tablets (40 mg) by mouth daily 2/27/18  Yes Dinesh Zarate MD   metFORMIN (GLUCOPHAGE) 500 MG tablet Take 1 tablet (500mg) every AM 5/31/18  Yes Guadalupe Glasgow MD   methylPREDNISolone (MEDROL) 32 MG tablet Take 1 tablet (32 mg) by mouth daily Take one tablet 12 hrs prior to scan and 1 tablet 2 hrs prior to scan 9/14/18  Yes Sean rFeed MD   metoprolol succinate (TOPROL-XL) 100 MG 24 hr tablet Take 2 tablets (200 mg) by mouth daily 2/27/18  Yes Dinesh Zarate MD   omeprazole (PRILOSEC) 40 MG capsule Take 1 capsule (40 mg) by mouth daily Take 30-60 minutes before a meal. 2/27/18  Yes Dinesh Zarate MD   order for DME Equipment being ordered: BP cuff, large 12/17/15  Yes Moses Lind MD   sildenafil (VIAGRA) 100 MG tablet Take 100mg tablet as directed.  -Rx prescribed via [employer] connection to care program- 6/20/18  Yes Zachary Batres MD   solifenacin (VESICARE) 10 MG tablet Take 1 tablet (10 mg) by mouth daily AM 2/27/18  Yes Dinesh Zarate MD   ASPIRIN NOT PRESCRIBED (INTENTIONAL) Please choose reason not prescribed, below  Patient not taking: Reported on 9/12/2018 1/8/18   Page Anderson MD   STATIN NOT PRESCRIBED, INTENTIONAL, 1 each daily Please choose reason not prescribed, below  Patient not taking: Reported on 9/12/2018 10/12/17   Rachel Berrios MD      ALLERGIES:    Allergies   Allergen Reactions     Dye [Contrast Dye] Rash     Dye left skin hyperpigmentation on his back     PHYSICAL EXAM:     Vitals:  BP (!) 137/93   Pulse 52   Temp 97.8  F (36.6  C)   Wt 125.9 kg (277 lb 8 oz)   SpO2 98%   BMI 39.82 kg/m      GENERAL APPEARANCE: not in acute distress  EYES: no scleral icterus, pupils equal  Endo: no goiter, no moon facies  Lymphatics: no cervical or supraclavicular LAD  Pulmonary: lungs clear to auscultation with equal breath sounds bilaterally, no clubbing  CV: regular rhythm, normal rate, no rub   -  JVD no   - Edema non-pitting LE edema bilaterally  GI: soft, nontender, normal bowel sounds  MS: no evidence of inflammation in joints, no muscle tenderness  SKIN: no rash, warm, dry, no cyanosis  NEURO: face symmetric, no asterixis     IMAGING:  All imaging studies reviewed by me.

## 2019-02-20 NOTE — PATIENT INSTRUCTIONS
1. Please increase amlodipine to 5mg daily    2. Please decrease lisinopril to 20mg daily    3. Please stop taking omeprazole    4. Please start taking zantac daily.

## 2019-02-20 NOTE — TELEPHONE ENCOUNTER
[facility] Cuevas regarding BP cuff as patients current BP cuff is too small. Left VM. Provided number for call back.  Nancy Grant LPN  Nephrology  [phone]

## 2019-02-20 NOTE — LETTER
RE: Jazz Berman  [address]  [facility] [address]       Outpatient Nephrology Followup Note  February 20th, 2019       INTERVAL HISTORY:    He has been feeling well and had no ED visits since we last saw him. His BP has improved, and he was restarted on amlodipine on 12/31 due to sBP >140. He is also no longer taking metformin. He has no lower extremity swelling, and  reports no shortness of breath or chest pains.     ASSESSMENT AND RECOMMENDATIONS:   Assessment and Plan:   1. CKD G3aA1  Baseline Cr 1.1-1.4 in 2017, but has been 1.5-1.7 at the end of 2018. He has a history of DMT2, but his UA did not show protein and his quantification was 0.05g/g. His UA showed no WBC or RBC, making GN less likely. Four hyaline casts were noted on UA, and sp grav 1.013. He is taking lisinopril 40mg daily, along with omeprazole 40mg daily.   - At the last visit, we recommended stopping his amlodipine (which we felt would help to raise his BP, and reduce with his LE swelling.) We called his several days after his visit and his sBP were 120-130 (compared to 100-110 before the change). His amlodipine was restarted at the end of 2018 when his BPs were noted to be 140's. Cr improved to 1.3 when amlodipine was stopped, and is now 1.6. I do not feel his amlodipine is the culprit, nor do I feel his metformin was the reason for his Cr rise. His BP are now stable (we felt his hypotension was the reason for his Cr rise).   - FLC assay was ordered from his last visit, and added on today (although UPC ratio was 0.05g/g)  - He was unable to give us a urine sample today.   - We will reduce his lisinopril to 20mg daily,which would help us understand if his Cr rise can be attributed to hemodynamic changes in his kidney.   - We will stop his omeprazole, and start ranitidine.    2. Anemia  Hgb 15.1, stable. Last iron panel shows replete state (ferritin 179, iron 52, isat 26)  - Can consider reducing iron to 1 pill daily in the future.      3. Electrolytes  Na 141, K 3.8. Recently stopped spironolactone and potassium is better.  - Lisinopril now being lowered to 20mg, watch for signs of hypokalemia     4. Acid-Base disturbances  HCO3 28, no anion gap    5. CKD-MB  Ca 8.9, Phos 3.1, Vit D 49,   - No overt signs of dysfunction.    6. DMT2, diet controlled  His last HbA1c was 5.5 (January 2018) , and his BG today is 102. He is off metformin. He denies any hyperglycemic episodes. If he had diabetes in the past, it appears to be diet controlled now and he does not have any proteinuria.    7. Blood pressure/Volume  BPs have been higher (140's) during the last 3 months since we saw Jazz. He remains on metoprolol, lasix, lisinopril, and amlodipine   - We will reduce his lisinopril to 20mg daily, and increase his amlodipine to 5mg daily. (Amlodipine can be increased to 10mg if well tolerated).  - We will contact [place] member for medical supplies. The patient reports that a home nurse comes to his house for BP measurements, but his BP cuff is too small and he gets erroneous measurements. I will send a letter to the County to establish a statement of need and necessity for the proper sized cuff.  - We will order an US with doppler to evaluate for JORGE given his labile blood pressures.    Follow up in 3 months    Reason for Visit:  Jazz Berman is a 63 year old male with CKD from unknown cause, who presents for CKD management.     HPI on Initial Encounter:  Patient is a 63 year olf -American male with a PMHx significant for HTN (on multiple medications), GREG, GIST (s/p resection), HFpEF (55-60%), DMT2 (last HbA1c 5.5, on low-dose metformin), who is presenting for CKD management.    Mr Berman reports that his blood pressure has been low for the last few months (he reports that it can sometimes go ~90 during the day, but denies falls, syncope). He becomes lightheaded easily, and reports orthostatic symptoms. Patient reports stopping his  spironolactone on 10/30 because his PCP felt that medication may have been worsening his CKD.    Patient reports leg swelling that worsens through the day, and appears to be forming in a non-pitting, dependent fashion exacerbated by activity (present during the day, gone when he sleeps). He is on lasix 40mg daily which is helping with the edema. He denies any difficulty breathing, or orthopnea.    He reports that he is controlling his diabetes through diet and exercise, and is currently on metformin 500mg daily. This metformin dose has been decreasing over the last year.    He is not taking NSAIDs. He is on a PPI for his history of GI tumor and acute GI bleeding.    He has no personal history of kidney stones, and no family history of stones. He has no family history of kidney disease, and no family members on dialysis.    Home BP: 120-130/70-75    Baseline Cr: 1.5-1.7         Kidney Disease and Medical Hx:       h/o HTN: No  Usual BP: 110/70       h/o DM: on metofmrin, but HbA1c 5.5       h/o Protein in Urine: No       h/o Blood in Urine: No       h/o Kidney Stones:No       h/o UTI  No       h/o Chronic NSAID Use: No         Previous Transplant Hx:      No         Uremic Symptoms:       Fatigue: No; Cold: No; Nausea: No; Poor Appetite: No; Metallic Taste: No; Edema: No; Pruritis: No; Tremor: No;  Active Medical Problems:  Patient Active Problem List   Diagnosis     Hypertension, essential     Body mass index (BMI) of 40.0-44.9 in adult (H)     GREG (obstructive sleep apnea) on CPAP     Erectile dysfunction     BPH (benign prostatic hyperplasia)     Mild recurrent major depression (H)     Diverticulosis of large intestine     GIST (gastrointestinal stromal tumor), malignant (H)     Obesity     Chronic diastolic heart failure (H)     Anticoagulation goal of INR 2 to 3     Paroxysmal atrial fibrillation (H)     Type 2 diabetes mellitus without complication, without long-term current use of insulin (H)     PAST MEDICAL  HISTORY:  Reviewed with patient on 02/23/2019     MEDICATIONS:     Medication Sig Start Date End Date Taking? Authorizing Provider   amLODIPine (NORVASC) 10 MG tablet Take 1 tablet (10 mg) by mouth daily 2/27/18  Yes Dinesh Zarate MD   apixaban ANTICOAGULANT (ELIQUIS) 5 MG tablet Take 1 tablet (5 mg) by mouth 2 times daily 2/27/18  Yes Dinesh Zarate MD   blood glucose monitoring (NO BRAND SPECIFIED) test strip Use to test blood sugars 2 times daily or as directed 8/23/17  Yes Moses Lind MD   blood glucose monitoring (ONE TOUCH DELICA) lancets Use to test blood sugars 2 times daily or as directed. 8/23/17  Yes Zachary Batres MD   Cholecalciferol (VITAMIN D) 2000 UNITS tablet Take 2,000 Units by mouth daily 2/27/18  Yes Dinesh Zarate MD   cycloSPORINE (RESTASIS) 0.05 % ophthalmic emulsion Place 1 drop into both eyes 2 times daily 6/22/17  Yes Andie Hyde, JEAN   ferrous sulfate (IRON) 325 (65 FE) MG tablet Take 1 tablet (325 mg) by mouth 2 times daily 2/27/18  Yes Dinesh Zarate MD   furosemide (LASIX) 40 MG tablet TAKE 1 TABLET (40 MG) BY MOUTH DAILY 2/27/18  Yes Dinesh Zarate MD   lisinopril (PRINIVIL/ZESTRIL) 20 MG tablet Take 2 tablets (40 mg) by mouth daily 2/27/18  Yes Dinesh Zarate MD   metFORMIN (GLUCOPHAGE) 500 MG tablet Take 1 tablet (500mg) every AM 5/31/18  Yes Guadalupe Glasgow MD   methylPREDNISolone (MEDROL) 32 MG tablet Take 1 tablet (32 mg) by mouth daily Take one tablet 12 hrs prior to scan and 1 tablet 2 hrs prior to scan 9/14/18  Yes Sean Freed MD   metoprolol succinate (TOPROL-XL) 100 MG 24 hr tablet Take 2 tablets (200 mg) by mouth daily 2/27/18  Yes Dinesh Zarate MD   omeprazole (PRILOSEC) 40 MG capsule Take 1 capsule (40 mg) by mouth daily Take 30-60 minutes before a meal. 2/27/18  Yes Dinesh Zarate MD   order for DME Equipment being ordered: BP cuff, large 12/17/15  Yes Moses Lind MD   sildenafil (VIAGRA) 100 MG tablet Take 100mg  tablet as directed.  -Rx prescribed via [employer] connection to care program- 6/20/18  Yes Zachary Batres MD   solifenacin (VESICARE) 10 MG tablet Take 1 tablet (10 mg) by mouth daily AM 2/27/18  Yes Dinesh Zarate MD   ASPIRIN NOT PRESCRIBED (INTENTIONAL) Please choose reason not prescribed, below  Patient not taking: Reported on 9/12/2018 1/8/18   Page Anderson MD   STATIN NOT PRESCRIBED, INTENTIONAL, 1 each daily Please choose reason not prescribed, below  Patient not taking: Reported on 9/12/2018 10/12/17   Rachel Berrios MD      ALLERGIES:    Allergies   Allergen Reactions     Dye [Contrast Dye] Rash     Dye left skin hyperpigmentation on his back     PHYSICAL EXAM:     Vitals:  BP (!) 137/93   Pulse 52   Temp 97.8  F (36.6  C)   Wt 125.9 kg (277 lb 8 oz)   SpO2 98%   BMI 39.82 kg/m       GENERAL APPEARANCE: not in acute distress  EYES: no scleral icterus, pupils equal  Endo: no goiter, no moon facies  Lymphatics: no cervical or supraclavicular LAD  Pulmonary: lungs clear to auscultation with equal breath sounds bilaterally, no clubbing  CV: regular rhythm, normal rate, no rub   - JVD no   - Edema non-pitting LE edema bilaterally  GI: soft, nontender, normal bowel sounds  MS: no evidence of inflammation in joints, no muscle tenderness  SKIN: no rash, warm, dry, no cyanosis  NEURO: face symmetric, no asterixis     IMAGING:  All imaging studies reviewed by me.     Francisco Arriaza MD

## 2019-02-20 NOTE — NURSING NOTE
Chief Complaint   Patient presents with     RECHECK     CKD f/u     Blood pressure (!) 137/93, pulse 52, temperature 97.8  F (36.6  C), weight 125.9 kg (277 lb 8 oz), SpO2 98 %.    Mary Cordova, CMA

## 2019-02-21 LAB
ALBUMIN SERPL ELPH-MCNC: 3.9 G/DL (ref 3.7–5.1)
ALPHA1 GLOB SERPL ELPH-MCNC: 0.3 G/DL (ref 0.2–0.4)
ALPHA2 GLOB SERPL ELPH-MCNC: 0.7 G/DL (ref 0.5–0.9)
B-GLOBULIN SERPL ELPH-MCNC: 0.8 G/DL (ref 0.6–1)
GAMMA GLOB SERPL ELPH-MCNC: 1.3 G/DL (ref 0.7–1.6)
KAPPA LC UR-MCNC: 1.81 MG/DL (ref 0.33–1.94)
KAPPA LC/LAMBDA SER: 0.88 {RATIO} (ref 0.26–1.65)
LAMBDA LC SERPL-MCNC: 2.06 MG/DL (ref 0.57–2.63)
M PROTEIN SERPL ELPH-MCNC: 0 G/DL
PROT PATTERN SERPL ELPH-IMP: NORMAL

## 2019-02-23 ENCOUNTER — MEDICAL CORRESPONDENCE (OUTPATIENT)
Dept: HEALTH INFORMATION MANAGEMENT | Facility: CLINIC | Age: 64
End: 2019-02-23

## 2019-02-23 PROBLEM — R09.89 LABILE HYPERTENSION: Status: ACTIVE | Noted: 2019-02-23

## 2019-02-23 PROBLEM — N18.30 CKD (CHRONIC KIDNEY DISEASE) STAGE 3, GFR 30-59 ML/MIN (H): Status: ACTIVE | Noted: 2019-02-23

## 2019-02-25 ENCOUNTER — ANCILLARY PROCEDURE (OUTPATIENT)
Dept: ULTRASOUND IMAGING | Facility: CLINIC | Age: 64
End: 2019-02-25
Attending: STUDENT IN AN ORGANIZED HEALTH CARE EDUCATION/TRAINING PROGRAM
Payer: MEDICARE

## 2019-02-25 DIAGNOSIS — R09.89 LABILE HYPERTENSION: ICD-10-CM

## 2019-02-27 ENCOUNTER — OFFICE VISIT (OUTPATIENT)
Dept: FAMILY MEDICINE | Facility: CLINIC | Age: 64
End: 2019-02-27
Payer: MEDICARE

## 2019-02-27 VITALS
BODY MASS INDEX: 39.4 KG/M2 | WEIGHT: 274.6 LBS | HEART RATE: 66 BPM | DIASTOLIC BLOOD PRESSURE: 80 MMHG | SYSTOLIC BLOOD PRESSURE: 119 MMHG | OXYGEN SATURATION: 99 % | TEMPERATURE: 98.8 F

## 2019-02-27 DIAGNOSIS — N18.30 CKD (CHRONIC KIDNEY DISEASE) STAGE 3, GFR 30-59 ML/MIN (H): ICD-10-CM

## 2019-02-27 DIAGNOSIS — I10 HYPERTENSION, ESSENTIAL: Primary | ICD-10-CM

## 2019-02-27 NOTE — PATIENT INSTRUCTIONS
Here is the plan from today's visit    1. Hypertension, essential  -Continue Norvasc (5mg daily) and lisinopril (20mg daily) as prescribed.   - no salt added to diet  - Plan to increase exercise    2. CKD (chronic kidney disease) stage 3, GFR 30-59 ml/min (H)  - Continue lisinopril at 20mg daily   - Plan to recheck BMP in 1 month  - F/u with Nephrologist in 3 months.     Please call or return to clinic if your symptoms don't go away.    Follow up plan  Please make a clinic appointment for follow up with your primary physician Kenyon Singh MD in 1 month for repeat BMP.     Thank you for coming to [facility] today.  Lab Testing:  **If you had lab testing today and your results are reassuring or normal they will be mailed to you or sent through [employer] within 7 days.   **If the lab tests need quick action we will call you with the results.  The phone number we will call with results is # [phone] (home) . If this is not the best number please call our clinic and change the number.  Medication Refills:  If you need any refills please call your pharmacy and they will contact us.   If you need to  your refill at a new pharmacy, please contact the new pharmacy directly. The new pharmacy will help you get your medications transferred faster.   Scheduling:  If you have any concerns about today's visit or wish to schedule another appointment please call our office during normal business hours [phone] (8-5:00 M-F)  If a referral was made to a [facility] and you don't get a call from central scheduling please call [phone].  If a Mammogram was ordered for you at [facility] call [phone] to schedule or change your appointment.  If you had an XRay/CT/Ultrasound/MRI ordered the number is [phone] to schedule or change your radiology appointment.   Medical Concerns:  If you have urgent medical concerns please call [phone] at any time of the day.    Kenyon  MD Samantha

## 2019-02-27 NOTE — PROGRESS NOTES
RIGO Berman is a 64 year old  who presents for   Chief Complaint   Patient presents with     Hypertension     130/94 when patient checks BP at home       CKD Follow-up    CKD Stage 3A (GFR 51)   No evidence of microalbuminuria currently however increase noted in lab results over 2 months. Creatinine increased from 1.3 -> 1.6 and lisinopril was decreased from 40mg daily -> 20mg daily while increasing norvasc from 2.5mg -> 5mg per nephrologist.     Outpatient blood pressures are being checked at home.  Results are 130/93.    Low Salt Diet: no added salt    NSAID Use?no     Did patient take their HTN pills today?  Yes     Labs  Last Basic Metabolic Panel:    Last Renal Panel:  Sodium   Date Value Ref Range Status   02/20/2019 141 133 - 144 mmol/L Final     Potassium   Date Value Ref Range Status   02/20/2019 3.8 3.4 - 5.3 mmol/L Final     Chloride   Date Value Ref Range Status   02/20/2019 107 94 - 109 mmol/L Final     Carbon Dioxide   Date Value Ref Range Status   02/20/2019 28 20 - 32 mmol/L Final     Anion Gap   Date Value Ref Range Status   02/20/2019 6 3 - 14 mmol/L Final     Glucose   Date Value Ref Range Status   02/20/2019 102 (H) 70 - 99 mg/dL Final     Urea Nitrogen   Date Value Ref Range Status   02/20/2019 19 7 - 30 mg/dL Final     Creatinine   Date Value Ref Range Status   02/20/2019 1.62 (H) 0.66 - 1.25 mg/dL Final     GFR Estimate   Date Value Ref Range Status   02/20/2019 44 (L) >60 mL/min/[1.73_m2] Final     Comment:     Non  GFR Calc  Starting 12/18/2018, serum creatinine based estimated GFR (eGFR) will be   calculated using the Chronic Kidney Disease Epidemiology Collaboration   (CKD-EPI) equation.       Calcium   Date Value Ref Range Status   02/20/2019 8.9 8.5 - 10.1 mg/dL Final     Phosphorus   Date Value Ref Range Status   02/20/2019 3.1 2.5 - 4.5 mg/dL Final     Albumin   Date Value Ref Range Status   02/20/2019 3.4 3.4 - 5.0 g/dL Final     Hemoglobin   Date  Value Ref Range Status   02/20/2019 15.1 13.3 - 17.7 g/dL Final   11/14/2018 14.2 13.3 - 17.7 g/dL Final   ,   Hypertension Follow-up  <140/90 goal  Currently on 5mg of norvasc and tolerating well.     Outpatient blood pressures are being checked at home.  Results are 130-93.    Chest Pain? :No     Low Salt Diet: no added salt    Daily NSAID Use?No     Did patient take their HTN pills today/last night as usual?  Yes      Adherence and Exercise  Medication side effects: no  How often is a medication missed? Never  Exercise:walking - occasionally (waiting for winter to end).      +++++++    Problem, Medication and Allergy Lists were      Patient Active Problem List    Diagnosis Date Noted     Hypertension, essential 08/30/2012     Priority: High     Class: Chronic     Use large BP cuff       GREG (obstructive sleep apnea) on CPAP 08/30/2012     Priority: High     Class: Chronic     Labile hypertension 02/23/2019     Priority: Medium     CKD (chronic kidney disease) stage 3, GFR 30-59 ml/min (H) 02/23/2019     Priority: Medium     Type 2 diabetes mellitus without complication, without long-term current use of insulin (H) 10/12/2017     Priority: Medium     No retinopathy noted on 3/23/2018 eye exam.        Paroxysmal atrial fibrillation (H) 04/24/2017     Priority: Medium     Atrial fibrillation, rate controlled.       Anticoagulation goal of INR 2 to 3 03/21/2017     Priority: Medium     Chronic diastolic heart failure (H) 02/11/2017     Priority: Medium     HFpEF       GIST (gastrointestinal stromal tumor), malignant (H) 09/27/2016     Priority: Medium     recurrence risk is on the order of 10-15% over a few years per Oncology        Obesity 09/27/2016     Priority: Medium     Diverticulosis of large intestine 07/04/2016     Priority: Medium     Colonoscopy 7/2/16        Mild recurrent major depression (H) 09/05/2013     Priority: Medium     Class: Chronic     BPH (benign prostatic hyperplasia) 08/29/2013      Priority: Medium     Erectile dysfunction 07/10/2013     Priority: Medium     Body mass index (BMI) of 40.0-44.9 in adult (H) 08/30/2012     Priority: Medium     Class: Chronic   ,     Current Outpatient Medications   Medication Sig Dispense Refill     amLODIPine (NORVASC) 2.5 MG tablet Take 2 tablets (5 mg) by mouth daily 90 tablet 3     apixaban ANTICOAGULANT (ELIQUIS) 5 MG tablet Take 1 tablet (5 mg) by mouth 2 times daily 180 tablet 3     Cholecalciferol (VITAMIN D) 2000 UNITS tablet Take 2,000 Units by mouth daily 90 tablet 3     cycloSPORINE (RESTASIS) 0.05 % ophthalmic emulsion Place 1 drop into both eyes 2 times daily 1 Box 3     ferrous sulfate (IRON) 325 (65 FE) MG tablet Take 1 tablet (325 mg) by mouth 2 times daily 180 tablet 3     furosemide (LASIX) 40 MG tablet TAKE 1 TABLET (40 MG) BY MOUTH DAILY 90 tablet 3     lisinopril (PRINIVIL/ZESTRIL) 20 MG tablet Take 1 tablet (20 mg) by mouth daily 180 tablet 3     metoprolol succinate (TOPROL-XL) 100 MG 24 hr tablet Take 2 tablets (200 mg) by mouth daily 180 tablet 3     order for DME Equipment being ordered: BP cuff, large 1 Units 0     ranitidine (ZANTAC) 150 MG tablet Take 1 tablet (150 mg) by mouth daily 90 tablet 3     sildenafil (VIAGRA) 100 MG tablet Take 100mg tablet as directed.  -Rx prescribed via [facility] connection to care program- 30 tablet 3     solifenacin (VESICARE) 10 MG tablet Take 1 tablet (10 mg) by mouth daily AM 90 tablet 3     apixaban ANTICOAGULANT (ELIQUIS) 5 MG tablet Take 5 mg by mouth 2 times daily       ASPIRIN NOT PRESCRIBED (INTENTIONAL) Please choose reason not prescribed, below (Patient not taking: Reported on 9/12/2018) 1 each 0     blood glucose monitoring (NO BRAND SPECIFIED) test strip Use to test blood sugars 2 times daily or as directed (Patient not taking: Reported on 2/27/2019) 100 each 3     blood glucose monitoring (ONE TOUCH DELICA) lancets Use to test blood sugars 2 times daily or as directed. (Patient not taking:  Reported on 2019) 100 each 3     STATIN NOT PRESCRIBED, INTENTIONAL, 1 each daily Please choose reason not prescribed, below (Patient not taking: Reported on 2018)     ,     Allergies   Allergen Reactions     Dye [Contrast Dye] Rash     Dye left skin hyperpigmentation on his back   .    Patient is   an established patient of this clinic.  Past Medical History:   Diagnosis Date     Arthritis      Atrial fibrillation (H)      BPH (benign prostatic hyperplasia) 2013     Cardiomegaly 2012     Crushing injury of foot 2012     Depressive disorder, not elsewhere classified 2012     Diabetes mellitus type 2 in obese (H)      Diverticulosis of large intestine 2016    Colonoscopy 16       Former smoker      Gastric mass      GI bleed      History of blood transfusion      Hypertension      Hypertension      Keloid 2012     GREG on CPAP      Family History   Problem Relation Age of Onset     Hypertension Father      Diabetes Mother      Colon Cancer No family hx of      Crohn's Disease No family hx of      Ulcerative Colitis No family hx of      Cancer No family hx of         no skin cancer     Glaucoma No family hx of      Macular Degeneration No family hx of      Social History     Socioeconomic History     Marital status: Single     Spouse name: None     Number of children: None     Years of education: None     Highest education level: None   Occupational History     None   Social Needs     Financial resource strain: None     Food insecurity:     Worry: None     Inability: None     Transportation needs:     Medical: None     Non-medical: None   Tobacco Use     Smoking status: Former Smoker     Years: 30.00     Types: Cigarettes     Last attempt to quit: 2011     Years since quittin.7     Smokeless tobacco: Never Used   Substance and Sexual Activity     Alcohol use: No     Alcohol/week: 0.0 oz     Comment: twice per week and 6 pack per sitting, quit about 6 months ago      Drug use: No     Comment: +marijuana and crack cocaine     Sexual activity: Yes   Lifestyle     Physical activity:     Days per week: None     Minutes per session: None     Stress: None   Relationships     Social connections:     Talks on phone: None     Gets together: None     Attends Gnosticism service: None     Active member of club or organization: None     Attends meetings of clubs or organizations: None     Relationship status: None     Intimate partner violence:     Fear of current or ex partner: None     Emotionally abused: None     Physically abused: None     Forced sexual activity: None   Other Topics Concern     Parent/sibling w/ CABG, MI or angioplasty before 65F 55M? Not Asked   Social History Narrative     None   .         Review of Systems:   Review of Systems  Skin: negative except as above  Respiratory: negative except as above  Cardiovascular: negative except as above  Gastrointestinal: negative except as above  Genitourinary: negative except as above  Musculoskeletal: negative except as above  Neurologic: negative except as above  Psychiatric: negative except as above  Hematologic/Lymphatic/Immunologic: negative except as above  Endocrine: negative except as above         Physical Exam:     Vitals:    02/27/19 1452   BP: 119/80   Pulse: 66   Temp: 98.8  F (37.1  C)   TempSrc: Oral   SpO2: 99%   Weight: 124.6 kg (274 lb 9.6 oz)     Body mass index is 39.4 kg/m .  Vitals were reviewed and were normal     Physical Exam  Constitutional: Oriented to person, place, and time. Appears well-developed and well-nourished.   Cardiovascular: Normal rate, regular rhythm, normal heart sounds and intact distal pulses.    Pulmonary/Chest: Effort normal and breath sounds normal. No respiratory distress. No wheezes.   Musculoskeletal: Normal range of motion. Mild LLE nonpitting edema (chronic from hx of surgery), No RLE edema noted.   Neurological: Alert and oriented to person, place, and time.   Skin: Skin is warm  and dry.   Psychiatric: Has a normal mood and affect. Behavior is normal.       Results:     No labs taken today    Assessment and Plan        1. Hypertension, essential  Well controlled with current regimen. No evidence of hypotension at this time.   -Continue Norvasc (5mg daily) and lisinopril (20mg daily) as prescribed.   - no salt added to diet  - Plan to increase exercise    2. CKD (chronic kidney disease) stage 3A, GFR 30-59 ml/min  Last creatinine checked 2 days ago and increased from 1.3 -> 1.6 and noted to have slight increase in urine protein as well with is consistent with potential progression of CKD. Lisinopril was decreased same day from 40mg daily. Now that HTN is controlled would expect future labs to remain stable.   - Continue lisinopril at 20mg daily   - F/u with Nephrologist in 3 months.  - Will plan to recheck BMP in 1 month.      Please call or return to clinic if your symptoms don't go away.    Follow up plan  Please make a clinic appointment for follow up with your primary physician Kenyon Singh MD in 1 month for repeat BMP.     Thank you for coming to [facility] today.       There are no discontinued medications.    Options for treatment and follow-up care were reviewed with the patient. Jazz Berman  engaged in the decision making process and verbalized understanding of the options discussed and agreed with the final plan.    Kenyon Singh MD

## 2019-02-27 NOTE — PROGRESS NOTES
Preceptor Attestation:   Patient seen, evaluated and discussed with the resident. I have verified the content of the note, which accurately reflects my assessment of the patient and the plan of care.   Supervising Physician:  Inez Gibbons MD

## 2019-02-28 ENCOUNTER — DOCUMENTATION ONLY (OUTPATIENT)
Dept: FAMILY MEDICINE | Facility: CLINIC | Age: 64
End: 2019-02-28

## 2019-02-28 ENCOUNTER — DOCUMENTATION ONLY (OUTPATIENT)
Dept: CARE COORDINATION | Facility: CLINIC | Age: 64
End: 2019-02-28

## 2019-02-28 NOTE — PROGRESS NOTES
"When opening a documentation only encounter, be sure to enter in \"Chief Complaint\" Forms and in \" Comments\" Title of form, description if needed.    Al is a 64 year old  male  Form received via: Fax  Form now resides in: Provider Ready    BHAVIK Bustillos 1:15 PM February 28, 2019    Form has been completed by provider.     Form sent out via: Fax to Kecia Herrera RN at Fax Number: [phone]  Patient informed: No  Output date: March 7, 2019    BHAVIK Bustillos 9:56 AM March 7, 2019    **Please close the encounter**                        "

## 2019-03-04 ENCOUNTER — CARE COORDINATION (OUTPATIENT)
Dept: CARDIOLOGY | Facility: CLINIC | Age: 64
End: 2019-03-04

## 2019-03-04 DIAGNOSIS — I48.0 PAROXYSMAL ATRIAL FIBRILLATION (H): ICD-10-CM

## 2019-03-04 DIAGNOSIS — I50.32 CHRONIC DIASTOLIC HEART FAILURE (H): Primary | ICD-10-CM

## 2019-03-05 DIAGNOSIS — E55.9 VITAMIN D DEFICIENCY: ICD-10-CM

## 2019-03-05 NOTE — TELEPHONE ENCOUNTER

## 2019-03-06 RX ORDER — CHOLECALCIFEROL (VITAMIN D3) 50 MCG
2000 TABLET ORAL DAILY
Qty: 90 TABLET | Refills: 3 | Status: SHIPPED | OUTPATIENT
Start: 2019-03-06 | End: 2020-03-12

## 2019-03-07 ENCOUNTER — DOCUMENTATION ONLY (OUTPATIENT)
Dept: FAMILY MEDICINE | Facility: CLINIC | Age: 64
End: 2019-03-07

## 2019-03-07 NOTE — PROGRESS NOTES
"When opening a documentation only encounter, be sure to enter in \"Chief Complaint\" Forms and in \" Comments\" Title of form, description if needed.    Al is a 64 year old  male  Form received via: Fax  Form now resides in: Provider Ready      Form has been completed by provider.     Form sent out via: Fax to Kecia Herrera RN at Fax Number: [phone]  Patient informed: No  Output date: March 7, 2019    BHAVIK Bustillos 10:00 AM March 7, 2019    **Please close the encounter**              "

## 2019-03-12 DIAGNOSIS — D50.9 IRON DEFICIENCY ANEMIA, UNSPECIFIED IRON DEFICIENCY ANEMIA TYPE: ICD-10-CM

## 2019-03-12 NOTE — TELEPHONE ENCOUNTER

## 2019-03-13 ENCOUNTER — OFFICE VISIT (OUTPATIENT)
Dept: CARDIOLOGY | Facility: CLINIC | Age: 64
End: 2019-03-13
Attending: INTERNAL MEDICINE
Payer: MEDICARE

## 2019-03-13 VITALS
SYSTOLIC BLOOD PRESSURE: 136 MMHG | HEART RATE: 65 BPM | OXYGEN SATURATION: 98 % | HEIGHT: 70 IN | BODY MASS INDEX: 39.4 KG/M2 | WEIGHT: 275.2 LBS | DIASTOLIC BLOOD PRESSURE: 92 MMHG

## 2019-03-13 DIAGNOSIS — I50.30 HEART FAILURE WITH PRESERVED EJECTION FRACTION, BORDERLINE, CLASS II (H): ICD-10-CM

## 2019-03-13 DIAGNOSIS — I48.0 PAROXYSMAL ATRIAL FIBRILLATION (H): ICD-10-CM

## 2019-03-13 DIAGNOSIS — N18.30 CKD (CHRONIC KIDNEY DISEASE) STAGE 3, GFR 30-59 ML/MIN (H): ICD-10-CM

## 2019-03-13 DIAGNOSIS — E78.2 MIXED HYPERLIPIDEMIA: ICD-10-CM

## 2019-03-13 DIAGNOSIS — I10 BENIGN ESSENTIAL HYPERTENSION: ICD-10-CM

## 2019-03-13 DIAGNOSIS — I48.19 PERSISTENT ATRIAL FIBRILLATION (H): Primary | ICD-10-CM

## 2019-03-13 DIAGNOSIS — I50.32 CHRONIC DIASTOLIC HEART FAILURE (H): ICD-10-CM

## 2019-03-13 LAB
ALBUMIN SERPL-MCNC: 3.4 G/DL (ref 3.4–5)
ALBUMIN UR-MCNC: NEGATIVE MG/DL
ALP SERPL-CCNC: 97 U/L (ref 40–150)
ALT SERPL W P-5'-P-CCNC: 20 U/L (ref 0–70)
ANION GAP SERPL CALCULATED.3IONS-SCNC: 4 MMOL/L (ref 3–14)
APPEARANCE UR: CLEAR
AST SERPL W P-5'-P-CCNC: 15 U/L (ref 0–45)
BACTERIA #/AREA URNS HPF: ABNORMAL /HPF
BILIRUB SERPL-MCNC: 0.6 MG/DL (ref 0.2–1.3)
BILIRUB UR QL STRIP: NEGATIVE
BUN SERPL-MCNC: 18 MG/DL (ref 7–30)
CALCIUM SERPL-MCNC: 8.8 MG/DL (ref 8.5–10.1)
CHLORIDE SERPL-SCNC: 108 MMOL/L (ref 94–109)
CO2 SERPL-SCNC: 30 MMOL/L (ref 20–32)
COLOR UR AUTO: ABNORMAL
CREAT SERPL-MCNC: 1.24 MG/DL (ref 0.66–1.25)
ERYTHROCYTE [DISTWIDTH] IN BLOOD BY AUTOMATED COUNT: 13.7 % (ref 10–15)
GFR SERPL CREATININE-BSD FRML MDRD: 61 ML/MIN/{1.73_M2}
GLUCOSE SERPL-MCNC: 92 MG/DL (ref 70–99)
GLUCOSE UR STRIP-MCNC: NEGATIVE MG/DL
HCT VFR BLD AUTO: 46.5 % (ref 40–53)
HGB BLD-MCNC: 15.6 G/DL (ref 13.3–17.7)
HGB UR QL STRIP: NEGATIVE
KETONES UR STRIP-MCNC: NEGATIVE MG/DL
LEUKOCYTE ESTERASE UR QL STRIP: NEGATIVE
MCH RBC QN AUTO: 30.2 PG (ref 26.5–33)
MCHC RBC AUTO-ENTMCNC: 33.5 G/DL (ref 31.5–36.5)
MCV RBC AUTO: 90 FL (ref 78–100)
MUCOUS THREADS #/AREA URNS LPF: PRESENT /LPF
NITRATE UR QL: NEGATIVE
NT-PROBNP SERPL-MCNC: 1089 PG/ML (ref 0–125)
PH UR STRIP: 5 PH (ref 5–7)
PLATELET # BLD AUTO: 195 10E9/L (ref 150–450)
POTASSIUM SERPL-SCNC: 4.2 MMOL/L (ref 3.4–5.3)
PROT SERPL-MCNC: 7.5 G/DL (ref 6.8–8.8)
RBC # BLD AUTO: 5.17 10E12/L (ref 4.4–5.9)
RBC #/AREA URNS AUTO: <1 /HPF (ref 0–2)
SODIUM SERPL-SCNC: 141 MMOL/L (ref 133–144)
SOURCE: ABNORMAL
SP GR UR STRIP: 1.01 (ref 1–1.03)
UROBILINOGEN UR STRIP-MCNC: 0 MG/DL (ref 0–2)
WBC # BLD AUTO: 6.2 10E9/L (ref 4–11)
WBC #/AREA URNS AUTO: 1 /HPF (ref 0–5)

## 2019-03-13 PROCEDURE — 80053 COMPREHEN METABOLIC PANEL: CPT | Performed by: INTERNAL MEDICINE

## 2019-03-13 PROCEDURE — G0463 HOSPITAL OUTPT CLINIC VISIT: HCPCS | Mod: ZF

## 2019-03-13 PROCEDURE — 81001 URINALYSIS AUTO W/SCOPE: CPT | Performed by: STUDENT IN AN ORGANIZED HEALTH CARE EDUCATION/TRAINING PROGRAM

## 2019-03-13 PROCEDURE — 36415 COLL VENOUS BLD VENIPUNCTURE: CPT | Performed by: INTERNAL MEDICINE

## 2019-03-13 PROCEDURE — 83880 ASSAY OF NATRIURETIC PEPTIDE: CPT | Performed by: INTERNAL MEDICINE

## 2019-03-13 PROCEDURE — 85027 COMPLETE CBC AUTOMATED: CPT | Performed by: INTERNAL MEDICINE

## 2019-03-13 PROCEDURE — 99205 OFFICE O/P NEW HI 60 MIN: CPT | Mod: ZP | Performed by: INTERNAL MEDICINE

## 2019-03-13 RX ORDER — FERROUS SULFATE 325(65) MG
325 TABLET ORAL 2 TIMES DAILY
Qty: 180 TABLET | Refills: 3 | Status: SHIPPED | OUTPATIENT
Start: 2019-03-13 | End: 2020-01-14

## 2019-03-13 ASSESSMENT — MIFFLIN-ST. JEOR: SCORE: 2044.55

## 2019-03-13 ASSESSMENT — PAIN SCALES - GENERAL: PAINLEVEL: NO PAIN (0)

## 2019-03-13 NOTE — NURSING NOTE
Chief Complaint   Patient presents with     New Patient     HFpEF, HTN, DM Type II, Obesity, and Afib.      Vitals were taken and medications were reconciled.     Pia Jones MA    8:48 AM

## 2019-03-13 NOTE — PATIENT INSTRUCTIONS
Thank you for your visit today.  Please call me with any questions or concerns.   Moses Alicea RN  Cardiology Care Coordinator  [phone], press option 1 then option 3

## 2019-03-13 NOTE — PROGRESS NOTES
Labs: Arnie gonzalez.(CMP,CBC,NT pro BNP) Patient was instructed to return for the next laboratory testing in one year . Patient demonstrated understanding of this information and agreed to call with further questions or concerns.   Med Reconcile: Reviewed and verified all current medications with the patient. The updated medication list was printed and given to the patient.  Return Appointment: Follow up in one year. Patient given instructions regarding scheduling next clinic visit. Patient demonstrated understanding of this information and agreed to call with further questions or concerns.  Patient stated he understood all health information given and agreed to call with further questions or concerns.
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
## 2019-03-19 ENCOUNTER — CARE COORDINATION (OUTPATIENT)
Dept: ONCOLOGY | Facility: CLINIC | Age: 64
End: 2019-03-19

## 2019-03-19 DIAGNOSIS — C49.A0 GIST (GASTROINTESTINAL STROMAL TUMOR), MALIGNANT (H): Primary | ICD-10-CM

## 2019-03-19 DIAGNOSIS — C49.A0 GIST (GASTROINTESTINAL STROMAL TUMOR), MALIGNANT (H): ICD-10-CM

## 2019-03-19 LAB
ALBUMIN SERPL-MCNC: 3.4 G/DL (ref 3.4–5)
ALP SERPL-CCNC: 90 U/L (ref 40–150)
ALT SERPL W P-5'-P-CCNC: 23 U/L (ref 0–70)
ANION GAP SERPL CALCULATED.3IONS-SCNC: 5 MMOL/L (ref 3–14)
AST SERPL W P-5'-P-CCNC: 16 U/L (ref 0–45)
BASOPHILS # BLD AUTO: 0 10E9/L (ref 0–0.2)
BASOPHILS NFR BLD AUTO: 0.2 %
BILIRUB SERPL-MCNC: 0.6 MG/DL (ref 0.2–1.3)
BUN SERPL-MCNC: 19 MG/DL (ref 7–30)
CALCIUM SERPL-MCNC: 9.2 MG/DL (ref 8.5–10.1)
CHLORIDE SERPL-SCNC: 109 MMOL/L (ref 94–109)
CO2 SERPL-SCNC: 28 MMOL/L (ref 20–32)
CREAT SERPL-MCNC: 1.19 MG/DL (ref 0.66–1.25)
DIFFERENTIAL METHOD BLD: NORMAL
EOSINOPHIL # BLD AUTO: 0.1 10E9/L (ref 0–0.7)
EOSINOPHIL NFR BLD AUTO: 1.2 %
ERYTHROCYTE [DISTWIDTH] IN BLOOD BY AUTOMATED COUNT: 13.7 % (ref 10–15)
GFR SERPL CREATININE-BSD FRML MDRD: 64 ML/MIN/{1.73_M2}
GLUCOSE SERPL-MCNC: 112 MG/DL (ref 70–99)
HCT VFR BLD AUTO: 48.4 % (ref 40–53)
HGB BLD-MCNC: 15.4 G/DL (ref 13.3–17.7)
IMM GRANULOCYTES # BLD: 0 10E9/L (ref 0–0.4)
IMM GRANULOCYTES NFR BLD: 0.2 %
LYMPHOCYTES # BLD AUTO: 1.3 10E9/L (ref 0.8–5.3)
LYMPHOCYTES NFR BLD AUTO: 25.6 %
MCH RBC QN AUTO: 28.8 PG (ref 26.5–33)
MCHC RBC AUTO-ENTMCNC: 31.8 G/DL (ref 31.5–36.5)
MCV RBC AUTO: 91 FL (ref 78–100)
MONOCYTES # BLD AUTO: 0.5 10E9/L (ref 0–1.3)
MONOCYTES NFR BLD AUTO: 9.2 %
NEUTROPHILS # BLD AUTO: 3.2 10E9/L (ref 1.6–8.3)
NEUTROPHILS NFR BLD AUTO: 63.6 %
NRBC # BLD AUTO: 0 10*3/UL
NRBC BLD AUTO-RTO: 0 /100
PLATELET # BLD AUTO: 208 10E9/L (ref 150–450)
POTASSIUM SERPL-SCNC: 4 MMOL/L (ref 3.4–5.3)
PROT SERPL-MCNC: 7.5 G/DL (ref 6.8–8.8)
RBC # BLD AUTO: 5.34 10E12/L (ref 4.4–5.9)
SODIUM SERPL-SCNC: 142 MMOL/L (ref 133–144)
WBC # BLD AUTO: 5 10E9/L (ref 4–11)

## 2019-03-19 RX ORDER — METHYLPREDNISOLONE 32 MG/1
32 TABLET ORAL DAILY
Qty: 2 TABLET | Refills: 11 | Status: SHIPPED | OUTPATIENT
Start: 2019-03-19 | End: 2019-06-20

## 2019-03-19 NOTE — PROGRESS NOTES
Spoke with Jazz to let him know that a prescription for medrol 32 mg has been sent to his local [facility] pharmacy on [address].  Advised him to take 1 tablet 12 hours prior to scan and another tablet 1 hour prior to scan.  He agreed with and verbalized understanding of the plan of care.

## 2019-03-22 ENCOUNTER — ANCILLARY PROCEDURE (OUTPATIENT)
Dept: CT IMAGING | Facility: CLINIC | Age: 64
End: 2019-03-22
Attending: INTERNAL MEDICINE
Payer: MEDICARE

## 2019-03-22 RX ORDER — IOPAMIDOL 755 MG/ML
135 INJECTION, SOLUTION INTRAVASCULAR ONCE
Status: COMPLETED | OUTPATIENT
Start: 2019-03-22 | End: 2019-03-22

## 2019-03-22 RX ADMIN — IOPAMIDOL 135 ML: 755 INJECTION, SOLUTION INTRAVASCULAR at 08:56

## 2019-03-22 NOTE — DISCHARGE INSTRUCTIONS

## 2019-03-26 ENCOUNTER — ONCOLOGY VISIT (OUTPATIENT)
Dept: ONCOLOGY | Facility: CLINIC | Age: 64
End: 2019-03-26
Attending: INTERNAL MEDICINE
Payer: MEDICARE

## 2019-03-26 VITALS
DIASTOLIC BLOOD PRESSURE: 97 MMHG | SYSTOLIC BLOOD PRESSURE: 147 MMHG | BODY MASS INDEX: 39.04 KG/M2 | WEIGHT: 272.7 LBS | HEART RATE: 58 BPM | TEMPERATURE: 98.6 F | HEIGHT: 70 IN | RESPIRATION RATE: 16 BRPM | OXYGEN SATURATION: 99 %

## 2019-03-26 DIAGNOSIS — I48.0 PAROXYSMAL ATRIAL FIBRILLATION (H): ICD-10-CM

## 2019-03-26 DIAGNOSIS — I10 HYPERTENSION, ESSENTIAL: ICD-10-CM

## 2019-03-26 DIAGNOSIS — F33.0 MILD RECURRENT MAJOR DEPRESSION (H): ICD-10-CM

## 2019-03-26 DIAGNOSIS — Z79.01 CURRENT USE OF LONG TERM ANTICOAGULATION: ICD-10-CM

## 2019-03-26 DIAGNOSIS — I51.7 CARDIOMEGALY: ICD-10-CM

## 2019-03-26 DIAGNOSIS — C49.A2 MALIGNANT GASTROINTESTINAL STROMAL TUMOR (GIST) OF STOMACH (H): Primary | ICD-10-CM

## 2019-03-26 DIAGNOSIS — G47.33 OSA (OBSTRUCTIVE SLEEP APNEA): ICD-10-CM

## 2019-03-26 PROCEDURE — 99214 OFFICE O/P EST MOD 30 MIN: CPT | Mod: ZP | Performed by: INTERNAL MEDICINE

## 2019-03-26 PROCEDURE — G0463 HOSPITAL OUTPT CLINIC VISIT: HCPCS | Mod: ZF

## 2019-03-26 ASSESSMENT — PAIN SCALES - GENERAL: PAINLEVEL: NO PAIN (0)

## 2019-03-26 ASSESSMENT — MIFFLIN-ST. JEOR: SCORE: 2033.21

## 2019-03-26 NOTE — PROGRESS NOTES
3-26-19      We saw Mr. 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       Background  In brief, he was in his usual state of health but noted some black stools in 06/2016. In early July 2016 he was admitted for anemia and GI bleeding and had orthostatic symptoms. 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 Endoscopy before that showed some punctate oozing and it is possible that some of the bleeding was coming from the tumor.   -  Surgery was August 2016 and pathology report showed 1 mitosis per 50 high-power fields, a tumor size of 15 cm and negative margins. 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   -    Interval history.     Patient presents for follow up. Al says he has been doing well and has no specific complaints. Patient denies headaches, vision changes, cough, sore throat, mouth sores, chest pain, shortness of breath, abdominal pain, constipation, diarrhea, bloody stools, nausea, vomiting, dysuria, or urinary frequency.     He has sleep apnea and uses a CPAP machine and also has a history of hypertension, depression, diabetes, cardiomegaly, and he was found to be in atrial fibrillation when he was hospitalized for the GI bleed.    He does have a history of chronic congestive heart failure and is on multiple medications.   He denies being bothered by shortness of breath right now though he has had that problem and is felt to likely have HFpEF.       He had some tiny nonspecific lung nodules, some adrenal nodules, and nonspecific splenic lesions on the imaging.    A 10-point ROS is otherwise unremarkable.       -  Background PMH, FH, SH  His past history is notable for keloid formation, resection of a rectal polyp, and a crushing foot injury.   He feels that contrast dye created a bit of a rash that left some skin hyperpigmentation on his back.   He is currently single and lives alone. He stopped smoking in 2011, though he was a polysubstance abuser in the  "past. He no longer drinks alcohol. He is retired from working in a furniture store.   Family history is positive for diabetes and hypertension.   -      On exam he appeared comfortable with a quiet affect.   BP (!) 147/97 (BP Location: Left arm, Patient Position: Sitting, Cuff Size: Adult Large)   Pulse 58   Temp 98.6  F (37  C) (Oral)   Resp 16   Ht 1.778 m (5' 10\")   Wt 123.7 kg (272 lb 11.2 oz)   SpO2 99%   BMI 39.13 kg/m    HEENT: No icterus  NECK: No thyromegaly or mass  LYMPH: No cervical, supraclavicular, axillary, or inguinal lymphadenopathy.   CHEST: Clear to auscultation.   HEART: There was irregularly irregular rhythm. No murmurs.   ABDOMEN: There was an obese abdomen with a well-healed surgical scar , no HSMT.   NEURO: Normal Romberg, (broke both ankles in the past).  EXT: 0-Tr edema   PSYCH: mood good       -  CT Chest abdomen pelvis shows no evidence of PD though he has some stable lung nodules. There are some nonspecific splenic lesions that are stable.  The adrenal nodules are stable.       -  Electrolytes WNL. Cr was 1.19 which has been his baseline. CBC WNL.      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  Interestingly, in contrast to the D842V, in-frame deletions of D842 to H845 are typically sensitive to imatinib.     -  It appears his recurrence risk is on the order of 10-15% over a few years.  I do not think that risk warrants the toxicities of Gleevec, especially given his multiple other problems and medications. No evidence of disease progression on CT scan on 3/19/19.       He had an echo that showed no structural heart disease, and while he was felt to be at high risk for stroke with a CHADS2 score of 4, and he is on xarelto.  He will f/u w his PCP on that. His atrial fibrillation, anticoagulation and other issues will be followed by his primary care team and Cardiology.     We will see him about 9/19 w/ CT and labs 2 d before.      All his questions were addressed and he will " call if he has others.     Scribed by Brent Henry MS4    I independently examined this patient and my assessment is in agreement with the above note.  I reviewed all tests and past medical history and my evaluation agrees with the findings in the note. The PDGFR mutation is unusual but is a case where gleevec could be usefuly.    -  Sean Freed M.D.  Professor  Hematology, Oncology and Transplantation

## 2019-03-26 NOTE — NURSING NOTE
"Oncology Rooming Note    March 26, 2019 10:15 AM   Jazz Berman is a 64 year old male who presents for:    Chief Complaint   Patient presents with     Oncology Clinic Visit     F/U GIST Tumor     Initial Vitals: BP (!) 147/97 (BP Location: Left arm, Patient Position: Sitting, Cuff Size: Adult Large)   Pulse 58   Temp 98.6  F (37  C) (Oral)   Resp 16   Ht 1.778 m (5' 10\")   Wt 123.7 kg (272 lb 11.2 oz)   SpO2 99%   BMI 39.13 kg/m   Estimated body mass index is 39.13 kg/m  as calculated from the following:    Height as of this encounter: 1.778 m (5' 10\").    Weight as of this encounter: 123.7 kg (272 lb 11.2 oz). Body surface area is 2.47 meters squared.  No Pain (0) Comment: Data Unavailable   No LMP for male patient.  Allergies reviewed: Yes  Medications reviewed: Yes    Medications: Medication refills not needed today.  Pharmacy name entered into [employer]: [id number] - [place], [place] - [address]    Clinical concerns: None, Dr Freed was NOT notified.      ANTONIO FLYNN LPN            "

## 2019-03-26 NOTE — LETTER
3/26/2019       RE: Jazz Berman  [address]  [place] [facility] [address]     Dear Colleague,    Thank you for referring your patient, Jazz Berman, to the [facility]. Please see a copy of my visit note below.    3-26-19      We saw Mr. 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       Background  In brief, he was in his usual state of health but noted some black stools in 06/2016. In early July 2016 he was admitted for anemia and GI bleeding and had orthostatic symptoms. 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 Endoscopy before that showed some punctate oozing and it is possible that some of the bleeding was coming from the tumor.   -  Surgery was August 2016 and pathology report showed 1 mitosis per 50 high-power fields, a tumor size of 15 cm and negative margins. 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   -    Interval history.     Patient presents for follow up. Al says he has been doing well and has no specific complaints. Patient denies headaches, vision changes, cough, sore throat, mouth sores, chest pain, shortness of breath, abdominal pain, constipation, diarrhea, bloody stools, nausea, vomiting, dysuria, or urinary frequency.     He has sleep apnea and uses a CPAP machine and also has a history of hypertension, depression, diabetes, cardiomegaly, and he was found to be in atrial fibrillation when he was hospitalized for the GI bleed.    He does have a history of chronic congestive heart failure and is on multiple medications.   He denies being bothered by shortness of breath right now though he has had that problem and is felt to likely have HFpEF.       He had some tiny nonspecific lung nodules, some adrenal nodules, and nonspecific splenic lesions on the imaging.    A 10-point ROS is otherwise unremarkable.       -  Background PMH, FH, SH  His past history is notable for keloid formation, resection of a rectal polyp, and a  "crushing foot injury.   He feels that contrast dye created a bit of a rash that left some skin hyperpigmentation on his back.   He is currently single and lives alone. He stopped smoking in 2011, though he was a polysubstance abuser in the past. He no longer drinks alcohol. He is retired from working in a furniture store.   Family history is positive for diabetes and hypertension.   -      On exam he appeared comfortable with a quiet affect.   BP (!) 147/97 (BP Location: Left arm, Patient Position: Sitting, Cuff Size: Adult Large)   Pulse 58   Temp 98.6  F (37  C) (Oral)   Resp 16   Ht 1.778 m (5' 10\")   Wt 123.7 kg (272 lb 11.2 oz)   SpO2 99%   BMI 39.13 kg/m     HEENT: No icterus  NECK: No thyromegaly or mass  LYMPH: No cervical, supraclavicular, axillary, or inguinal lymphadenopathy.   CHEST: Clear to auscultation.   HEART: There was irregularly irregular rhythm. No murmurs.   ABDOMEN: There was an obese abdomen with a well-healed surgical scar , no HSMT.   NEURO: Normal Romberg, (broke both ankles in the past).  EXT: 0-Tr edema   PSYCH: mood good       -  CT Chest abdomen pelvis shows no evidence of PD though he has some stable lung nodules. There are some nonspecific splenic lesions that are stable.  The adrenal nodules are stable.       -  Electrolytes WNL. Cr was 1.19 which has been his baseline. CBC WNL.      No KIT mutation, but a PDGFR [id number] deletion.  Interestingly, in contrast to the D842V, in-frame deletions of D842 to H845 are typically sensitive to imatinib.     -  It appears his recurrence risk is on the order of 10-15% over a few years.  I do not think that risk warrants the toxicities of Gleevec, especially given his multiple other problems and medications. No evidence of disease progression on CT scan on 3/19/19.       He had an echo that showed no structural heart disease, and while he was felt to be at high risk for stroke with a CHADS2 score of 4, and he is on xarelto.  He will f/u w " his PCP on that. His atrial fibrillation, anticoagulation and other issues will be followed by his primary care team and Cardiology.     We will see him about 9/19 w/ CT and labs 2 d before.      All his questions were addressed and he will call if he has others.     Scribed by Brent Henry MS4    I independently examined this patient and my assessment is in agreement with the above note.  I reviewed all tests and past medical history and my evaluation agrees with the findings in the note. The PDGFR mutation is unusual but is a case where gleevec could be usefuly.    -  Sean Freed M.D.  Professor  Hematology, Oncology and Transplantation

## 2019-03-28 ENCOUNTER — TELEPHONE (OUTPATIENT)
Dept: FAMILY MEDICINE | Facility: CLINIC | Age: 64
End: 2019-03-28

## 2019-03-28 NOTE — TELEPHONE ENCOUNTER
[facility] phone call message- patient requesting a refill:    Full Medication Name: sildenafil (VIAGRA) 100 MG tablet    Dose: Take 100mg tablet as directed.  -Rx prescribed via [employer] connection to care program-    Pharmacy confirmed as:     Additional Comments: Patient said you call into [employer], no other pharmacy information    OK to leave a message on voice mail? Yes    Primary language: English      needed? No    Call taken on March 28, 2019 at 10:02 AM by Anneliese Sutherland

## 2019-03-29 ENCOUNTER — OFFICE VISIT (OUTPATIENT)
Dept: FAMILY MEDICINE | Facility: CLINIC | Age: 64
End: 2019-03-29
Payer: MEDICARE

## 2019-03-29 VITALS
TEMPERATURE: 98 F | BODY MASS INDEX: 39.49 KG/M2 | SYSTOLIC BLOOD PRESSURE: 131 MMHG | RESPIRATION RATE: 16 BRPM | DIASTOLIC BLOOD PRESSURE: 87 MMHG | OXYGEN SATURATION: 96 % | HEART RATE: 69 BPM | WEIGHT: 275.2 LBS

## 2019-03-29 DIAGNOSIS — I10 HYPERTENSION, ESSENTIAL: ICD-10-CM

## 2019-03-29 DIAGNOSIS — N18.30 CKD (CHRONIC KIDNEY DISEASE) STAGE 3, GFR 30-59 ML/MIN (H): Primary | ICD-10-CM

## 2019-03-29 LAB
BUN SERPL-MCNC: 21.2 MG/DL (ref 7–21)
CALCIUM SERPL-MCNC: 9.2 MG/DL (ref 8.5–10.1)
CHLORIDE SERPLBLD-SCNC: 102.9 MMOL/L (ref 98–110)
CO2 SERPL-SCNC: 27.8 MMOL/L (ref 20–32)
CREAT SERPL-MCNC: 1.3 MG/DL (ref 0.7–1.3)
CREAT UR-MCNC: 64 MG/DL
GFR SERPL CREATININE-BSD FRML MDRD: 59.1 ML/MIN/1.7 M2
GLUCOSE SERPL-MCNC: 85.5 MG'DL (ref 70–99)
MICROALBUMIN UR-MCNC: 6 MG/L
MICROALBUMIN/CREAT UR: 9.66 MG/G CR (ref 0–17)
POTASSIUM SERPL-SCNC: 3.8 MMOL/DL (ref 3.3–4.5)
SODIUM SERPL-SCNC: 136.1 MMOL/L (ref 132.6–141.4)

## 2019-03-29 NOTE — LETTER
April 1, 2019      Jazz Berman  [address]   [place] [address]        Dear Jazz,    Thank you for getting your care at [facility]. Please see below for your test results.    Resulted Orders   Basic Metabolic Panel ([facility])   Result Value Ref Range    Urea Nitrogen 21.2 (H) 7.0 - 21.0 mg/dL    Calcium 9.2 8.5 - 10.1 mg/dL    Chloride 102.9 98.0 - 110.0 mmol/L    Carbon Dioxide 27.8 20.0 - 32.0 mmol/L    Creatinine 1.3 0.7 - 1.3 mg/dL    Glucose 85.5 70.0 - 99.0 mg'dL    Potassium 3.8 3.3 - 4.5 mmol/dL    Sodium 136.1 132.6 - 141.4 mmol/L    GFR Estimate 59.1 (L) >60.0 mL/min/1.7 m2    GFR Estimate If Black 71.5 >60.0 mL/min/1.7 m2   Albumin Random Urine Quantitative with Creat Ratio   Result Value Ref Range    Creatinine Urine 64 mg/dL    Albumin Urine mg/L 6 mg/L    Albumin Urine mg/g Cr 9.66 0 - 17 mg/g Cr       If you have any concerns about these results please call and leave a message for me or send a MyChart message to the clinic.    Sincerely,    Kenyon Singh MD

## 2019-03-29 NOTE — PROGRESS NOTES
Preceptor Attestation:   Patient seen, evaluated and discussed with the resident. I have verified the content of the note, which accurately reflects my assessment of the patient and the plan of care.   Supervising Physician:  Sushil Corrigan MD

## 2019-03-29 NOTE — PATIENT INSTRUCTIONS
Here is the plan from today's visit    1. CKD (chronic kidney disease) stage 3, GFR 30-59 ml/min (H)  - Basic Metabolic Panel ([facility])  - Albumin Random Urine Quantitative with Creat Ratio    2. Hypertension, essential  - Switch to pink himalayan salt  - Limit refined sugar  - Dark chocolate as choice for sweet tooth  - Cold press oils like olive oil, coconut oil, and avacado  - Avoid processed foods    3. Routine health maintenance  - Colonoscopy by 7/2019 (last performed 7/2016)    Please call or return to clinic if your symptoms don't go away.    Follow up plan  Please make a clinic appointment for follow up with your primary physician Kenyon Singh MD in 1-2 motnhs as needed.     Thank you for coming to [facility] today.  Lab Testing:  **If you had lab testing today and your results are reassuring or normal they will be mailed to you or sent through [employer] within 7 days.   **If the lab tests need quick action we will call you with the results.  The phone number we will call with results is # [phone] (home) . If this is not the best number please call our clinic and change the number.  Medication Refills:  If you need any refills please call your pharmacy and they will contact us.   If you need to  your refill at a new pharmacy, please contact the new pharmacy directly. The new pharmacy will help you get your medications transferred faster.   Scheduling:  If you have any concerns about today's visit or wish to schedule another appointment please call our office during normal business hours [phone] (8-5:00 M-F)  If a referral was made to a [facility] and you don't get a call from central scheduling please call [phone].  If a Mammogram was ordered for you at [facility] call [phone] to schedule or change your appointment.  If you had an XRay/CT/Ultrasound/MRI ordered the number is [phone] to schedule or change your radiology appointment.    Medical Concerns:  If you have urgent medical concerns please call [phone] at any time of the day.    Kenyon Singh MD

## 2019-03-29 NOTE — PROGRESS NOTES
RIGO Berman is a 64 year old  who presents for   Chief Complaint   Patient presents with     Hypertension     bp f/u     Medication Reconciliation     complete         Hypertension Follow-up  <140/90    Outpatient blood pressures are being checked at home.  Results are controlled.    Chest Pain? :No     Low Salt Diet: small sprinkle of salt    Daily NSAID Use?No     Did patient take their HTN pills today/last night as usual?  Yes    Last Basic Metabolic Panel:  Lab Results   Component Value Date     03/19/2019      Lab Results   Component Value Date    POTASSIUM 4.0 03/19/2019     Lab Results   Component Value Date    CHLORIDE 109 03/19/2019     Lab Results   Component Value Date    NATALYA 9.2 03/19/2019     Lab Results   Component Value Date    CO2 28 03/19/2019     Lab Results   Component Value Date    BUN 19 03/19/2019     Lab Results   Component Value Date    CR 1.19 03/19/2019     Lab Results   Component Value Date     03/19/2019       Adherence and Exercise  Medication side effects: no  How often is a medication missed? Never  Exercise:No exercise but plans to start walking      +++++++    Problem, Medication and Allergy Lists were      Patient Active Problem List    Diagnosis Date Noted     Hypertension, essential 08/30/2012     Priority: High     Class: Chronic     Use large BP cuff       GREG (obstructive sleep apnea) on CPAP 08/30/2012     Priority: High     Class: Chronic     Labile hypertension 02/23/2019     Priority: Medium     CKD (chronic kidney disease) stage 3, GFR 30-59 ml/min (H) 02/23/2019     Priority: Medium     Type 2 diabetes mellitus without complication, without long-term current use of insulin (H) 10/12/2017     Priority: Medium     No retinopathy noted on 3/23/2018 eye exam.        Paroxysmal atrial fibrillation (H) 04/24/2017     Priority: Medium     Atrial fibrillation, rate controlled.       Anticoagulation goal of INR 2 to 3 03/21/2017     Priority: Medium      Chronic diastolic heart failure (H) 02/11/2017     Priority: Medium     HFpEF       GIST (gastrointestinal stromal tumor), malignant (H) 09/27/2016     Priority: Medium     recurrence risk is on the order of 10-15% over a few years per Oncology        Obesity 09/27/2016     Priority: Medium     Diverticulosis of large intestine 07/04/2016     Priority: Medium     Colonoscopy 7/2/16        Mild recurrent major depression (H) 09/05/2013     Priority: Medium     Class: Chronic     BPH (benign prostatic hyperplasia) 08/29/2013     Priority: Medium     Erectile dysfunction 07/10/2013     Priority: Medium     Body mass index (BMI) of 40.0-44.9 in adult (H) 08/30/2012     Priority: Medium     Class: Chronic   ,     Current Outpatient Medications   Medication Sig Dispense Refill     amLODIPine (NORVASC) 2.5 MG tablet Take 2 tablets (5 mg) by mouth daily 90 tablet 3     apixaban ANTICOAGULANT (ELIQUIS) 5 MG tablet Take 5 mg by mouth 2 times daily       apixaban ANTICOAGULANT (ELIQUIS) 5 MG tablet Take 1 tablet (5 mg) by mouth 2 times daily 180 tablet 3     blood glucose monitoring (ONE TOUCH DELICA) lancets Use to test blood sugars 2 times daily or as directed. 100 each 3     cycloSPORINE (RESTASIS) 0.05 % ophthalmic emulsion Place 1 drop into both eyes 2 times daily 1 Box 3     ferrous sulfate (FEROSUL) 325 (65 Fe) MG tablet Take 1 tablet (325 mg) by mouth 2 times daily 180 tablet 3     furosemide (LASIX) 40 MG tablet TAKE 1 TABLET (40 MG) BY MOUTH DAILY 90 tablet 3     lisinopril (PRINIVIL/ZESTRIL) 20 MG tablet Take 1 tablet (20 mg) by mouth daily 180 tablet 3     metoprolol succinate (TOPROL-XL) 100 MG 24 hr tablet Take 2 tablets (200 mg) by mouth daily 180 tablet 3     order for DME Equipment being ordered: BP cuff, large 1 Units 0     ranitidine (ZANTAC) 150 MG tablet Take 1 tablet (150 mg) by mouth daily 90 tablet 3     sildenafil (VIAGRA) 100 MG tablet Take 100mg tablet as directed.  -Rx prescribed via [employer]  connection to care program- 30 tablet 3     solifenacin (VESICARE) 10 MG tablet Take 1 tablet (10 mg) by mouth daily AM 90 tablet 3     vitamin D3 (CHOLECALCIFEROL) 2000 units tablet Take 1 tablet by mouth daily 90 tablet 3     ASPIRIN NOT PRESCRIBED (INTENTIONAL) Please choose reason not prescribed, below (Patient not taking: Reported on 3/26/2019) 1 each 0     blood glucose monitoring (NO BRAND SPECIFIED) test strip Use to test blood sugars 2 times daily or as directed (Patient not taking: Reported on 3/26/2019) 100 each 3     methylPREDNISolone (MEDROL) 32 MG tablet Take 1 tablet (32 mg) by mouth daily Take one tablet 12 hours prior to scan and 1 tablet 1 hour prior to scan (Patient not taking: Reported on 3/26/2019) 2 tablet 11     STATIN NOT PRESCRIBED, INTENTIONAL, 1 each daily Please choose reason not prescribed, below (Patient not taking: Reported on 12/18/2018)     ,     Allergies   Allergen Reactions     Dye [Contrast Dye] Rash     Dye left skin hyperpigmentation on his back  Patient needs to be premedicated for all CT contrast exams.    .    Patient is   an established patient of this clinic.  Past Medical History:   Diagnosis Date     Arthritis      Atrial fibrillation (H)      BPH (benign prostatic hyperplasia) 8/29/2013     Cardiomegaly 8/30/2012     Crushing injury of foot 8/30/2012     Depressive disorder, not elsewhere classified 8/30/2012     Diabetes mellitus type 2 in obese (H)      Diverticulosis of large intestine 7/4/2016    Colonoscopy 7/2/16       Former smoker      Gastric mass      GI bleed      History of blood transfusion      Hypertension      Hypertension      Keloid 6/11/2012     GREG on CPAP      Family History   Problem Relation Age of Onset     Hypertension Father      Diabetes Mother      Colon Cancer No family hx of      Crohn's Disease No family hx of      Ulcerative Colitis No family hx of      Cancer No family hx of         no skin cancer     Glaucoma No family hx of       Macular Degeneration No family hx of      Social History     Socioeconomic History     Marital status: Single     Spouse name: None     Number of children: None     Years of education: None     Highest education level: None   Occupational History     None   Social Needs     Financial resource strain: None     Food insecurity:     Worry: None     Inability: None     Transportation needs:     Medical: None     Non-medical: None   Tobacco Use     Smoking status: Former Smoker     Years: 30.00     Types: Cigarettes     Last attempt to quit: 2011     Years since quittin.8     Smokeless tobacco: Never Used   Substance and Sexual Activity     Alcohol use: No     Alcohol/week: 0.0 oz     Comment: twice per week and 6 pack per sitting, quit about 6 months ago     Drug use: No     Comment: +marijuana and crack cocaine     Sexual activity: Yes   Lifestyle     Physical activity:     Days per week: None     Minutes per session: None     Stress: None   Relationships     Social connections:     Talks on phone: None     Gets together: None     Attends Islam service: None     Active member of club or organization: None     Attends meetings of clubs or organizations: None     Relationship status: None     Intimate partner violence:     Fear of current or ex partner: None     Emotionally abused: None     Physically abused: None     Forced sexual activity: None   Other Topics Concern     Parent/sibling w/ CABG, MI or angioplasty before 65F 55M? Not Asked   Social History Narrative     None   .         Review of Systems:   Review of Systems  Skin: negative except as above  Respiratory: negative except as above  Cardiovascular: negative except as above  Gastrointestinal: negative except as above  Genitourinary: negative except as above  Musculoskeletal: negative except as above  Neurologic: negative except as above  Psychiatric: negative except as above  Hematologic/Lymphatic/Immunologic: negative except as above  Endocrine:  negative except as above         Physical Exam:     Vitals:    03/29/19 1023   BP: 131/87   Pulse: 69   Resp: 16   Temp: 98  F (36.7  C)   TempSrc: Oral   SpO2: 96%   Weight: 124.8 kg (275 lb 3.2 oz)     Body mass index is 39.49 kg/m .  Vitals were reviewed and were normal     Physical Exam  Constitutional: Oriented to person, place, and time. Appears well-developed and well-nourished.   HENT:   Head: Normocephalic and atraumatic.   Eyes: Conjunctivae are normal.   Neck: Normal range of motion.   Cardiovascular: Normal rate, regular rhythm, normal heart sounds and intact distal pulses.    Pulmonary/Chest: Effort normal and breath sounds normal. No respiratory distress. No wheezes.   Psychiatric: Has a normal mood and affect. Behavior is normal.       Results:      Results from this visit  Results for orders placed or performed in visit on 03/29/19   Basic Metabolic Panel ([facility])   Result Value Ref Range    Urea Nitrogen 21.2 (H) 7.0 - 21.0 mg/dL    Calcium 9.2 8.5 - 10.1 mg/dL    Chloride 102.9 98.0 - 110.0 mmol/L    Carbon Dioxide 27.8 20.0 - 32.0 mmol/L    Creatinine 1.3 0.7 - 1.3 mg/dL    Glucose 85.5 70.0 - 99.0 mg'dL    Potassium 3.8 3.3 - 4.5 mmol/dL    Sodium 136.1 132.6 - 141.4 mmol/L    GFR Estimate 59.1 (L) >60.0 mL/min/1.7 m2    GFR Estimate If Black 71.5 >60.0 mL/min/1.7 m2       Assessment and Plan          1. CKD (chronic kidney disease) stage 3, GFR 30-59 ml/min (H)  Currently monitoring BMP until it stabilizes for diagnostic purposes (detecting what stage of CKD). Otherwise feels great and will continue to monitor. Will review labs at next visit.   - Basic Metabolic Panel ([facility])  - Albumin Random Urine Quantitative with Creat Ratio    2. Hypertension, essential  Continue HTN medications as instructed. Will attempt lifestyle modifications along with refilling medications.    - Switch to pink himalayan salt  - Limit refined sugar  - Dark chocolate as choice for sweet tooth  - Cold press oils  like olive oil, coconut oil, and avacado  - Avoid processed foods    3. Routine health maintenance  - Colonoscopy by 7/2019 (last performed 7/2016)    Please call or return to clinic if your symptoms don't go away.    Follow up plan  Please make a clinic appointment for follow up with your primary physician Kenyon Singh MD in 1-2 motnhs as needed.     Thank you for coming to [facility] today.     There are no discontinued medications.    Options for treatment and follow-up care were reviewed with the patient. Jazz Berman  engaged in the decision making process and verbalized understanding of the options discussed and agreed with the final plan.    Kenyon Singh MD

## 2019-04-12 NOTE — TELEPHONE ENCOUNTER
PHARMACY TELEPHONE ENCOUNTER:    Reason: Viagra refill       [employer] connection to care program called.  Order placed  Will be sent on 4/12/19  Pt was called today and informed.    Jayson Hernandez PharmJamalD.

## 2019-04-17 DIAGNOSIS — R39.15 URINARY URGENCY: ICD-10-CM

## 2019-04-17 RX ORDER — SOLIFENACIN SUCCINATE 10 MG/1
10 TABLET, FILM COATED ORAL DAILY
Qty: 90 TABLET | Refills: 3 | Status: SHIPPED | OUTPATIENT
Start: 2019-04-17 | End: 2020-02-25

## 2019-04-24 ENCOUNTER — MEDICAL CORRESPONDENCE (OUTPATIENT)
Dept: HEALTH INFORMATION MANAGEMENT | Facility: CLINIC | Age: 64
End: 2019-04-24

## 2019-04-24 ENCOUNTER — TELEPHONE (OUTPATIENT)
Dept: FAMILY MEDICINE | Facility: CLINIC | Age: 64
End: 2019-04-24

## 2019-04-24 NOTE — TELEPHONE ENCOUNTER
PHARMACY TELEPHONE ENCOUNTER:    Reason: [employer] patient assistance program      Pt was called and told his Viagra Rx has arrived and is ready for pickup from the clinic.      Jayson Hernandez PharmJamalD.

## 2019-04-24 NOTE — TELEPHONE ENCOUNTER
Verify that the refill encounter hasn't been started Yes    [facility] phone call message- patient requesting a refill:    Full Medication Name: sildenafil (VIAGRA) 100 MG tablet    Dose: Take 100mg tablet as directed     Pharmacy confirmed as   [id number] - [place], [place] - [address] [place] [address]  Phone: [phone] Fax: [phone]  : Yes    Medication tab checked to see if medication has been sent  Yes    Additional Comments:      OK to leave a message on voice mail? Yes    Advised patient refill may take up to 2 business days? Yes    Primary language: English      needed? No    Call taken on April 24, 2019 at 1:47 PM by Luz Dye to [facility] MED REFILL

## 2019-05-14 DIAGNOSIS — I10 ESSENTIAL HYPERTENSION WITH GOAL BLOOD PRESSURE LESS THAN 140/90: ICD-10-CM

## 2019-05-15 RX ORDER — METOPROLOL SUCCINATE 100 MG/1
200 TABLET, EXTENDED RELEASE ORAL DAILY
Qty: 180 TABLET | Refills: 3 | Status: SHIPPED | OUTPATIENT
Start: 2019-05-15 | End: 2019-06-20

## 2019-05-21 DIAGNOSIS — I50.30 (HFPEF) HEART FAILURE WITH PRESERVED EJECTION FRACTION (H): ICD-10-CM

## 2019-05-21 NOTE — TELEPHONE ENCOUNTER
"Request for medication refill: Furosemide 40 mg tablets    Providers if patient needs an appointment and you are willing to give a one month supply please refill for one month and  send a letter/MyChart using \".SMILLIMITEDREFILL\" .smillimited and route chart to \"P SMI \" (Giving one month refill in non controlled medications is strongly recommended before denial)    If refill has been denied, meaning absolutely no refills without visit, please complete the smart phrase \".smirxrefuse\" and route it to the \"P SMI MED REFILLS\"  pool to inform the patient and the pharmacy.    Muna Tam MA        "

## 2019-05-22 RX ORDER — FUROSEMIDE 40 MG
TABLET ORAL
Qty: 90 TABLET | Refills: 3 | Status: SHIPPED | OUTPATIENT
Start: 2019-05-22 | End: 2019-08-30

## 2019-05-29 DIAGNOSIS — I48.0 PAROXYSMAL ATRIAL FIBRILLATION (H): Primary | ICD-10-CM

## 2019-06-11 ENCOUNTER — TELEPHONE (OUTPATIENT)
Dept: FAMILY MEDICINE | Facility: CLINIC | Age: 64
End: 2019-06-11

## 2019-06-11 NOTE — TELEPHONE ENCOUNTER
Verify that the refill encounter hasn't been started Yes    [facility] phone call message- patient requesting a refill:    Full Medication Name: sildenafil (VIAGRA) 100 MG tablet    Dose: Take 100mg tablet as directed.  -Rx prescribed via [employer] connection to [facility] program-     Pharmacy confirmed as   [id number] - [place], [place] - [address] [place] [address]  Phone: [phone] Fax: [phone]  : Yes    Medication tab checked to see if medication has been sent  Yes    Additional Comments:      OK to leave a message on voice mail? Yes    Advised patient refill may take up to 2 business days? Yes    Primary language: English      needed? No    Call taken on June 11, 2019 at 12:17 PM by Luz Celeste    Route to [facility] MED REFILL

## 2019-06-13 NOTE — TELEPHONE ENCOUNTER
Patient called in to check status. Was informed that Jayson will be in 6/14/19 and will reply to previous request.

## 2019-06-17 NOTE — TELEPHONE ENCOUNTER
PHARMACY TELEPHONE ENCOUNTER:    Reason: [employer] connection to care      Viagra refill sent to [facility].  Pt called and informed.    Jayson Hernandez PharmJamalD.

## 2019-06-17 NOTE — TELEPHONE ENCOUNTER
Patient is calling again regarding medication request. Patient would like a call from Dr. Hernandez today if possible. Please call patient, it is okay to leave a voicemail if necessary.

## 2019-06-18 DIAGNOSIS — N18.30 CKD (CHRONIC KIDNEY DISEASE) STAGE 3, GFR 30-59 ML/MIN (H): Primary | ICD-10-CM

## 2019-06-19 ENCOUNTER — OFFICE VISIT (OUTPATIENT)
Dept: NEPHROLOGY | Facility: CLINIC | Age: 64
End: 2019-06-19
Attending: INTERNAL MEDICINE
Payer: MEDICARE

## 2019-06-19 VITALS
TEMPERATURE: 98.3 F | WEIGHT: 277.7 LBS | SYSTOLIC BLOOD PRESSURE: 129 MMHG | HEART RATE: 46 BPM | OXYGEN SATURATION: 96 % | DIASTOLIC BLOOD PRESSURE: 83 MMHG | BODY MASS INDEX: 39.85 KG/M2

## 2019-06-19 DIAGNOSIS — N18.30 CKD (CHRONIC KIDNEY DISEASE) STAGE 3, GFR 30-59 ML/MIN (H): ICD-10-CM

## 2019-06-19 DIAGNOSIS — N18.30 CKD (CHRONIC KIDNEY DISEASE) STAGE 3, GFR 30-59 ML/MIN (H): Primary | ICD-10-CM

## 2019-06-19 DIAGNOSIS — I50.32 CHRONIC DIASTOLIC HEART FAILURE (H): ICD-10-CM

## 2019-06-19 DIAGNOSIS — E11.9 TYPE 2 DIABETES MELLITUS WITHOUT COMPLICATION, WITHOUT LONG-TERM CURRENT USE OF INSULIN (H): ICD-10-CM

## 2019-06-19 DIAGNOSIS — I10 HYPERTENSION, ESSENTIAL: ICD-10-CM

## 2019-06-19 LAB
ALBUMIN SERPL-MCNC: 3.5 G/DL (ref 3.4–5)
ANION GAP SERPL CALCULATED.3IONS-SCNC: 6 MMOL/L (ref 3–14)
BUN SERPL-MCNC: 22 MG/DL (ref 7–30)
CALCIUM SERPL-MCNC: 8.9 MG/DL (ref 8.5–10.1)
CHLORIDE SERPL-SCNC: 108 MMOL/L (ref 94–109)
CO2 SERPL-SCNC: 25 MMOL/L (ref 20–32)
CREAT SERPL-MCNC: 1.21 MG/DL (ref 0.66–1.25)
CREAT UR-MCNC: 52 MG/DL
ERYTHROCYTE [DISTWIDTH] IN BLOOD BY AUTOMATED COUNT: 13.9 % (ref 10–15)
GFR SERPL CREATININE-BSD FRML MDRD: 63 ML/MIN/{1.73_M2}
GLUCOSE SERPL-MCNC: 98 MG/DL (ref 70–99)
HCT VFR BLD AUTO: 44.3 % (ref 40–53)
HGB BLD-MCNC: 14.4 G/DL (ref 13.3–17.7)
MCH RBC QN AUTO: 30.1 PG (ref 26.5–33)
MCHC RBC AUTO-ENTMCNC: 32.5 G/DL (ref 31.5–36.5)
MCV RBC AUTO: 93 FL (ref 78–100)
PHOSPHATE SERPL-MCNC: 3.3 MG/DL (ref 2.5–4.5)
PLATELET # BLD AUTO: 190 10E9/L (ref 150–450)
POTASSIUM SERPL-SCNC: 3.7 MMOL/L (ref 3.4–5.3)
PROT UR-MCNC: 0.09 G/L
PROT/CREAT 24H UR: 0.17 G/G CR (ref 0–0.2)
RBC # BLD AUTO: 4.78 10E12/L (ref 4.4–5.9)
SODIUM SERPL-SCNC: 139 MMOL/L (ref 133–144)
WBC # BLD AUTO: 5.7 10E9/L (ref 4–11)

## 2019-06-19 PROCEDURE — 80069 RENAL FUNCTION PANEL: CPT | Performed by: STUDENT IN AN ORGANIZED HEALTH CARE EDUCATION/TRAINING PROGRAM

## 2019-06-19 PROCEDURE — G0463 HOSPITAL OUTPT CLINIC VISIT: HCPCS | Mod: ZF

## 2019-06-19 PROCEDURE — 36415 COLL VENOUS BLD VENIPUNCTURE: CPT | Performed by: STUDENT IN AN ORGANIZED HEALTH CARE EDUCATION/TRAINING PROGRAM

## 2019-06-19 PROCEDURE — 84156 ASSAY OF PROTEIN URINE: CPT | Performed by: STUDENT IN AN ORGANIZED HEALTH CARE EDUCATION/TRAINING PROGRAM

## 2019-06-19 PROCEDURE — 85027 COMPLETE CBC AUTOMATED: CPT | Performed by: STUDENT IN AN ORGANIZED HEALTH CARE EDUCATION/TRAINING PROGRAM

## 2019-06-19 ASSESSMENT — ENCOUNTER SYMPTOMS
SHORTNESS OF BREATH: 0
FLANK PAIN: 0
DECREASED APPETITE: 0
DYSURIA: 0
PALPITATIONS: 0
ALTERED MENTAL STATUS: 0
FEVER: 0
CHILLS: 0
BACK PAIN: 0
ABDOMINAL PAIN: 0
COUGH: 0

## 2019-06-19 ASSESSMENT — PAIN SCALES - GENERAL: PAINLEVEL: NO PAIN (0)

## 2019-06-19 NOTE — PROGRESS NOTES
Outpatient Nephrology Followup Note  June 19th, 2019       INTERVAL HISTORY:    He has been feeling well and had no ED visits since we last saw him. His BP has improved, and his creatinine is back to baseline. He still has a home care nurse visit on a weekly basis to measure vitals. He denies any complaints.    ASSESSMENT AND RECOMMENDATIONS:     1. CKD G3aA1  Baseline Cr 1.1-1.4 in 2017, but has been 1.5-1.7 at the end of 2018. He has a history of DMT2, but his UA did not show protein and his quantification was 0.05g/g. His UA showed no WBC or RBC, making GN less likely. He is taking lisinopril 20mg daily (was decreased at the previous nephrology encounter). Cr is 1.21 today  - He was unable to give us a urine sample today.   - Stable Cr today, continue current BP medication plan.    2. Anemia  Hgb 14.4, stable. Last iron panel shows replete state (ferritin 179, iron 52, isat 26)  - Can consider reducing iron to 1 pill daily in the future.     3. Electrolytes  Na 139, K 3.7. Recently stopped spironolactone and potassium is better controlled.    4. Acid-Base disturbances  HCO3 25, no anion gap. Patient is breathing comfortably    5. CKD-MB  Ca 8.9, Phos 3.3, Vit D 49,   - No overt signs of dysfunction.    6. DMT2, diet controlled  His last HbA1c was 5.6 (December 2018) , and his BG today is 98. He is off metformin. He denies any hyperglycemic episodes. If he had diabetes in the past, it appears to be diet controlled now and he does not have any proteinuria.    7. Blood pressure/Volume  BPs have been well controlled (129/83).  He remains on metoprolol, lasix, lisinopril, and amlodipine   - His HR is in the 40's, we would suggest that he talk to his PCP tomorrow (has an appointment) and discuss stopping his metoprolol for now.  - We may need to increase his amlodipine to 10mg if his BP goes higher while off the metoprolol.    Follow up in 6 months    Reason for Visit:  Jazz Berman is a 63 year old male with  CKD from unknown cause, who presents for CKD management.     HPI on Initial Encounter:  Patient is a 63 year olf -American male with a PMHx significant for HTN (on multiple medications), GREG, GIST (s/p resection), HFpEF (55-60%), DMT2 (last HbA1c 5.5, on low-dose metformin), who is presenting for CKD management.    Mr Cullen reports that his blood pressure has been low for the last few months (he reports that it can sometimes go ~90 during the day, but denies falls, syncope). He becomes lightheaded easily, and reports orthostatic symptoms. Patient reports stopping his spironolactone on 10/30 because his PCP felt that medication may have been worsening his CKD.    Patient reports leg swelling that worsens through the day, and appears to be forming in a non-pitting, dependent fashion exacerbated by activity (present during the day, gone when he sleeps). He is on lasix 40mg daily which is helping with the edema. He denies any difficulty breathing, or orthopnea.    He reports that he is controlling his diabetes through diet and exercise, and is currently on metformin 500mg daily. This metformin dose has been decreasing over the last year.    He is not taking NSAIDs. He is on a PPI for his history of GI tumor and acute GI bleeding.    He has no personal history of kidney stones, and no family history of stones. He has no family history of kidney disease, and no family members on dialysis.    Home BP: 120-130/70-75    Baseline Cr: 1.5-1.7         Kidney Disease and Medical Hx:       h/o HTN: No  Usual BP: 110/70       h/o DM: on metofmrin, but HbA1c 5.5       h/o Protein in Urine: No       h/o Blood in Urine: No       h/o Kidney Stones:No       h/o UTI  No       h/o Chronic NSAID Use: No         Previous Transplant Hx:      No         Uremic Symptoms:       Fatigue: No; Cold: No; Nausea: No; Poor Appetite: No; Metallic Taste: No; Edema: No; Pruritis: No; Tremor: No;    ROS:  Review of Systems   Constitution:  Negative for chills, decreased appetite, fever and malaise/fatigue.   Cardiovascular: Negative for chest pain, leg swelling and palpitations.   Respiratory: Negative for cough and shortness of breath.    Skin: Negative for rash.   Musculoskeletal: Negative for back pain.   Gastrointestinal: Negative for abdominal pain.   Genitourinary: Negative for dysuria and flank pain.   Psychiatric/Behavioral: Negative for altered mental status.     Active Medical Problems:  Patient Active Problem List   Diagnosis     Hypertension, essential     Body mass index (BMI) of 40.0-44.9 in adult (H)     GREG (obstructive sleep apnea) on CPAP     Erectile dysfunction     BPH (benign prostatic hyperplasia)     Mild recurrent major depression (H)     Diverticulosis of large intestine     GIST (gastrointestinal stromal tumor), malignant (H)     Obesity     Chronic diastolic heart failure (H)     Anticoagulation goal of INR 2 to 3     Paroxysmal atrial fibrillation (H)     Type 2 diabetes mellitus without complication, without long-term current use of insulin (H)     Labile hypertension     CKD (chronic kidney disease) stage 3, GFR 30-59 ml/min (H)     PAST MEDICAL HISTORY:  Reviewed with patient on 06/23/2019     MEDICATIONS:     Medication Sig Start Date End Date Taking? Authorizing Provider   amLODIPine (NORVASC) 10 MG tablet Take 1 tablet (10 mg) by mouth daily 2/27/18  Yes Dinesh Zarate MD   apixaban ANTICOAGULANT (ELIQUIS) 5 MG tablet Take 1 tablet (5 mg) by mouth 2 times daily 2/27/18  Yes Dinesh Zarate MD   blood glucose monitoring (NO BRAND SPECIFIED) test strip Use to test blood sugars 2 times daily or as directed 8/23/17  Yes Moses Lind MD   blood glucose monitoring (ONE TOUCH DELICA) lancets Use to test blood sugars 2 times daily or as directed. 8/23/17  Yes Zachary Batres MD   Cholecalciferol (VITAMIN D) 2000 UNITS tablet Take 2,000 Units by mouth daily 2/27/18  Yes Dinesh Zarate MD   cycloSPORINE (RESTASIS)  0.05 % ophthalmic emulsion Place 1 drop into both eyes 2 times daily 6/22/17  Yes Andie Hyde, OD   ferrous sulfate (IRON) 325 (65 FE) MG tablet Take 1 tablet (325 mg) by mouth 2 times daily 2/27/18  Yes Dinesh Zarate MD   furosemide (LASIX) 40 MG tablet TAKE 1 TABLET (40 MG) BY MOUTH DAILY 2/27/18  Yes Dinesh Zarate MD   lisinopril (PRINIVIL/ZESTRIL) 20 MG tablet Take 2 tablets (40 mg) by mouth daily 2/27/18  Yes Dinesh Zarate MD   metFORMIN (GLUCOPHAGE) 500 MG tablet Take 1 tablet (500mg) every AM 5/31/18  Yes Guadalupe Glasgow MD   methylPREDNISolone (MEDROL) 32 MG tablet Take 1 tablet (32 mg) by mouth daily Take one tablet 12 hrs prior to scan and 1 tablet 2 hrs prior to scan 9/14/18  Yes Sean Freed MD   metoprolol succinate (TOPROL-XL) 100 MG 24 hr tablet Take 2 tablets (200 mg) by mouth daily 2/27/18  Yes Dinesh Zarate MD   omeprazole (PRILOSEC) 40 MG capsule Take 1 capsule (40 mg) by mouth daily Take 30-60 minutes before a meal. 2/27/18  Yes Dinesh Zarate MD   order for DME Equipment being ordered: BP cuff, large 12/17/15  Yes Moses Lind MD   sildenafil (VIAGRA) 100 MG tablet Take 100mg tablet as directed.  -Rx prescribed via [employer] connection to care program- 6/20/18  Yes Zachary Batres MD   solifenacin (VESICARE) 10 MG tablet Take 1 tablet (10 mg) by mouth daily AM 2/27/18  Yes Dinesh Zarate MD   ASPIRIN NOT PRESCRIBED (INTENTIONAL) Please choose reason not prescribed, below  Patient not taking: Reported on 9/12/2018 1/8/18   Page Anderson MD   STATIN NOT PRESCRIBED, INTENTIONAL, 1 each daily Please choose reason not prescribed, below  Patient not taking: Reported on 9/12/2018 10/12/17   Rachel Berrios MD      ALLERGIES:    Allergies   Allergen Reactions     Dye [Contrast Dye] Rash     Dye left skin hyperpigmentation on his back  Patient needs to be premedicated for all CT contrast exams.      PHYSICAL EXAM:     Vitals:  /83 (BP  Location: Right arm)   Pulse (!) 46   Temp 98.3  F (36.8  C)   Wt 126 kg (277 lb 11.2 oz)   SpO2 96%   BMI 39.85 kg/m      GENERAL APPEARANCE: not in acute distress  EYES: no scleral icterus, pupils equal  Endo: no goiter, no moon facies  Lymphatics: no cervical or supraclavicular LAD  Pulmonary: lungs clear to auscultation with equal breath sounds bilaterally, no clubbing  CV: regular rhythm, normal rate, no rub   - JVD no   - Edema non-pitting LE edema bilaterally  GI: soft, nontender, normal bowel sounds  MS: no evidence of inflammation in joints, no muscle tenderness  SKIN: no rash, warm, dry, no cyanosis  NEURO: face symmetric, no asterixis     IMAGING:  All imaging studies reviewed by me.     I have seen and examined this patient with the fellow Dr Francisco Arriaza.  This note reflects our joint assessment and plan.     Anneliese Wiley MD

## 2019-06-19 NOTE — LETTER
6/19/2019      RE: Jazz Berman  [address]  [facility] [address]         Outpatient Nephrology Followup Note  June 19th, 2019       INTERVAL HISTORY:    He has been feeling well and had no ED visits since we last saw him. His BP has improved, and his creatinine is back to baseline. He still has a home care nurse visit on a weekly basis to measure vitals. He denies any complaints.    ASSESSMENT AND RECOMMENDATIONS:     1. CKD G3aA1  Baseline Cr 1.1-1.4 in 2017, but has been 1.5-1.7 at the end of 2018. He has a history of DMT2, but his UA did not show protein and his quantification was 0.05g/g. His UA showed no WBC or RBC, making GN less likely. He is taking lisinopril 20mg daily (was decreased at the previous nephrology encounter). Cr is 1.21 today  - He was unable to give us a urine sample today.   - Stable Cr today, continue current BP medication plan.    2. Anemia  Hgb 14.4, stable. Last iron panel shows replete state (ferritin 179, iron 52, isat 26)  - Can consider reducing iron to 1 pill daily in the future.     3. Electrolytes  Na 139, K 3.7. Recently stopped spironolactone and potassium is better controlled.    4. Acid-Base disturbances  HCO3 25, no anion gap. Patient is breathing comfortably    5. CKD-MB  Ca 8.9, Phos 3.3, Vit D 49,   - No overt signs of dysfunction.    6. DMT2, diet controlled  His last HbA1c was 5.6 (December 2018) , and his BG today is 98. He is off metformin. He denies any hyperglycemic episodes. If he had diabetes in the past, it appears to be diet controlled now and he does not have any proteinuria.    7. Blood pressure/Volume  BPs have been well controlled (129/83).  He remains on metoprolol, lasix, lisinopril, and amlodipine   - His HR is in the 40's, we would suggest that he talk to his PCP tomorrow (has an appointment) and discuss stopping his metoprolol for now.  - We may need to increase his amlodipine to 10mg if his BP goes higher while off the  metoprolol.    Follow up in 6 months    Reason for Visit:  Jazz Berman is a 63 year old male with CKD from unknown cause, who presents for CKD management.     HPI on Initial Encounter:  Patient is a 63 year olf -American male with a PMHx significant for HTN (on multiple medications), GREG, GIST (s/p resection), HFpEF (55-60%), DMT2 (last HbA1c 5.5, on low-dose metformin), who is presenting for CKD management.    Mr Berman reports that his blood pressure has been low for the last few months (he reports that it can sometimes go ~90 during the day, but denies falls, syncope). He becomes lightheaded easily, and reports orthostatic symptoms. Patient reports stopping his spironolactone on 10/30 because his PCP felt that medication may have been worsening his CKD.    Patient reports leg swelling that worsens through the day, and appears to be forming in a non-pitting, dependent fashion exacerbated by activity (present during the day, gone when he sleeps). He is on lasix 40mg daily which is helping with the edema. He denies any difficulty breathing, or orthopnea.    He reports that he is controlling his diabetes through diet and exercise, and is currently on metformin 500mg daily. This metformin dose has been decreasing over the last year.    He is not taking NSAIDs. He is on a PPI for his history of GI tumor and acute GI bleeding.    He has no personal history of kidney stones, and no family history of stones. He has no family history of kidney disease, and no family members on dialysis.    Home BP: 120-130/70-75    Baseline Cr: 1.5-1.7         Kidney Disease and Medical Hx:       h/o HTN: No  Usual BP: 110/70       h/o DM: on metofmrin, but HbA1c 5.5       h/o Protein in Urine: No       h/o Blood in Urine: No       h/o Kidney Stones:No       h/o UTI  No       h/o Chronic NSAID Use: No         Previous Transplant Hx:      No         Uremic Symptoms:       Fatigue: No; Cold: No; Nausea: No; Poor Appetite: No;  Metallic Taste: No; Edema: No; Pruritis: No; Tremor: No;    ROS:  Review of Systems   Constitution: Negative for chills, decreased appetite, fever and malaise/fatigue.   Cardiovascular: Negative for chest pain, leg swelling and palpitations.   Respiratory: Negative for cough and shortness of breath.    Skin: Negative for rash.   Musculoskeletal: Negative for back pain.   Gastrointestinal: Negative for abdominal pain.   Genitourinary: Negative for dysuria and flank pain.   Psychiatric/Behavioral: Negative for altered mental status.     Active Medical Problems:  Patient Active Problem List   Diagnosis     Hypertension, essential     Body mass index (BMI) of 40.0-44.9 in adult (H)     GREG (obstructive sleep apnea) on CPAP     Erectile dysfunction     BPH (benign prostatic hyperplasia)     Mild recurrent major depression (H)     Diverticulosis of large intestine     GIST (gastrointestinal stromal tumor), malignant (H)     Obesity     Chronic diastolic heart failure (H)     Anticoagulation goal of INR 2 to 3     Paroxysmal atrial fibrillation (H)     Type 2 diabetes mellitus without complication, without long-term current use of insulin (H)     Labile hypertension     CKD (chronic kidney disease) stage 3, GFR 30-59 ml/min (H)     PAST MEDICAL HISTORY:  Reviewed with patient on 06/23/2019     MEDICATIONS:     Medication Sig Start Date End Date Taking? Authorizing Provider   amLODIPine (NORVASC) 10 MG tablet Take 1 tablet (10 mg) by mouth daily 2/27/18  Yes Dinesh Zarate MD   apixaban ANTICOAGULANT (ELIQUIS) 5 MG tablet Take 1 tablet (5 mg) by mouth 2 times daily 2/27/18  Yes Dinesh Zarate MD   blood glucose monitoring (NO BRAND SPECIFIED) test strip Use to test blood sugars 2 times daily or as directed 8/23/17  Yes Moses Lind MD   blood glucose monitoring (ONE TOUCH DELICA) lancets Use to test blood sugars 2 times daily or as directed. 8/23/17  Yes Zachary Batres MD   Cholecalciferol (VITAMIN D) 2000 UNITS  tablet Take 2,000 Units by mouth daily 2/27/18  Yes Dinesh Zarate MD   cycloSPORINE (RESTASIS) 0.05 % ophthalmic emulsion Place 1 drop into both eyes 2 times daily 6/22/17  Yes Andie Hyde, JEAN   ferrous sulfate (IRON) 325 (65 FE) MG tablet Take 1 tablet (325 mg) by mouth 2 times daily 2/27/18  Yes Dinesh Zarate MD   furosemide (LASIX) 40 MG tablet TAKE 1 TABLET (40 MG) BY MOUTH DAILY 2/27/18  Yes Dinesh Zarate MD   lisinopril (PRINIVIL/ZESTRIL) 20 MG tablet Take 2 tablets (40 mg) by mouth daily 2/27/18  Yes Dinesh Zarate MD   metFORMIN (GLUCOPHAGE) 500 MG tablet Take 1 tablet (500mg) every AM 5/31/18  Yes Guadalupe Glasgow MD   methylPREDNISolone (MEDROL) 32 MG tablet Take 1 tablet (32 mg) by mouth daily Take one tablet 12 hrs prior to scan and 1 tablet 2 hrs prior to scan 9/14/18  Yes Sean Freed MD   metoprolol succinate (TOPROL-XL) 100 MG 24 hr tablet Take 2 tablets (200 mg) by mouth daily 2/27/18  Yes Dinesh Zarate MD   omeprazole (PRILOSEC) 40 MG capsule Take 1 capsule (40 mg) by mouth daily Take 30-60 minutes before a meal. 2/27/18  Yes Dinesh Zarate MD   order for DME Equipment being ordered: BP cuff, large 12/17/15  Yes Moses Lind MD   sildenafil (VIAGRA) 100 MG tablet Take 100mg tablet as directed.  -Rx prescribed via [employer] connection to care program- 6/20/18  Yes Zachary Batres MD   solifenacin (VESICARE) 10 MG tablet Take 1 tablet (10 mg) by mouth daily AM 2/27/18  Yes Dinesh Zarate MD   ASPIRIN NOT PRESCRIBED (INTENTIONAL) Please choose reason not prescribed, below  Patient not taking: Reported on 9/12/2018 1/8/18   Page Anderson MD   STATIN NOT PRESCRIBED, INTENTIONAL, 1 each daily Please choose reason not prescribed, below  Patient not taking: Reported on 9/12/2018 10/12/17   Rachel Berrios MD      ALLERGIES:    Allergies   Allergen Reactions     Dye [Contrast Dye] Rash     Dye left skin hyperpigmentation on his back  Patient needs  to be premedicated for all CT contrast exams.      PHYSICAL EXAM:     Vitals:  /83 (BP Location: Right arm)   Pulse (!) 46   Temp 98.3  F (36.8  C)   Wt 126 kg (277 lb 11.2 oz)   SpO2 96%   BMI 39.85 kg/m       GENERAL APPEARANCE: not in acute distress  EYES: no scleral icterus, pupils equal  Endo: no goiter, no moon facies  Lymphatics: no cervical or supraclavicular LAD  Pulmonary: lungs clear to auscultation with equal breath sounds bilaterally, no clubbing  CV: regular rhythm, normal rate, no rub   - JVD no   - Edema non-pitting LE edema bilaterally  GI: soft, nontender, normal bowel sounds  MS: no evidence of inflammation in joints, no muscle tenderness  SKIN: no rash, warm, dry, no cyanosis  NEURO: face symmetric, no asterixis     IMAGING:  All imaging studies reviewed by me.     I have seen and examined this patient with the fellow Dr Francisco Arriaza.  This note reflects our joint assessment and plan.     MD Francisco Herrera MD

## 2019-06-19 NOTE — PATIENT INSTRUCTIONS
1. We will discuss with your primary care doctor about reducing your metoprolol to 100mg daily (or stopping completely), and increasing your amlodipine to 10mg daily. You are having good BP control right now, but having a low heart rate    2. Continue eating a low salt, and low sugar diet.

## 2019-06-19 NOTE — NURSING NOTE
"Chief Complaint   Patient presents with     RECHECK     3 month follow up CKD     Vital signs:  Temp: 98.3  F (36.8  C)   BP: 129/83 Pulse: (!) 46     SpO2: 96 %       Weight: 126 kg (277 lb 11.2 oz)  Estimated body mass index is 39.85 kg/m  as calculated from the following:    Height as of 3/26/19: 1.778 m (5' 10\").    Weight as of this encounter: 126 kg (277 lb 11.2 oz).    Pauly Monet CMA    "

## 2019-06-20 ENCOUNTER — DOCUMENTATION ONLY (OUTPATIENT)
Dept: FAMILY MEDICINE | Facility: CLINIC | Age: 64
End: 2019-06-20

## 2019-06-20 ENCOUNTER — OFFICE VISIT (OUTPATIENT)
Dept: FAMILY MEDICINE | Facility: CLINIC | Age: 64
End: 2019-06-20
Payer: MEDICARE

## 2019-06-20 VITALS
OXYGEN SATURATION: 99 % | HEIGHT: 69 IN | WEIGHT: 277.4 LBS | RESPIRATION RATE: 16 BRPM | TEMPERATURE: 97.9 F | SYSTOLIC BLOOD PRESSURE: 114 MMHG | DIASTOLIC BLOOD PRESSURE: 80 MMHG | HEART RATE: 74 BPM | BODY MASS INDEX: 41.09 KG/M2

## 2019-06-20 DIAGNOSIS — I10 ESSENTIAL HYPERTENSION WITH GOAL BLOOD PRESSURE LESS THAN 140/90: ICD-10-CM

## 2019-06-20 DIAGNOSIS — R00.1 BRADYCARDIA: Primary | ICD-10-CM

## 2019-06-20 RX ORDER — METOPROLOL SUCCINATE 100 MG/1
150 TABLET, EXTENDED RELEASE ORAL DAILY
Qty: 180 TABLET | Refills: 3 | Status: SHIPPED | OUTPATIENT
Start: 2019-06-20 | End: 2019-08-30

## 2019-06-20 ASSESSMENT — ENCOUNTER SYMPTOMS
NECK STIFFNESS: 0
EYE PAIN: 0
WEAKNESS: 0
CHILLS: 0
ABDOMINAL PAIN: 1
SHORTNESS OF BREATH: 0
CONSTIPATION: 0
NAUSEA: 0
FEVER: 0
DIZZINESS: 0
CHEST TIGHTNESS: 0
FATIGUE: 0
VOMITING: 0
NECK PAIN: 0
NUMBNESS: 0
PALPITATIONS: 0
ARTHRALGIAS: 0
DIARRHEA: 0
MYALGIAS: 0

## 2019-06-20 ASSESSMENT — MIFFLIN-ST. JEOR: SCORE: 2043.27

## 2019-06-20 NOTE — PROGRESS NOTES
"When opening a documentation only encounter, be sure to enter in \"Chief Complaint\" Forms and in \" Comments\" Title of form, description if needed.    Al is a 64 year old  male  Form received via: Fax  Form now resides in: Provider Ready    BHAVIK Bustillos 1:41 PM June 20, 2019    Form has been completed by provider.     Form sent out via: Fax to [employer] at Fax Number: [phone]  Patient informed: No  Output date: June 24, 2019    BHAVIK Bustillos 8:32 AM June 24, 2019    **Please close the encounter**                    "

## 2019-06-20 NOTE — PATIENT INSTRUCTIONS
Here is the plan from today's visit    1. Bradycardia  - EKG rhythm strip    2. Essential hypertension with goal blood pressure less than 140/90  - metoprolol succinate ER (TOPROL-XL) 100 MG 24 hr tablet; Take 1.5 tablets (150 mg) by mouth daily  Dispense: 180 tablet; Refill: 3    Please call or return to clinic if your symptoms don't go away.    Follow up plan  Please make a clinic appointment for follow up with your primary physician Kenyon Singh MD in 1-2 months for follow up.     Thank you for coming to [facility] today.  Lab Testing:  **If you had lab testing today and your results are reassuring or normal they will be mailed to you or sent through [employer] within 7 days.   **If the lab tests need quick action we will call you with the results.  The phone number we will call with results is # [phone] (home) . If this is not the best number please call our clinic and change the number.  Medication Refills:  If you need any refills please call your pharmacy and they will contact us.   If you need to  your refill at a new pharmacy, please contact the new pharmacy directly. The new pharmacy will help you get your medications transferred faster.   Scheduling:  If you have any concerns about today's visit or wish to schedule another appointment please call our office during normal business hours [phone] (8-5:00 M-F)  If a referral was made to a [facility] and you don't get a call from central scheduling please call [phone].  If a Mammogram was ordered for you at [facility] call [phone] to schedule or change your appointment.  If you had an XRay/CT/Ultrasound/MRI ordered the number is [phone] to schedule or change your radiology appointment.   Medical Concerns:  If you have urgent medical concerns please call [phone] at any time of the day.    Kenyon Singh MD

## 2019-06-20 NOTE — PROGRESS NOTES
Preceptor Attestation:   Patient seen, evaluated and discussed with the resident. I personally viewed the EKG and agree with the interpretation documented by the resident. I have verified the content of the note, which accurately reflects my assessment of the patient and the plan of care.   Supervising Physician:  Dinesh Zarate MD

## 2019-06-20 NOTE — PROGRESS NOTES
"       HPI       Jazz Berman is a 64 year old male with a history of stage 3 CKD, HTN, A.fib on abixaban and metoprolol, diagnosis of  who presents for   Chief Complaint   Patient presents with     Follow Up     blood pressure and diabetes     Decreased Heart Rate  Mr. Berman primarily presents for concerns over slow pulse. His home nurse has been noting low heart rates (45-55 BPM) over the last two months or so. He feels \"dizzy\" when his heart rate drops below 60. He denies palpitations, excessive sweating, chest pain, or shortness of breath, His blood pressures have otherwise been within target range, even during these episodes. He takes metoprolol ER 200mg daily, amlodipine 5mg daily, lasix 40mg daily, and lisinopril 20mg daily. He takes his medications regularly. His last cardiology appointment was \"a while ago\" but his last nephrology appointment was yesterday    Diabetes:  Unclear whether he really meets a current diagnosis of diabetes. Last A1C 6 months ago was 5.6. He does not take any medications for diabetes control.    Mr. Berman speaks English and so an  was not used.  +++++++    Problem, Medication and Allergy Lists were reviewed and updated if needed..    Patient is an established patient of this clinic..         Review of Systems:   Review of Systems   Constitutional: Negative for chills, fatigue and fever.   HENT: Negative for ear pain and hearing loss.    Eyes: Negative for pain and visual disturbance.   Respiratory: Negative for chest tightness and shortness of breath.    Cardiovascular: Negative for chest pain, palpitations and leg swelling.   Gastrointestinal: Positive for abdominal pain. Negative for constipation, diarrhea, nausea and vomiting.   Endocrine: Negative for cold intolerance and heat intolerance.   Musculoskeletal: Negative for arthralgias, myalgias, neck pain and neck stiffness.   Neurological: Negative for dizziness, weakness and numbness.            Physical Exam: " "    Vitals:    06/20/19 1513 06/20/19 1515   BP: (!) 74/45 114/80   Pulse: 74    Resp: 16    Temp: 97.9  F (36.6  C)    TempSrc: Oral    SpO2: 99%    Weight: 125.8 kg (277 lb 6.4 oz)    Height: 1.76 m (5' 9.29\")      Body mass index is 40.62 kg/m .  Vitals were reviewed and were normal     Physical Exam   Constitutional: He appears well-developed and well-nourished. No distress.   HENT:   Head: Normocephalic and atraumatic.   Eyes: Conjunctivae and EOM are normal.   Neck: Normal range of motion. Neck supple.   Cardiovascular: Normal rate and regular rhythm.   Pulmonary/Chest: Effort normal and breath sounds normal.   Abdominal: Soft. He exhibits no distension. There is no tenderness.   Musculoskeletal: Normal range of motion. He exhibits no edema.   Skin: Skin is warm and dry. He is not diaphoretic.         Results:   Results are ordered and pending    Assessment and Plan        Bradycardia   EKG today demonstrated irregular rhythm without P-waves consistent with atrial fibrillation. Etiology is most likely persistent atrial fibrillation in the setting of significant rate control with metoprolol. Next cardiology appointment is in September. Plan to decrease to Metoprolol 150mg ER with closer cardiology follow-up  - Decrease Metoprolol to 150mg ER daily  - Follow-up with cardiology in 1-2 weeks    Hypertension  Well-controlled per home nurse-notes. Anti-hypertensives may require adjustment following decrease in metoprolol. May be able to go up on amlodipine dose per nephrology note on 06/19/19.  - Continue current dose of amlodipine, lasix, and lisinopril  - Follow-up with cardiology in 1-2 weeks       Medications Discontinued During This Encounter   Medication Reason     apixaban ANTICOAGULANT (ELIQUIS) 5 MG tablet      methylPREDNISolone (MEDROL) 32 MG tablet      metoprolol succinate ER (TOPROL-XL) 100 MG 24 hr tablet Reorder       Options for treatment and follow-up care were reviewed with the patient. Jazz " Cullen  engaged in the decision making process and verbalized understanding of the options discussed and agreed with the final plan.    Jason Wooten, MS4    Kenyon Singh MD

## 2019-06-23 ENCOUNTER — MEDICAL CORRESPONDENCE (OUTPATIENT)
Dept: HEALTH INFORMATION MANAGEMENT | Facility: CLINIC | Age: 64
End: 2019-06-23

## 2019-06-26 ENCOUNTER — DOCUMENTATION ONLY (OUTPATIENT)
Dept: FAMILY MEDICINE | Facility: CLINIC | Age: 64
End: 2019-06-26

## 2019-06-26 NOTE — PROGRESS NOTES
"When opening a documentation only encounter, be sure to enter in \"Chief Complaint\" Forms and in \" Comments\" Title of form, description if needed.    Al is a 64 year old  male  Form received via: Fax  Form now resides in: Provider Ready    BHAVIK Bustillos 7:53 AM June 26, 2019      Form has been completed by provider.     Form sent out via: Fax to Jacquie Cid at Fax Number: [phone]  Patient informed: No  Output date: June 27, 2019    BHAVIK Bustillos 7:20 AM June 27, 2019    **Please close the encounter**            "

## 2019-06-28 ENCOUNTER — TELEPHONE (OUTPATIENT)
Dept: FAMILY MEDICINE | Facility: CLINIC | Age: 64
End: 2019-06-28

## 2019-06-28 NOTE — TELEPHONE ENCOUNTER
Marina from [employer] called requesting clarification on the hypertension DX. She would like to know if it is just essential hypertension or is it renal failure hypertension.    Phone: Marina at [phone] ext [phone]    April MELISSA Rm, [facility]

## 2019-06-28 NOTE — TELEPHONE ENCOUNTER
PHARMACY TELEPHONE ENCOUNTER:    Reason: Viagra      Pt calls requesting .  Viagra Rx has arrived today and is ready for  at nurse lock box.    Jayson Hernandez Pharm.D.

## 2019-07-31 ENCOUNTER — DOCUMENTATION ONLY (OUTPATIENT)
Dept: FAMILY MEDICINE | Facility: CLINIC | Age: 64
End: 2019-07-31

## 2019-07-31 NOTE — PROGRESS NOTES
"When opening a documentation only encounter, be sure to enter in \"Chief Complaint\" Forms and in \" Comments\" Title of form, description if needed.    Al is a 64 year old  male  Form received via: Fax  Form now resides in: Provider Ready    BHAVIK Bustillos 8:11 AM July 31, 2019    Form has been completed by provider.     Form sent out via: Fax to Marina Augustin RN at Fax Number: [phone]  Patient informed: No  Output date: July 31, 2019    BHAVIK Bustillos 10:53 AM July 31, 2019    **Please close the encounter**                  "

## 2019-08-22 ENCOUNTER — MEDICAL CORRESPONDENCE (OUTPATIENT)
Dept: HEALTH INFORMATION MANAGEMENT | Facility: CLINIC | Age: 64
End: 2019-08-22

## 2019-08-23 NOTE — TELEPHONE ENCOUNTER
RN has to route to pharmacy pool as RN does not have the ability to do this. Routing high priority.  Rocio Ann, RN

## 2019-08-23 NOTE — TELEPHONE ENCOUNTER
Order has been placed with [employer].  Company has notified the patient that the program will no longer be available after this order.  Pt understands.    Order number [id number]    Jayson Hernandez Pharm.D.

## 2019-08-23 NOTE — TELEPHONE ENCOUNTER
Verify that the refill encounter hasn't been started Yes    [facility] phone call message- patient requesting a refill:    Full Medication Name: sildenafil (VIAGRA) 100 MG tablet    Dose: 100mg tablet as directed.  -Rx prescribed via [employer] connection to care program     Pharmacy confirmed as   [id number] - [place], [place] - [address]  [address] [place] [address]  Phone: [phone] Fax: [phone]  : Yes    Medication tab checked to see if medication has been sent  Yes    Additional Comments: Patient stated Jayson normally fills medication every 45 days with the [employer] Program. Author informed patient that Jayson is out of clinic but would forward message to triage RN. Please advise, patient will be out shortly.       OK to leave a message on voice mail? Yes    Advised patient refill may take up to 2 business days? Yes    Primary language: English      needed? No    Call taken on August 23, 2019 at 1:23 PM by Theresa Valentin    Route to [facility] MED REFILL

## 2019-08-30 ENCOUNTER — OFFICE VISIT (OUTPATIENT)
Dept: FAMILY MEDICINE | Facility: CLINIC | Age: 64
End: 2019-08-30
Payer: MEDICARE

## 2019-08-30 VITALS
OXYGEN SATURATION: 99 % | HEART RATE: 68 BPM | SYSTOLIC BLOOD PRESSURE: 143 MMHG | DIASTOLIC BLOOD PRESSURE: 91 MMHG | RESPIRATION RATE: 16 BRPM | TEMPERATURE: 97.7 F | BODY MASS INDEX: 39.25 KG/M2 | WEIGHT: 274.2 LBS | HEIGHT: 70 IN

## 2019-08-30 DIAGNOSIS — I50.32 CHRONIC HEART FAILURE WITH PRESERVED EJECTION FRACTION (H): ICD-10-CM

## 2019-08-30 DIAGNOSIS — I10 ESSENTIAL HYPERTENSION WITH GOAL BLOOD PRESSURE LESS THAN 140/90: Primary | ICD-10-CM

## 2019-08-30 RX ORDER — AMLODIPINE BESYLATE 5 MG/1
5 TABLET ORAL DAILY
Qty: 60 TABLET | Refills: 3 | Status: SHIPPED | OUTPATIENT
Start: 2019-08-30 | End: 2020-04-28

## 2019-08-30 RX ORDER — FUROSEMIDE 40 MG
TABLET ORAL
Qty: 90 TABLET | Refills: 3 | Status: SHIPPED | OUTPATIENT
Start: 2019-08-30 | End: 2020-04-28

## 2019-08-30 RX ORDER — METOPROLOL SUCCINATE 50 MG/1
150 TABLET, EXTENDED RELEASE ORAL DAILY
Qty: 180 TABLET | Refills: 3 | Status: SHIPPED | OUTPATIENT
Start: 2019-08-30 | End: 2020-05-13

## 2019-08-30 RX ORDER — LISINOPRIL 20 MG/1
20 TABLET ORAL DAILY
Qty: 180 TABLET | Refills: 3 | Status: SHIPPED | OUTPATIENT
Start: 2019-08-30 | End: 2020-10-14

## 2019-08-30 ASSESSMENT — MIFFLIN-ST. JEOR: SCORE: 2040.01

## 2019-08-30 NOTE — PROGRESS NOTES
HPI       Jazz Berman is a 64 year old  who presents for   Chief Complaint   Patient presents with     Follow Up     blood pressure follow up          Hypertension Follow-up  <140/90    Outpatient blood pressures are being checked at home.  Results are 106/67 -142/95.    Chest Pain? :No     Low Salt Diet: low salt    Daily NSAID Use?No     Did patient take their HTN pills today/last night as usual?  Yes    Last Basic Metabolic Panel:  Lab Results   Component Value Date     06/19/2019      Lab Results   Component Value Date    POTASSIUM 3.7 06/19/2019     Lab Results   Component Value Date    CHLORIDE 108 06/19/2019     Lab Results   Component Value Date    NATALYA 8.9 06/19/2019     Lab Results   Component Value Date    CO2 25 06/19/2019     Lab Results   Component Value Date    BUN 22 06/19/2019     Lab Results   Component Value Date    CR 1.21 06/19/2019     Lab Results   Component Value Date    GLC 98 06/19/2019       Adherence and Exercise  Medication side effects: no  How often is a medication missed? Never  Exercise:walking 3-4 days/week for an average of 15-30 minutes      +++++++    Problem, Medication and Allergy Lists were      Patient Active Problem List    Diagnosis Date Noted     Hypertension, essential 08/30/2012     Priority: High     Class: Chronic     Use large BP cuff       GREG (obstructive sleep apnea) on CPAP 08/30/2012     Priority: High     Class: Chronic     Labile hypertension 02/23/2019     Priority: Medium     CKD (chronic kidney disease) stage 3, GFR 30-59 ml/min (H) 02/23/2019     Priority: Medium     Type 2 diabetes mellitus without complication, without long-term current use of insulin (H) 10/12/2017     Priority: Medium     No retinopathy noted on 3/23/2018 eye exam.        Paroxysmal atrial fibrillation (H) 04/24/2017     Priority: Medium     Atrial fibrillation, rate controlled.       Anticoagulation goal of INR 2 to 3 03/21/2017     Priority: Medium     Chronic diastolic  heart failure (H) 02/11/2017     Priority: Medium     HFpEF       GIST (gastrointestinal stromal tumor), malignant (H) 09/27/2016     Priority: Medium     recurrence risk is on the order of 10-15% over a few years per Oncology        Obesity 09/27/2016     Priority: Medium     Diverticulosis of large intestine 07/04/2016     Priority: Medium     Colonoscopy 7/2/16        Mild recurrent major depression (H) 09/05/2013     Priority: Medium     Class: Chronic     BPH (benign prostatic hyperplasia) 08/29/2013     Priority: Medium     Erectile dysfunction 07/10/2013     Priority: Medium     Body mass index (BMI) of 40.0-44.9 in adult (H) 08/30/2012     Priority: Medium     Class: Chronic   ,     Current Outpatient Medications   Medication Sig Dispense Refill     amLODIPine (NORVASC) 5 MG tablet Take 1 tablet (5 mg) by mouth daily 60 tablet 3     apixaban ANTICOAGULANT (ELIQUIS) 5 MG tablet Take 1 tablet (5 mg) by mouth 2 times daily 60 tablet 3     blood glucose monitoring (NO BRAND SPECIFIED) test strip Use to test blood sugars 2 times daily or as directed 100 each 3     blood glucose monitoring (ONE TOUCH DELICA) lancets Use to test blood sugars 2 times daily or as directed. 100 each 3     cycloSPORINE (RESTASIS) 0.05 % ophthalmic emulsion Place 1 drop into both eyes 2 times daily 1 Box 3     ferrous sulfate (FEROSUL) 325 (65 Fe) MG tablet Take 1 tablet (325 mg) by mouth 2 times daily 180 tablet 3     furosemide (LASIX) 40 MG tablet TAKE 1 TABLET (40 MG) BY MOUTH DAILY 90 tablet 3     lisinopril (PRINIVIL/ZESTRIL) 20 MG tablet Take 1 tablet (20 mg) by mouth daily 180 tablet 3     metoprolol succinate ER (TOPROL-XL) 50 MG 24 hr tablet Take 3 tablets (150 mg) by mouth daily 180 tablet 3     order for DME Equipment being ordered: BP cuff, large 1 Units 0     ranitidine (ZANTAC) 150 MG tablet Take 1 tablet (150 mg) by mouth daily 90 tablet 3     sildenafil (VIAGRA) 100 MG tablet Take 100mg tablet as directed.  -Rx  prescribed via [employer] connection to care program- 30 tablet 3     solifenacin (VESICARE) 10 MG tablet Take 1 tablet (10 mg) by mouth daily AM 90 tablet 3     vitamin D3 (CHOLECALCIFEROL) 2000 units tablet Take 1 tablet by mouth daily 90 tablet 3     ASPIRIN NOT PRESCRIBED (INTENTIONAL) Please choose reason not prescribed, below (Patient not taking: Reported on 3/26/2019) 1 each 0     STATIN NOT PRESCRIBED, INTENTIONAL, 1 each daily Please choose reason not prescribed, below (Patient not taking: Reported on 12/18/2018)     ,     Allergies   Allergen Reactions     Dye [Contrast Dye] Rash     Dye left skin hyperpigmentation on his back  Patient needs to be premedicated for all CT contrast exams.    .    Patient is   an established patient of this clinic.  Past Medical History:   Diagnosis Date     Arthritis      Atrial fibrillation (H)      BPH (benign prostatic hyperplasia) 8/29/2013     Cardiomegaly 8/30/2012     Crushing injury of foot 8/30/2012     Depressive disorder, not elsewhere classified 8/30/2012     Diabetes mellitus type 2 in obese (H)      Diverticulosis of large intestine 7/4/2016    Colonoscopy 7/2/16       Former smoker      Gastric mass      GI bleed      History of blood transfusion      Hypertension      Hypertension      Keloid 6/11/2012     GREG on CPAP      Family History   Problem Relation Age of Onset     Hypertension Father      Diabetes Mother      Colon Cancer No family hx of      Crohn's Disease No family hx of      Ulcerative Colitis No family hx of      Cancer No family hx of         no skin cancer     Glaucoma No family hx of      Macular Degeneration No family hx of      Social History     Socioeconomic History     Marital status: Single     Spouse name: None     Number of children: None     Years of education: None     Highest education level: None   Occupational History     None   Social Needs     Financial resource strain: None     Food insecurity:     Worry: None     Inability: None  "    Transportation needs:     Medical: None     Non-medical: None   Tobacco Use     Smoking status: Former Smoker     Years: 30.00     Types: Cigarettes     Last attempt to quit: 2011     Years since quittin.2     Smokeless tobacco: Never Used   Substance and Sexual Activity     Alcohol use: No     Alcohol/week: 0.0 oz     Comment: twice per week and 6 pack per sitting, quit about 6 months ago     Drug use: No     Comment: +marijuana and crack cocaine     Sexual activity: Yes   Lifestyle     Physical activity:     Days per week: None     Minutes per session: None     Stress: None   Relationships     Social connections:     Talks on phone: None     Gets together: None     Attends [employer] service: None     Active member of club or organization: None     Attends meetings of clubs or organizations: None     Relationship status: None     Intimate partner violence:     Fear of current or ex partner: None     Emotionally abused: None     Physically abused: None     Forced sexual activity: None   Other Topics Concern     Parent/sibling w/ CABG, MI or angioplasty before 65F 55M? Not Asked   Social History Narrative     None   .         Review of Systems:   Review of Systems         Physical Exam:     Vitals:    19 0802 19 0804   BP: (!) 163/119 (!) 143/91   Pulse: 68    Resp: 16    Temp: 97.7  F (36.5  C)    TempSrc: Oral    SpO2: 99%    Weight: 124.4 kg (274 lb 3.2 oz)    Height: 1.778 m (5' 10\")      Body mass index is 39.34 kg/m .  Vital signs normal except HTN     Physical Exam  Constitutional: Oriented to person, place, and time. Appears well-developed and well-nourished.   HENT:   Head: Normocephalic and atraumatic.   Eyes: Conjunctivae are normal.   Neck: Normal range of motion.   Cardiovascular: Normal rate, irregular rhythm, normal heart sounds and intact distal pulses.    Pulmonary/Chest: Effort normal and breath sounds normal. No respiratory distress. No wheezes.       Results:   No testing " ordered today    Assessment and Plan          1. Essential hypertension with goal blood pressure less than 140/90  Although readings are elevated in clinic, his readings at home are most often controlled. At this time would not make any medication changes and continue to monitor. Last set of labs from 6/2019 and plan to repeat after 6 months.   - amLODIPine (NORVASC) 5 MG tablet; Take 1 tablet (5 mg) by mouth daily  Dispense: 60 tablet; Refill: 3    2. Chronic heart failure with preserved ejection fraction   Optimized on medications for the time being with lisinopril and metoprolol. Follows up regularly with cardiology and last seen in 3/2019. Changed tablet strength from 100mg to 50mg so no need to split pills however continuing same daily dose.   - metoprolol succinate ER (TOPROL-XL) 50 MG 24 hr tablet; Take 3 tablets (150 mg) by mouth daily  Dispense: 180 tablet; Refill: 3  - furosemide (LASIX) 40 MG tablet; TAKE 1 TABLET (40 MG) BY MOUTH DAILY  Dispense: 90 tablet; Refill: 3  - lisinopril (PRINIVIL/ZESTRIL) 20 MG tablet; Take 1 tablet (20 mg) by mouth daily  Dispense: 180 tablet; Refill: 3    Please call or return to clinic if your symptoms don't go away.    Follow up plan  Please make a clinic appointment for follow up with your primary physician Kenyon Singh MD in 4 months. See Jayson (pharmD) for med rec in mean time.     Thank you for coming to [facility] today.     Medications Discontinued During This Encounter   Medication Reason     metoprolol succinate ER (TOPROL-XL) 100 MG 24 hr tablet Reorder     amLODIPine (NORVASC) 2.5 MG tablet Reorder     furosemide (LASIX) 40 MG tablet Reorder     lisinopril (PRINIVIL/ZESTRIL) 20 MG tablet Reorder       Options for treatment and follow-up care were reviewed with the patient. Jazz Berman  engaged in the decision making process and verbalized understanding of the options discussed and agreed with the final plan.    Kenyon Singh MD

## 2019-08-30 NOTE — PROGRESS NOTES
Preceptor Attestation:   Patient seen, evaluated and discussed with the resident. I have verified the content of the note, which accurately reflects my assessment of the patient and the plan of care.   Supervising Physician:  Cami Gil MD MD

## 2019-08-30 NOTE — PATIENT INSTRUCTIONS
Here is the plan from today's visit    1. Essential hypertension with goal blood pressure less than 140/90  - metoprolol succinate ER (TOPROL-XL) 50 MG 24 hr tablet; Take 3 tablets (150 mg) by mouth daily  Dispense: 180 tablet; Refill: 3  - amLODIPine (NORVASC) 5 MG tablet; Take 1 tablet (5 mg) by mouth daily  Dispense: 60 tablet; Refill: 3    2. Chronic heart failure with preserved ejection fraction (H)  - furosemide (LASIX) 40 MG tablet; TAKE 1 TABLET (40 MG) BY MOUTH DAILY  Dispense: 90 tablet; Refill: 3  - lisinopril (PRINIVIL/ZESTRIL) 20 MG tablet; Take 1 tablet (20 mg) by mouth daily  Dispense: 180 tablet; Refill: 3    Please call or return to clinic if your symptoms don't go away.    Follow up plan  Please make a clinic appointment for follow up with your primary physician Kenyon Singh MD in 4 months. See Jayson (pharmD) for med rec in mean time.     Thank you for coming to [facility] today.  Lab Testing:  **If you had lab testing today and your results are reassuring or normal they will be mailed to you or sent through [employer] within 7 days.   **If the lab tests need quick action we will call you with the results.  The phone number we will call with results is # [phone] (home) . If this is not the best number please call our clinic and change the number.  Medication Refills:  If you need any refills please call your pharmacy and they will contact us.   If you need to  your refill at a new pharmacy, please contact the new pharmacy directly. The new pharmacy will help you get your medications transferred faster.   Scheduling:  If you have any concerns about today's visit or wish to schedule another appointment please call our office during normal business hours [phone] (8-5:00 M-F)  If a referral was made to a [facility] and you don't get a call from central scheduling please call [phone].  If a Mammogram was ordered for you at [facility] call  [phone] to schedule or change your appointment.  If you had an XRay/CT/Ultrasound/MRI ordered the number is [phone] to schedule or change your radiology appointment.   Medical Concerns:  If you have urgent medical concerns please call [phone] at any time of the day.    Kenyon Singh MD

## 2019-09-09 ENCOUNTER — OFFICE VISIT (OUTPATIENT)
Dept: PHARMACY | Facility: PHYSICIAN GROUP | Age: 64
End: 2019-09-09
Payer: COMMERCIAL

## 2019-09-09 DIAGNOSIS — N40.0 BENIGN PROSTATIC HYPERPLASIA, UNSPECIFIED WHETHER LOWER URINARY TRACT SYMPTOMS PRESENT: ICD-10-CM

## 2019-09-09 DIAGNOSIS — K21.00 GASTROESOPHAGEAL REFLUX DISEASE WITH ESOPHAGITIS: ICD-10-CM

## 2019-09-09 DIAGNOSIS — I10 HYPERTENSION, ESSENTIAL: Primary | ICD-10-CM

## 2019-09-09 DIAGNOSIS — N52.9 ERECTILE DYSFUNCTION, UNSPECIFIED ERECTILE DYSFUNCTION TYPE: ICD-10-CM

## 2019-09-09 DIAGNOSIS — I48.20 CHRONIC ATRIAL FIBRILLATION (H): ICD-10-CM

## 2019-09-09 PROCEDURE — 99606 MTMS BY PHARM EST 15 MIN: CPT | Performed by: PHARMACIST

## 2019-09-09 PROCEDURE — 99607 MTMS BY PHARM ADDL 15 MIN: CPT | Performed by: PHARMACIST

## 2019-09-09 RX ORDER — SILDENAFIL 100 MG/1
TABLET, FILM COATED ORAL
Qty: 30 TABLET | Refills: 0 | Status: SHIPPED | OUTPATIENT
Start: 2019-09-09 | End: 2019-09-10

## 2019-09-09 NOTE — PROGRESS NOTES
SUBJECTIVE/OBJECTIVE:                           Jazz Berman is a 64 year old male coming in for an initial visit for Medication Therapy Management.  He was referred to me from Kenyon Singh MD .    Chief Complaint: Polypharmacy     Allergies/ADRs: Reviewed in Epic  Tobacco: No tobacco use  Alcohol: NA  Caffeine: NA  Activity: NA  PMH: Reviewed in Epic    Medication Adherence/Access:  no issues reported      Hypertension: Current medications include     Amlodipine 5 mg daily     Furosemide 40 mg daily  - taken for leg swelling once a day in the AM.  Causes diuresis     Lisinopril 20 mg daily     metprolol 150 mg dose.  Well controlled and pt records agree with our chart.      .  Patient does not self-monitor BP.  Patient reports no current medication side effects attributed to his medications specifically.    Pain : APAP 500 mg PRN  APAP not used often and pt doesn't think he has this at home..  He would like to discontinue and take this off his medication profile.          Suppliments:   Vit D3- 2000 per day   Not sure why he is taking it.   He has a Hx of fractures in both legs..  Used for bone health     Iron tabs 2 tabs in the AM  No mally- constipation or other GI effects      A.fib: Dx in Feb 18  Pulse is Irreglularly irregular today in clinic.    Eliquis BID. Good compliance.  No bruising or bleeding   No history of falls.    He is able to afford this medication.      GERD:   Ranitidine 150 mg   Taking once daily  Well controlled GERD symptoms.      BPH:   Taking vesicare and he does not believe this is working well.  He feels that his urine stream should be stronger as in the past.  He denies any incontinence or accidents.  Is able to use the bathroom on his own time frame.  His concern is that he finds it hard to empty his bladder.        Today's Vitals: /89            ASSESSMENT:                             Current medications were reviewed today as discussed above.     Medication Adherence:  "excellent, no issues identified  Medication pillboxes currently filled by home care nurse.  Patient has appropriate documentation.  He brings today previously discontinued medications to request disposal.  I have educated the patient on how to dispose of medications that his home or through a \"pill takeback\" box.    Hypertension:  Controlled  Current medication is appropriate and blood pressures in goal range today.  Patient describes some swelling, and uses the loop diuretic to control his peripheral edema.  While the calcium channel blocker may cause some swelling this could also be a result of the patient's heart failure.  Currently the amlodipine is a 5 mg dose, so considering the lower dose I would not recommend a dose change as the lower dose may be reducing this adverse effect.    A.fib:  Controlled  Patient is appropriately anticoagulated and denies any symptoms or adverse effects related to this.  I have educated the patient today on safety around use of anticoagulation and understanding that this could worsen complications to a trauma.      Pain :   Currently managed and the patient's request will discontinue acetaminophen from the profile.           Suppliments:   Stable no change          GERD:   Stable no change.  Will remain on ranitidine 150 mg once daily.        BPH:   We discussed the need for Vesicare.  I believe the problem is the patient's expectations to match the potential benefit from the medication.  He agrees to continue on the therapy and feels it is helping to a certain extent, but will discuss this further with his PCP.    PLAN:                            No changes at this time but consider an adjustment and calcium channel blocker to evaluate lower extremity swelling.    I spent 40 minutes with this patient today. All changes were made via collaborative practice agreement with Kenyon Singh . A copy of the visit note was provided to the patient's primary care provider.    Will follow " up with his primary care provider.  At this time no plan follow-up with [facility] pharmacist..    The patient was given a summary of these recommendations as an after visit summary.     Brittany CrumpD.

## 2019-09-09 NOTE — Clinical Note
Dr. Singh, thanks for the referral.  I reviewed the patient's medication and have updated the medication list.I believe the only consideration for the future is to reduce the amlodipine 5 mg dose to either lower or take it off altogether and evaluate peripheral edema to see if it improves.  Otherwise patient has good compliance to his medication and therapy appears appropriate.Babatunde Hernandez, Pharm.D.

## 2019-09-10 RX ORDER — SILDENAFIL 100 MG/1
TABLET, FILM COATED ORAL
Qty: 30 TABLET | Refills: 0 | Status: SHIPPED | OUTPATIENT
Start: 2019-09-10 | End: 2019-10-30

## 2019-09-13 VITALS — DIASTOLIC BLOOD PRESSURE: 89 MMHG | SYSTOLIC BLOOD PRESSURE: 114 MMHG

## 2019-09-23 DIAGNOSIS — C49.A0 GIST (GASTROINTESTINAL STROMAL TUMOR), MALIGNANT (H): ICD-10-CM

## 2019-09-23 RX ORDER — METHYLPREDNISOLONE 32 MG/1
32 TABLET ORAL DAILY
Qty: 2 TABLET | Refills: 11 | Status: SHIPPED | OUTPATIENT
Start: 2019-09-23 | End: 2019-10-08

## 2019-09-24 ENCOUNTER — ANCILLARY PROCEDURE (OUTPATIENT)
Dept: CT IMAGING | Facility: CLINIC | Age: 64
End: 2019-09-24
Attending: INTERNAL MEDICINE
Payer: MEDICARE

## 2019-09-24 DIAGNOSIS — C49.A2 MALIGNANT GASTROINTESTINAL STROMAL TUMOR (GIST) OF STOMACH (H): ICD-10-CM

## 2019-09-24 DIAGNOSIS — F33.0 MILD RECURRENT MAJOR DEPRESSION (H): ICD-10-CM

## 2019-09-24 DIAGNOSIS — I51.7 CARDIOMEGALY: ICD-10-CM

## 2019-09-24 DIAGNOSIS — I48.0 PAROXYSMAL ATRIAL FIBRILLATION (H): ICD-10-CM

## 2019-09-24 DIAGNOSIS — I10 HYPERTENSION, ESSENTIAL: ICD-10-CM

## 2019-09-24 DIAGNOSIS — Z79.01 CURRENT USE OF LONG TERM ANTICOAGULATION: ICD-10-CM

## 2019-09-24 DIAGNOSIS — G47.33 OSA (OBSTRUCTIVE SLEEP APNEA): ICD-10-CM

## 2019-09-24 DIAGNOSIS — C49.A0 GIST (GASTROINTESTINAL STROMAL TUMOR), MALIGNANT (H): ICD-10-CM

## 2019-09-24 LAB
ALBUMIN SERPL-MCNC: 3.7 G/DL (ref 3.4–5)
ALP SERPL-CCNC: 94 U/L (ref 40–150)
ALT SERPL W P-5'-P-CCNC: 22 U/L (ref 0–70)
ANION GAP SERPL CALCULATED.3IONS-SCNC: 4 MMOL/L (ref 3–14)
AST SERPL W P-5'-P-CCNC: 13 U/L (ref 0–45)
BASOPHILS # BLD AUTO: 0 10E9/L (ref 0–0.2)
BASOPHILS NFR BLD AUTO: 0 %
BILIRUB SERPL-MCNC: 0.7 MG/DL (ref 0.2–1.3)
BUN SERPL-MCNC: 18 MG/DL (ref 7–30)
CALCIUM SERPL-MCNC: 9.5 MG/DL (ref 8.5–10.1)
CHLORIDE SERPL-SCNC: 105 MMOL/L (ref 94–109)
CO2 SERPL-SCNC: 28 MMOL/L (ref 20–32)
CREAT SERPL-MCNC: 1.18 MG/DL (ref 0.66–1.25)
DIFFERENTIAL METHOD BLD: ABNORMAL
EOSINOPHIL # BLD AUTO: 0 10E9/L (ref 0–0.7)
EOSINOPHIL NFR BLD AUTO: 0 %
ERYTHROCYTE [DISTWIDTH] IN BLOOD BY AUTOMATED COUNT: 13.6 % (ref 10–15)
GFR SERPL CREATININE-BSD FRML MDRD: 65 ML/MIN/{1.73_M2}
GLUCOSE SERPL-MCNC: 126 MG/DL (ref 70–99)
HCT VFR BLD AUTO: 48.1 % (ref 40–53)
HGB BLD-MCNC: 16 G/DL (ref 13.3–17.7)
IMM GRANULOCYTES # BLD: 0 10E9/L (ref 0–0.4)
IMM GRANULOCYTES NFR BLD: 0.2 %
LYMPHOCYTES # BLD AUTO: 0.5 10E9/L (ref 0.8–5.3)
LYMPHOCYTES NFR BLD AUTO: 8.3 %
MCH RBC QN AUTO: 30.4 PG (ref 26.5–33)
MCHC RBC AUTO-ENTMCNC: 33.3 G/DL (ref 31.5–36.5)
MCV RBC AUTO: 91 FL (ref 78–100)
MONOCYTES # BLD AUTO: 0.1 10E9/L (ref 0–1.3)
MONOCYTES NFR BLD AUTO: 1.5 %
NEUTROPHILS # BLD AUTO: 5.2 10E9/L (ref 1.6–8.3)
NEUTROPHILS NFR BLD AUTO: 90 %
NRBC # BLD AUTO: 0 10*3/UL
NRBC BLD AUTO-RTO: 0 /100
PLATELET # BLD AUTO: 216 10E9/L (ref 150–450)
POTASSIUM SERPL-SCNC: 4.2 MMOL/L (ref 3.4–5.3)
PROT SERPL-MCNC: 8.1 G/DL (ref 6.8–8.8)
RBC # BLD AUTO: 5.27 10E12/L (ref 4.4–5.9)
SODIUM SERPL-SCNC: 138 MMOL/L (ref 133–144)
WBC # BLD AUTO: 5.8 10E9/L (ref 4–11)

## 2019-09-24 RX ORDER — IOPAMIDOL 755 MG/ML
135 INJECTION, SOLUTION INTRAVASCULAR ONCE
Status: COMPLETED | OUTPATIENT
Start: 2019-09-24 | End: 2019-09-24

## 2019-09-24 RX ADMIN — IOPAMIDOL 135 ML: 755 INJECTION, SOLUTION INTRAVASCULAR at 09:24

## 2019-10-07 ENCOUNTER — DOCUMENTATION ONLY (OUTPATIENT)
Dept: FAMILY MEDICINE | Facility: CLINIC | Age: 64
End: 2019-10-07

## 2019-10-07 NOTE — PROGRESS NOTES
"When opening a documentation only encounter, be sure to enter in \"Chief Complaint\" Forms and in \" Comments\" Title of form, description if needed.    Al is a 64 year old  male  Form received via: Fax  Form now resides in: Provider BHAVIK [place] 3:39 PM October 7, 2019                    "

## 2019-10-07 NOTE — PROGRESS NOTES
10-8-19      I saw Mr. 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       Background  In brief, he was in his usual state of health but noted some black stools in 06/2016. In early July 2016 he was admitted for anemia and GI bleeding and had orthostatic symptoms. 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 Endoscopy before that showed some punctate oozing and it is possible that some of the bleeding was coming from the tumor.   -  Surgery was August 2016 and pathology report showed 1 mitosis per 50 high-power fields, a tumor size of 15 cm and negative margins. 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  Interestingly, in contrast to the D842V, in-frame deletions of D842 to H845 are typically sensitive to imatinib.  -      Interval history.    He is doing well and has no specific complaints.    He denies being bothered by shortness of breath right now though he has had that problem and is felt to likely have HFpEF.   He can walk 30+ min without stopping.  He does have a history of chronic congestive heart failure and is on multiple medications.     He has sleep apnea and uses a CPAP machine and also has a history of hypertension, depression, diabetes, cardiomegaly, and he was found to be in atrial fibrillation when he was hospitalized for the GI bleed.     He had some tiny nonspecific lung nodules, some adrenal nodules, and nonspecific splenic lesions on past imaging.     He goes to clinic for primary care about fairly frequently.    A 10-point ROS is otherwise unremarkable.       -  Background PMH, FH, SH  His past history is notable for keloid formation, resection of a rectal polyp, and a crushing foot injury.   He feels that contrast dye created a bit of a rash that left some skin hyperpigmentation on his back.   He is currently single and lives alone. He stopped smoking in 2011, though he was a polysubstance  "abuser in the past. He no longer drinks alcohol. He is retired from working in a furniture store.   Family history is positive for diabetes and hypertension.   -      On exam he appeared comfortable with a quiet affect.   /71 (BP Location: Left arm, Patient Position: Chair, Cuff Size: Adult Large)   Pulse 63   Temp 98  F (36.7  C) (Oral)   Resp 14   Ht 1.778 m (5' 10\")   Wt 122.9 kg (271 lb)   SpO2 98%   BMI 38.88 kg/m    HEENT: No icterus  NECK: No thyromegaly or mass  CHEST: Clear to auscultation.   HEART: There was irregularly irregular rhythm. No murmurs.   ABDOMEN: There was an obese abdomen with a well-healed surgical scar , no HSMT.   LYMPH: No cervical, supraclavicular, axillary, or inguinal lymphadenopathy.   NEURO: Normal Romberg, (broke both ankles in the past).  EXT: 0-Tr edema   PSYCH: mood good       -  CT abdomen pelvis shows no evidence of PD.  He has a history of some stable lung nodules and adrenal nodule.       -  Electrolytes WNL. Cr was 1.18 which has been his baseline. CBC, LFT OK.     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  Interestingly, in contrast to the D842V, in-frame deletions of D842 to H845 are typically sensitive to imatinib.     -    It appears his recurrence risk is on the order of 10-15% over a few years.  I do not think that risk warrants the toxicities of Gleevec, especially given his multiple other problems and medications.     No evidence of disease progression on CT scan Sept 2019.     He had an echo that showed no structural heart disease, and he was felt to be at high risk for stroke with a CHADS2 score of 4, and he is on xarelto.  His atrial fibrillation, anticoagulation and other issues will be followed by his primary care team and Cardiology.     He is now 3 years out.  We will see him about 8 months w/ CT-CAP and labs 2 d before.    The PDGFR mutation is unusual but is a case where gleevec could be useful.  All his questions were addressed and he " will call if he has others.    -  Sean Freed M.D.  Professor  Hematology, Oncology and Transplantation

## 2019-10-08 ENCOUNTER — ONCOLOGY VISIT (OUTPATIENT)
Dept: ONCOLOGY | Facility: CLINIC | Age: 64
End: 2019-10-08
Attending: INTERNAL MEDICINE
Payer: MEDICARE

## 2019-10-08 VITALS
RESPIRATION RATE: 14 BRPM | SYSTOLIC BLOOD PRESSURE: 108 MMHG | HEIGHT: 70 IN | HEART RATE: 63 BPM | TEMPERATURE: 98 F | DIASTOLIC BLOOD PRESSURE: 71 MMHG | WEIGHT: 271 LBS | OXYGEN SATURATION: 98 % | BODY MASS INDEX: 38.8 KG/M2

## 2019-10-08 DIAGNOSIS — C49.A2 MALIGNANT GASTROINTESTINAL STROMAL TUMOR (GIST) OF STOMACH (H): Primary | ICD-10-CM

## 2019-10-08 DIAGNOSIS — G47.33 OSA (OBSTRUCTIVE SLEEP APNEA): ICD-10-CM

## 2019-10-08 DIAGNOSIS — I10 HYPERTENSION, ESSENTIAL: ICD-10-CM

## 2019-10-08 DIAGNOSIS — F33.0 MILD RECURRENT MAJOR DEPRESSION (H): ICD-10-CM

## 2019-10-08 DIAGNOSIS — N40.0 BENIGN PROSTATIC HYPERPLASIA, UNSPECIFIED WHETHER LOWER URINARY TRACT SYMPTOMS PRESENT: ICD-10-CM

## 2019-10-08 DIAGNOSIS — I48.0 PAROXYSMAL ATRIAL FIBRILLATION (H): ICD-10-CM

## 2019-10-08 DIAGNOSIS — E11.9 TYPE 2 DIABETES MELLITUS WITHOUT COMPLICATION, WITHOUT LONG-TERM CURRENT USE OF INSULIN (H): ICD-10-CM

## 2019-10-08 DIAGNOSIS — N18.30 CKD (CHRONIC KIDNEY DISEASE) STAGE 3, GFR 30-59 ML/MIN (H): ICD-10-CM

## 2019-10-08 DIAGNOSIS — R91.8 PULMONARY NODULES: ICD-10-CM

## 2019-10-08 DIAGNOSIS — C49.A0 GIST (GASTROINTESTINAL STROMAL TUMOR), MALIGNANT (H): ICD-10-CM

## 2019-10-08 DIAGNOSIS — E66.9 OBESITY, UNSPECIFIED CLASSIFICATION, UNSPECIFIED OBESITY TYPE, UNSPECIFIED WHETHER SERIOUS COMORBIDITY PRESENT: ICD-10-CM

## 2019-10-08 PROCEDURE — G0463 HOSPITAL OUTPT CLINIC VISIT: HCPCS | Mod: ZF

## 2019-10-08 PROCEDURE — 99214 OFFICE O/P EST MOD 30 MIN: CPT | Mod: ZP | Performed by: INTERNAL MEDICINE

## 2019-10-08 RX ORDER — METHYLPREDNISOLONE 32 MG/1
32 TABLET ORAL DAILY
Qty: 2 TABLET | Refills: 11 | Status: SHIPPED | OUTPATIENT
Start: 2019-10-08 | End: 2019-10-30

## 2019-10-08 RX ORDER — INFLUENZA A VIRUS A/VICTORIA/2454/2019 IVR-207 (H1N1) ANTIGEN (PROPIOLACTONE INACTIVATED), INFLUENZA A VIRUS A/HONG KONG/2671/2019 IVR-208 (H3N2) ANTIGEN (PROPIOLACTONE INACTIVATED), INFLUENZA B VIRUS B/VICTORIA/705/2018 BVR-11 ANTIGEN (PROPIOLACTONE INACTIVATED), INFLUENZA B VIRUS B/PHUKET/3073/2013 BVR-1B ANTIGEN (PROPIOLACTONE INACTIVATED) 15; 15; 15; 15 UG/.5ML; UG/.5ML; UG/.5ML; UG/.5ML
INJECTION, SUSPENSION INTRAMUSCULAR
Refills: 0 | COMMUNITY
Start: 2019-10-03 | End: 2021-11-16

## 2019-10-08 ASSESSMENT — PAIN SCALES - GENERAL: PAINLEVEL: NO PAIN (0)

## 2019-10-08 ASSESSMENT — MIFFLIN-ST. JEOR: SCORE: 2025.5

## 2019-10-08 NOTE — LETTER
RE: Jazz Berman  [address]  [facility] [address]     Dear Colleague,    Thank you for referring your patient, Jazz Berman, to the [facility]. Please see a copy of my visit note below.    10-8-19      I saw Mr. 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       Background  In brief, he was in his usual state of health but noted some black stools in 06/2016. In early July 2016 he was admitted for anemia and GI bleeding and had orthostatic symptoms. 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 Endoscopy before that showed some punctate oozing and it is possible that some of the bleeding was coming from the tumor.   -  Surgery was August 2016 and pathology report showed 1 mitosis per 50 high-power fields, a tumor size of 15 cm and negative margins. 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  Interestingly, in contrast to the D842V, in-frame deletions of D842 to H845 are typically sensitive to imatinib.  -    Interval history.    He is doing well and has no specific complaints.    He denies being bothered by shortness of breath right now though he has had that problem and is felt to likely have HFpEF.   He can walk 30+ min without stopping.  He does have a history of chronic congestive heart failure and is on multiple medications.     He has sleep apnea and uses a CPAP machine and also has a history of hypertension, depression, diabetes, cardiomegaly, and he was found to be in atrial fibrillation when he was hospitalized for the GI bleed.     He had some tiny nonspecific lung nodules, some adrenal nodules, and nonspecific splenic lesions on past imaging.     He goes to clinic for primary care about fairly frequently.    A 10-point ROS is otherwise unremarkable.       -  Background PMH, FH, SH  His past history is notable for keloid formation, resection of a rectal polyp, and a  "crushing foot injury.   He feels that contrast dye created a bit of a rash that left some skin hyperpigmentation on his back.   He is currently single and lives alone. He stopped smoking in 2011, though he was a polysubstance abuser in the past. He no longer drinks alcohol. He is retired from working in a furniture store.   Family history is positive for diabetes and hypertension.   -      On exam he appeared comfortable with a quiet affect.   /71 (BP Location: Left arm, Patient Position: Chair, Cuff Size: Adult Large)   Pulse 63   Temp 98  F (36.7  C) (Oral)   Resp 14   Ht 1.778 m (5' 10\")   Wt 122.9 kg (271 lb)   SpO2 98%   BMI 38.88 kg/m     HEENT: No icterus  NECK: No thyromegaly or mass  CHEST: Clear to auscultation.   HEART: There was irregularly irregular rhythm. No murmurs.   ABDOMEN: There was an obese abdomen with a well-healed surgical scar , no HSMT.   LYMPH: No cervical, supraclavicular, axillary, or inguinal lymphadenopathy.   NEURO: Normal Romberg, (broke both ankles in the past).  EXT: 0-Tr edema   PSYCH: mood good       -  CT abdomen pelvis shows no evidence of PD.  He has a history of some stable lung nodules and adrenal nodule.       -  Electrolytes WNL. Cr was 1.18 which has been his baseline. CBC, LFT OK.     No KIT mutation, but a PDGFR [id number] deletion.  Interestingly, in contrast to the D842V, in-frame deletions of D842 to H845 are typically sensitive to imatinib.     -    It appears his recurrence risk is on the order of 10-15% over a few years.  I do not think that risk warrants the toxicities of Gleevec, especially given his multiple other problems and medications.     No evidence of disease progression on CT scan Sept 2019.     He had an echo that showed no structural heart disease, and he was felt to be at high risk for stroke with a CHADS2 score of 4, and he is on xarelto.  His atrial fibrillation, anticoagulation and other issues will be followed by his primary care team " and Cardiology.     He is now 3 years out.  We will see him about  8 months w/ CT -CAP and labs 2 d before.    The PDGFR mutation is unusual but is a case where gleevec could be useful.  All his questions were addressed and he will call if he has others.    -  Sean Freed M.D.  Professor  Hematology, Oncology and Transplantation

## 2019-10-08 NOTE — NURSING NOTE
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    Weight as of this encounter: 122.9 kg (271 lb). Body surface area is 2.46 meters squared.  No Pain (0) Comment: Data Unavailable   No LMP for male patient.  Allergies reviewed: Yes  Medications reviewed: Yes    Medications: Medication refills not needed today.  Pharmacy name entered into [employer] HR:    [id number] - [place], [place] - [address] [id number] - [place], [place] - [address] [place] [address]    Clinical concerns: No questions or concerns to escalate today.  Patient doing well.   [place] was notified.      Kinza Coates, RN, MSN              "

## 2019-10-09 NOTE — PROGRESS NOTES
Form has been completed by provider.     Form sent out via: Fax to [employer] at Fax Number: [phone]  Patient informed: N/A  Output date: October 9, 2019    Muna Tam CMA

## 2019-10-17 ENCOUNTER — DOCUMENTATION ONLY (OUTPATIENT)
Dept: FAMILY MEDICINE | Facility: CLINIC | Age: 64
End: 2019-10-17

## 2019-10-17 NOTE — PROGRESS NOTES
"When opening a documentation only encounter, be sure to enter in \"Chief Complaint\" Forms and in \" Comments\" Title of form, description if needed.    Al is a 64 year old  male  Form received via: Fax  Form now resides in: Provider Ready      Form has been completed by provider.     Form sent out via: Fax to Jacquie Cid at Fax Number: [phone]  Patient informed: No  Output date: October 17, 2019    BHAVIK Bustillos 8:55 AM October 17, 2019      **Please close the encounter**      "

## 2019-10-21 ENCOUNTER — MEDICAL CORRESPONDENCE (OUTPATIENT)
Dept: HEALTH INFORMATION MANAGEMENT | Facility: CLINIC | Age: 64
End: 2019-10-21

## 2019-10-30 ENCOUNTER — OFFICE VISIT (OUTPATIENT)
Dept: PHARMACY | Facility: PHYSICIAN GROUP | Age: 64
End: 2019-10-30
Payer: COMMERCIAL

## 2019-10-30 VITALS
DIASTOLIC BLOOD PRESSURE: 88 MMHG | TEMPERATURE: 98.6 F | BODY MASS INDEX: 39.2 KG/M2 | OXYGEN SATURATION: 99 % | RESPIRATION RATE: 16 BRPM | HEART RATE: 105 BPM | WEIGHT: 273.2 LBS | SYSTOLIC BLOOD PRESSURE: 138 MMHG

## 2019-10-30 DIAGNOSIS — I10 HYPERTENSION, ESSENTIAL: Primary | ICD-10-CM

## 2019-10-30 DIAGNOSIS — N52.9 ERECTILE DYSFUNCTION, UNSPECIFIED ERECTILE DYSFUNCTION TYPE: ICD-10-CM

## 2019-10-30 PROCEDURE — 99606 MTMS BY PHARM EST 15 MIN: CPT | Mod: GY | Performed by: PHARMACIST

## 2019-10-30 RX ORDER — SILDENAFIL 100 MG/1
TABLET, FILM COATED ORAL
Qty: 30 TABLET | Refills: 1 | Status: SHIPPED | OUTPATIENT
Start: 2019-10-30 | End: 2020-01-14

## 2019-10-30 NOTE — PROGRESS NOTES
SUBJECTIVE/OBJECTIVE:                Jazz Berman is a 64 year old male coming in for a follow-up visit for Medication Therapy Management.  He was referred to me from Kenyon Singh MD .     Chief Complaint: Follow up from MTM visit on 9/9/19.    Personal Healthcare Goals: NA  Tobacco:  reports that he quit smoking about 8 years ago. His smoking use included cigarettes. He quit after 30.00 years of use. He has never used smokeless tobacco.  Alcohol: NA    Medication Adherence/Access:  no issues reported      Hypertension: Current medications include     Amlodipine 5 mg daily    Furosemide 40 mg daily     Lisinopril 20 mg daily     Metoprolol suc 150 mg daily... but patient does not recall this medication   .  Patient does not self-monitor BP.  Patient reports no current medication side effects.  He feels that his viagra is helping his blood pressure control.      Erectile Dysfunction : viagra 100mg per the [employer] connection to care program.   He would like refills but knows that the program is expiring soon.    GERD: Ranitidine 150 mg once daily  Controlled.     A.Fib: Apixaban 5 mg daily  No bruising or adverse effect.s     Today's Vitals: /88   Pulse 105   Temp 98.6  F (37  C) (Oral)   Resp 16   Wt 273 lb 3.2 oz (123.9 kg)   SpO2 99%   BMI 39.20 kg/m      Creatinine 1.18   0.66 - 1.25 mg/dL Final 09/24/2019  8:34 AM 1740   GFR Estimate 65   >60 mL/min/{1.73_m2 Final 09/24/2019  8:34 AM 1740         ASSESSMENT:                  Medication Adherence: excellent, no issues identified    Hypertension:   Not Controlled.  BP less than 130/80.    Current medications include CCB, Diuretic, Ace Inhibitor and Metoprolol.  The patient is not clear if he is taking the betablocker and feels this may have dropped off his meds at home.    The majority of the other drugs are not dosed to a Maxamum strength.     Pt has a history of HFpEF and CKD stage 3.  Goal may be to improve overall blood pressure.  If BP remains  above , recommend to increase Lisinopril to 40 mg daily.      Erectile Dysfunction : [employer] connection to care program has .  Of call the company with the patient present and they have explained that they will no longer fill refills of his Viagra medication.  I have offered the patient alternatives of where to go to purchase Viagra.  He understands he needs prescription refills from his primary care provider.  Currently has a prescription available for him at [facility] pharmacy.    GERD: Ranitidine 150 mg once daily  Controlled.       A.Fib: Apixaban 5 mg daily  No bruising or adverse effect.  Jazz has good compliance with this medication and reports no new concerns.         PLAN:                  Continue to monitor blood pressure.  If BP remains above goal, recommend to increase lisinopril dose to 40 mg daily.      I spent 30 minutes with this patient today. All changes were made via collaborative practice agreement with Kenyon Singh . A copy of the visit note was provided to the patient's primary care provider.     Will follow up in 3 months.    The patient was given a summary of these recommendations as an after visit summary.    Brittany CrumpD.

## 2019-11-04 ENCOUNTER — HEALTH MAINTENANCE LETTER (OUTPATIENT)
Age: 64
End: 2019-11-04

## 2019-11-26 ENCOUNTER — DOCUMENTATION ONLY (OUTPATIENT)
Dept: FAMILY MEDICINE | Facility: CLINIC | Age: 64
End: 2019-11-26

## 2019-11-26 NOTE — PROGRESS NOTES
"When opening a documentation only encounter, be sure to enter in \"Chief Complaint\" Forms and in \" Comments\" Title of form, description if needed.    Al is a 64 year old  male  Form received via: Fax  Form now resides in: Provider Ready    BENJI SHER MA    Form has been completed by provider.     Form sent out via: Fax to Kendal Mack at Fax Number: [phone]  Patient informed: N/A  Output date: November 27, 2019    BENJI SHER MA      **Please close the encounter**                      "

## 2019-12-20 ENCOUNTER — MEDICAL CORRESPONDENCE (OUTPATIENT)
Dept: HEALTH INFORMATION MANAGEMENT | Facility: CLINIC | Age: 64
End: 2019-12-20

## 2020-01-12 PROBLEM — N18.31 CKD STAGE G3A/A1, GFR 45-59 AND ALBUMIN CREATININE RATIO <30 MG/G (H): Status: ACTIVE | Noted: 2019-02-23

## 2020-01-12 PROBLEM — N18.2 CKD STAGE G2/A1, GFR 60-89 AND ALBUMIN CREATININE RATIO <30 MG/G: Status: ACTIVE | Noted: 2019-02-23

## 2020-01-14 ENCOUNTER — OFFICE VISIT (OUTPATIENT)
Dept: FAMILY MEDICINE | Facility: CLINIC | Age: 65
End: 2020-01-14
Payer: COMMERCIAL

## 2020-01-14 VITALS
RESPIRATION RATE: 16 BRPM | WEIGHT: 277.8 LBS | BODY MASS INDEX: 41.15 KG/M2 | SYSTOLIC BLOOD PRESSURE: 138 MMHG | HEART RATE: 59 BPM | OXYGEN SATURATION: 98 % | TEMPERATURE: 98.5 F | HEIGHT: 69 IN | DIASTOLIC BLOOD PRESSURE: 84 MMHG

## 2020-01-14 DIAGNOSIS — I50.32 CHRONIC DIASTOLIC HEART FAILURE (H): ICD-10-CM

## 2020-01-14 DIAGNOSIS — D50.9 IRON DEFICIENCY ANEMIA, UNSPECIFIED IRON DEFICIENCY ANEMIA TYPE: ICD-10-CM

## 2020-01-14 DIAGNOSIS — Z76.89 ENCOUNTER TO ESTABLISH CARE WITH NEW DOCTOR: Primary | ICD-10-CM

## 2020-01-14 DIAGNOSIS — E11.9 TYPE 2 DIABETES MELLITUS WITHOUT COMPLICATION, WITHOUT LONG-TERM CURRENT USE OF INSULIN (H): ICD-10-CM

## 2020-01-14 DIAGNOSIS — K21.9 GASTROESOPHAGEAL REFLUX DISEASE WITHOUT ESOPHAGITIS: ICD-10-CM

## 2020-01-14 DIAGNOSIS — N52.9 ERECTILE DYSFUNCTION, UNSPECIFIED ERECTILE DYSFUNCTION TYPE: ICD-10-CM

## 2020-01-14 LAB
CHOLEST SERPL-MCNC: 156.2 MG/DL (ref 0–200)
CHOLEST/HDLC SERPL: 3.7 {RATIO} (ref 0–5)
HBA1C MFR BLD: 5.5 % (ref 4.1–5.7)
HDLC SERPL-MCNC: 42.3 MG/DL
LDLC SERPL CALC-MCNC: 103 MG/DL (ref 0–129)
NT-PROBNP SERPL-MCNC: 894 PG/ML (ref 0–125)
TRIGL SERPL-MCNC: 53.6 MG/DL (ref 0–150)
VLDL CHOLESTEROL: 10.7 MG/DL (ref 7–32)

## 2020-01-14 RX ORDER — FERROUS SULFATE 325(65) MG
325 TABLET ORAL 2 TIMES DAILY
Qty: 60 TABLET | Refills: 3 | Status: SHIPPED | OUTPATIENT
Start: 2020-01-14 | End: 2020-02-25

## 2020-01-14 RX ORDER — FAMOTIDINE 40 MG/1
40 TABLET, FILM COATED ORAL DAILY
Qty: 60 TABLET | Refills: 4 | Status: SHIPPED | OUTPATIENT
Start: 2020-01-14 | End: 2020-09-01

## 2020-01-14 RX ORDER — SILDENAFIL 100 MG/1
100 TABLET, FILM COATED ORAL DAILY PRN
Qty: 30 TABLET | Refills: 3 | Status: SHIPPED | OUTPATIENT
Start: 2020-01-14 | End: 2020-04-28

## 2020-01-14 ASSESSMENT — ANXIETY QUESTIONNAIRES
6. BECOMING EASILY ANNOYED OR IRRITABLE: NOT AT ALL
3. WORRYING TOO MUCH ABOUT DIFFERENT THINGS: NOT AT ALL
GAD7 TOTAL SCORE: 0
2. NOT BEING ABLE TO STOP OR CONTROL WORRYING: NOT AT ALL
IF YOU CHECKED OFF ANY PROBLEMS ON THIS QUESTIONNAIRE, HOW DIFFICULT HAVE THESE PROBLEMS MADE IT FOR YOU TO DO YOUR WORK, TAKE CARE OF THINGS AT HOME, OR GET ALONG WITH OTHER PEOPLE: NOT DIFFICULT AT ALL
7. FEELING AFRAID AS IF SOMETHING AWFUL MIGHT HAPPEN: NOT AT ALL
1. FEELING NERVOUS, ANXIOUS, OR ON EDGE: NOT AT ALL
5. BEING SO RESTLESS THAT IT IS HARD TO SIT STILL: NOT AT ALL

## 2020-01-14 ASSESSMENT — PATIENT HEALTH QUESTIONNAIRE - PHQ9
SUM OF ALL RESPONSES TO PHQ QUESTIONS 1-9: 1
5. POOR APPETITE OR OVEREATING: NOT AT ALL

## 2020-01-14 ASSESSMENT — MIFFLIN-ST. JEOR: SCORE: 2040.47

## 2020-01-14 NOTE — PROGRESS NOTES
"       HPI       Jazz Berman is a 64 year old  who presents for   Chief Complaint   Patient presents with     Establish Care     Medication concerns     1. GERD  - well controlled on ranitidine, but would like to switch due to concern for med recall    2. Refills  - [facility] refill at [facility]  - needs elequis, norvasc, iron refills at [facility]    3. HTN, CHF, DM  Due for foot exam, eye exam, labs. Will get labs today but will come back to discuss results and management of chronic conditions  Taking all his meds - has a home RN who helps set up pillbox  Feels quite well -getting around ok, no falls in winter, no CP, shortness of breath, leg swelling    4. Life updates  Birthday coming up - 65    Problem, Medication and Allergy Lists were reviewed and updated if needed..    Patient is an established patient of this clinic..         Review of Systems:   Review of Systems         Physical Exam:     Vitals:    01/14/20 1025 01/14/20 1028   BP: (!) 148/92 138/84   BP Location: Right arm Right arm   Patient Position: Sitting Sitting   Cuff Size: Adult Large Adult Regular   Pulse: 59    Resp: 16    Temp: 98.5  F (36.9  C)    TempSrc: Oral    SpO2: 98%    Weight: 126 kg (277 lb 12.8 oz)    Height: 1.753 m (5' 9\")      Body mass index is 41.02 kg/m .  Vitals were reviewed and were normal     Physical Exam  Constitutional:       General: He is not in acute distress.     Appearance: He is obese.   HENT:      Head: Normocephalic and atraumatic.      Mouth/Throat:      Mouth: Mucous membranes are moist.      Pharynx: Oropharynx is clear.   Pulmonary:      Effort: Pulmonary effort is normal. No respiratory distress.   Skin:     General: Skin is warm and dry.   Neurological:      General: No focal deficit present.      Mental Status: He is alert and oriented to person, place, and time.   Psychiatric:         Mood and Affect: Mood normal.         Behavior: Behavior normal.           Results:   Results are ordered and " pending    Assessment and Plan      Al is a 63 yo cisgender man who presents with:    1. Encounter to establish care with new doctor      2. Type 2 diabetes mellitus without complication, without long-term current use of insulin (H)  Labs today and referral placed today. Has been well controlled on diet alone for some time now. Foot exam next visit.  - Hemoglobin A1c ([place])  - Lipid Cascade ([facility])  - OPTHALMOLOGY ADULT REFERRAL - INTERNAL    3. Chronic diastolic heart failure (H)    - N terminal pro BNP outpatient    4. Iron deficiency anemia, unspecified iron deficiency anemia type    - ferrous sulfate (FEROSUL) 325 (65 Fe) MG tablet; Take 1 tablet (325 mg) by mouth 2 times daily  Dispense: 60 tablet; Refill: 3    5. Gastroesophageal reflux disease without esophagitis  Switch to pepcid  - famotidine (PEPCID) 40 MG tablet; Take 1 tablet (40 mg) by mouth daily  Dispense: 60 tablet; Refill: 4    6. Erectile dysfunction, unspecified erectile dysfunction type    - sildenafil (VIAGRA) 100 MG tablet; Take 1 tablet (100 mg) by mouth daily as needed  Dispense: 30 tablet; Refill: 3       Medications Discontinued During This Encounter   Medication Reason     ranitidine (ZANTAC) 150 MG tablet      sildenafil (VIAGRA) 100 MG tablet      ferrous sulfate (FEROSUL) 325 (65 Fe) MG tablet        Options for treatment and follow-up care were reviewed with the patient. Jazz Berman  engaged in the decision making process and verbalized understanding of the options discussed and agreed with the final plan.    DO [facility] Resident PGY-2  [phone]

## 2020-01-14 NOTE — PATIENT INSTRUCTIONS
Here is the plan from today's visit    1. Type 2 diabetes mellitus without complication, without long-term current use of insulin (H)  Labs today  - Hemoglobin A1c ([facility])  - Lipid Cascade ([facility])  - OPTHALMOLOGY ADULT REFERRAL - INTERNAL    2. Chronic diastolic heart failure (H)  Labs today  - N terminal pro BNP outpatient    3. Iron deficiency anemia, unspecified iron deficiency anemia type    - ferrous sulfate (FEROSUL) 325 (65 Fe) MG tablet; Take 1 tablet (325 mg) by mouth 2 times daily  Dispense: 60 tablet; Refill: 3    4. Gastroesophageal reflux disease without esophagitis  Changed out GERD medicine from ranitidine to famotidine (pecid) - no cancer risk or recall on this one  - famotidine (PEPCID) 40 MG tablet; Take 1 tablet (40 mg) by mouth daily  Dispense: 60 tablet; Refill: 4    5. Erectile dysfunction, unspecified erectile dysfunction type  [facility]  - sildenafil (VIAGRA) 100 MG tablet; Take 1 tablet (100 mg) by mouth daily as needed  Dispense: 30 tablet; Refill: 3      Please call or return to clinic if your symptoms don't go away.    Follow up plan  Please make a clinic appointment for follow up with me (JULIUS WHEELER) in 1-3  weeks for follow up labs.    Thank you for coming to [facility] today.  Lab Testing:  **If you had lab testing today and your results are reassuring or normal they will be mailed to you or sent through [employer] within 7 days.   **If the lab tests need quick action we will call you with the results.  The phone number we will call with results is # [phone] (home) . If this is not the best number please call our clinic and change the number.  Medication Refills:  If you need any refills please call your pharmacy and they will contact us.   If you need to  your refill at a new pharmacy, please contact the new pharmacy directly. The new pharmacy will help you get your medications transferred faster.   Scheduling:  If you have any concerns about today's  visit or wish to schedule another appointment please call our office during normal business hours [phone] (8-5:00 M-F)  If a referral was made to a [facility] and you don't get a call from central scheduling please call [phone].  If a Mammogram was ordered for you at [facility] call [phone] to schedule or change your appointment.  If you had an XRay/CT/Ultrasound/MRI ordered the number is [phone] to schedule or change your radiology appointment.   Medical Concerns:  If you have urgent medical concerns please call [phone] at any time of the day.    Lon Angeles, DO

## 2020-01-15 ASSESSMENT — ANXIETY QUESTIONNAIRES: GAD7 TOTAL SCORE: 0

## 2020-01-16 NOTE — PROGRESS NOTES
Preceptor Attestation:   Patient seen, evaluated and discussed with the resident. I have verified the content of the note, which accurately reflects my assessment of the patient and the plan of care.   Supervising Physician:  Elbert Buchanan MD

## 2020-01-17 DIAGNOSIS — I48.0 PAROXYSMAL ATRIAL FIBRILLATION (H): ICD-10-CM

## 2020-01-17 NOTE — TELEPHONE ENCOUNTER
"Request for medication refill: apixaban ANTICOAGULANT (ELIQUIS) 5 MG tablet    Providers if patient needs an appointment and you are willing to give a one month supply please refill for one month and  send a letter/MyChart using \".SMILLIMITEDREFILL\" .smillimited and route chart to \"P [facility] \" (Giving one month refill in non controlled medications is strongly recommended before denial)    If refill has been denied, meaning absolutely no refills without visit, please complete the smart phrase \".smirxrefuse\" and route it to the \"P [facility] MED REFILLS\"  pool to inform the patient and the pharmacy.    Jacquie Matthew, CMA        "

## 2020-01-28 ENCOUNTER — DOCUMENTATION ONLY (OUTPATIENT)
Dept: FAMILY MEDICINE | Facility: CLINIC | Age: 65
End: 2020-01-28

## 2020-01-28 NOTE — PROGRESS NOTES
"When opening a documentation only encounter, be sure to enter in \"Chief Complaint\" Forms and in \" Comments\" Title of form, description if needed.    Al is a 65 year old  male  Form received via: Fax  Form now resides in: Provider Ready    Muna Tam CMA                  "

## 2020-02-03 NOTE — PROGRESS NOTES
Form has been completed by provider.     Form sent out via: Fax to [employer], at Fax Number: [phone]  Patient informed: n/a  Output date: February 3, 2020    Lorenza Hillman MA      **Please close the encounter**

## 2020-02-17 NOTE — PROGRESS NOTES
HPI       Jazz Berman is a 65 year old  who presents for No chief complaint on file.    Epic was down for most of this encounter. Able to send prescriptions electronically at end of visit.    Heart Failure Follow-up    Symptoms:    Shortness of breath: none    Lower extremity edema: stable, trace    Chest pain: no    Using more pillows than normal: no    Cough at night: no    Weight:    Checking weight daily: No    Weight change: unknown    Cardiology visits, ER/UC, or hospital admissions since last visit: None    Medication side effects: none    Etiology (right click to change): Ischemic    Current/Most Recent EF%: 55-60%       +++++++++++++++  Wt Readings from Last 4 Encounters:   01/14/20 126 kg (277 lb 12.8 oz)   10/30/19 123.9 kg (273 lb 3.2 oz)   10/08/19 122.9 kg (271 lb)   08/30/19 124.4 kg (274 lb 3.2 oz)         Estimated dry weight: 275 lbs    Sodium Restriction: 2 gram sodium diet    Fluid Restriction: None currently        Loop diuretic management   Loop Diuretics       furosemide (LASIX) 40 MG tablet    TAKE 1 TABLET (40 MG) BY MOUTH DAILY            No changes to loop diuretic today    Parameters for patient to change their loop diuretic: If weight increases by 2 pounds in 1 day or 5 pounds in 1 week from dry weight, double your diuretic dose and call your physician.        Beta blocker management (for Systolic Heart Failure Patients)    Beta Blockers Cardio-Selective       metoprolol succinate ER (TOPROL-XL) 50 MG 24 hr tablet    Take 3 tablets (150 mg) by mouth daily             Well controlled so no need to increase dosing at this time        No complex issues requiring ACE/ARB, aldosterone antagonist, vasodilator, pacemakers or cardiology consult information.  -> already on ACE    Patient reports he has never been hospitalized for heart failure, or have had breathing problems from it.  Does not check his weight regularly.  Has never tried adjusting his diuretics too much his symptoms  before.  States that typically when his legs get swollen he will prop them up at night and use his compression hose, and find that that works well for him.      Who to Call with Questions: Lon Angeles DO   [phone]    Hypertension Follow-up  <130/80    Outpatient blood pressures are being checked at home by home RN.  Results are:    1/21: 108/74, P 62    1/28: 110/75, P 61    2/4: 124/95, P 72    2/11: 117/73, P 54    2/25: 127/80, P 47    Chest Pain? :No     Low Salt Diet: 2 gram sodium    Daily NSAID Use?No     Did patient take their HTN pills today/last night as usual?  Yes    Last Basic Metabolic Panel:  Lab Results   Component Value Date     09/24/2019      Lab Results   Component Value Date    POTASSIUM 4.2 09/24/2019     Lab Results   Component Value Date    CHLORIDE 105 09/24/2019     Lab Results   Component Value Date    NATALYA 9.5 09/24/2019     Lab Results   Component Value Date    CO2 28 09/24/2019     Lab Results   Component Value Date    BUN 18 09/24/2019     Lab Results   Component Value Date    CR 1.18 09/24/2019     Lab Results   Component Value Date     09/24/2019       Adherence and Exercise  Medication side effects: no  How often is a medication missed? Never, home RN helps set up weekly pill box    3. Labs  Wants to follow up lab results from last time, but Epic is down    4.  Meds  Needs an Eliquis refill.  Also was wondering about his iron pill.  States his been taking that twice a day for last year or so ever since he had a surgery.  Denies any constipation, but states he takes quite a bit of fiber to prevent that.  Would also like his Vesicare switched to tamsulosin since he got a letter from insurance is saying Vesicare will be covered.    Problem, Medication and Allergy Lists were reviewed and updated if needed..    Patient is an established patient of this clinic..         Review of Systems:   Review of Systems         Physical Exam:     Vitals:    02/25/20 1500 02/25/20  "1501   BP: (!) 125/90 (!) 118/93   Pulse: 57    Temp: 98.4  F (36.9  C)    SpO2: 97%    Weight: 123.8 kg (273 lb)    Height: 1.76 m (5' 9.29\")      Body mass index is 39.98 kg/m .  Vitals were reviewed and were normal     Physical Exam  Constitutional:       General: He is not in acute distress.     Appearance: He is obese.   HENT:      Head: Normocephalic and atraumatic.   Cardiovascular:      Rate and Rhythm: Normal rate and regular rhythm.      Heart sounds: Normal heart sounds. No murmur.      Comments: Trace pedal edema to mid-shins  Pulmonary:      Effort: Pulmonary effort is normal. No respiratory distress.      Breath sounds: Normal breath sounds. No rales.   Skin:     General: Skin is warm and dry.   Neurological:      Mental Status: He is alert.             Results:   Results from last visit:  Office Visit on 01/14/2020   Component Date Value Ref Range Status     Hemoglobin A1C 01/14/2020 5.5  4.1 - 5.7 % Final     Cholesterol 01/14/2020 156.2  0.0 - 200.0 mg/dL Final     Cholesterol/HDL Ratio 01/14/2020 3.7  0.0 - 5.0 Final     HDL Cholesterol 01/14/2020 42.3  >40.0 mg/dL Final     Triglycerides 01/14/2020 53.6  0.0 - 150.0 mg/dL Final     VLDL Cholesterol 01/14/2020 10.7  7.0 - 32.0 mg/dL Final     LDL Cholesterol Calculated 01/14/2020 103  0 - 129 mg/dL Final     N-Terminal Pro Bnp 01/14/2020 894* 0 - 125 pg/mL Final    Comment:    Reference range shown and results flagged as abnormal are for the outpatient,   non acute settings. Establishing a baseline value for each individual patient   is useful for follow-up.  Suggested inpatient cut points for confirming diagnosis of CHF in an acute   setting are:   >450 pg/mL (age 18 to less than 50)   >900 pg/mL (age 50 to less than 75)   >1800 pg/mL (75 yrs and older)  An inpatient or emergency department NT-proPBNP <300 pg/mL effectively rules   out acute CHF, with 99% negative predictive value.            Assessment and Plan           Al is a 64 yo cisgender " man who presents today with:    1. Chronic heart failure with preserved ejection fraction (H)  T2DM  Essential HTN    Patient relatively euvolemic today.  Printed out heart failure exacerbation plan with patient and reviewed it with him.  State that if he gets into his yellow zone, he can take an additional Lasix pill that day and to call our clinic.  Advised him to check and record his weights.  Advised him also to talk this over with his home nurse to.  Once epic was back up, it was able to review the patient has not seen the cardiology core clinic since last March, and has not had an echo since 2018.  At follow-up, will  Review following up at the core clinics of the can help educate him further about monitoring his symptoms and preventing hospitalizations  After visit review labs, A1c and lipid panel are at goal.  Blood pressure is also nicely at goal.  Will call patient to review these results    2. Paroxysmal atrial fibrillation (H)  Refilled apixaban today, patient appropriately rate controlled at this time.  - apixaban ANTICOAGULANT (ELIQUIS) 5 MG tablet; Take 1 tablet (5 mg) by mouth 2 times daily  Dispense: 60 tablet; Refill: 3    3. Iron deficiency anemia, unspecified iron deficiency anemia type  Unable to review hemoglobin and labs with patient during the visit due to epic being down, but did advise patient to space his iron to at least daily dosing, since more frequent dosing has a minimal benefit and increase absorption.  After visit, found patient has hemoglobin of 16.  MCV is normocytic and MCHC is also within normal limits.  He has, however, had a partial gastrectomy. Will keep on daily PO iron supplementation.  - ferrous sulfate (FEROSUL) 325 (65 Fe) MG tablet; Take 1 tablet (325 mg) by mouth daily (with breakfast)  Dispense: 60 tablet; Refill: 3    4. Urinary urgency/BPH  Discontinued Vesicare and started Flomax.  After visit, able to review in epic that he was diagnosed with both urinary urgency  and BPH/outflow obstruction, so I am not sure exactly how much Flomax will even help him.  At follow-up can review his symptomatology further and potentially check a postvoid residual on him/urinary studies.  - tamsulosin (FLOMAX) 0.4 MG capsule; Take 1 capsule (0.4 mg) by mouth daily  Dispense: 90 capsule; Refill: 1    5. Healthcare maintenance  Advised patient to come back in 1 to 2 months for Medicare wellness visit  Reviewed with patient he is due for his second zoster vaccination which he can receive at pharmacy  - Pneumococcal vaccine 23 valent PPSV23  (Pneumovax) [05804]    Handwrote the patient with an AVS reviewing these changes to his plan and used teach back.       Medications Discontinued During This Encounter   Medication Reason     solifenacin (VESICARE) 10 MG tablet      ferrous sulfate (FEROSUL) 325 (65 Fe) MG tablet      apixaban ANTICOAGULANT (ELIQUIS) 5 MG tablet Reorder       Options for treatment and follow-up care were reviewed with the patient. Jazz Berman  engaged in the decision making process and verbalized understanding of the options discussed and agreed with the final plan.    DO [facility] Resident PGY-2  [phone]

## 2020-02-18 ENCOUNTER — MEDICAL CORRESPONDENCE (OUTPATIENT)
Dept: HEALTH INFORMATION MANAGEMENT | Facility: CLINIC | Age: 65
End: 2020-02-18

## 2020-02-25 ENCOUNTER — OFFICE VISIT (OUTPATIENT)
Dept: FAMILY MEDICINE | Facility: CLINIC | Age: 65
End: 2020-02-25
Payer: COMMERCIAL

## 2020-02-25 VITALS
WEIGHT: 273 LBS | SYSTOLIC BLOOD PRESSURE: 118 MMHG | TEMPERATURE: 98.4 F | BODY MASS INDEX: 40.43 KG/M2 | DIASTOLIC BLOOD PRESSURE: 93 MMHG | HEART RATE: 57 BPM | OXYGEN SATURATION: 97 % | HEIGHT: 69 IN

## 2020-02-25 DIAGNOSIS — Z00.00 HEALTHCARE MAINTENANCE: ICD-10-CM

## 2020-02-25 DIAGNOSIS — R39.15 URINARY URGENCY: ICD-10-CM

## 2020-02-25 DIAGNOSIS — E11.9 TYPE 2 DIABETES MELLITUS WITHOUT COMPLICATION, WITHOUT LONG-TERM CURRENT USE OF INSULIN (H): ICD-10-CM

## 2020-02-25 DIAGNOSIS — I48.0 PAROXYSMAL ATRIAL FIBRILLATION (H): ICD-10-CM

## 2020-02-25 DIAGNOSIS — N40.0 BENIGN PROSTATIC HYPERPLASIA, UNSPECIFIED WHETHER LOWER URINARY TRACT SYMPTOMS PRESENT: ICD-10-CM

## 2020-02-25 DIAGNOSIS — D50.9 IRON DEFICIENCY ANEMIA, UNSPECIFIED IRON DEFICIENCY ANEMIA TYPE: ICD-10-CM

## 2020-02-25 DIAGNOSIS — I10 ESSENTIAL HYPERTENSION WITH GOAL BLOOD PRESSURE LESS THAN 140/90: ICD-10-CM

## 2020-02-25 DIAGNOSIS — I50.32 CHRONIC HEART FAILURE WITH PRESERVED EJECTION FRACTION (H): Primary | ICD-10-CM

## 2020-02-25 RX ORDER — TAMSULOSIN HYDROCHLORIDE 0.4 MG/1
0.4 CAPSULE ORAL DAILY
Qty: 90 CAPSULE | Refills: 1 | Status: SHIPPED | OUTPATIENT
Start: 2020-02-25 | End: 2022-03-28

## 2020-02-25 RX ORDER — FERROUS SULFATE 325(65) MG
325 TABLET ORAL
Qty: 60 TABLET | Refills: 3 | Status: SHIPPED | OUTPATIENT
Start: 2020-02-25 | End: 2020-05-11

## 2020-02-25 ASSESSMENT — MIFFLIN-ST. JEOR: SCORE: 2018.31

## 2020-02-25 NOTE — PROGRESS NOTES
Preceptor Attestation:   Patient seen, evaluated and discussed with the resident. I have verified the content of the note, which accurately reflects my assessment of the patient and the plan of care.   Supervising Physician:  Kecia Elizabeth MD

## 2020-02-25 NOTE — LETTER
My Heart Failure Action Plan   Name: Jazz Berman    YOB: 1955   Date: 2/25/2020    My doctor: [facility]     [address] [place] [address],  [address]  [place] [address]  [phone]  My Diagnosis: Heart failure   My Exercise Goal: 30 minutes daily  .     My Weight Plan:   Wt Readings from Last 2 Encounters:   01/14/20 126 kg (277 lb 12.8 oz)   10/30/19 123.9 kg (273 lb 3.2 oz)     Weigh yourself daily using the same scale. If you gain more than 2 pounds in 24 hours or 5 pounds in a week increase your Lasix (Furosemide) to twice a day    My Diet Goal: No added salt    Emergency Room Visits:    Our goal is to improve your quality of life and help you avoid a visit to the emergency room or hospital.  If we work together, we can achieve this goal. But, if you feel you need to call 911 or go to the emergency room, please do so.  If you go to the emergency room, please bring your list of medicines and your daily weight chart with you.       GREEN ZONE     Doing well today    Weight gained is no more than 2 pounds a day or 5 pounds a week.    No swelling in feet, ankles, legs or stomach.    No more swelling than usual.    No more trouble breathing than usual.    No change in my sleep.    No other problems. Actions:    I am doing fine.  I will take my medicine, follow my diet, see my doctor, exercise, and watch for symptoms.           YELLOW ZONE         Having a bad day or flare up    Weight gain of more than 2 pounds in one day or 5 pounds in one week.    New swelling in ankle, leg, knee or thigh.    Bloating in belly, pants feel tighter.    Swelling in hands or face.    Coughing or trouble breathing while walking or talking.    Harder to breathe last night.    Have trouble sleeping, wake up short of breath.    Much more tired than usual.    Not eating.    Pain in my chest or bad leg cramps.    Feel weak or dizzy. Actions:    I need to take action and call my  doctor or nurse today.                 RED ZONE         Need medical care now    Weight gain of 5 pounds overnight.    Chest pain or pressure that does not go away.    Feel less alert.    Wheezing or have trouble breathing when at rest.    Cannot sleep lying down.    Cannot take my water pill.    Pass out or faint. Actions:    I need to call my doctor or nurse now!    Call 911 if I have chest pain or cannot breathe.

## 2020-02-27 ENCOUNTER — TELEPHONE (OUTPATIENT)
Dept: FAMILY MEDICINE | Facility: CLINIC | Age: 65
End: 2020-02-27

## 2020-02-27 NOTE — TELEPHONE ENCOUNTER
"Called patient to relay provider's results message, per PCP, \"blood counts all look good, A1C is great, and we have a good baseline for a heart failure blood test. He should keep taking his iron every day like we talked about in the visit. I have no other medicine changes at this time. \"Anua, DO.    All pertinent results information given, patient verbalized understanding and agreed.    Vaughn Joyner RN    "

## 2020-03-11 DIAGNOSIS — E55.9 VITAMIN D DEFICIENCY: ICD-10-CM

## 2020-03-11 NOTE — TELEPHONE ENCOUNTER

## 2020-03-12 RX ORDER — CHOLECALCIFEROL (VITAMIN D3) 50 MCG
2000 TABLET ORAL DAILY
Qty: 90 TABLET | Refills: 3 | Status: SHIPPED | OUTPATIENT
Start: 2020-03-12 | End: 2020-09-11

## 2020-03-19 ENCOUNTER — TELEPHONE (OUTPATIENT)
Dept: OPHTHALMOLOGY | Facility: CLINIC | Age: 65
End: 2020-03-19

## 2020-03-19 DIAGNOSIS — H02.846 SWELLING OF EYELID, LEFT: Primary | ICD-10-CM

## 2020-03-19 NOTE — TELEPHONE ENCOUNTER
Image received via email  Left upper lid swelling with redness    reveiwed with Dr. Ramirez the images and symptoms    Dr. Ramirez was comfortable starting oral antibiotics in lieu of visit during COVID scheduling precautions    Was able to speak to pt at 1055 after calling around 9 AM and leaving message    Pt states was seen by provider last night and being treated    Note to Dr. James Thomson RN 10:55 AM 03/20/20    ---    With no pain     Spoke to pt at 1314  Left eye swelling starting Monday    Redness/swelling above left upper lid    No redness on eye globe  No eye pain    No noted vision changes    Asked pt to send picture via email to clinic [email or web address] for resident on call to review if telephone conference ok to treat vs appt during COVID precautions    Moses Thomson RN 2:16 PM 03/19/20        [facility]    Phone Message    May a detailed message be left on voicemail: yes     Reason for Call: Other:   Pt missed his January and feb appt with the eye clinic. Pt says that his left eye would occasionally swell up and go back down but this time it's been since Monday and has not gone done. Pt would like an appt if possible. Please call back    Action Taken: Other:  eye    Travel Screening: Not Applicable

## 2020-03-31 ENCOUNTER — DOCUMENTATION ONLY (OUTPATIENT)
Dept: FAMILY MEDICINE | Facility: CLINIC | Age: 65
End: 2020-03-31

## 2020-03-31 NOTE — PROGRESS NOTES
"When opening a documentation only encounter, be sure to enter in \"Chief Complaint\" Forms and in \" Comments\" Title of form, description if needed.    Al is a 65 year old  male  Form received via: Fax  Form now resides in: Provider Ready    Lorenza Hillman MA                  "

## 2020-04-01 NOTE — PROGRESS NOTES
Form has been completed by provider.     Form sent out via: Fax to [facility] at Fax Number: [phone]  Patient informed: n/a  Output date: April 1, 2020    Lorenza Hillman MA      **Please close the encounter**

## 2020-04-18 ENCOUNTER — MEDICAL CORRESPONDENCE (OUTPATIENT)
Dept: HEALTH INFORMATION MANAGEMENT | Facility: CLINIC | Age: 65
End: 2020-04-18

## 2020-04-28 DIAGNOSIS — I10 ESSENTIAL HYPERTENSION WITH GOAL BLOOD PRESSURE LESS THAN 140/90: ICD-10-CM

## 2020-04-28 DIAGNOSIS — N52.9 ERECTILE DYSFUNCTION, UNSPECIFIED ERECTILE DYSFUNCTION TYPE: ICD-10-CM

## 2020-04-28 DIAGNOSIS — I50.32 CHRONIC HEART FAILURE WITH PRESERVED EJECTION FRACTION (H): ICD-10-CM

## 2020-04-28 RX ORDER — FUROSEMIDE 40 MG
TABLET ORAL
Qty: 90 TABLET | Refills: 3 | Status: SHIPPED | OUTPATIENT
Start: 2020-04-28 | End: 2021-05-11

## 2020-04-28 RX ORDER — AMLODIPINE BESYLATE 5 MG/1
5 TABLET ORAL DAILY
Qty: 60 TABLET | Refills: 3 | Status: SHIPPED | OUTPATIENT
Start: 2020-04-28 | End: 2020-10-21

## 2020-04-28 NOTE — TELEPHONE ENCOUNTER
Verify that the refill encounter hasn't been started Yes    [facility] phone call message- patient requesting a refill:    Full Medication Name: 1.furosemide (LASIX) 40 MG tablet 2.amLODIPine (NORVASC) 5 MG tablet         Dose: TAKE 1 TABLET (40 MG) BY MOUTH DAILY 2.Route: Take 1 tablet (5 mg) by mouth daily - Oral     Pharmacy confirmed as   [id number] - [place], [place] - [address] [place] [address]  Phone: [phone] Fax: [phone]    : Yes    Medication tab checked to see if medication has been sent  Yes    Additional Comments: Pt is out of medication      OK to leave a message on voice mail? Yes    Advised patient refill may take up to 2 business days? No:     Primary language: English      needed? No    Call taken on April 28, 2020 at 9:20 AM by Geovanna Waddell    Route to [facility] MED REFILL

## 2020-04-28 NOTE — TELEPHONE ENCOUNTER
Received refill request for for amLODIPine (NORVASC) 5 MG tablet and furosemide (LASIX) 40 MG tablet , patient has Medication history  for aditional antihypertensive regiment, request routed to PCP to review and address if appropriate.    Will continue to follow up    Vaughn Joyner RN

## 2020-05-11 DIAGNOSIS — D50.9 IRON DEFICIENCY ANEMIA, UNSPECIFIED IRON DEFICIENCY ANEMIA TYPE: ICD-10-CM

## 2020-05-11 RX ORDER — FERROUS SULFATE 325(65) MG
325 TABLET ORAL
Qty: 60 TABLET | Refills: 3 | Status: SHIPPED | OUTPATIENT
Start: 2020-05-11 | End: 2020-12-02

## 2020-05-11 NOTE — TELEPHONE ENCOUNTER
"Request for medication refill: Ferrous Sulfate     Providers if patient needs an appointment and you are willing to give a one month supply please refill for one month and  send a letter/MyChart using \".SMILLIMITEDREFILL\" .smillimited and route chart to \"[facility] \" (Giving one month refill in non controlled medications is strongly recommended before denial)    If refill has been denied, meaning absolutely no refills without visit, please complete the smart phrase \".smirxrefuse\" and route it to the \"[facility] MED REFILLS\"  pool to inform the patient and the pharmacy.    Aissatou Landaverde, CMA        " Unable to refill med per protocol. ROuted to MD.

## 2020-05-12 DIAGNOSIS — I10 ESSENTIAL HYPERTENSION WITH GOAL BLOOD PRESSURE LESS THAN 140/90: ICD-10-CM

## 2020-05-12 NOTE — TELEPHONE ENCOUNTER
"Request for medication refill: metoprolol succinate ER (TOPROL-XL) 50 MG 24 hr tablet     -Medication shown to have ended/ on 10/29/2019 please advise before refilling.      Providers if patient needs an appointment and you are willing to give a one month supply please refill for one month and  send a letter/MyChart using \".SMILLIMITEDREFILL\" .smillimited and route chart to \"[facility] \" (Giving one month refill in non controlled medications is strongly recommended before denial)    If refill has been denied, meaning absolutely no refills without visit, please complete the smart phrase \".smirxrefuse\" and route it to the \"P [facility] MED REFILLS\"  pool to inform the patient and the pharmacy.    Lorenza Hillman MA        "

## 2020-05-13 RX ORDER — METOPROLOL SUCCINATE 50 MG/1
150 TABLET, EXTENDED RELEASE ORAL DAILY
Qty: 180 TABLET | Refills: 3 | Status: SHIPPED | OUTPATIENT
Start: 2020-05-13 | End: 2020-11-02

## 2020-06-17 ENCOUNTER — MEDICAL CORRESPONDENCE (OUTPATIENT)
Dept: HEALTH INFORMATION MANAGEMENT | Facility: CLINIC | Age: 65
End: 2020-06-17

## 2020-06-23 DIAGNOSIS — N52.9 ERECTILE DYSFUNCTION, UNSPECIFIED ERECTILE DYSFUNCTION TYPE: ICD-10-CM

## 2020-06-23 RX ORDER — SILDENAFIL 100 MG/1
100 TABLET, FILM COATED ORAL DAILY PRN
Qty: 30 TABLET | Refills: 1 | Status: SHIPPED | OUTPATIENT
Start: 2020-06-23 | End: 2020-08-19

## 2020-06-23 NOTE — TELEPHONE ENCOUNTER
Verify that the refill encounter hasn't been started Yes    [facility] phone call message- patient requesting a refill:    Full Medication Name: sildenafil (VIAGRA) 100 MG tablet     Dose: Route: Take 1 tablet (100 mg) by mouth daily as needed (sexual dysfunction) - Oral     Pharmacy confirmed as   [id number] - [facility] [address] [facility]  [address] [facility] [address]  Phone: [phone] Fax: [phone]  : Yes    Medication tab checked to see if medication has been sent  Yes    Additional Comments:      OK to leave a message on voice mail? Yes    Advised patient refill may take up to 2 business days? Yes    Primary language: English      needed? No    Call taken on June 23, 2020 at 10:18 AM by April Juany    Route to [facility] MED REFILL

## 2020-06-23 NOTE — TELEPHONE ENCOUNTER

## 2020-06-29 ENCOUNTER — ANCILLARY PROCEDURE (OUTPATIENT)
Dept: CT IMAGING | Facility: CLINIC | Age: 65
End: 2020-06-29
Attending: INTERNAL MEDICINE
Payer: COMMERCIAL

## 2020-06-29 DIAGNOSIS — E11.9 TYPE 2 DIABETES MELLITUS WITHOUT COMPLICATION, WITHOUT LONG-TERM CURRENT USE OF INSULIN (H): ICD-10-CM

## 2020-06-29 DIAGNOSIS — C49.A2 MALIGNANT GASTROINTESTINAL STROMAL TUMOR (GIST) OF STOMACH (H): ICD-10-CM

## 2020-06-29 DIAGNOSIS — I10 HYPERTENSION, ESSENTIAL: ICD-10-CM

## 2020-06-29 DIAGNOSIS — N40.0 BENIGN PROSTATIC HYPERPLASIA, UNSPECIFIED WHETHER LOWER URINARY TRACT SYMPTOMS PRESENT: ICD-10-CM

## 2020-06-29 DIAGNOSIS — N18.30 CKD (CHRONIC KIDNEY DISEASE) STAGE 3, GFR 30-59 ML/MIN (H): ICD-10-CM

## 2020-06-29 DIAGNOSIS — G47.33 OSA (OBSTRUCTIVE SLEEP APNEA): ICD-10-CM

## 2020-06-29 DIAGNOSIS — C49.A0 GIST (GASTROINTESTINAL STROMAL TUMOR), MALIGNANT (H): ICD-10-CM

## 2020-06-29 DIAGNOSIS — F33.0 MILD RECURRENT MAJOR DEPRESSION (H): ICD-10-CM

## 2020-06-29 DIAGNOSIS — E66.9 OBESITY, UNSPECIFIED CLASSIFICATION, UNSPECIFIED OBESITY TYPE, UNSPECIFIED WHETHER SERIOUS COMORBIDITY PRESENT: ICD-10-CM

## 2020-06-29 DIAGNOSIS — I48.0 PAROXYSMAL ATRIAL FIBRILLATION (H): ICD-10-CM

## 2020-06-29 DIAGNOSIS — R91.8 PULMONARY NODULES: ICD-10-CM

## 2020-06-29 LAB
ALBUMIN SERPL-MCNC: 3.5 G/DL (ref 3.4–5)
ALP SERPL-CCNC: 94 U/L (ref 40–150)
ALT SERPL W P-5'-P-CCNC: 29 U/L (ref 0–70)
ANION GAP SERPL CALCULATED.3IONS-SCNC: 3 MMOL/L (ref 3–14)
AST SERPL W P-5'-P-CCNC: 22 U/L (ref 0–45)
BASOPHILS # BLD AUTO: 0 10E9/L (ref 0–0.2)
BASOPHILS NFR BLD AUTO: 0.3 %
BILIRUB SERPL-MCNC: 0.6 MG/DL (ref 0.2–1.3)
BUN SERPL-MCNC: 22 MG/DL (ref 7–30)
CALCIUM SERPL-MCNC: 9.2 MG/DL (ref 8.5–10.1)
CHLORIDE SERPL-SCNC: 109 MMOL/L (ref 94–109)
CO2 SERPL-SCNC: 30 MMOL/L (ref 20–32)
CREAT SERPL-MCNC: 1.18 MG/DL (ref 0.66–1.25)
DIFFERENTIAL METHOD BLD: NORMAL
EOSINOPHIL # BLD AUTO: 0.1 10E9/L (ref 0–0.7)
EOSINOPHIL NFR BLD AUTO: 0.5 %
ERYTHROCYTE [DISTWIDTH] IN BLOOD BY AUTOMATED COUNT: 13.6 % (ref 10–15)
GFR SERPL CREATININE-BSD FRML MDRD: 64 ML/MIN/{1.73_M2}
GLUCOSE SERPL-MCNC: 102 MG/DL (ref 70–99)
HCT VFR BLD AUTO: 45 % (ref 40–53)
HGB BLD-MCNC: 14.8 G/DL (ref 13.3–17.7)
IMM GRANULOCYTES # BLD: 0 10E9/L (ref 0–0.4)
IMM GRANULOCYTES NFR BLD: 0.4 %
LYMPHOCYTES # BLD AUTO: 1.7 10E9/L (ref 0.8–5.3)
LYMPHOCYTES NFR BLD AUTO: 18.3 %
MCH RBC QN AUTO: 30.4 PG (ref 26.5–33)
MCHC RBC AUTO-ENTMCNC: 32.9 G/DL (ref 31.5–36.5)
MCV RBC AUTO: 92 FL (ref 78–100)
MONOCYTES # BLD AUTO: 0.7 10E9/L (ref 0–1.3)
MONOCYTES NFR BLD AUTO: 7.4 %
NEUTROPHILS # BLD AUTO: 6.9 10E9/L (ref 1.6–8.3)
NEUTROPHILS NFR BLD AUTO: 73.1 %
NRBC # BLD AUTO: 0 10*3/UL
NRBC BLD AUTO-RTO: 0 /100
PLATELET # BLD AUTO: 195 10E9/L (ref 150–450)
POTASSIUM SERPL-SCNC: 4 MMOL/L (ref 3.4–5.3)
PROT SERPL-MCNC: 7.5 G/DL (ref 6.8–8.8)
RBC # BLD AUTO: 4.87 10E12/L (ref 4.4–5.9)
SODIUM SERPL-SCNC: 142 MMOL/L (ref 133–144)
WBC # BLD AUTO: 9.4 10E9/L (ref 4–11)

## 2020-06-29 RX ORDER — IOPAMIDOL 755 MG/ML
135 INJECTION, SOLUTION INTRAVASCULAR ONCE
Status: COMPLETED | OUTPATIENT
Start: 2020-06-29 | End: 2020-06-29

## 2020-06-29 RX ADMIN — IOPAMIDOL 135 ML: 755 INJECTION, SOLUTION INTRAVASCULAR at 09:01

## 2020-06-30 ENCOUNTER — VIRTUAL VISIT (OUTPATIENT)
Dept: ONCOLOGY | Facility: CLINIC | Age: 65
End: 2020-06-30
Attending: INTERNAL MEDICINE
Payer: COMMERCIAL

## 2020-06-30 DIAGNOSIS — E11.9 TYPE 2 DIABETES MELLITUS WITHOUT COMPLICATION, WITHOUT LONG-TERM CURRENT USE OF INSULIN (H): ICD-10-CM

## 2020-06-30 DIAGNOSIS — F33.0 MILD RECURRENT MAJOR DEPRESSION (H): ICD-10-CM

## 2020-06-30 DIAGNOSIS — N40.0 BENIGN PROSTATIC HYPERPLASIA, UNSPECIFIED WHETHER LOWER URINARY TRACT SYMPTOMS PRESENT: ICD-10-CM

## 2020-06-30 DIAGNOSIS — I48.0 PAROXYSMAL ATRIAL FIBRILLATION (H): ICD-10-CM

## 2020-06-30 DIAGNOSIS — N18.30 CKD (CHRONIC KIDNEY DISEASE) STAGE 3, GFR 30-59 ML/MIN (H): ICD-10-CM

## 2020-06-30 DIAGNOSIS — C49.A2 MALIGNANT GASTROINTESTINAL STROMAL TUMOR (GIST) OF STOMACH (H): ICD-10-CM

## 2020-06-30 DIAGNOSIS — E66.9 OBESITY, UNSPECIFIED CLASSIFICATION, UNSPECIFIED OBESITY TYPE, UNSPECIFIED WHETHER SERIOUS COMORBIDITY PRESENT: ICD-10-CM

## 2020-06-30 DIAGNOSIS — G47.33 OSA (OBSTRUCTIVE SLEEP APNEA): ICD-10-CM

## 2020-06-30 DIAGNOSIS — I10 HYPERTENSION, ESSENTIAL: ICD-10-CM

## 2020-06-30 PROCEDURE — 40000114 ZZH STATISTIC NO CHARGE CLINIC VISIT

## 2020-06-30 PROCEDURE — 99213 OFFICE O/P EST LOW 20 MIN: CPT | Mod: 95 | Performed by: INTERNAL MEDICINE

## 2020-06-30 NOTE — LETTER
6/30/2020         RE: Jazz Berman  [address]  [place] [address]        Dear Colleague,    Thank you for referring your patient, Jazz Berman, to the [facility]. Please see a copy of my visit note below.    Jazz Berman is a 65 year old male who is being evaluated via a billable telephone visit.          I have reviewed and updated the patient's allergies and medication list. Patient was asked if they had any patient reported vital signs to present, if yes, please see documented vitals.  Patient was also asked for their current weight and height, if presented, documented in vitals.      Concerns: Patient has no new concerns.      Refills: None             DULCE Tran            Phone call duration: >7 minutes          6-30-20    I talked w Mr. Berman, for f/u of a GIST.       Background  In brief, he was in his usual state of health but noted some black stools in 06/2016. In early July 2016 he was admitted for anemia and GI bleeding and had orthostatic symptoms. 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 Endoscopy before that showed some punctate oozing and it is possible that some of the bleeding was coming from the tumor.   -  Surgery was August 2016 and pathology report showed 1 mitosis per 50 high-power fields, a tumor size of 15 cm and negative margins. 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  Interestingly, in contrast to the D842V, in-frame deletions of D842 to H845 are typically sensitive to imatinib.  -        Interval history.           Due to covid we are doing a phone visit.          He is doing well and has no specific new complaints.      He denies being bothered by shortness of breath right now though he has had that problem and is felt to likely have HFpEF.     He can walk more than 15 min or a couple blocks without stopping.  He does have a history of  chronic congestive heart failure and is on multiple medications.      DM is controlled off medications. Denies neuropathy.    He has sleep apnea and uses a CPAP machine and also has a history of hypertension, depression, diabetes, cardiomegaly, and he was found to be in atrial fibrillation when he was hospitalized for the GI bleed.     He had some tiny nonspecific lung nodules, some adrenal nodules, and nonspecific splenic lesions on past imaging.     He connects with his PCP fairly frequently. He has an issue on his tongue that he will be contacting them about soon.     A 14-point ROS is otherwise unremarkable.         -  Background PMH, FH, SH  His past history is notable for keloid formation, resection of a rectal polyp, and a crushing foot injury.   He feels that contrast dye created a bit of a rash that left some skin hyperpigmentation on his back.   He is currently single and lives alone. He stopped smoking in 2011, though he was a polysubstance abuser in the past. He no longer drinks alcohol. He is retired from working in a furniture store.   Family history is positive for diabetes and hypertension.   -      On exam he sounded comfortable with a quiet affect.   CHEST: no resp distress.   NEURO: Normal mentation and speech.  PSYCH: mood good     -  CBC, LFT, GNE OK    -  I reviewed his new images and there is no evidence of PD.  He has a history of some stable lung nodules and adrenal nodule.     Formal read:  1. Postsurgical changes of gastrointestinal stromal tumor resection  with no convincing evidence of recurrence or metastasis.   2. There is a 1.3 cm groundglass subpleural right upper lobe pulmonary  nodule. In retrospect and allowing for motion artifact on prior exam,  nodule is likely present on prior exam 07/16/2016. Recommend attention  on follow-up.  3. Colonic diverticulosis.  4. Stable bilateral adrenal nodules.  5. Ascending thoracic aorta measures 3.8 cm, stable.      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  Interestingly, in contrast to the D842V, in-frame deletions of D842 to H845 are typically sensitive to imatinib.     -     It appears his recurrence risk is on the order of 10-15% over a few years.  I do not think that risk warrants the toxicities of Gleevec, especially given his multiple other problems and medications.      No evidence of disease progression on CT.      He had an echo that showed no structural heart disease, and he was felt to be at high risk for stroke with a CHADS2 score of 4, and he is on xarelto.  His atrial fibrillation, anticoagulation and other issues will be followed by his primary care team and Cardiology.      He is now almost years out.  We will see him about March w/ CT-CAP and labs 2 d before.     The PDGFR mutation is unusual but is a case where gleevec could be useful.  All his questions were addressed and he will call if he has others.     -  Sean Freed M.D.  Professor  Hematology, Oncology and Transplantation

## 2020-06-30 NOTE — PROGRESS NOTES
"Jazz Berman is a 65 year old male who is being evaluated via a billable telephone visit.      The patient has been notified of following:     \"This telephone visit will be conducted via a call between you and your physician/provider. We have found that certain health care needs can be provided without the need for a physical exam.  This service lets us provide the care you need with a short phone conversation.  If a prescription is necessary we can send it directly to your pharmacy.  If lab work is needed we can place an order for that and you can then stop by our lab to have the test done at a later time.    Telephone visits are billed at different rates depending on your insurance coverage. During this emergency period, for some insurers they may be billed the same as an in-person visit.  Please reach out to your insurance provider with any questions.    If during the course of the call the physician/provider feels a telephone visit is not appropriate, you will not be charged for this service.\"    Patient has given verbal consent for Telephone visit?  Yes    What phone number would you like to be contacted at? [phone]    How would you like to obtain your AVS? MyChart     I have reviewed and updated the patient's allergies and medication list. Patient was asked if they had any patient reported vital signs to present, if yes, please see documented vitals.  Patient was also asked for their current weight and height, if presented, documented in vitals.      Concerns: Patient has no new concerns.      Refills: None             DULCE Tran            Phone call duration: >7 minutes          6-30-20    I talked w Mr. Berman, for f/u of a GIST.       Background  In brief, he was in his usual state of health but noted some black stools in 06/2016. In early July 2016 he was admitted for anemia and GI bleeding and had orthostatic symptoms. 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 " Endoscopy before that showed some punctate oozing and it is possible that some of the bleeding was coming from the tumor.   -  Surgery was August 2016 and pathology report showed 1 mitosis per 50 high-power fields, a tumor size of 15 cm and negative margins. 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  Interestingly, in contrast to the D842V, in-frame deletions of D842 to H845 are typically sensitive to imatinib.  -        Interval history.           Due to covid we are doing a phone visit.          He is doing well and has no specific new complaints.      He denies being bothered by shortness of breath right now though he has had that problem and is felt to likely have HFpEF.     He can walk more than 15 min or a couple blocks without stopping.  He does have a history of chronic congestive heart failure and is on multiple medications.      DM is controlled off medications. Denies neuropathy.    He has sleep apnea and uses a CPAP machine and also has a history of hypertension, depression, diabetes, cardiomegaly, and he was found to be in atrial fibrillation when he was hospitalized for the GI bleed.     He had some tiny nonspecific lung nodules, some adrenal nodules, and nonspecific splenic lesions on past imaging.     He connects with his PCP fairly frequently. He has an issue on his tongue that he will be contacting them about soon.     A 14-point ROS is otherwise unremarkable.         -  Background PMH, FH, SH  His past history is notable for keloid formation, resection of a rectal polyp, and a crushing foot injury.   He feels that contrast dye created a bit of a rash that left some skin hyperpigmentation on his back.   He is currently single and lives alone. He stopped smoking in 2011, though he was a polysubstance abuser in the past. He no longer drinks alcohol. He is retired from working in a furniture store.   Family history is positive for  diabetes and hypertension.   -      On exam he sounded comfortable with a quiet affect.   CHEST: no resp distress.   NEURO: Normal mentation and speech.  PSYCH: mood good     -  CBC, LFT, GNE OK    -  I reviewed his new images and there is no evidence of PD.  He has a history of some stable lung nodules and adrenal nodule.     Formal read:  1. Postsurgical changes of gastrointestinal stromal tumor resection  with no convincing evidence of recurrence or metastasis.   2. There is a 1.3 cm groundglass subpleural right upper lobe pulmonary  nodule. In retrospect and allowing for motion artifact on prior exam,  nodule is likely present on prior exam 07/16/2016. Recommend attention  on follow-up.  3. Colonic diverticulosis.  4. Stable bilateral adrenal nodules.  5. Ascending thoracic aorta measures 3.8 cm, stable.      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  Interestingly, in contrast to the D842V, in-frame deletions of D842 to H845 are typically sensitive to imatinib.     -     It appears his recurrence risk is on the order of 10-15% over a few years.  I do not think that risk warrants the toxicities of Gleevec, especially given his multiple other problems and medications.      No evidence of disease progression on CT.      He had an echo that showed no structural heart disease, and he was felt to be at high risk for stroke with a CHADS2 score of 4, and he is on xarelto.  His atrial fibrillation, anticoagulation and other issues will be followed by his primary care team and Cardiology.      He is now almost years out.  We will see him about March w/ CT-CAP and labs 2 d before.     The PDGFR mutation is unusual but is a case where gleevec could be useful.  All his questions were addressed and he will call if he has others.     -  Sean Freed M.D.  Professor  Hematology, Oncology and Transplantation

## 2020-07-20 ENCOUNTER — DOCUMENTATION ONLY (OUTPATIENT)
Dept: FAMILY MEDICINE | Facility: CLINIC | Age: 65
End: 2020-07-20

## 2020-07-20 NOTE — PROGRESS NOTES
"When opening a documentation only encounter, be sure to enter in \"Chief Complaint\" Forms and in \" Comments\" Title of form, description if needed.    Al is a 65 year old  male  Form received via: Fax  Form now resides in: Provider BHAVIK Stockton 5:18 PM July 20, 2020                    "

## 2020-07-20 NOTE — PROGRESS NOTES
"When opening a documentation only encounter, be sure to enter in \"Chief Complaint\" Forms and in \" Comments\" Title of form, description if needed.    Al is a 65 year old  male  Form received via: Fax  Form now resides in: Provider BHAVIK Stockton 5:13 PM July 20, 2020                    "

## 2020-07-20 NOTE — PROGRESS NOTES
"When opening a documentation only encounter, be sure to enter in \"Chief Complaint\" Forms and in \" Comments\" Title of form, description if needed.    Al is a 65 year old  male  Form received via: Fax  Form now resides in: Provider BHAVIK Stockton 5:14 PM July 20, 2020        Form has been completed by provider.     Form sent out via: Fax to [employer] at Fax Number: [phone]  Patient informed: n/a  Output date: July 21, 2020    Lorenza Hillman MA      **Please close the encounter**                "

## 2020-07-20 NOTE — PROGRESS NOTES
"When opening a documentation only encounter, be sure to enter in \"Chief Complaint\" Forms and in \" Comments\" Title of form, description if needed.    Al is a 65 year old  male  Form received via: Fax  Form now resides in: Provider BHAVIK Stockton 4:03 PM July 20, 2020                  "

## 2020-07-20 NOTE — PROGRESS NOTES
"When opening a documentation only encounter, be sure to enter in \"Chief Complaint\" Forms and in \" Comments\" Title of form, description if needed.    Al is a 65 year old  male  Form received via: Fax  Form now resides in: Provider BHAVIK Stockton 5:15 PM July 20, 2020                    "

## 2020-07-20 NOTE — PROGRESS NOTES
"When opening a documentation only encounter, be sure to enter in \"Chief Complaint\" Forms and in \" Comments\" Title of form, description if needed.    Al is a 65 year old  male  Form received via: Fax  Form now resides in: Provider BHAVIK Stockton 5:16 PM July 20, 2020                "

## 2020-07-20 NOTE — PROGRESS NOTES
"When opening a documentation only encounter, be sure to enter in \"Chief Complaint\" Forms and in \" Comments\" Title of form, description if needed.    Al is a 65 year old  male  Form received via: Fax  Form now resides in: Provider Micki Washburn RMA 5:17 PM July 20, 2020                  "

## 2020-07-20 NOTE — PROGRESS NOTES
"When opening a documentation only encounter, be sure to enter in \"Chief Complaint\" Forms and in \" Comments\" Title of form, description if needed.    Al is a 65 year old  male  Form received via: Fax  Form now resides in: Provider BHAVIK Stockton 4:05 PM July 20, 2020                    "

## 2020-07-20 NOTE — PROGRESS NOTES
"When opening a documentation only encounter, be sure to enter in \"Chief Complaint\" Forms and in \" Comments\" Title of form, description if needed.    Al is a 65 year old  male  Form received via: Fax  Form now resides in: Provider BHAVIK Stockton 5:19 PM July 20, 2020                    "

## 2020-07-20 NOTE — PROGRESS NOTES
"When opening a documentation only encounter, be sure to enter in \"Chief Complaint\" Forms and in \" Comments\" Title of form, description if needed.    Al is a 65 year old  male  Form received via: Fax  Form now resides in: Provider BHAVIK Stockton 5:12 PM July 20, 2020    "

## 2020-07-21 ENCOUNTER — DOCUMENTATION ONLY (OUTPATIENT)
Dept: FAMILY MEDICINE | Facility: CLINIC | Age: 65
End: 2020-07-21

## 2020-07-21 NOTE — PROGRESS NOTES
Form has been completed by provider.     Form sent out via: Fax to [employer] at Fax Number: [phone]  Patient informed: n/a  Output date: July 21, 2020    Lorenza Hillman MA      **Please close the encounter**

## 2020-07-21 NOTE — PROGRESS NOTES
Form has been completed by provider.     Form sent out via: Fax to [employer] network at Fax Number: [phone]  Patient informed: n/a  Output date: July 21, 2020    Lorenza Hillman MA      **Please close the encounter**

## 2020-07-21 NOTE — PROGRESS NOTES
Form has been completed by provider.     Form sent out via: Fax to [employer] [facility] at Fax Number: [phone]  Patient informed: n/a  Output date: July 21, 2020    Lorenza Hillman MA      **Please close the encounter**

## 2020-07-21 NOTE — PROGRESS NOTES
Form has been completed by provider.     Form sent out via: Fax to [employer] at Fax Number: [phone]  Patient informed: n/a  Output date: July 21, 2020    Lorenza Hillman MA      **Please close the encounter**

## 2020-07-21 NOTE — PROGRESS NOTES
"When opening a documentation only encounter, be sure to enter in \"Chief Complaint\" Forms and in \" Comments\" Title of form, description if needed.    Al is a 65 year old  male  Form received via: Fax  Form now resides in: Provider BHAVIK Stockton 2:26 PM July 21, 2020                    "

## 2020-07-21 NOTE — PROGRESS NOTES
Form has been completed by provider.     Form sent out via: Fax to [employer] at Fax Number: [phone]  Patient informed: n/a  Output date: July 21, 2020    Lorenza Hillman MA      **Please close the encounter**

## 2020-07-22 NOTE — PROGRESS NOTES
Form has been completed by provider.     Form sent out via: Fax to [employer] [facility] at Fax Number: [phone]  Patient informed: n/a  Output date: July 22, 2020    Lorenza Hillman MA      **Please close the encounter**

## 2020-07-22 NOTE — PROGRESS NOTES
Form has been completed by provider.     Form sent out via: Fax to [employer] at Fax Number: [phone]  Patient informed: n/a  Output date: July 22, 2020    Lorenza Hillman MA      **Please close the encounter**

## 2020-07-22 NOTE — PROGRESS NOTES
Form has been completed by provider.     Form sent out via: Fax to [employer] [facility] at Fax Number: [phone]  Patient informed: n/a  Output date: July 22, 2020    Lorenza Hillman MA      **Please close the encounter**

## 2020-07-22 NOTE — PROGRESS NOTES
Form has been completed by provider.     Form sent out via: Fax to [employer] at Fax Number: [phone]  Patient informed: n/a  Output date: July 22, 2020    Lorenza Hillman MA      **Please close the encounter**

## 2020-07-30 NOTE — PROGRESS NOTES
Form has been completed by provider.     Form sent out via: Fax to [employer] at Fax Number: [phone]  Patient informed: No  Output date: July 30, 2020    BHAVIK Bustillos 2:34 PM July 30, 2020    **Please close the encounter**

## 2020-08-16 ENCOUNTER — MEDICAL CORRESPONDENCE (OUTPATIENT)
Dept: HEALTH INFORMATION MANAGEMENT | Facility: CLINIC | Age: 65
End: 2020-08-16

## 2020-08-19 DIAGNOSIS — N52.9 ERECTILE DYSFUNCTION, UNSPECIFIED ERECTILE DYSFUNCTION TYPE: ICD-10-CM

## 2020-08-19 RX ORDER — SILDENAFIL 100 MG/1
100 TABLET, FILM COATED ORAL DAILY PRN
Qty: 30 TABLET | Refills: 1 | Status: SHIPPED | OUTPATIENT
Start: 2020-08-19 | End: 2020-09-11

## 2020-08-19 NOTE — TELEPHONE ENCOUNTER
Verify that the refill encounter hasn't been started Yes    [facility] phone call message- patient requesting a refill:    Full Medication Name: sildenafil (VIAGRA) 100 MG tablet     Dose: Take 1 tablet (100 mg) by mouth daily as needed (sexual dysfunction) - Oral      Pharmacy confirmed as   [id number] - [place], [place] - [address] [facility]  [address] [facility] [address]  Phone: [phone] Fax: [phone]  : Yes    Medication tab checked to see if medication has been sent  Yes    Additional Comments: Patient is out of medication. Please advise.      OK to leave a message on voice mail? Yes    Advised patient refill may take up to 2 business days? Yes    Primary language: English      needed? No    Call taken on August 19, 2020 at 9:31 AM by Theresa Valentin    Route to [facility] REFILL

## 2020-08-19 NOTE — TELEPHONE ENCOUNTER
Last office visit: 02/25/2020  Last refill: 06/23/2020 for #30 with 1 refill  Patient following up on 09/01.    Medication refilled per standing order.    Marcia Cabral RN

## 2020-09-01 DIAGNOSIS — K21.9 GASTROESOPHAGEAL REFLUX DISEASE WITHOUT ESOPHAGITIS: ICD-10-CM

## 2020-09-01 RX ORDER — FAMOTIDINE 40 MG/1
40 TABLET, FILM COATED ORAL DAILY
Qty: 60 TABLET | Refills: 4 | Status: SHIPPED | OUTPATIENT
Start: 2020-09-01 | End: 2021-03-24

## 2020-09-01 NOTE — TELEPHONE ENCOUNTER

## 2020-09-08 NOTE — PATIENT INSTRUCTIONS
Preventive Health Recommendations  See your health care provider every year to    Review health changes.     Discuss preventive care.      Review your medicines if your doctor has prescribed any.    Talk with your health care provider about whether you should have a test to screen for prostate cancer (PSA).    Every 3 years, have a diabetes test (fasting glucose). If you are at risk for diabetes, you should have this test more often.    Every 5 years, have a cholesterol test. Have this test more often if you are at risk for high cholesterol or heart disease.     Every 10 years, have a colonoscopy. Or, have a yearly FIT test (stool test). These exams will check for colon cancer.    Talk to with your health care provider about screening for Abdominal Aortic Aneurysm if you have a family history of AAA or have a history of smoking.    Shots:     Get a flu shot each year.     Get a tetanus shot every 10 years.     Talk to your doctor about your pneumonia vaccines. There are now two you should receive - Pneumovax (PPSV 23) and Prevnar (PCV 13).    Talk to your pharmacist about a shingles vaccine.     Talk to your doctor about the hepatitis B vaccine.    Nutrition:     Eat at least 5 servings of fruits and vegetables each day.     Eat whole-grain bread, whole-wheat pasta and brown rice instead of white grains and rice.     Get adequate Calcium and Vitamin D.     Lifestyle    Exercise for at least 150 minutes a week (30 minutes a day, 5 days a week). This will help you control your weight and prevent disease.     Limit alcohol to one drink per day.     No smoking.     Wear sunscreen to prevent skin cancer.     See your dentist every six months for an exam and cleaning.     See your eye doctor every 1 to 2 years to screen for conditions such as glaucoma, macular degeneration and cataracts.    Personalized Prevention Plan  You are due for the preventive services outlined below.  Your care team is available to assist you in  scheduling these services.  If you have already completed any of these items, please share that information with your care team to update in your medical record.  Health Maintenance   Topic Date Due     ADVANCE CARE PLANNING  1955     DEPRESSION ACTION PLAN  1955     SKIN CANCER SCREENING  1955     HEPATITIS B IMMUNIZATION (1 of 3 - Risk 3-dose series) 01/22/1974     ZOSTER IMMUNIZATION (2 of 3) 12/07/2017     DIABETIC FOOT EXAM  04/24/2018     EYE EXAM  03/23/2019     COLORECTAL CANCER SCREENING  07/02/2019     MEDICARE ANNUAL WELLNESS VISIT  01/22/2020     FALL RISK ASSESSMENT  01/22/2020     MICROALBUMIN  06/19/2020     PHQ-9  07/14/2020     INFLUENZA VACCINE (1) 09/01/2020     BMP  12/29/2020     A1C  01/14/2021     LIPID  01/14/2021     ALT  06/29/2021     CBC  06/29/2021     DTAP/TDAP/TD IMMUNIZATION (2 - Td) 01/31/2022     HF ACTION PLAN  02/25/2023     TSH W/FREE T4 REFLEX  Completed     HEPATITIS C SCREENING  Completed     HIV SCREENING  Completed     PNEUMOCOCCAL IMMUNIZATION 65+ LOW/MEDIUM RISK  Completed     AORTIC ANEURYSM SCREENING (SYSTEM ASSIGNED)  Completed     IPV IMMUNIZATION  Aged Out     MENINGITIS IMMUNIZATION  Aged Out     [facility] MEDICARE PERSONAL PREVENTIVE SERVICES PLAN - SERVICES     Review these tests with your doctor then decide which ones you want and take this page home for your reference                                                    IMMUNIZATIONS Description Recommend today?     Hepatitis B  If you have any of the following risk factors you should be immunized for hepatitis B: severe kidney disease, people who live in the same house as a carrier of Hepatitis B virus, people who live in  institutions (e.g. nursing homes or group homes), homosexual men, patients with hemophilia who received Factor VIII or IX concentrates, abusers of illicit injectable drugs Yes; Recommended and ordered.     SCREENING TESTS     Description   Year of Last Screening   Recommended  Today?   Heart disease screening blood tests    Cholesterol level Reducing cholesterol can reduce your risk of heart attacks by 25%.  Screening is recommended yearly if you are at risk of heart disease otherwise every 4-5 years 2020 No; is up to date.   Diabetes screening tests    Hemoglobin A1c blood test   Finding and treating diabetes early can reduce complications.  Screening recommended/covered yearly if you have high blood pressure, high cholesterol, obesity (BMI >30), or a history of high blood glucose tests; or 2 of the following: family history of diabetes, overweight (BMI >25 but <30), age 65 years or older, and a history of diabetes of pregnancy or gave birth to baby weighing more than 9 lbs. 2020 No; is up to date.   Hepatitis B screening Finding hepatitis B early can reduce complications.  Screening is recommended for persons with selected risk factors. 2011 No: is up to date.   Hepatitis C screening Finding hepatitis C early can reduce complications.  Screening is recommended for all persons born from 1945 through 1965 and for those with selected other risk factors.  2008 No; is up to date.   HIV screening Finding HIV early can reduce complications.  Screening is recommended for persons with risk factors for HIV infection. 2018 No; is up to date.   Glaucoma screening Early detection of glaucoma can prevent blindness.   Please talk to your eye doctor about this.       SCREENING TESTS     Description   Year of Last Screening   Recommended Today?   Colorectal cancer screening    Fecal occult blood test     Screening colonoscopy Screening for colon cancer has been shown to reduce death from colon cancer by 25-30%. Screening recommended to start at 50 years and continuing until age 75 years.   2016  Was due for repeat in 2019 Yes; Recommended and ordered.   Breast Cancer Screening (women)    Mammogram Mammogram screening for breast cancer has been shown to reduce the risk of dying from breast cancer and  prolong life. Screening is recommended every 1-2 years for women aged 50 to 74 years.   No: is not indicated today.   Cervical Cancer screening (women)    Pap Cervical pap smears can reduce cervical cancer. Screening is recommended annually if high risk (history of abnormal pap smears) otherwise every 2-3 years, stop screening at 65 years of age if history of normal paps.  No; is not indicated today.   Screening for Osteoporosis:  Bone mass measurements (women)    Dexa Scan Screening and treating Osteoporosis can reduce the risk of hip and spine fractures. Screening is recommended in women 65 years or older and in women and men at risk of osteoporosis.  No: is not indicated today.   Screening for Lung Cancer     Low-dose CT scanning Screening can reduce mortality in persons aged 55-80 who have smoked at least 30 pack-years and who are either still smoking or have quit in the past 15 years. none Yes; Recommended and ordered.   Abdominal Aortic Aneurysm (AAA) screening    Ultrasound (US)   An aneurysm treated before rupture is very safe -a ruptured aneurysm can be fatal.  Screening  by US for AAA is limited to patients who meet one of the following criteria:    Men who are 65-75 years old and have smoked more than 100 cigarettes in their lifetime    Anyone with a family history of abdominal aortic aneurysm none Yes; Recommended and ordered.             MEDICARE WELLNESS EXAM PATIENT INSTRUCTIONS    Yearly exam:     See your health care provider every year in order to review changes in your health, review medicines that you take, and discuss preventive care needs such as immunizations and cancer screening.    Get a flu shot each year.     Advance Directives:    If you have not done so, you are encouraged to complete advance directives and/or a living will.   More information about advance directives can be found at: http://www.[place]med.org/advocacy/Key-Issues/Advance-Directives    Nutrition:     Eat at least 5 servings of  fruits and vegetables each day.     Eat whole-grain bread, whole-wheat pasta and brown rice instead of white grains and rice.     Talk to your doctor about Calcium and Vitamin D.     Lifestyle:    Exercise for at least 150 minutes a week (30 minutes a day, 5 days a week). This will help you control your weight and prevent disease.     Limit alcohol to one drink per day.     If you smoke, try to quit - your doctor will be happy to help.     Wear sunscreen to prevent skin cancer.     See your dentist every six months for an exam and cleaning.     See your eye doctor every 1 to 2 years to screen for conditions such as glaucoma, macular degeneration and cataracts.    Here is the plan from today's visit    1. Encounter for Medicare annual wellness exam    - GASTROENTEROLOGY ADULT REF PROCEDURE ONLY; Future  - [place] Aorta Medicare AAA Screening; Future    2. Healthcare maintenance      3. History of colonic polyps    - GASTROENTEROLOGY ADULT REF PROCEDURE ONLY; Future    4. Former smoker    - US Aorta Medicare AAA Screening; Future    5. Erectile dysfunction, unspecified erectile dysfunction type    - sildenafil (VIAGRA) 100 MG tablet; Take 1 tablet (100 mg) by mouth daily as needed (sexual dysfunction)  Dispense: 30 tablet; Refill: 1    6. Tongue mass  Let's have you see the ENT doctor to take that off  - OTOLARYNGOLOGY REFERRAL - INTERNAL      Please call or return to clinic if your symptoms don't go away.    Follow up plan  Clinic appt in 1-2 months for HF and talk about lung cancer screening    Thank you for coming to [facility] today.  Lab Testing:  **If you had lab testing today and your results are reassuring or normal they will be mailed to you or sent through [employer] within 7 days.   **If the lab tests need quick action we will call you with the results.  The phone number we will call with results is # [phone] (home) . If this is not the best number please call our clinic and change the number.  Medication  Refills:  If you need any refills please call your pharmacy and they will contact us.   If you need to  your refill at a new pharmacy, please contact the new pharmacy directly. The new pharmacy will help you get your medications transferred faster.   Scheduling:  If you have any concerns about today's visit or wish to schedule another appointment please call our office during normal business hours [phone] (8-5:00 M-F)  If a referral was made to a [facility] and you don't get a call from central scheduling please call [phone].  If a Mammogram was ordered for you at [facility] call [phone] to schedule or change your appointment.  If you had an XRay/CT/Ultrasound/MRI ordered the number is [phone] to schedule or change your radiology appointment.   Medical Concerns:  If you have urgent medical concerns please call [phone] at any time of the day.    Lon Angeles,
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
## 2020-09-09 ENCOUNTER — TELEPHONE (OUTPATIENT)
Dept: FAMILY MEDICINE | Facility: CLINIC | Age: 65
End: 2020-09-09

## 2020-09-09 NOTE — TELEPHONE ENCOUNTER
Denise from [employer] calling to request a re-fax of forms that were sent in July. Stated she never received them. Fax # [phone] Please advise.

## 2020-09-11 ENCOUNTER — OFFICE VISIT (OUTPATIENT)
Dept: FAMILY MEDICINE | Facility: CLINIC | Age: 65
End: 2020-09-11
Payer: COMMERCIAL

## 2020-09-11 VITALS
HEART RATE: 74 BPM | SYSTOLIC BLOOD PRESSURE: 130 MMHG | RESPIRATION RATE: 16 BRPM | TEMPERATURE: 98.4 F | HEIGHT: 69 IN | WEIGHT: 270 LBS | OXYGEN SATURATION: 97 % | DIASTOLIC BLOOD PRESSURE: 81 MMHG | BODY MASS INDEX: 39.99 KG/M2

## 2020-09-11 DIAGNOSIS — Z00.00 ENCOUNTER FOR MEDICARE ANNUAL WELLNESS EXAM: Primary | ICD-10-CM

## 2020-09-11 DIAGNOSIS — Z87.891 FORMER SMOKER: ICD-10-CM

## 2020-09-11 DIAGNOSIS — K14.8 TONGUE MASS: ICD-10-CM

## 2020-09-11 DIAGNOSIS — Z86.0100 HISTORY OF COLONIC POLYPS: ICD-10-CM

## 2020-09-11 DIAGNOSIS — Z00.00 HEALTHCARE MAINTENANCE: ICD-10-CM

## 2020-09-11 DIAGNOSIS — N52.9 ERECTILE DYSFUNCTION, UNSPECIFIED ERECTILE DYSFUNCTION TYPE: ICD-10-CM

## 2020-09-11 RX ORDER — SILDENAFIL 100 MG/1
100 TABLET, FILM COATED ORAL DAILY PRN
Qty: 30 TABLET | Refills: 1 | Status: SHIPPED | OUTPATIENT
Start: 2020-09-11 | End: 2020-11-05

## 2020-09-11 RX ORDER — SILDENAFIL 100 MG/1
100 TABLET, FILM COATED ORAL DAILY PRN
Qty: 30 TABLET | Refills: 1 | Status: SHIPPED | OUTPATIENT
Start: 2020-09-11 | End: 2020-09-11

## 2020-09-11 ASSESSMENT — MIFFLIN-ST. JEOR: SCORE: 2000.09

## 2020-09-17 ENCOUNTER — TELEPHONE (OUTPATIENT)
Dept: OTOLARYNGOLOGY | Facility: CLINIC | Age: 65
End: 2020-09-17

## 2020-09-17 NOTE — TELEPHONE ENCOUNTER
Called patient to schedule, as advised by Mary STEEL RN:    Appointment Type - [facility] NEW  Provider - Dr. Ford  Appointment Notes - Tongue Mass - referred by Dr. Karthik HERNANDEZ with scheduling instructions and the ENT call center number. Patient is welcome to schedule in next available [facility] NEW slot of provider.

## 2020-09-17 NOTE — TELEPHONE ENCOUNTER
[facility] Call Center    Phone Message    May a detailed message be left on voicemail: yes     Reason for Call: Other: Patient is being referred for a tongue mass. Please follow up with the patient to schedule as i did not see this on the GL. Thank you.     Action Taken: Message routed to:  [facility] ([facility]): ent    Travel Screening: Not Applicable

## 2020-09-18 NOTE — TELEPHONE ENCOUNTER
FUTURE VISIT INFORMATION      FUTURE VISIT INFORMATION:    Date: 10/12/2020    Time: 8:40AM    [place] [facility] [place]ERRAL INFORMATION:    Referring provider:  Tequila Angeles DO     Referring providers clinic:  [facility]    Reason for visit/diagnosis  Tongue Mass - referred by Dr. Angeles (via Angelika BAEZ). Per Pt    RECORDS REQUESTED FROM:       Clinic name Comments Records Status Imaging Status   [facility] 9/11/2020 referral and note from Tequila Angeles DO  Epic

## 2020-09-19 NOTE — PROGRESS NOTES
Preceptor Attestation:   Patient seen, evaluated and discussed with the resident. I have verified the content of the note, which accurately reflects my assessment of the patient and the plan of care.   Supervising Physician:  Va Reilly, DO

## 2020-09-21 ENCOUNTER — TELEPHONE (OUTPATIENT)
Dept: OTOLARYNGOLOGY | Facility: CLINIC | Age: 65
End: 2020-09-21

## 2020-09-21 ENCOUNTER — HOSPITAL ENCOUNTER (OUTPATIENT)
Facility: AMBULATORY SURGERY CENTER | Age: 65
End: 2020-09-21
Attending: INTERNAL MEDICINE
Payer: COMMERCIAL

## 2020-09-21 NOTE — TELEPHONE ENCOUNTER
Called patient and offered sooner appointment with Dr. Ford (pt is currently scheduled on 10/12). Spoke with patient. He preferred to leave appointment as-is.

## 2020-09-29 DIAGNOSIS — Z11.59 ENCOUNTER FOR SCREENING FOR OTHER VIRAL DISEASES: Primary | ICD-10-CM

## 2020-10-01 PROBLEM — N18.31 TYPE 2 DIABETES MELLITUS WITH STAGE 3A CHRONIC KIDNEY DISEASE, WITHOUT LONG-TERM CURRENT USE OF INSULIN (H): Status: ACTIVE | Noted: 2017-10-12

## 2020-10-01 PROBLEM — Z86.0100 HISTORY OF COLONIC POLYPS: Status: ACTIVE | Noted: 2020-10-01

## 2020-10-01 PROBLEM — E11.22 TYPE 2 DIABETES MELLITUS WITH STAGE 3A CHRONIC KIDNEY DISEASE, WITHOUT LONG-TERM CURRENT USE OF INSULIN (H): Status: ACTIVE | Noted: 2017-10-12

## 2020-10-01 PROBLEM — Z87.891 HISTORY OF TOBACCO USE: Status: ACTIVE | Noted: 2020-10-01
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
## 2020-10-12 ENCOUNTER — PRE VISIT (OUTPATIENT)
Dept: OTOLARYNGOLOGY | Facility: CLINIC | Age: 65
End: 2020-10-12

## 2020-10-12 ENCOUNTER — TELEPHONE (OUTPATIENT)
Dept: OTOLARYNGOLOGY | Facility: CLINIC | Age: 65
End: 2020-10-12

## 2020-10-12 ENCOUNTER — OFFICE VISIT (OUTPATIENT)
Dept: OTOLARYNGOLOGY | Facility: CLINIC | Age: 65
End: 2020-10-12
Attending: FAMILY MEDICINE
Payer: COMMERCIAL

## 2020-10-12 VITALS
OXYGEN SATURATION: 98 % | DIASTOLIC BLOOD PRESSURE: 101 MMHG | SYSTOLIC BLOOD PRESSURE: 148 MMHG | TEMPERATURE: 97.3 F | BODY MASS INDEX: 40.88 KG/M2 | HEART RATE: 55 BPM | WEIGHT: 276 LBS | RESPIRATION RATE: 18 BRPM | HEIGHT: 69 IN

## 2020-10-12 DIAGNOSIS — K14.8 TONGUE LESION: Primary | ICD-10-CM

## 2020-10-12 PROCEDURE — 41112 EXCISION OF TONGUE LESION: CPT | Performed by: STUDENT IN AN ORGANIZED HEALTH CARE EDUCATION/TRAINING PROGRAM

## 2020-10-12 PROCEDURE — 99243 OFF/OP CNSLTJ NEW/EST LOW 30: CPT | Mod: 25 | Performed by: STUDENT IN AN ORGANIZED HEALTH CARE EDUCATION/TRAINING PROGRAM

## 2020-10-12 PROCEDURE — 88305 TISSUE EXAM BY PATHOLOGIST: CPT | Performed by: PATHOLOGY

## 2020-10-12 RX ORDER — OXYCODONE AND ACETAMINOPHEN 5; 325 MG/1; MG/1
1 TABLET ORAL EVERY 6 HOURS PRN
Qty: 15 TABLET | Refills: 0 | Status: SHIPPED | OUTPATIENT
Start: 2020-10-12 | End: 2020-11-30

## 2020-10-12 ASSESSMENT — MIFFLIN-ST. JEOR: SCORE: 2027.31

## 2020-10-12 ASSESSMENT — PAIN SCALES - GENERAL: PAINLEVEL: NO PAIN (0)

## 2020-10-12 NOTE — TELEPHONE ENCOUNTER
Writer received call from patient. He states that his pain is at 10/10 to tongue lesion (that was removed in clinic). Patient called to ask if he could have anything for pain. Writer consulted with Dr. Ford regarding this. Dr. Ford sent pain medication to patient preferred pharmacy (electronically). Writer called patient and let him know this. He was agreeable.    Writer encouraged patient to call with any other questions or concerns.       Nedra Harden LPN
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
## 2020-10-12 NOTE — NURSING NOTE
"Chief Complaint   Patient presents with     Consult     tongue mass      Blood pressure (!) 148/101, pulse 55, temperature 97.3  F (36.3  C), resp. rate 18, height 1.753 m (5' 9\"), weight 125.2 kg (276 lb), SpO2 98 %.    Cristo Walsh LPN    "

## 2020-10-12 NOTE — PATIENT INSTRUCTIONS
1. You were seen in the ENT Clinic today by Dr. Ford.  If you have any questions or concerns after your appointment, please call   -  ENT Clinic: [phone]      2.   Plan to return to clinic in one week    Nedra Harden LPN  [facility]- Otolaryngology  [phone]    Or     Mary Coppola RN  [facility]- Otolaryngology  [phone]

## 2020-10-13 LAB — COPATH REPORT: NORMAL

## 2020-10-14 ENCOUNTER — TELEPHONE (OUTPATIENT)
Dept: GASTROENTEROLOGY | Facility: CLINIC | Age: 65
End: 2020-10-14

## 2020-10-14 DIAGNOSIS — I50.32 CHRONIC HEART FAILURE WITH PRESERVED EJECTION FRACTION (H): ICD-10-CM

## 2020-10-14 RX ORDER — LISINOPRIL 20 MG/1
20 TABLET ORAL DAILY
Qty: 180 TABLET | Refills: 3 | Status: SHIPPED | OUTPATIENT
Start: 2020-10-14 | End: 2020-12-02

## 2020-10-14 NOTE — TELEPHONE ENCOUNTER
"Request for medication refill:Lisinopril     Providers if patient needs an appointment and you are willing to give a one month supply please refill for one month and  send a letter/MyChart using \".SMILLIMITEDREFILL\" .smillimited and route chart to \"P SMI \" (Giving one month refill in non controlled medications is strongly recommended before denial)    If refill has been denied, meaning absolutely no refills without visit, please complete the smart phrase \".smirxrefuse\" and route it to the \"P SMI MED REFILLS\"  pool to inform the patient and the pharmacy.    Aissatou Landaverde, CMA        "

## 2020-10-14 NOTE — TELEPHONE ENCOUNTER
Caller patient    Reason for cancel? Didn't feel had enough time for prep instructions    Rescheduled? Yes- 11-17-20    Will patient call back to reschedule? No need

## 2020-10-15 ENCOUNTER — MEDICAL CORRESPONDENCE (OUTPATIENT)
Dept: HEALTH INFORMATION MANAGEMENT | Facility: CLINIC | Age: 65
End: 2020-10-15

## 2020-10-19 ENCOUNTER — OFFICE VISIT (OUTPATIENT)
Dept: OTOLARYNGOLOGY | Facility: CLINIC | Age: 65
End: 2020-10-19
Payer: COMMERCIAL

## 2020-10-19 VITALS — WEIGHT: 270 LBS | BODY MASS INDEX: 39.87 KG/M2 | HEART RATE: 60 BPM | OXYGEN SATURATION: 99 %

## 2020-10-19 DIAGNOSIS — K14.8 LESION OF TONGUE: Primary | ICD-10-CM

## 2020-10-19 PROCEDURE — 99024 POSTOP FOLLOW-UP VISIT: CPT | Performed by: STUDENT IN AN ORGANIZED HEALTH CARE EDUCATION/TRAINING PROGRAM

## 2020-10-19 ASSESSMENT — PAIN SCALES - GENERAL: PAINLEVEL: NO PAIN (0)

## 2020-10-19 NOTE — PATIENT INSTRUCTIONS
1. Please follow-up in clinic as needed with Dr. Ford  2. Please call the ENT clinic with any questions,concerns, new or worsening symptoms.    -Clinic number is [phone]   - Mary's direct line (Dr. Ford's nurse) [phone]

## 2020-10-19 NOTE — NURSING NOTE
Chief Complaint   Patient presents with     RECHECK     Discuss results         Pulse 60, weight 122.5 kg (270 lb), SpO2 99 %.    Kyara Mcmahon EMT

## 2020-10-19 NOTE — PROGRESS NOTES
Head and Neck Surgery  10/19/2020    Mr Berman returns today for follow up. He has done well since his biopsy. No complaints today.    Physical Examination:  Pulse 60   Wt 122.5 kg (270 lb)   SpO2 99%   BMI 39.87 kg/m    Alert, NAD  Tongue biopsy site healed well.     Pathology:  TONGUE, EXCISIONAL BIOPSY:   - Squamous mucosa with verrucous epithelial hyperplasia and keratosis, see    comment.   - No evidence of high-grade dysplasia or malignancy.     A/P:  Doing well today. Reviewed pathology with him again. This is a benign lesion. He can follow up as needed.     Tutu Ford MD

## 2020-10-19 NOTE — LETTER
10/19/2020       RE: Jazz Berman  [address]  [place] [place] [facility] [address]     Dear Colleague,    Thank you for referring your patient, Jazz Berman, to the [facility] EAR NOSE AND THROAT CLINIC [place] at [facility]. Please see a copy of my visit note below.    Head and Neck Surgery  10/19/2020    Mr Berman returns today for follow up. He has done well since his biopsy. No complaints today.    Physical Examination:  Pulse 60   Wt 122.5 kg (270 lb)   SpO2 99%   BMI 39.87 kg/m    Alert, NAD  Tongue biopsy site healed well.     Pathology:  TONGUE, EXCISIONAL BIOPSY:   - Squamous mucosa with verrucous epithelial hyperplasia and keratosis, see    comment.   - No evidence of high-grade dysplasia or malignancy.     A/P:  Doing well today. Reviewed pathology with him again. This is a benign lesion. He can follow up as needed.     Tutu Ford MD

## 2020-10-21 DIAGNOSIS — I10 ESSENTIAL HYPERTENSION WITH GOAL BLOOD PRESSURE LESS THAN 140/90: ICD-10-CM

## 2020-10-21 RX ORDER — AMLODIPINE BESYLATE 5 MG/1
5 TABLET ORAL DAILY
Qty: 60 TABLET | Refills: 3 | Status: SHIPPED | OUTPATIENT
Start: 2020-10-21 | End: 2021-05-05

## 2020-10-21 NOTE — TELEPHONE ENCOUNTER
On 9/24/2020 [facility] called Denise and spoke with a employee from that facility and was informed that she would contact the [place] in the next following day but there was no encounter made about denise contacting the [facility] for AL's forms. [facility] is closing encounter as denise has not reached out back to [facility] [place].     Lorenza Hillman MA

## 2020-10-21 NOTE — TELEPHONE ENCOUNTER

## 2020-11-02 DIAGNOSIS — I10 ESSENTIAL HYPERTENSION WITH GOAL BLOOD PRESSURE LESS THAN 140/90: ICD-10-CM

## 2020-11-02 RX ORDER — METOPROLOL SUCCINATE 50 MG/1
150 TABLET, EXTENDED RELEASE ORAL DAILY
Qty: 180 TABLET | Refills: 3 | Status: SHIPPED | OUTPATIENT
Start: 2020-11-02 | End: 2020-11-05

## 2020-11-02 NOTE — TELEPHONE ENCOUNTER
"Request for medication refill: Metoprolol   Providers if patient needs an appointment and you are willing to give a one month supply please refill for one month and  send a letter/MyChart using \".SMILLIMITEDREFILL\" .smillimited and route chart to \"P SMI \" (Giving one month refill in non controlled medications is strongly recommended before denial)    If refill has been denied, meaning absolutely no refills without visit, please complete the smart phrase \".smirxrefuse\" and route it to the \"P SMI MED REFILLS\"  pool to inform the patient and the pharmacy.    Aissatou Landaverde, CMA        "

## 2020-11-03 NOTE — PROGRESS NOTES
HPI       Jazz Berman is a 65 year old  who presents for   Chief Complaint   Patient presents with     RECHECK     Medication follow up: Patient would like to discus dizzy spills from the Metoprolol Succ Er     Refill Request     Viagra 100mg     Got tongue mass off - fibroma    Colonoscopy screening coming up. It's scheduled    Need AAA US scheduled    Flu shot in Sept    Eating and drinking well, voiding and stooling normally.    Needs viagra refilled and new metoprolol rx for correct dose.  Was getting really dizzy and low BP on metoprolol - was having 200 mg XR dispensed. Home RN noted his BPs were low and adjusted his metoprolol to 50 mg daily. Dizziness resolved and he's been on the 50 mg for the last month. Has not been taking the flomax and the viagra on the same day, per our prior discussions.    Home readings:        Sleep worse: 3-4 hrs then wakes up.Takes a while to go back to sleep, then sleeps another 2-3 hrs. Takes naps throughout the day. No more than 5 hrs at night total. Feels rested in the morning. Drinks 1-2 cups of coffee/d. Sometimes a soda here or there, occasionally late in the day.    Problem, Medication and Allergy Lists were reviewed and updated if needed..    Patient is an established patient of this clinic..         Review of Systems:   Review of Systems         Physical Exam:     Vitals:    11/05/20 0935 11/05/20 0938   BP: (!) 167/104 (!) 158/105   Pulse: 75    Resp: 16    Temp: 99  F (37.2  C)    TempSrc: Oral    SpO2: 99%    Weight: 122.5 kg (270 lb)      Body mass index is 39.87 kg/m .  Vitals were reviewed and were normal     Physical Exam  Constitutional:       General: He is not in acute distress.     Appearance: Normal appearance. He is not ill-appearing.   HENT:      Head: Normocephalic and atraumatic.   Cardiovascular:      Rate and Rhythm: Normal rate.      Heart sounds: Normal heart sounds.   Pulmonary:      Effort: Pulmonary effort is normal. No respiratory  distress.      Breath sounds: Normal breath sounds.   Musculoskeletal:      Right lower leg: No edema.      Left lower leg: No edema.   Skin:     General: Skin is warm and dry.   Neurological:      General: No focal deficit present.      Mental Status: He is alert and oriented to person, place, and time.   Psychiatric:         Mood and Affect: Mood normal.         Behavior: Behavior normal.           Results:   Results are ordered and pending    Assessment and Plan        Jazz was seen today for recheck and refill request.    Diagnoses and all orders for this visit:    Essential hypertension with goal blood pressure less than 140/90  Due for urine microalbumin  Updated metoprolol order to reflect current dosing. BP well controlled at home. Cuff here in room was too small  Had MA schedule Al's AAA screening  -     Albumin Random Urine Quantitative with Creat Ratio  -     metoprolol succinate ER (TOPROL-XL) 50 MG 24 hr tablet; Take 1 tablet (50 mg) by mouth daily    Erectile dysfunction, unspecified erectile dysfunction type  -     sildenafil (VIAGRA) 100 MG tablet; Take 1 tablet (100 mg) by mouth daily as needed (sexual dysfunction)    Primary insomnia  Discussed sleep hygiene recs. Pt does not want to try meds at this point.         Medications Discontinued During This Encounter   Medication Reason     metoprolol succinate ER (TOPROL-XL) 50 MG 24 hr tablet      sildenafil (VIAGRA) 100 MG tablet Reorder       Options for treatment and follow-up care were reviewed with the patient. Jazz Berman  engaged in the decision making process and verbalized understanding of the options discussed and agreed with the final plan.    DO [facility] Resident PGY-3  [phone]

## 2020-11-05 ENCOUNTER — OFFICE VISIT (OUTPATIENT)
Dept: FAMILY MEDICINE | Facility: CLINIC | Age: 65
End: 2020-11-05
Payer: COMMERCIAL

## 2020-11-05 VITALS
TEMPERATURE: 99 F | BODY MASS INDEX: 39.87 KG/M2 | SYSTOLIC BLOOD PRESSURE: 158 MMHG | HEART RATE: 75 BPM | OXYGEN SATURATION: 99 % | RESPIRATION RATE: 16 BRPM | DIASTOLIC BLOOD PRESSURE: 105 MMHG | WEIGHT: 270 LBS

## 2020-11-05 DIAGNOSIS — N52.9 ERECTILE DYSFUNCTION, UNSPECIFIED ERECTILE DYSFUNCTION TYPE: ICD-10-CM

## 2020-11-05 DIAGNOSIS — I10 ESSENTIAL HYPERTENSION WITH GOAL BLOOD PRESSURE LESS THAN 140/90: Primary | ICD-10-CM

## 2020-11-05 DIAGNOSIS — F51.01 PRIMARY INSOMNIA: ICD-10-CM

## 2020-11-05 LAB
CREAT UR-MCNC: 22 MG/DL
MICROALBUMIN UR-MCNC: 7 MG/L
MICROALBUMIN/CREAT UR: 31.44 MG/G CR (ref 0–17)

## 2020-11-05 PROCEDURE — 82043 UR ALBUMIN QUANTITATIVE: CPT | Performed by: FAMILY MEDICINE

## 2020-11-05 PROCEDURE — 99214 OFFICE O/P EST MOD 30 MIN: CPT | Mod: GC | Performed by: STUDENT IN AN ORGANIZED HEALTH CARE EDUCATION/TRAINING PROGRAM

## 2020-11-05 RX ORDER — METOPROLOL SUCCINATE 50 MG/1
50 TABLET, EXTENDED RELEASE ORAL DAILY
Qty: 180 TABLET | Refills: 1 | Status: SHIPPED | OUTPATIENT
Start: 2020-11-05 | End: 2021-09-23

## 2020-11-05 RX ORDER — SILDENAFIL 100 MG/1
100 TABLET, FILM COATED ORAL DAILY PRN
Qty: 30 TABLET | Refills: 3 | Status: SHIPPED | OUTPATIENT
Start: 2020-11-05 | End: 2021-03-24

## 2020-11-05 ASSESSMENT — PATIENT HEALTH QUESTIONNAIRE - PHQ9: SUM OF ALL RESPONSES TO PHQ QUESTIONS 1-9: 5

## 2020-11-05 NOTE — PROGRESS NOTES
Preceptor Attestation:    Patient seen and evaluated in person. I discussed the patient with the resident. I have verified the content of the note, which accurately reflects my assessment of the patient and the plan of care.   Supervising Physician:  Tiago Fung MD, MD.

## 2020-11-05 NOTE — LETTER
November 8, 2020      Jazz Berman  [address]   [facility] [address]        Dear Jazz,    Thank you for getting your care at [facility]. Please see below for your test results.    Resulted Orders   Albumin Random Urine Quantitative with Creat Ratio   Result Value Ref Range    Creatinine Urine 22 mg/dL    Albumin Urine mg/L 7 mg/L    Albumin Urine mg/g Cr 31.44 (H) 0 - 17 mg/g Cr     This test shows a little bit of protein in your urine, which can be a sign of wear and tear on the kidneys from BP and diabetes over time. We'll repeat the test in a few months, because sometimes this test can change a bit before we say for sure.  If you have any concerns about these results please call and leave a message for me or send a [place] message to the clinic.    Sincerely,    Lon Angeles, DO

## 2020-11-05 NOTE — PATIENT INSTRUCTIONS
Here is the plan from today's visit    1. Essential hypertension with goal blood pressure less than 140/90  Urine test for kidneys today  Metoprolol 50 mg a day (1 pill) sounds good.    - Albumin Random Urine Quantitative with Creat Ratio  - metoprolol succinate ER (TOPROL-XL) 50 MG 24 hr tablet; Take 1 tablet (50 mg) by mouth daily  Dispense: 180 tablet; Refill: 1    2. Erectile dysfunction, unspecified erectile dysfunction type    - sildenafil (VIAGRA) 100 MG tablet; Take 1 tablet (100 mg) by mouth daily as needed (sexual dysfunction)  Dispense: 30 tablet; Refill: 3    3. Primary insomnia  Try no TV at night  Get rid of naps        Please call or return to clinic if your symptoms don't go away.    Follow up plan  Preop physical at [facility] next Friday    Thank you for coming to [facility] today.  Lab Testing:  **If you had lab testing today and your results are reassuring or normal they will be mailed to you or sent through [employer] within 7 days.   **If the lab tests need quick action we will call you with the results.  The phone number we will call with results is # [phone] (home) . If this is not the best number please call our clinic and change the number.  Medication Refills:  If you need any refills please call your pharmacy and they will contact us.   If you need to  your refill at a new pharmacy, please contact the new pharmacy directly. The new pharmacy will help you get your medications transferred faster.   Scheduling:  If you have any concerns about today's visit or wish to schedule another appointment please call our office during normal business hours [phone] (8-5:00 M-F)  If a referral was made to a [facility] and you don't get a call from central scheduling please call [phone].  If a Mammogram was ordered for you at [facility] call [phone] to schedule or change your appointment.  If you had an XRay/CT/Ultrasound/MRI ordered the number is  [phone] to schedule or change your radiology appointment.   Medical Concerns:  If you have urgent medical concerns please call [phone] at any time of the day.    Lon Angeles, DO

## 2020-11-12 ENCOUNTER — OFFICE VISIT (OUTPATIENT)
Dept: FAMILY MEDICINE | Facility: CLINIC | Age: 65
End: 2020-11-12
Payer: COMMERCIAL

## 2020-11-12 ENCOUNTER — ANCILLARY PROCEDURE (OUTPATIENT)
Dept: ULTRASOUND IMAGING | Facility: CLINIC | Age: 65
End: 2020-11-12
Attending: FAMILY MEDICINE
Payer: COMMERCIAL

## 2020-11-12 VITALS
HEART RATE: 76 BPM | WEIGHT: 272 LBS | BODY MASS INDEX: 40.29 KG/M2 | TEMPERATURE: 98.8 F | SYSTOLIC BLOOD PRESSURE: 138 MMHG | HEIGHT: 69 IN | DIASTOLIC BLOOD PRESSURE: 85 MMHG | OXYGEN SATURATION: 100 %

## 2020-11-12 DIAGNOSIS — Z87.891 FORMER SMOKER: ICD-10-CM

## 2020-11-12 DIAGNOSIS — Z01.818 PRE-OP EVALUATION: Primary | ICD-10-CM

## 2020-11-12 DIAGNOSIS — Z00.00 ENCOUNTER FOR MEDICARE ANNUAL WELLNESS EXAM: ICD-10-CM

## 2020-11-12 LAB
BUN SERPL-MCNC: 18.6 MG/DL (ref 7–21)
CALCIUM SERPL-MCNC: 9.6 MG/DL (ref 8.5–10.1)
CHLORIDE SERPLBLD-SCNC: 104 MMOL/L (ref 98–110)
CO2 SERPL-SCNC: 25.5 MMOL/L (ref 20–32)
CREAT SERPL-MCNC: 1.1 MG/DL (ref 0.7–1.3)
GFR SERPL CREATININE-BSD FRML MDRD: 71.6 ML/MIN/1.7 M2
GLUCOSE SERPL-MCNC: 113.1 MG'DL (ref 70–99)
POTASSIUM SERPL-SCNC: 4.3 MMOL/L (ref 3.3–4.5)
SODIUM SERPL-SCNC: 142.5 MMOL/L (ref 132.6–141.4)

## 2020-11-12 PROCEDURE — 85610 PROTHROMBIN TIME: CPT | Performed by: FAMILY MEDICINE

## 2020-11-12 PROCEDURE — 76706 US ABDL AORTA SCREEN AAA: CPT | Performed by: RADIOLOGY

## 2020-11-12 PROCEDURE — 99214 OFFICE O/P EST MOD 30 MIN: CPT | Mod: GC | Performed by: STUDENT IN AN ORGANIZED HEALTH CARE EDUCATION/TRAINING PROGRAM

## 2020-11-12 PROCEDURE — 80048 BASIC METABOLIC PNL TOTAL CA: CPT | Performed by: STUDENT IN AN ORGANIZED HEALTH CARE EDUCATION/TRAINING PROGRAM

## 2020-11-12 PROCEDURE — 93010 ELECTROCARDIOGRAM REPORT: CPT | Mod: GC | Performed by: STUDENT IN AN ORGANIZED HEALTH CARE EDUCATION/TRAINING PROGRAM

## 2020-11-12 PROCEDURE — 85730 THROMBOPLASTIN TIME PARTIAL: CPT | Performed by: FAMILY MEDICINE

## 2020-11-12 PROCEDURE — 36415 COLL VENOUS BLD VENIPUNCTURE: CPT | Performed by: STUDENT IN AN ORGANIZED HEALTH CARE EDUCATION/TRAINING PROGRAM

## 2020-11-12 ASSESSMENT — MIFFLIN-ST. JEOR: SCORE: 2009.16
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
## 2020-11-12 NOTE — PATIENT INSTRUCTIONS
"1. Hold your eliquis 2 days prior to your procedure  2. Take all of your other medications as you usually would  Preparing for Your Surgery  Getting started  A surgery nurse will call you to review your health history and instructions. They will give you an arrival time based on your scheduled surgery time.  Please be ready to share the following:    Your doctor's clinic name and phone number    Your medical, surgical and anesthesia history    A list of allergies and sensitivities    A list of medicines, including herbal treatments and over-the-counter drugs    Whether the patient has a legal guardian (ask how to send us the papers in advance)  If your child is having surgery, please ask for a copy of Preparing for Your Child's Surgery.    Preparing for surgery    Within 30 days of surgery: Have an exam at your family clinic (primary care clinic), or go to a pre-operative clinic. This exam is called a \"History and Physical,\" or H&P.    At your H&P exam, talk to your care team about all medicines you take. If you need to stop any medicines before surgery, ask when to start taking them again.  ? We do this for your safety. Many medicines can make you bleed too much during surgery. Some change how well surgery (anesthesia) drugs work.    Call your insurance company to see what it will and won't pay for. Ask if they need to pre-approve the surgery. (If no insurance, call [phone].)    Call your surgeon's clinic if there's any change in your health. This includes signs of a cold or flu (sore throat, runny nose, cough, rash, fever). It also includes a scrape or scratch near the surgery site.    If you have questions on the day of surgery, call your surgery center.  Eating and drinking guidelines  For your safety: Unless your surgeon tells you otherwise, follow the guidelines below.    Eat and drink as usual until 8 hours before surgery. After that, no food or milk.    Drink clear liquids until 2 hours before surgery. " These are liquids you can see through, like water, Gatorade and Propel Water. You may also have black coffee and tea (no cream or milk).    Nothing by mouth within 2 hours of surgery. This includes gum, candy and breath mints.    Stop alcohol the midnight before surgery.    If your family clinic tells you to take medicine on the morning of surgery, it's okay to take it with a sip of water.  Preventing infection    Shower or bathe the night before and morning of your surgery. Follow the instructions your clinic gave you. (If no instructions, use regular soap.)    Don't shave or clip hair near your surgery site. This can lead to skin infection.    Don't smoke the morning of surgery. Smoking increases the risk of infection. You may chew nicotine gum up to 2 hours before surgery. A nicotine patch is okay.  ? Note: Some surgeries require you to completely quit smoking and nicotine. Check with your surgeon.    Your care team will make every effort to keep you safe from infection. We will:  ? Clean our hands often with soap and water (or an alcohol-based hand rub).  ? Clean the skin at your surgery site with a special soap that kills germs. We'll also remove hair from the site as needed.  ? Wear special hair covers, masks, gowns and gloves during surgery.  ? Give antibiotic medicine, if prescribed. Not all surgeries need antibiotics.  What to bring on the day of surgery    Photo ID and insurance card    Copy of your health care directive, if you have one    Glasses and hearing aides (bring cases)  ? You can't wear contacts during surgery    Inhaler and eye drops, if you use them (tell us about these when you arrive)    CPAP machine or breathing device, if you use them    A few personal items, if spending the night    If you have . . .  ? A pacemaker or ICD (cardiac defibrillator): Bring the ID card.  ? An implanted stimulator: Bring the remote control.  ? A legal guardian: Bring a copy of the certified (court-stamped)  guardianship papers.  Please remove any jewelry, including body piercings. Leave jewelry and other valuables at home.  If you're going home the day of surgery  Important: If you don't follow the rules below, we must cancel your surgery.     Arrange for someone to drive you home after surgery. You may not drive, take a taxi or take public transportation by yourself (unless you'll have local anesthesia only).    Arrange for a responsible adult to stay with you overnight. If you don't, we may keep you in the hospital overnight, and you may need to pay the costs yourself.  Questions?   If you have any questions for your care team, list them here: _________________________________________________________________________________________________________________________________________________________________________________________________________________________________________________________________________________________________________________________  For informational purposes only. Not to replace the advice of your health care provider. Copyright   1890-8139 [employer]. All rights reserved. Clinically reviewed by Hellen Hernandez MD. [employer] [id number] - REV 07/19.

## 2020-11-12 NOTE — PROGRESS NOTES
Preceptor Attestation:    Patient seen and evaluated in person. I discussed the patient with the resident. I personally viewed the EKG and agree with the interpretation documented by the resident. I have verified the content of the note, which accurately reflects my assessment of the patient and the plan of care.   Supervising Physician:  Guadalupe Glasgow MD.

## 2020-11-13 ENCOUNTER — ALLIED HEALTH/NURSE VISIT (OUTPATIENT)
Dept: FAMILY MEDICINE | Facility: CLINIC | Age: 65
End: 2020-11-13
Payer: COMMERCIAL

## 2020-11-13 DIAGNOSIS — Z11.59 ENCOUNTER FOR SCREENING FOR OTHER VIRAL DISEASES: ICD-10-CM

## 2020-11-13 LAB
APTT PPP: 33 SEC (ref 22–37)
INR PPP: 1.13 (ref 0.86–1.14)

## 2020-11-13 PROCEDURE — 99207 PR NO CHARGE NURSE ONLY: CPT

## 2020-11-13 PROCEDURE — U0003 INFECTIOUS AGENT DETECTION BY NUCLEIC ACID (DNA OR RNA); SEVERE ACUTE RESPIRATORY SYNDROME CORONAVIRUS 2 (SARS-COV-2) (CORONAVIRUS DISEASE [COVID-19]), AMPLIFIED PROBE TECHNIQUE, MAKING USE OF HIGH THROUGHPUT TECHNOLOGIES AS DESCRIBED BY CMS-2020-01-R: HCPCS | Performed by: FAMILY MEDICINE

## 2020-11-14 LAB
SARS-COV-2 RNA SPEC QL NAA+PROBE: NOT DETECTED
SPECIMEN SOURCE: NORMAL

## 2020-11-20 ENCOUNTER — DOCUMENTATION ONLY (OUTPATIENT)
Dept: FAMILY MEDICINE | Facility: CLINIC | Age: 65
End: 2020-11-20

## 2020-11-30 ENCOUNTER — TELEPHONE (OUTPATIENT)
Dept: FAMILY MEDICINE | Facility: CLINIC | Age: 65
End: 2020-11-30

## 2020-11-30 DIAGNOSIS — I10 ESSENTIAL HYPERTENSION WITH GOAL BLOOD PRESSURE LESS THAN 140/90: Primary | ICD-10-CM

## 2020-11-30 DIAGNOSIS — I50.32 CHRONIC HEART FAILURE WITH PRESERVED EJECTION FRACTION (H): ICD-10-CM

## 2020-11-30 NOTE — TELEPHONE ENCOUNTER
Is he confusing lasix (furosemide) for lisinopril? He's on 40 mg lasix daily. If he's absolutely sure he's on 40 mg lisinopril daily for a while, then that's ok and you can forward it to me and I'll update his med list.    Lon Angeles, DO

## 2020-11-30 NOTE — TELEPHONE ENCOUNTER
RN attempted to reach patient to discuss further-LM to call back, please transfer to any available RN.     Patient reported he is taking 40 mg of lisinopril daily but our current medication list has him taking 20 mg daily (decreased from 40 mg in 2019). RN attempted to clarify with patient but had to LM.  Last hypertension visit on 11/5/20 only discussed his metoprolol. Routing to PCP to advise.   Sepideh Mcmahan RN

## 2020-11-30 NOTE — TELEPHONE ENCOUNTER
Rn confirmed with patient that he is talking about his lisinopril and not referring to his lasix. Patient states he has been taking the two pills (40 mg) for months. He is requesting updated script be sent to [facility] [address] with correct dosing so he can get a refill. Routing to PCP to order if appropriate.   Sepideh Mcmahan RN

## 2020-11-30 NOTE — TELEPHONE ENCOUNTER
[facility] phone call message- medication clarification/question:    Full Medication Name: lisinopril (ZESTRIL) 20 MG tablet   Dose: Take 1 tablet (20 mg) by mouth daily - Oral    Question/Clarification needed: Patient stated they are supposed to be taking two tablets to equal 40 mg but pharmacy does not have a script that matches that, therefore patient is running out of medication quicker since they are not dispensing enough. Please advise.      Pharmacy confirmed as   [id number] - [place], [place] - [address]  [address]  [facility] [address]  Phone: [phone] Fax: [phone]    : Yes    Please leave ONLY preferred pharmacy    OK to leave a message on voice mail? Yes    Advised patient that RN would call back within 3 hours, unless emergent.    Primary language: English      needed? No    Call taken on November 30, 2020 at 9:08 AM by Theresa Dye to [facility]

## 2020-11-30 NOTE — TELEPHONE ENCOUNTER
"Rn spoke to patient who states he has been taking two 20 mg tablets (total of 40 mg) daily of lisinopril \"for awhile\". RN informed him that the chart says he should be taking 20 mg daily. Patient states he thought they had changed it sometime within the last year. Patient requesting Dr. Angeles clarify correct dose.     Patient states his blood pressures have \"been really good\". States he has it checked on Tuesdays but doesn't remember the last value. Patient reports no dizziness or other symptoms. Routing to PCP to advise.   Sepideh Mcmahan RN    "

## 2020-12-02 RX ORDER — LISINOPRIL 40 MG/1
40 TABLET ORAL DAILY
Qty: 90 TABLET | Refills: 3 | Status: SHIPPED | OUTPATIENT
Start: 2020-12-02 | End: 2021-03-24

## 2020-12-04 NOTE — PROGRESS NOTES
Form has been completed by provider.     Form sent out via: Fax to [email or web address] network at Fax Number: [phone]  Patient informed: n/a  Output date: December 4, 2020    Lorenza Hillman MA      **Please close the encounter**

## 2020-12-04 NOTE — PROGRESS NOTES
Form has been completed by provider.     Form sent out via: Fax to [employer] network at Fax Number: [phone]  Patient informed: n/a  Output date: December 4, 2020    Lorenza Hillman MA      **Please close the encounter**

## 2020-12-04 NOTE — PROGRESS NOTES
Form has been completed by provider.     Form sent out via: Fax to [employer] network at Fax Number: [phone]  Patient informed: n/a  Output date: December 4, 2020    Lorenza Hillman MA      **Please close the encounter**

## 2020-12-14 ENCOUNTER — MEDICAL CORRESPONDENCE (OUTPATIENT)
Dept: HEALTH INFORMATION MANAGEMENT | Facility: CLINIC | Age: 65
End: 2020-12-14

## 2020-12-18 ENCOUNTER — DOCUMENTATION ONLY (OUTPATIENT)
Dept: FAMILY MEDICINE | Facility: CLINIC | Age: 65
End: 2020-12-18

## 2020-12-18 NOTE — PROGRESS NOTES
"When opening a documentation only encounter, be sure to enter in \"Chief Complaint\" Forms and in \" Comments\" Title of form, description if needed.    Al is a 65 year old  male  Form received via: Fax  Form now resides in: Provider Ready    Katalina Garvin, EMT                  "

## 2020-12-19 DIAGNOSIS — I48.0 PAROXYSMAL ATRIAL FIBRILLATION (H): ICD-10-CM

## 2020-12-21 RX ORDER — APIXABAN 5 MG/1
TABLET, FILM COATED ORAL
Qty: 60 TABLET | Refills: 3 | Status: SHIPPED | OUTPATIENT
Start: 2020-12-21 | End: 2021-09-01

## 2020-12-21 NOTE — TELEPHONE ENCOUNTER

## 2020-12-22 NOTE — PROGRESS NOTES
Form has been completed by provider.     Form sent out via: Fax to [employer] at Fax Number: [phone]  Patient informed: n/a  Output date: December 22, 2020    Lorenza Hillman MA      **Please close the encounter**

## 2021-01-04 ENCOUNTER — DOCUMENTATION ONLY (OUTPATIENT)
Dept: FAMILY MEDICINE | Facility: CLINIC | Age: 66
End: 2021-01-04

## 2021-01-04 NOTE — PROGRESS NOTES
"When opening a documentation only encounter, be sure to enter in \"Chief Complaint\" Forms and in \" Comments\" Title of form, description if needed.    Al is a 65 year old  male  Form received via: Fax  Form now resides in: Provider Ready    Lorenza Hillman MA      Form has been completed by provider.     Form sent out via: Fax to [employer] at Fax Number: [phone]  Patient informed: No, Reason:NA  Output date: January 22, 2021    Katalina Garvin, EMT      **Please close the encounter**                "

## 2021-01-04 NOTE — PROGRESS NOTES
"When opening a documentation only encounter, be sure to enter in \"Chief Complaint\" Forms and in \" Comments\" Title of form, description if needed.    Al is a 65 year old  male  Form received via: Fax  Form now resides in: Provider Ready    Lorenza Hillman MA        Form has been completed by provider.     Form sent out via: Fax to [facility] at Fax Number: [phone]  Patient informed: No, Reason:NA  Output date: January 22, 2021    Katalina Garvin, EMT      **Please close the encounter**              "

## 2021-01-18 ENCOUNTER — DOCUMENTATION ONLY (OUTPATIENT)
Dept: FAMILY MEDICINE | Facility: CLINIC | Age: 66
End: 2021-01-18

## 2021-01-22 NOTE — PROGRESS NOTES
Form has been completed by provider.     Form sent out via: Fax to [employer] at Fax Number: [phone]  Patient informed: na  Output date: January 22, 2021    Lynette Uribe CMA      **Please close the encounter**

## 2021-01-27 ENCOUNTER — DOCUMENTATION ONLY (OUTPATIENT)
Dept: FAMILY MEDICINE | Facility: CLINIC | Age: 66
End: 2021-01-27

## 2021-01-27 NOTE — PROGRESS NOTES
"When opening a documentation only encounter, be sure to enter in \"Chief Complaint\" Forms and in \" Comments\" Title of form, description if needed.    Al is a 66 year old  male  Form received via: Fax  Form now resides in: Provider Ready    Lorenza Hilmlan MA            "

## 2021-01-28 ENCOUNTER — DOCUMENTATION ONLY (OUTPATIENT)
Dept: FAMILY MEDICINE | Facility: CLINIC | Age: 66
End: 2021-01-28

## 2021-01-28 NOTE — PROGRESS NOTES
"When opening a documentation only encounter, be sure to enter in \"Chief Complaint\" Forms and in \" Comments\" Title of form, description if needed.    Al is a 66 year old  male  Form received via: Fax  Form now resides in: Provider Ready    Grace Arizmendi MA                  "

## 2021-02-01 NOTE — PROGRESS NOTES
Form has been completed by provider.     Form sent out via: Fax to [employer] at Fax Number: [phone]  Patient informed: n/a  Output date: February 1, 2021    Lorenza Hillman MA      **Please close the encounter**

## 2021-02-02 ENCOUNTER — DOCUMENTATION ONLY (OUTPATIENT)
Dept: FAMILY MEDICINE | Facility: CLINIC | Age: 66
End: 2021-02-02

## 2021-02-02 NOTE — PROGRESS NOTES
"When opening a documentation only encounter, be sure to enter in \"Chief Complaint\" Forms and in \" Comments\" Title of form, description if needed.    Al is a 66 year old  male  Form received via: Fax  Form now resides in: Provider Ready    Lorenza Hillman MA    Form has been completed by provider.     Form sent out via: Fax to [employer] at Fax Number: [phone]  Patient informed: n/a  Output date: February 2, 2021    Lorenza Hillman MA      **Please close the encounter**                    "

## 2021-02-07 ENCOUNTER — HEALTH MAINTENANCE LETTER (OUTPATIENT)
Age: 66
End: 2021-02-07

## 2021-02-26 ENCOUNTER — DOCUMENTATION ONLY (OUTPATIENT)
Dept: FAMILY MEDICINE | Facility: CLINIC | Age: 66
End: 2021-02-26

## 2021-02-26 NOTE — PROGRESS NOTES
"When opening a documentation only encounter, be sure to enter in \"Chief Complaint\" Forms and in \" Comments\" Title of form, description if needed.    Al is a 66 year old  male  Form received via: Fax  Form now resides in: Provider Ready    Katalina Garvin, EMT                  "

## 2021-03-03 NOTE — PROGRESS NOTES
Form has been completed by provider.     Form sent out via: Fax to [employer] at Fax Number: [phone]  Patient informed: n/a  Output date: March 3, 2021    Lorenza Hillman MA      **Please close the encounter**

## 2021-03-18 DIAGNOSIS — C49.A2 MALIGNANT GASTROINTESTINAL STROMAL TUMOR (GIST) OF STOMACH (H): Primary | ICD-10-CM

## 2021-03-18 RX ORDER — METHYLPREDNISOLONE 32 MG/1
32 TABLET ORAL SEE ADMIN INSTRUCTIONS
Qty: 2 TABLET | Refills: 5 | Status: SHIPPED | OUTPATIENT
Start: 2021-03-18 | End: 2021-03-30

## 2021-03-23 RX ORDER — FERROUS SULFATE 325(65) MG
TABLET ORAL
COMMUNITY
Start: 2020-09-21 | End: 2021-06-10

## 2021-03-24 ENCOUNTER — OFFICE VISIT (OUTPATIENT)
Dept: FAMILY MEDICINE | Facility: CLINIC | Age: 66
End: 2021-03-24
Payer: COMMERCIAL

## 2021-03-24 ENCOUNTER — PATIENT OUTREACH (OUTPATIENT)
Dept: ONCOLOGY | Facility: CLINIC | Age: 66
End: 2021-03-24

## 2021-03-24 VITALS
TEMPERATURE: 98.3 F | DIASTOLIC BLOOD PRESSURE: 95 MMHG | OXYGEN SATURATION: 99 % | BODY MASS INDEX: 40.73 KG/M2 | HEART RATE: 69 BPM | WEIGHT: 275 LBS | SYSTOLIC BLOOD PRESSURE: 140 MMHG | HEIGHT: 69 IN

## 2021-03-24 DIAGNOSIS — Z86.0100 HISTORY OF COLONIC POLYPS: ICD-10-CM

## 2021-03-24 DIAGNOSIS — I50.32 CHRONIC HEART FAILURE WITH PRESERVED EJECTION FRACTION (H): ICD-10-CM

## 2021-03-24 DIAGNOSIS — N52.9 ERECTILE DYSFUNCTION, UNSPECIFIED ERECTILE DYSFUNCTION TYPE: ICD-10-CM

## 2021-03-24 DIAGNOSIS — E66.01 CLASS 2 SEVERE OBESITY DUE TO EXCESS CALORIES WITH SERIOUS COMORBIDITY AND BODY MASS INDEX (BMI) OF 39.0 TO 39.9 IN ADULT (H): ICD-10-CM

## 2021-03-24 DIAGNOSIS — I48.0 PAROXYSMAL ATRIAL FIBRILLATION (H): ICD-10-CM

## 2021-03-24 DIAGNOSIS — N18.31 TYPE 2 DIABETES MELLITUS WITH STAGE 3A CHRONIC KIDNEY DISEASE, WITHOUT LONG-TERM CURRENT USE OF INSULIN (H): Primary | ICD-10-CM

## 2021-03-24 DIAGNOSIS — I10 ESSENTIAL HYPERTENSION WITH GOAL BLOOD PRESSURE LESS THAN 140/90: ICD-10-CM

## 2021-03-24 DIAGNOSIS — N18.31 CKD STAGE G3A/A1, GFR 45-59 AND ALBUMIN CREATININE RATIO <30 MG/G (H): ICD-10-CM

## 2021-03-24 DIAGNOSIS — E11.22 TYPE 2 DIABETES MELLITUS WITH STAGE 3A CHRONIC KIDNEY DISEASE, WITHOUT LONG-TERM CURRENT USE OF INSULIN (H): Primary | ICD-10-CM

## 2021-03-24 DIAGNOSIS — E66.812 CLASS 2 SEVERE OBESITY DUE TO EXCESS CALORIES WITH SERIOUS COMORBIDITY AND BODY MASS INDEX (BMI) OF 39.0 TO 39.9 IN ADULT (H): ICD-10-CM

## 2021-03-24 DIAGNOSIS — K21.9 GASTROESOPHAGEAL REFLUX DISEASE WITHOUT ESOPHAGITIS: ICD-10-CM

## 2021-03-24 LAB
CHOLEST SERPL-MCNC: 172.5 MG/DL (ref 0–200)
CHOLEST/HDLC SERPL: 3.2 {RATIO} (ref 0–5)
CREAT UR-MCNC: 22 MG/DL
HBA1C MFR BLD: 5.8 % (ref 4.1–5.7)
HDLC SERPL-MCNC: 53.4 MG/DL
LDLC SERPL CALC-MCNC: 100 MG/DL (ref 0–129)
MICROALBUMIN UR-MCNC: <5 MG/L
MICROALBUMIN/CREAT UR: NORMAL MG/G CR (ref 0–17)
TRIGL SERPL-MCNC: 97.1 MG/DL (ref 0–150)
VLDL CHOLESTEROL: 19.4 MG/DL (ref 7–32)

## 2021-03-24 PROCEDURE — 99214 OFFICE O/P EST MOD 30 MIN: CPT | Mod: GC | Performed by: STUDENT IN AN ORGANIZED HEALTH CARE EDUCATION/TRAINING PROGRAM

## 2021-03-24 PROCEDURE — 80061 LIPID PANEL: CPT | Performed by: STUDENT IN AN ORGANIZED HEALTH CARE EDUCATION/TRAINING PROGRAM

## 2021-03-24 PROCEDURE — 82043 UR ALBUMIN QUANTITATIVE: CPT | Performed by: FAMILY MEDICINE

## 2021-03-24 PROCEDURE — 36415 COLL VENOUS BLD VENIPUNCTURE: CPT | Performed by: STUDENT IN AN ORGANIZED HEALTH CARE EDUCATION/TRAINING PROGRAM

## 2021-03-24 PROCEDURE — 83036 HEMOGLOBIN GLYCOSYLATED A1C: CPT | Performed by: STUDENT IN AN ORGANIZED HEALTH CARE EDUCATION/TRAINING PROGRAM

## 2021-03-24 RX ORDER — FAMOTIDINE 40 MG/1
40 TABLET, FILM COATED ORAL DAILY
Qty: 60 TABLET | Refills: 4 | Status: SHIPPED | OUTPATIENT
Start: 2021-03-24 | End: 2021-12-13

## 2021-03-24 RX ORDER — LISINOPRIL 40 MG/1
40 TABLET ORAL DAILY
Qty: 90 TABLET | Refills: 3 | Status: SHIPPED | OUTPATIENT
Start: 2021-03-24 | End: 2022-03-28

## 2021-03-24 RX ORDER — SILDENAFIL 100 MG/1
100 TABLET, FILM COATED ORAL DAILY PRN
Qty: 30 TABLET | Refills: 4 | Status: SHIPPED | OUTPATIENT
Start: 2021-03-24 | End: 2021-08-25

## 2021-03-24 RX ORDER — SILDENAFIL 100 MG/1
100 TABLET, FILM COATED ORAL DAILY PRN
Qty: 30 TABLET | Refills: 3 | Status: CANCELLED | OUTPATIENT
Start: 2021-03-24

## 2021-03-24 ASSESSMENT — MIFFLIN-ST. JEOR: SCORE: 2017.77

## 2021-03-24 NOTE — PROGRESS NOTES
Called Al to remind him to start taking his solumedrol 32 mg for his contrast allergy 12 hours prior to the scan.  He should start it on Thursday night at 9:30 pm and then take the second dose on Friday morning at 7:30 am.  Let him know that the prescription has been sent and received by his local pharmacy [facility] on [address].  He agreed with and verbalized understanding of the plan of care.

## 2021-03-24 NOTE — PROGRESS NOTES
Assessment & Plan     Type 2 diabetes mellitus with stage 3a chronic kidney disease, without long-term current use of insulin (H)  Wonderfully controlled on dietary control alone. Advised for diabetic eye exam yearly. Foot exam today normal except for nail findings below.  - Albumin Random Urine Quantitative with Creat Ratio  - Hemoglobin A1c ([facility])  - Lipid [place] ([facility])    Chronic heart failure with preserved ejection fraction (H)  Essential hypertension with goal blood pressure less than 140/90  Paroxysmal afib  Current use of long term anticoagulation  Well controlled BP and HR at home and asymptomatic, AC on DOAC. Cholesterol well controlled and patient has declined statin in past. Continue current meds with/o adjustment. Reviewed meds with patient and dispense date prior to correction from last visit for metoprolol. New rx sent to pharmacy for lisinopril to decrease pill burden.  - lisinopril (ZESTRIL) 40 MG tablet  Dispense: 90 tablet; Refill: 3    CKD stage G3a/A1, GFR 45-59 and albumin creatinine ratio <30 mg/g  Had + microalbuminuria x1 last visit. Repeat urine today negative for microalbuminuria. Continue to check yearly. GFR/CR stable.    Carrying extra weight  Patient will start going on walks now that the weather is nice for regular exercise.    History of colonic polyps  Now 2 years overdue for colonoscopy due to COVID-19 pandemic disruption. Asymptomatic for active cancer today. Will discuss with care coordinator to assist with scheduling since patient has tried calling back to reschedule several times without luck.    Gastroesophageal reflux disease without esophagitis  - famotidine (PEPCID) 40 MG tablet  Dispense: 60 tablet; Refill: 4    Erectile dysfunction, unspecified erectile dysfunction type  - sildenafil (VIAGRA) 100 MG tablet  Dispense: 30 tablet; Refill: 4    F/u in a few months, due for full body skin check and discuss ACP.    Lon Angeles,   [facility]  "Resident PGY-3  [phone]    Subjective   Al is a 66 year old who presents for the following health issues     HPI       Due for check in and needs refills.  Med discrepancies: pharmacy still dispensed the metoprolol tid and the lasix 20 mg 2 pills daily despite reconciliation last visit.  Home BPs: 107/70 , 110s-120s/70s-80s, P 50s-76. Recorded by his home nurse  Still not lightheaded/dizzy and spaces out the viagra and flomax  Hasn't been very active this winter due to COVID-19  Just got vaccinated for COVID, brought me his card to show me  Colonoscopy got cancelled because of COVID, tried calling many times to reschedule and could never get through.  Walks from parking lot to here without shortness of breath, chest pressure     Review of Systems   + 3 lb weight gain since November  Denies dyspnea, chest pain or pressure with exertion, excessive leg swelling, fatigue, GI bleeding, n/v, poor appetite, bloody or black stool        Objective    BP (!) 140/95   Pulse 69   Temp 98.3  F (36.8  C) (Oral)   Ht 1.753 m (5' 9\")   Wt 124.7 kg (275 lb)   SpO2 99%   BMI 40.61 kg/m    Body mass index is 40.61 kg/m .  Physical Exam   GENERAL: healthy, alert and no distress  NECK: no adenopathy, no asymmetry, masses, or scars and thyroid normal to palpation  RESP: lungs clear to auscultation - no rales, rhonchi or wheezes  CV: regular rate and rhythm, normal S1 S2, no S3 or S4, no murmur, click or rub, no peripheral edema and peripheral pulses strong  ABDOMEN: soft, nontender, no hepatosplenomegaly, no masses and bowel sounds normal  MS: no gross musculoskeletal defects noted, no edema  Diabetic foot exam: normal DP and PT pulses, no trophic changes or ulcerative lesions, normal sensory exam (normal vibratory sense), dry cracking heels and nail exam onychomycosis of the toenails, bilateral large toenails s/p removal    Results for orders placed or performed in visit on 03/24/21 (from the past 24 hour(s))   Hemoglobin A1c " ([facility])   Result Value Ref Range    Hemoglobin A1C 5.8 (H) 4.1 - 5.7 %   Lipid [place] ([facility])   Result Value Ref Range    Cholesterol 172.5 0.0 - 200.0 mg/dL    Cholesterol/HDL Ratio 3.2 0.0 - 5.0    HDL Cholesterol 53.4 >40.0 mg/dL    Triglycerides 97.1 0.0 - 150.0 mg/dL    VLDL Cholesterol 19.4 7.0 - 32.0 mg/dL    LDL Cholesterol Calculated 100 0 - 129 mg/dL

## 2021-03-24 NOTE — LETTER
March 25, 2021      Jazz Berman  [address]   [place] [address]        Dear Jazz,    Thank you for getting your care at [facility]. Please see below for your test results.    Resulted Orders   Albumin Random Urine Quantitative with Creat Ratio   Result Value Ref Range    Creatinine Urine 22 mg/dL    Albumin Urine mg/L <5 mg/L    Albumin Urine mg/g Cr Unable to calculate due to low value 0 - 17 mg/g Cr   Hemoglobin A1c ([facility])   Result Value Ref Range    Hemoglobin A1C 5.8 (H) 4.1 - 5.7 %   Lipid [place] ([facility])   Result Value Ref Range    Cholesterol 172.5 0.0 - 200.0 mg/dL    Cholesterol/HDL Ratio 3.2 0.0 - 5.0    HDL Cholesterol 53.4 >40.0 mg/dL    Triglycerides 97.1 0.0 - 150.0 mg/dL    VLDL Cholesterol 19.4 7.0 - 32.0 mg/dL    LDL Cholesterol Calculated 100 0 - 129 mg/dL     Your cholesterol is looking good and your A1C is fantastic! Also, your urine does not have any protein spilling into it, which means the kidneys are working nicely and not getting leaky from hypertension and diabetes.  If you have any concerns about these results please call and leave a message for me or send a MyChart message to the clinic.    Sincerely,    Lon Angeles, DO

## 2021-03-24 NOTE — PATIENT INSTRUCTIONS
Go see your eye doctor within the next month or two for your yearly eye check up.  My nurse will help schedule that colonoscopy.  We have our meds right in the computer  Refills today  Nerves and diabetes are great! :)  Follow up with full body skin check whenever is convenient for ya    Here is the plan from today's visit    1. Type 2 diabetes mellitus with stage 3a chronic kidney disease, without long-term current use of insulin (H)    - Albumin Random Urine Quantitative with Creat Ratio  - Hemoglobin A1c ([facility])  - Lipid [place] ([facility])    2. Gastroesophageal reflux disease without esophagitis    - famotidine (PEPCID) 40 MG tablet; Take 1 tablet (40 mg) by mouth daily  Dispense: 60 tablet; Refill: 4    3. Erectile dysfunction, unspecified erectile dysfunction type    - sildenafil (VIAGRA) 100 MG tablet; Take 1 tablet (100 mg) by mouth daily as needed (sexual dysfunction)  Dispense: 30 tablet; Refill: 4    4. Essential hypertension with goal blood pressure less than 140/90    - lisinopril (ZESTRIL) 40 MG tablet; Take 1 tablet (40 mg) by mouth daily  Dispense: 90 tablet; Refill: 3    5. Chronic heart failure with preserved ejection fraction (H)    - lisinopril (ZESTRIL) 40 MG tablet; Take 1 tablet (40 mg) by mouth daily  Dispense: 90 tablet; Refill: 3    Please call or return to clinic if your symptoms don't go away.      Thank you for coming to [facility] today.  Lab Testing:  **If you had lab testing today and your results are reassuring or normal they will be mailed to you or sent through [employer] within 7 days.   **If the lab tests need quick action we will call you with the results.  The phone number we will call with results is # [phone] (home) . If this is not the best number please call our clinic and change the number.  Medication Refills:  If you need any refills please call your pharmacy and they will contact us.   If you need to  your refill at a new pharmacy, please contact the  new pharmacy directly. The new pharmacy will help you get your medications transferred faster.   Scheduling:  If you have any concerns about today's visit or wish to schedule another appointment please call our office during normal business hours [phone] (8-5:00 M-F)   eferrals to [facility] please call [phone].   Mammogram Scheduling [phone]     XRay/CT/Ultrasound/MRI Scheduling [phone]    Medical Concerns:  If you have urgent medical concerns please call [phone] at any time of the day.    Tequila Angeles, DO

## 2021-03-26 ENCOUNTER — ANCILLARY PROCEDURE (OUTPATIENT)
Dept: CT IMAGING | Facility: CLINIC | Age: 66
End: 2021-03-26
Attending: INTERNAL MEDICINE
Payer: COMMERCIAL

## 2021-03-26 DIAGNOSIS — E66.9 OBESITY, UNSPECIFIED CLASSIFICATION, UNSPECIFIED OBESITY TYPE, UNSPECIFIED WHETHER SERIOUS COMORBIDITY PRESENT: ICD-10-CM

## 2021-03-26 DIAGNOSIS — N18.30 CKD (CHRONIC KIDNEY DISEASE) STAGE 3, GFR 30-59 ML/MIN (H): ICD-10-CM

## 2021-03-26 DIAGNOSIS — C49.A2 MALIGNANT GASTROINTESTINAL STROMAL TUMOR (GIST) OF STOMACH (H): ICD-10-CM

## 2021-03-26 DIAGNOSIS — C49.A0 GIST (GASTROINTESTINAL STROMAL TUMOR), MALIGNANT (H): ICD-10-CM

## 2021-03-26 DIAGNOSIS — I10 HYPERTENSION, ESSENTIAL: ICD-10-CM

## 2021-03-26 DIAGNOSIS — E11.9 TYPE 2 DIABETES MELLITUS WITHOUT COMPLICATION, WITHOUT LONG-TERM CURRENT USE OF INSULIN (H): ICD-10-CM

## 2021-03-26 DIAGNOSIS — G47.33 OSA (OBSTRUCTIVE SLEEP APNEA): ICD-10-CM

## 2021-03-26 DIAGNOSIS — I48.0 PAROXYSMAL ATRIAL FIBRILLATION (H): ICD-10-CM

## 2021-03-26 DIAGNOSIS — N40.0 BENIGN PROSTATIC HYPERPLASIA, UNSPECIFIED WHETHER LOWER URINARY TRACT SYMPTOMS PRESENT: ICD-10-CM

## 2021-03-26 DIAGNOSIS — F33.0 MILD RECURRENT MAJOR DEPRESSION (H): ICD-10-CM

## 2021-03-26 LAB
ALBUMIN SERPL-MCNC: 3.8 G/DL (ref 3.4–5)
ALP SERPL-CCNC: 100 U/L (ref 40–150)
ALT SERPL W P-5'-P-CCNC: 37 U/L (ref 0–70)
ANION GAP SERPL CALCULATED.3IONS-SCNC: 4 MMOL/L (ref 3–14)
AST SERPL W P-5'-P-CCNC: 20 U/L (ref 0–45)
BASOPHILS # BLD AUTO: 0 10E9/L (ref 0–0.2)
BASOPHILS NFR BLD AUTO: 0.2 %
BILIRUB SERPL-MCNC: 0.6 MG/DL (ref 0.2–1.3)
BUN SERPL-MCNC: 26 MG/DL (ref 7–30)
CALCIUM SERPL-MCNC: 9.7 MG/DL (ref 8.5–10.1)
CHLORIDE SERPL-SCNC: 109 MMOL/L (ref 94–109)
CO2 SERPL-SCNC: 28 MMOL/L (ref 20–32)
CREAT SERPL-MCNC: 1.19 MG/DL (ref 0.66–1.25)
DIFFERENTIAL METHOD BLD: ABNORMAL
EOSINOPHIL # BLD AUTO: 0 10E9/L (ref 0–0.7)
EOSINOPHIL NFR BLD AUTO: 0 %
ERYTHROCYTE [DISTWIDTH] IN BLOOD BY AUTOMATED COUNT: 13.4 % (ref 10–15)
GFR SERPL CREATININE-BSD FRML MDRD: 62 ML/MIN/{1.73_M2}
GLUCOSE SERPL-MCNC: 139 MG/DL (ref 70–99)
HCT VFR BLD AUTO: 46.9 % (ref 40–53)
HGB BLD-MCNC: 15.5 G/DL (ref 13.3–17.7)
IMM GRANULOCYTES # BLD: 0 10E9/L (ref 0–0.4)
IMM GRANULOCYTES NFR BLD: 0.4 %
LYMPHOCYTES # BLD AUTO: 0.4 10E9/L (ref 0.8–5.3)
LYMPHOCYTES NFR BLD AUTO: 7.6 %
MCH RBC QN AUTO: 29.8 PG (ref 26.5–33)
MCHC RBC AUTO-ENTMCNC: 33 G/DL (ref 31.5–36.5)
MCV RBC AUTO: 90 FL (ref 78–100)
MONOCYTES # BLD AUTO: 0.1 10E9/L (ref 0–1.3)
MONOCYTES NFR BLD AUTO: 0.9 %
NEUTROPHILS # BLD AUTO: 4.9 10E9/L (ref 1.6–8.3)
NEUTROPHILS NFR BLD AUTO: 90.9 %
NRBC # BLD AUTO: 0 10*3/UL
NRBC BLD AUTO-RTO: 0 /100
PLATELET # BLD AUTO: 198 10E9/L (ref 150–450)
POTASSIUM SERPL-SCNC: 4.3 MMOL/L (ref 3.4–5.3)
PROT SERPL-MCNC: 8.4 G/DL (ref 6.8–8.8)
RBC # BLD AUTO: 5.21 10E12/L (ref 4.4–5.9)
SODIUM SERPL-SCNC: 141 MMOL/L (ref 133–144)
WBC # BLD AUTO: 5.4 10E9/L (ref 4–11)

## 2021-03-26 PROCEDURE — 85025 COMPLETE CBC W/AUTO DIFF WBC: CPT | Performed by: PATHOLOGY

## 2021-03-26 PROCEDURE — 71260 CT THORAX DX C+: CPT | Performed by: RADIOLOGY

## 2021-03-26 PROCEDURE — 74177 CT ABD & PELVIS W/CONTRAST: CPT | Performed by: RADIOLOGY

## 2021-03-26 PROCEDURE — 80053 COMPREHEN METABOLIC PANEL: CPT | Performed by: PATHOLOGY

## 2021-03-26 PROCEDURE — 36415 COLL VENOUS BLD VENIPUNCTURE: CPT | Performed by: PATHOLOGY

## 2021-03-26 RX ORDER — IOPAMIDOL 755 MG/ML
135 INJECTION, SOLUTION INTRAVASCULAR ONCE
Status: COMPLETED | OUTPATIENT
Start: 2021-03-26 | End: 2021-03-26

## 2021-03-26 RX ADMIN — IOPAMIDOL 135 ML: 755 INJECTION, SOLUTION INTRAVASCULAR at 09:10

## 2021-03-26 NOTE — PROGRESS NOTES
Al is a 66 year old who is being evaluated via a billable telephone visit.      What phone number would you like to be contacted at? [phone]  How would you like to obtain your AVS? [employer]  Phone call duration: >11 minutes           3-30-21     I talked w Mr. Berman, for f/u of a GIST.       Background  In brief, he was in his usual state of health but noted some black stools in 06/2016. In early July 2016 he was admitted for anemia and GI bleeding and had orthostatic symptoms. 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 Endoscopy before that showed some punctate oozing and it is possible that some of the bleeding was coming from the tumor.   -  Surgery was August 2016 and pathology report showed 1 mitosis per 50 high-power fields, a tumor size of 15 cm and negative margins. 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  Interestingly, in contrast to the D842V, in-frame deletions of D842 to H845 are typically sensitive to imatinib.  -        Interval history.     Due to covid we are doing a phone visit.     He is doing well and got both vaccine doses.  No new specific new complaints.       He denies being bothered by shortness of breath right now though he has had that problem and is felt to likely have HFpEF.      He has been covid isolating and really not getting out much and is not very active.  He does have a history of chronic congestive heart failure and is on multiple medications.     DM is controlled off medications. Denies neuropathy.     He has sleep apnea and uses a CPAP machine and also has a history of hypertension, depression, diabetes, cardiomegaly, and he was found to be in atrial fibrillation when he was hospitalized for the GI bleed.    He feels his mood is good and saw his PCP recently.  He connects with his PCP fairly frequently.    He had some tiny nonspecific lung nodules, some adrenal nodules, and  nonspecific splenic lesions on past imaging.    A 10-point ROS is otherwise unremarkable.          -  Background PMH, FH, SH  His past history is notable for keloid formation, resection of a rectal polyp, and a crushing foot injury.   He feels that contrast dye created a bit of a rash that left some skin hyperpigmentation on his back.   He is currently single and lives alone. He stopped smoking in 2011, though he was a polysubstance abuser in the past. He no longer drinks alcohol. He is retired from working in a furniture store.   Family history is positive for diabetes and hypertension.   -      On exam he sounded comfortable with a quiet affect.   CHEST: no resp distress.   NEURO: Normal mentation and speech.  PSYCH: mood good     -  CBC, LFT, GNE OK; cr 1.19    -  I reviewed his new images and there is no evidence of PD.  He has a history of some stable lung nodules and adrenal nodule.     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      -  No KIT mutation, but a PDGFR S785_R616 deletion.  Interestingly, in contrast to the D842V, in-frame deletions of D842 to H845 are typically sensitive to imatinib.     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  It appears his recurrence risk was on the order of 10-15% over a few years.  I did not think that risk warranted the toxicities of Gleevec, especially given his multiple other problems and medications.     No evidence of disease progression on CT.     The PDGFR mutation is unusual but is a case where gleevec could be useful.  A switch inhibitor could possibly be helpful as well, but with the low mitotic index he may not need it.     He is now almost >4.5 years out.  We will see him  about   March w/ CT-CAP and labs 2 d before.    2-heart disease  Noted  He had an echo that showed no structural heart disease, and he was felt to be at high risk for stroke with a CHADS2 score of 4, and he is on xarelto.  His atrial fibrillation, anticoagulation and other issues will be followed by his primary care team and Cardiology.     All his questions were addressed and he will call if he has others.     -  Sean Freed M.D.  Professor  Hematology, Oncology and Transplantation

## 2021-03-30 ENCOUNTER — VIRTUAL VISIT (OUTPATIENT)
Dept: ONCOLOGY | Facility: CLINIC | Age: 66
End: 2021-03-30
Attending: INTERNAL MEDICINE
Payer: COMMERCIAL

## 2021-03-30 DIAGNOSIS — E66.9 OBESITY, UNSPECIFIED CLASSIFICATION, UNSPECIFIED OBESITY TYPE, UNSPECIFIED WHETHER SERIOUS COMORBIDITY PRESENT: ICD-10-CM

## 2021-03-30 DIAGNOSIS — C49.A2 MALIGNANT GASTROINTESTINAL STROMAL TUMOR (GIST) OF STOMACH (H): ICD-10-CM

## 2021-03-30 DIAGNOSIS — I10 HYPERTENSION, ESSENTIAL: ICD-10-CM

## 2021-03-30 DIAGNOSIS — G47.33 OSA (OBSTRUCTIVE SLEEP APNEA): ICD-10-CM

## 2021-03-30 DIAGNOSIS — I48.0 PAROXYSMAL ATRIAL FIBRILLATION (H): ICD-10-CM

## 2021-03-30 DIAGNOSIS — F33.0 MILD RECURRENT MAJOR DEPRESSION (H): ICD-10-CM

## 2021-03-30 DIAGNOSIS — N40.0 BENIGN PROSTATIC HYPERPLASIA, UNSPECIFIED WHETHER LOWER URINARY TRACT SYMPTOMS PRESENT: ICD-10-CM

## 2021-03-30 DIAGNOSIS — E11.9 TYPE 2 DIABETES MELLITUS WITHOUT COMPLICATION, WITHOUT LONG-TERM CURRENT USE OF INSULIN (H): ICD-10-CM

## 2021-03-30 DIAGNOSIS — N18.30 CKD (CHRONIC KIDNEY DISEASE) STAGE 3, GFR 30-59 ML/MIN (H): ICD-10-CM

## 2021-03-30 PROCEDURE — 999N001193 HC VIDEO/TELEPHONE VISIT; NO CHARGE

## 2021-03-30 PROCEDURE — 99214 OFFICE O/P EST MOD 30 MIN: CPT | Mod: TEL | Performed by: INTERNAL MEDICINE

## 2021-03-30 RX ORDER — METHYLPREDNISOLONE 32 MG/1
32 TABLET ORAL SEE ADMIN INSTRUCTIONS
Qty: 2 TABLET | Refills: 5 | Status: SHIPPED | OUTPATIENT
Start: 2021-03-30 | End: 2021-09-23

## 2021-03-30 NOTE — LETTER
3/30/2021         RE: Jazz Berman  [address]  [place] [address]        Dear Colleague,    Thank you for referring your patient, Jazz Berman, to the [facility]. Please see a copy of my visit note below.    Al is a 66 year old who is being evaluated via a billable telephone visit.      What phone number would you like to be contacted at? [phone]  How would you like to obtain your AVS? MyChart  Phone call duration: >11 minutes           3-30-21     I talked w Mr. Berman, for f/u of a GIST.       Background  In brief, he was in his usual state of health but noted some black stools in 06/2016. In early July 2016 he was admitted for anemia and GI bleeding and had orthostatic symptoms. 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 Endoscopy before that showed some punctate oozing and it is possible that some of the bleeding was coming from the tumor.   -  Surgery was August 2016 and pathology report showed 1 mitosis per 50 high-power fields, a tumor size of 15 cm and negative margins. 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   -  No KIT mutation, but a PDGFR L868_Y620 deletion.  Interestingly, in contrast to the D842V, in-frame deletions of D842 to H845 are typically sensitive to imatinib.  -        Interval history.     Due to covid we are doing a phone visit.     He is doing well and got both vaccine doses.  No new specific new complaints.       He denies being bothered by shortness of breath right now though he has had that problem and is felt to likely have HFpEF.      He has been covid isolating and really not getting out much and is not very active.  He does have a history of chronic congestive heart failure and is on multiple medications.     DM is controlled off medications. Denies neuropathy.     He has sleep apnea and uses a CPAP machine and also has a history of hypertension, depression, diabetes, cardiomegaly, and  he was found to be in atrial fibrillation when he was hospitalized for the GI bleed.    He feels his mood is good and saw his PCP recently.  He connects with his PCP fairly frequently.    He had some tiny nonspecific lung nodules, some adrenal nodules, and nonspecific splenic lesions on past imaging.    A 10-point ROS is otherwise unremarkable.          -  Background PMH, FH, SH  His past history is notable for keloid formation, resection of a rectal polyp, and a crushing foot injury.   He feels that contrast dye created a bit of a rash that left some skin hyperpigmentation on his back.   He is currently single and lives alone. He stopped smoking in 2011, though he was a polysubstance abuser in the past. He no longer drinks alcohol. He is retired from working in a furniture store.   Family history is positive for diabetes and hypertension.   -      On exam he sounded comfortable with a quiet affect.   CHEST: no resp distress.   NEURO: Normal mentation and speech.  PSYCH: mood good     -  CBC, LFT, GNE OK; cr 1.19    -  I reviewed his new images and there is no evidence of PD.  He has a history of some stable lung nodules and adrenal nodule.     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      -  No KIT mutation, but a PDGFR M146_C616 deletion.  Interestingly, in contrast to the D842V, in-frame deletions of D842 to H845 are typically sensitive to imatinib.     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  It appears his recurrence risk was on the order of 10-15% over a few years.  I did not think that risk warranted the toxicities of Gleevec, especially given his multiple other problems and medications.     No evidence of disease progression  on CT.     The PDGFR mutation is unusual but is a case where gleevec could be useful.  A switch inhibitor could possibly be helpful as well, but with the low mitotic index he may not need it.     He is now almost >4.5 years out.  We will see him about   March w/ CT-CAP and labs 2 d before.    2-heart disease  Noted  He had an echo that showed no structural heart disease, and he was felt to be at high risk for stroke with a CHADS2 score of 4, and he is on xarelto.  His atrial fibrillation, anticoagulation and other issues will be followed by his primary care team and Cardiology.     All his questions were addressed and he will call if he has others.     -  Sean Freed M.D.  Professor  Hematology, Oncology and Transplantation      Again, thank you for allowing me to participate in the care of your patient.        Sincerely,        Sean Freed MD

## 2021-04-26 ENCOUNTER — DOCUMENTATION ONLY (OUTPATIENT)
Dept: FAMILY MEDICINE | Facility: CLINIC | Age: 66
End: 2021-04-26

## 2021-04-26 NOTE — PROGRESS NOTES
"When opening a documentation only encounter, be sure to enter in \"Chief Complaint\" Forms and in \" Comments\" Title of form, description if needed.    Al is a 66 year old  male  Form received via: Fax  Form now resides in: Provider Ready    DULCE Lancaster      Form has been completed by provider.     Form sent out via: Fax to [employer] at Fax Number: [phone]  Patient informed: No, Reason:NA  Output date: April 30, 2021    DULCE Lancaster      **Please close the encounter**                "

## 2021-04-29 DIAGNOSIS — Z53.9 DIAGNOSIS NOT YET DEFINED: Primary | ICD-10-CM

## 2021-04-29 PROCEDURE — G0179 MD RECERTIFICATION HHA PT: HCPCS | Performed by: FAMILY MEDICINE

## 2021-05-05 DIAGNOSIS — I10 ESSENTIAL HYPERTENSION WITH GOAL BLOOD PRESSURE LESS THAN 140/90: ICD-10-CM

## 2021-05-05 RX ORDER — AMLODIPINE BESYLATE 5 MG/1
5 TABLET ORAL DAILY
Qty: 90 TABLET | Refills: 3 | Status: SHIPPED | OUTPATIENT
Start: 2021-05-05 | End: 2022-06-15

## 2021-05-05 NOTE — TELEPHONE ENCOUNTER
"Request for medication refill:Amlodipine    Providers if patient needs an appointment and you are willing to give a one month supply please refill for one month and  send a letter/MyChart using \".SMILLIMITEDREFILL\" .smillimited and route chart to \"P [facility]MI \" (Giving one month refill in non controlled medications is strongly recommended before denial)    If refill has been denied, meaning absolutely no refills without visit, please complete the smart phrase \".smirxrefuse\" and route it to the \"P [facility] MED REFILLS\"  pool to inform the patient and the pharmacy.    Aissatou Landaverde, CMA        "

## 2021-05-10 ENCOUNTER — DOCUMENTATION ONLY (OUTPATIENT)
Dept: FAMILY MEDICINE | Facility: CLINIC | Age: 66
End: 2021-05-10

## 2021-05-10 NOTE — PROGRESS NOTES
"When opening a documentation only encounter, be sure to enter in \"Chief Complaint\" Forms and in \" Comments\" Title of form, description if needed.    Al is a 66 year old  male  Form received via: Fax  Form now resides in: Provider Ready    DULCE Lancaster      Form has been completed by provider.     Form sent out via: Fax to Marina Sánchez at Fax Number: [phone]  Patient informed: No, Reason:NA  Output date: May 13, 2021    DULCE Lancaster      **Please close the encounter**                "

## 2021-05-11 ENCOUNTER — DOCUMENTATION ONLY (OUTPATIENT)
Dept: FAMILY MEDICINE | Facility: CLINIC | Age: 66
End: 2021-05-11

## 2021-05-11 NOTE — PROGRESS NOTES
"When opening a documentation only encounter, be sure to enter in \"Chief Complaint\" Forms and in \" Comments\" Title of form, description if needed.    Al is a 66 year old  male  Form received via: Fax  Form now resides in: Provider Ready    Lorenza Hillman MA                  "

## 2021-06-07 DIAGNOSIS — I50.32 CHRONIC HEART FAILURE WITH PRESERVED EJECTION FRACTION (H): ICD-10-CM

## 2021-06-07 NOTE — TELEPHONE ENCOUNTER
"Request for medication refill:    Lasix 40 mg     Providers if patient needs an appointment and you are willing to give a one month supply please refill for one month and  send a letter/MyChart using \".SMILLIMITEDREFILL\" .smillimited and route chart to \"P [facility] \" (Giving one month refill in non controlled medications is strongly recommended before denial)    If refill has been denied, meaning absolutely no refills without visit, please complete the smart phrase \".smirxrefuse\" and route it to the \"P [facility] MED REFILLS\"  pool to inform the patient and the pharmacy.    Mary Loza, CMA        "

## 2021-06-08 RX ORDER — FUROSEMIDE 40 MG
TABLET ORAL
Qty: 90 TABLET | Refills: 3 | Status: SHIPPED | OUTPATIENT
Start: 2021-06-08 | End: 2022-03-09

## 2021-06-10 DIAGNOSIS — C49.A2 MALIGNANT GASTROINTESTINAL STROMAL TUMOR (GIST) OF STOMACH (H): Primary | ICD-10-CM

## 2021-06-10 RX ORDER — FERROUS SULFATE 325(65) MG
325 TABLET ORAL
Qty: 90 TABLET | Refills: 3 | Status: SHIPPED | OUTPATIENT
Start: 2021-06-10 | End: 2021-06-17

## 2021-06-10 NOTE — TELEPHONE ENCOUNTER
"Request for medication refill  Ferrous sulfate 325 MG tablet  Providers if patient needs an appointment and you are willing to give a one month supply please refill for one month and  send a letter/MyChart using \".SMILLIMITEDREFILL\" .smillimited and route chart to \"P [facility] \" (Giving one month refill in non controlled medications is strongly recommended before denial)    If refill has been denied, meaning absolutely no refills without visit, please complete the smart phrase \".smirxrefuse\" and route it to the \"P [facility] MED REFILLS\"  pool to inform the patient and the pharmacy.    Berta Stanford        "

## 2021-06-16 DIAGNOSIS — C49.A2 MALIGNANT GASTROINTESTINAL STROMAL TUMOR (GIST) OF STOMACH (H): ICD-10-CM

## 2021-06-16 NOTE — TELEPHONE ENCOUNTER
"Request for medication refill:  ferrous sulfate (FEROSUL) 325 (65 Fe) MG tablet    Providers if patient needs an appointment and you are willing to give a one month supply please refill for one month and  send a letter/MyChart using \".SMILLIMITEDREFILL\" .smillimited and route chart to \"P [facility] \" (Giving one month refill in non controlled medications is strongly recommended before denial)    If refill has been denied, meaning absolutely no refills without visit, please complete the smart phrase \".smirxrefuse\" and route it to the \"P [facility] MED REFILLS\"  pool to inform the patient and the pharmacy.    Berta Stanford        "

## 2021-06-17 RX ORDER — FERROUS SULFATE 325(65) MG
325 TABLET ORAL
Qty: 90 TABLET | Refills: 3 | Status: SHIPPED | OUTPATIENT
Start: 2021-06-17 | End: 2022-05-26

## 2021-06-29 ENCOUNTER — DOCUMENTATION ONLY (OUTPATIENT)
Dept: FAMILY MEDICINE | Facility: CLINIC | Age: 66
End: 2021-06-29

## 2021-06-30 DIAGNOSIS — Z53.9 DIAGNOSIS NOT YET DEFINED: Primary | ICD-10-CM

## 2021-06-30 PROCEDURE — G0179 MD RECERTIFICATION HHA PT: HCPCS | Performed by: FAMILY MEDICINE

## 2021-07-05 NOTE — PROGRESS NOTES
Form has been completed by provider.     Form sent out via: Fax to [employer] at Fax Number: [phone]  Patient informed: n/a  Output date: July 5, 2021    Lorenza Hillman MA      **Please close the encounter**

## 2021-09-07 DIAGNOSIS — I48.0 PAROXYSMAL ATRIAL FIBRILLATION (H): ICD-10-CM

## 2021-09-07 NOTE — TELEPHONE ENCOUNTER
"Request for medication refill: apixaban ANTICOAGULANT (ELIQUIS ANTICOAGULANT) 5 MG tablet    Providers if patient needs an appointment and you are willing to give a one month supply please refill for one month and  send a letter/MyChart using \".SMILLIMITEDREFILL\" .smillimited and route chart to \"P [facility] \" (Giving one month refill in non controlled medications is strongly recommended before denial)    If refill has been denied, meaning absolutely no refills without visit, please complete the smart phrase \".smirxrefuse\" and route it to the \"P [facility] MED REFILLS\"  pool to inform the patient and the pharmacy.    Juani Caballero        "

## 2021-09-18 ENCOUNTER — HEALTH MAINTENANCE LETTER (OUTPATIENT)
Age: 66
End: 2021-09-18
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
## 2021-09-23 ENCOUNTER — OFFICE VISIT (OUTPATIENT)
Dept: FAMILY MEDICINE | Facility: CLINIC | Age: 66
End: 2021-09-23
Payer: COMMERCIAL

## 2021-09-23 VITALS
DIASTOLIC BLOOD PRESSURE: 88 MMHG | HEART RATE: 76 BPM | RESPIRATION RATE: 16 BRPM | SYSTOLIC BLOOD PRESSURE: 126 MMHG | TEMPERATURE: 97.8 F | OXYGEN SATURATION: 98 % | BODY MASS INDEX: 39.31 KG/M2 | HEIGHT: 70 IN | WEIGHT: 274.6 LBS

## 2021-09-23 DIAGNOSIS — R73.03 PREDIABETES: ICD-10-CM

## 2021-09-23 DIAGNOSIS — Z23 NEED FOR PROPHYLACTIC VACCINATION AND INOCULATION AGAINST INFLUENZA: Primary | ICD-10-CM

## 2021-09-23 DIAGNOSIS — M70.51 PES ANSERINUS BURSITIS OF RIGHT KNEE: ICD-10-CM

## 2021-09-23 DIAGNOSIS — I10 ESSENTIAL HYPERTENSION WITH GOAL BLOOD PRESSURE LESS THAN 140/90: ICD-10-CM

## 2021-09-23 PROCEDURE — G0008 ADMIN INFLUENZA VIRUS VAC: HCPCS | Performed by: STUDENT IN AN ORGANIZED HEALTH CARE EDUCATION/TRAINING PROGRAM

## 2021-09-23 PROCEDURE — 90662 IIV NO PRSV INCREASED AG IM: CPT | Performed by: STUDENT IN AN ORGANIZED HEALTH CARE EDUCATION/TRAINING PROGRAM

## 2021-09-23 PROCEDURE — 99214 OFFICE O/P EST MOD 30 MIN: CPT | Mod: 25 | Performed by: STUDENT IN AN ORGANIZED HEALTH CARE EDUCATION/TRAINING PROGRAM

## 2021-09-23 RX ORDER — ACETAMINOPHEN 500 MG
500-1000 TABLET ORAL 4 TIMES DAILY PRN
Qty: 200 TABLET | Refills: 0 | Status: SHIPPED | OUTPATIENT
Start: 2021-09-23 | End: 2022-03-08

## 2021-09-23 RX ORDER — LISINOPRIL 20 MG/1
TABLET ORAL
COMMUNITY
Start: 2021-04-22 | End: 2021-11-22

## 2021-09-23 RX ORDER — METOPROLOL SUCCINATE 50 MG/1
50 TABLET, EXTENDED RELEASE ORAL DAILY
Qty: 180 TABLET | Refills: 1 | Status: SHIPPED | OUTPATIENT
Start: 2021-09-23 | End: 2022-03-28

## 2021-09-23 RX ORDER — ROSUVASTATIN CALCIUM 10 MG/1
10 TABLET, COATED ORAL DAILY
Qty: 90 TABLET | Refills: 1 | Status: SHIPPED | OUTPATIENT
Start: 2021-09-23 | End: 2021-11-22

## 2021-09-23 ASSESSMENT — MIFFLIN-ST. JEOR: SCORE: 2026.83

## 2021-09-23 NOTE — PATIENT INSTRUCTIONS
Patient Education   Here is the plan from today's visit    1. Need for prophylactic vaccination and inoculation against influenza  - INFLUENZA, QUAD, HIGH DOSE, PF, 65YR + (FLUZONE HD)    2. Prediabetes  - rosuvastatin (CRESTOR) 10 MG tablet; Take 1 tablet (10 mg) by mouth daily  Dispense: 90 tablet; Refill: 1    3. Pes anserinus bursitis of right knee  Use Ice!!!!!   - acetaminophen (TYLENOL) 500 MG tablet; Take 1-2 tablets (500-1,000 mg) by mouth 4 times daily as needed for mild pain  Dispense: 200 tablet; Refill: 0    4. Essential hypertension with goal blood pressure less than 140/90  - metoprolol succinate ER (TOPROL-XL) 50 MG 24 hr tablet; Take 1 tablet (50 mg) by mouth daily  Dispense: 180 tablet; Refill: 1    Please call or return to clinic if your symptoms don't go away.    Follow up plan  Return for Preventive care visit with Dr. Rosey Ascencio 4 weeks.    Thank you for coming to [facility] today.  COVID-19 Vaccine:  [facility] has walk-in appointments for COVID-19 vaccines. No appointment needed!   You also have the option of receiving Moderna vaccine during your physician appointment. Please ask your care team for more information!  Lab Testing:  **If you had lab testing today and your results are reassuring or normal they will be mailed to you or sent through [employer] within 7 days.   **If the lab tests need quick action we will call you with the results.  **If you are having labs done on a different day, please call [phone] to schedule at [facility] or [phone] for other [facility] locations.   The phone number we will call with results is # [phone] (home) . If this is not the best number please call our clinic and change the number.  Medication Refills:  If you need any refills please call your pharmacy and they will contact us.   If you need to  your refill at a new pharmacy, please contact the new pharmacy directly. The new pharmacy will help you  get your medications transferred faster.   Scheduling:  If you have any concerns about today's visit or wish to schedule another appointment please call our office during normal business hours [phone] (8-5:00 M-F)  If a referral was made to a [facility] and you don't get a call from central scheduling please call [phone].  If a Mammogram was ordered for you at [facility] call [phone] to schedule or change your appointment.  If you had an EKG/XRay/CT/Ultrasound/MRI ordered the number is [phone] to schedule or change your radiology appointment.   Medical Concerns:  If you have urgent medical concerns please call [phone] at any time of the day.    Sarai Ascencio, DO

## 2021-10-05 ENCOUNTER — DOCUMENTATION ONLY (OUTPATIENT)
Dept: FAMILY MEDICINE | Facility: CLINIC | Age: 66
End: 2021-10-05

## 2021-10-05 NOTE — PROGRESS NOTES
"When opening a documentation only encounter, be sure to enter in \"Chief Complaint\" Forms and in \" Comments\" Title of form, description if needed.    Al is a 66 year old  male  Form received via: Fax  Form now resides in: Provider Wu Caballero                  "

## 2021-10-13 PROCEDURE — G0179 MD RECERTIFICATION HHA PT: HCPCS | Mod: GC | Performed by: STUDENT IN AN ORGANIZED HEALTH CARE EDUCATION/TRAINING PROGRAM

## 2021-10-18 NOTE — PROGRESS NOTES
Form has been completed by provider.     Form sent out via: Fax to [facility] [place] [id number] - [facility] Cert and Plan of Care  at Fax Number: [phone]  Patient informed: N/A  Output date: October 18, 2021    Ira Ramirez      **Please close the encounter**

## 2021-10-21 DIAGNOSIS — Z53.9 DIAGNOSIS NOT YET DEFINED: Primary | ICD-10-CM

## 2021-11-13 ENCOUNTER — HEALTH MAINTENANCE LETTER (OUTPATIENT)
Age: 66
End: 2021-11-13

## 2021-11-17 NOTE — PATIENT INSTRUCTIONS
Personalized Prevention Plan  You are due for the preventive services outlined below.  Your care team is available to assist you in scheduling these services.  If you have already completed any of these items, please share that information with your care team to update in your medical record.  Health Maintenance Due   Topic Date Due     Discuss Advance Care Planning  Never done     Depression Action Plan  Never done     Skin Cancer Screening  Never done     Zoster (Shingles) Vaccine (2 of 3) 12/07/2017     Eye Exam  03/23/2019     Colorectal Cancer Screening  07/02/2019     Depression Assessment  05/05/2021     COVID-19 Vaccine (3 - Booster for Pfizer series) 08/19/2021     Basic Metabolic Panel  09/26/2021     Preventive Health Recommendations  See your health care provider every year to    Review health changes.     Discuss preventive care.      Review your medicines if your doctor has prescribed any.    Talk with your health care provider about whether you should have a test to screen for prostate cancer (PSA).    Every 3 years, have a diabetes test (fasting glucose). If you are at risk for diabetes, you should have this test more often.    Every 5 years, have a cholesterol test. Have this test more often if you are at risk for high cholesterol or heart disease.     Every 10 years, have a colonoscopy. Or, have a yearly FIT test (stool test). These exams will check for colon cancer.    Talk to with your health care provider about screening for Abdominal Aortic Aneurysm if you have a family history of AAA or have a history of smoking.    Shots:     Get a flu shot each year.     Get a tetanus shot every 10 years.     Talk to your doctor about your pneumonia vaccines. There are now two you should receive - Pneumovax (PPSV 23) and Prevnar (PCV 13).    Talk to your pharmacist about a shingles vaccine.     Talk to your doctor about the hepatitis B vaccine.    Nutrition:     Eat at least 5 servings of fruits and  vegetables each day.     Eat whole-grain bread, whole-wheat pasta and brown rice instead of white grains and rice.     Get adequate Calcium and Vitamin D.     Lifestyle    Exercise for at least 150 minutes a week (30 minutes a day, 5 days a week). This will help you control your weight and prevent disease.     Limit alcohol to one drink per day.     No smoking.     Wear sunscreen to prevent skin cancer.     See your dentist every six months for an exam and cleaning.     See your eye doctor every 1 to 2 years to screen for conditions such as glaucoma, macular degeneration and cataracts.    Personalized Prevention Plan  You are due for the preventive services outlined below.  Your care team is available to assist you in scheduling these services.  If you have already completed any of these items, please share that information with your care team to update in your medical record.  Health Maintenance   Topic Date Due     ADVANCE CARE PLANNING  Never done     DEPRESSION ACTION PLAN  Never done     SKIN CANCER SCREENING  Never done     ZOSTER IMMUNIZATION (2 of 3) 12/07/2017     EYE EXAM  03/23/2019     COLORECTAL CANCER SCREENING  07/02/2019     PHQ-9  05/05/2021     COVID-19 Vaccine (3 - Booster for Pfizer series) 08/19/2021     BMP  09/26/2021     DTAP/TDAP/TD IMMUNIZATION (2 - Td or Tdap) 01/31/2022     A1C  03/24/2022     LIPID  03/24/2022     MICROALBUMIN  03/24/2022     DIABETIC FOOT EXAM  03/24/2022     HEMOGLOBIN  03/26/2022     LUNG CANCER SCREENING  03/26/2022     FALL RISK ASSESSMENT  03/30/2022     MEDICARE ANNUAL WELLNESS VISIT  11/22/2022     HEPATITIS C SCREENING  Completed     INFLUENZA VACCINE  Completed     Pneumococcal Vaccine: 65+ Years  Completed     URINALYSIS  Completed     AORTIC ANEURYSM SCREENING (SYSTEM ASSIGNED)  Completed     IPV IMMUNIZATION  Aged Out     MENINGITIS IMMUNIZATION  Aged Out        Personalized Prevention Plan  You are due for the preventive services outlined below.  Your care  team is available to assist you in scheduling these services.  If you have already completed any of these items, please share that information with your care team to update in your medical record.  Health Maintenance Due   Topic Date Due     Discuss Advance Care Planning  Never done     Depression Action Plan  Never done     Zoster (Shingles) Vaccine (2 of 3) 12/07/2017     Eye Exam  03/23/2019     Colorectal Cancer Screening  07/02/2019     Depression Assessment  05/05/2021     COVID-19 Vaccine (3 - Booster for Pfizer series) 08/19/2021       Patient Education   Here is the plan from today's visit    1. Encounter for Medicare annual wellness exam    2. High priority for 2019-nCoV vaccine    Get Zoster vaccine (shingles) at [facility] usually any chain pharmacy.     - COVID-19,PF,PFIZER (12+ Yrs PURPLE LABEL)    3. Colon cancer screening  Schedule appointment.   - Adult Gastro Ref - Procedure Only; Future    4. Chronic diastolic heart failure (H)  See Cardiology doctor for routine heart failure visit, discuss other heart symptoms.   - Adult Cardiology Eval Referral; Future  - atorvastatin (LIPITOR) 20 MG tablet; Take 1 tablet (20 mg) by mouth daily  Dispense: 90 tablet; Refill: 1    5. Routine screening for STI (sexually transmitted infection)  - Chlamydia trachomatis/Neisseria gonorrhoeae by PCR - Clinic Collect    6. Hypertension, essential  Take blood pressure at home. Write it down, bring into clinic.   Bring in log to see me in 2-3 weeks.   May go up on Amlodipine to 10 mg if still high.   Goal < 140/90     - atorvastatin (LIPITOR) 20 MG tablet; Take 1 tablet (20 mg) by mouth daily  Dispense: 90 tablet; Refill: 1      Please call or return to clinic if your symptoms don't go away.    Follow up plan  No follow-ups on file.    Thank you for coming to [facility] today.  Lab Testing:  **If you had lab testing today and your results are reassuring or normal they will be mailed to you or sent through  [facility] within 7 days.   **If the lab tests need quick action we will call you with the results.  **If you are having labs done on a different day, please call [phone] to schedule at [facility] or [phone] for other [facility] locations. Labs do not offer walk-in appointments.  The phone number we will call with results is # [phone] (home) . If this is not the best number please call our clinic and change the number.  Medication Refills:  If you need any refills please call your pharmacy and they will contact us.   If you need to  your refill at a new pharmacy, please contact the new pharmacy directly. The new pharmacy will help you get your medications transferred faster.   Scheduling:  If you have any concerns about today's visit or wish to schedule another appointment please call our office during normal business hours [phone] (8-5:00 M-F)  If a referral was made to a [facility] and you don't get a call from central scheduling please call [phone].  If a Mammogram was ordered for you at [facility] call [phone] to schedule or change your appointment.  If you had an EKG/XRay/CT/Ultrasound/MRI ordered the number is [phone] to schedule or change your radiology appointment.   [facility] has limited ultrasound appointments available on Wednesdays, if you would like your ultrasound at [facility], please call [phone] to schedule.   Medical Concerns:  If you have urgent medical concerns please call [phone] at any time of the day.    Sarai Ascencio,            Scheduling:  If you had an XRay/CT/Ultrasound/MRI ordered the number is [phone] to schedule or change your radiology appointment.   [facility] PERSONAL PREVENTIVE SERVICES PLAN - SERVICES  For Men   Review these tests with your doctor then decide which ones you want and take this page home for your reference     Immunization History   Administered  Date(s) Administered     COVID-19,PF,Pfizer (12+ Yrs) 01/29/2021, 02/19/2021, 11/22/2021     DTaP, Unspecified 01/31/2012     FLU 6-35 months 10/29/2018     Flu, Unspecified 10/20/1998, 09/23/2009     HepB-Adult 09/11/2020     Influenza (H1N1) 12/14/2008, 12/14/2009     Influenza (IIV3) PF 10/20/1998, 01/18/2005, 11/29/2005, 11/16/2006, 11/29/2007, 10/01/2008, 09/23/2009, 10/11/2010, 10/04/2011, 10/16/2013, 10/16/2014     Influenza Vaccine IM > 6 months Valent IIV4 (Alfuria,Fluzone) 11/10/2016, 10/12/2017     Influenza Vaccine, 6+MO IM (QUADRIVALENT W/PRESERVATIVES) 10/03/2019, 10/20/2020     Influenza, Quad, High Dose, Pf, 65yr+ (Fluzone HD) 09/23/2021     Influenza,INJ,MDCK,PF,Quad >4yrs 10/29/2018     Pneumo Conj 13-V (2010&after) 10/12/2017     Pneumococcal 23 valent 10/11/2010, 10/16/2013, 02/25/2020     TDAP Vaccine (Boostrix) 01/31/2012     Zoster vaccine, live 10/12/2017                                                       IMMUNIZATIONS Description Recommend today?     Influenza (flu shot) Helps to prevent flu; you should get it every year No; is up to date.   PCV 13 Prevents pneumonia; given once No; is up to date.   PPSV 23 Prevents pneumonia; given once No; is up to date.   Tetanus Prevents tetanus; need every 10 years No; is up to date.   Herpes Zoster (shingles) Prevents or lessens symptoms from shingles; given once.  Yes; Recommended and ordered.  If you want this vaccine please go to a pharmacy to receive Shinrix   Hepatitis B  If you have any of the following risk factors you should be immunized for hepatitis B: severe kidney disease, people who live in the same house as a carrier of Hepatitis B virus, people who live in  institutions (e.g. nursing homes or group homes), homosexual men, patients with hemophilia who received Factor VIII or IX concentrates, abusers of illicit injectable drugs No; is up to date.     Health Maintenance   Topic Date Due     DEPRESSION ACTION PLAN  Never done     ZOSTER  IMMUNIZATION (2 of 3) 12/07/2017     COLORECTAL CANCER SCREENING  07/02/2019     PHQ-9  05/05/2021     BMP  03/30/2022 (Originally 9/26/2021)     EYE EXAM  03/31/2023 (Originally 3/23/2019)     DTAP/TDAP/TD IMMUNIZATION (2 - Td or Tdap) 01/31/2022     A1C  03/24/2022     LIPID  03/24/2022     MICROALBUMIN  03/24/2022     DIABETIC FOOT EXAM  03/24/2022     HEMOGLOBIN  03/26/2022     LUNG CANCER SCREENING  03/26/2022     FALL RISK ASSESSMENT  03/30/2022     MEDICARE ANNUAL WELLNESS VISIT  11/22/2022     SKIN CANCER SCREENING  11/22/2022     ADVANCE CARE PLANNING  11/22/2026     HEPATITIS C SCREENING  Completed     INFLUENZA VACCINE  Completed     Pneumococcal Vaccine: 65+ Years  Completed     URINALYSIS  Completed     AORTIC ANEURYSM SCREENING (SYSTEM ASSIGNED)  Completed     COVID-19 Vaccine  Completed     IPV IMMUNIZATION  Aged Out     MENINGITIS IMMUNIZATION  Aged Out         SCREENING TESTS     Description   Year of Last Screening   Recommended Today?   Heart disease screening blood tests    Cholesterol level Reducing cholesterol can reduce your risk of heart attacks by 25%.  Screening is recommended yearly if you are at risk of heart disease otherwise every 4-5 years  No; is up to date.     Diabetes screening tests    Hemoglobin A1c blood test   Finding and treating diabetes early can reduce complications.  Screening recommended/covered yearly if you have high blood pressure, high cholesterol, obesity (BMI >30), or a history of high blood glucose tests; or 2 of the following: family history of diabetes, overweight (BMI >25 but <30), age 65 years or older, and a history of diabetes of pregnancy or gave birth to baby weighing more than 9 lbs.    No; is up to date.   Hepatitis B screening Finding hepatitis B early can reduce complications.  Screening is recommended for persons with selected risk factors.  No; is up to date.   Hepatitis C screening Finding hepatitis C early can reduce complications.  Screening is  recommended for all persons born from 1945 through 1965 and for those with selected other risk factors.   No; is up to date.   HIV screening Finding HIV early can reduce complications.  Screening is recommended for persons with risk factors for HIV infection.  No; is up to date.   Glaucoma screening Early detection of glaucoma can prevent blindness.   Please talk to your eye doctor about this.       SCREENING TESTS     Description   Year of Last Screening   Recommended Today?   Colorectal cancer screening    Fecal occult blood test     Screening colonoscopy Screening for colon cancer has been shown to reduce death from colon cancer by 25-30%. Screening recommended to start at 50 years and continuing until age 75 years.    Yes; Recommended and ordered.   Screening for Lung Cancer     Low-dose CT scanning Screening can reduce mortality in persons aged 55-80 who have smoked at least 30 pack-years and who are either still smoking or have quit in the past 15 years.  Recommeded and patient declined.    Abdominal Aortic Aneurysm (AAA) screening    Ultrasound (US)   An aneurysm treated before rupture is very safe -a ruptured aneurysm can be fatal.  Screening  by US for AAA is limited to patients who meet one of the following criteria:    Men who are 65-75 years old and have smoked more than 100 cigarettes in their lifetime    Anyone with a family history of abdominal aortic aneurysm  No: is not indicated today.     MEDICARE WELLNESS EXAM PATIENT INSTRUCTIONS    Yearly exam:     See your health care provider every year in order to review changes in your health, review medicines that you take, and discuss preventive care needs such as immunizations and cancer screening.    Get a flu shot each year.     Advance Directive:    If you have not done so, you are encouraged to complete an advance directive. If you would like support with this, please contact the clinic  through the main clinic line. More information  about advance directives can be found at:     [email or web address]    Nutrition:     Eat at least 5 servings of fruits and vegetables each day.     Eat whole-grain bread, whole-wheat pasta and brown rice instead of white grains and rice.     Talk to your doctor about Calcium and Vitamin D.     Lifestyle:    Exercise for at least 150 minutes a week (30 minutes a day, 5 days a week). This will help you control your weight and prevent disease.     Limit alcohol to one drink per day.     If you smoke, try to quit - your doctor will be happy to help.     Wear sunscreen to prevent skin cancer.     See your dentist every six months for an exam and cleaning.     See your eye doctor every 1 to 2 years to screen for conditions such as glaucoma, macular degeneration and cataracts.

## 2021-11-22 ENCOUNTER — OFFICE VISIT (OUTPATIENT)
Dept: FAMILY MEDICINE | Facility: CLINIC | Age: 66
End: 2021-11-22
Payer: COMMERCIAL

## 2021-11-22 ENCOUNTER — DOCUMENTATION ONLY (OUTPATIENT)
Dept: FAMILY MEDICINE | Facility: CLINIC | Age: 66
End: 2021-11-22

## 2021-11-22 VITALS
OXYGEN SATURATION: 99 % | HEIGHT: 69 IN | HEART RATE: 68 BPM | DIASTOLIC BLOOD PRESSURE: 101 MMHG | SYSTOLIC BLOOD PRESSURE: 146 MMHG | TEMPERATURE: 98.2 F | WEIGHT: 282.2 LBS | RESPIRATION RATE: 16 BRPM | BODY MASS INDEX: 41.8 KG/M2

## 2021-11-22 DIAGNOSIS — Z23 HIGH PRIORITY FOR 2019-NCOV VACCINE: ICD-10-CM

## 2021-11-22 DIAGNOSIS — Z23 NEED FOR ZOSTER VACCINATION: ICD-10-CM

## 2021-11-22 DIAGNOSIS — I10 HYPERTENSION, ESSENTIAL: ICD-10-CM

## 2021-11-22 DIAGNOSIS — Z11.3 ROUTINE SCREENING FOR STI (SEXUALLY TRANSMITTED INFECTION): ICD-10-CM

## 2021-11-22 DIAGNOSIS — Z00.00 ENCOUNTER FOR MEDICARE ANNUAL WELLNESS EXAM: Primary | ICD-10-CM

## 2021-11-22 DIAGNOSIS — Z12.11 COLON CANCER SCREENING: ICD-10-CM

## 2021-11-22 DIAGNOSIS — I50.32 CHRONIC DIASTOLIC HEART FAILURE (H): ICD-10-CM

## 2021-11-22 DIAGNOSIS — Z71.89 ADVANCED CARE PLANNING/COUNSELING DISCUSSION: ICD-10-CM

## 2021-11-22 PROBLEM — M15.9 OSTEOARTHRITIS OF MULTIPLE JOINTS: Status: ACTIVE | Noted: 2021-11-22

## 2021-11-22 PROCEDURE — G0439 PPPS, SUBSEQ VISIT: HCPCS | Mod: 25 | Performed by: STUDENT IN AN ORGANIZED HEALTH CARE EDUCATION/TRAINING PROGRAM

## 2021-11-22 PROCEDURE — 87591 N.GONORRHOEAE DNA AMP PROB: CPT | Performed by: STUDENT IN AN ORGANIZED HEALTH CARE EDUCATION/TRAINING PROGRAM

## 2021-11-22 PROCEDURE — 87491 CHLMYD TRACH DNA AMP PROBE: CPT | Performed by: STUDENT IN AN ORGANIZED HEALTH CARE EDUCATION/TRAINING PROGRAM

## 2021-11-22 PROCEDURE — 91300 COVID-19,PF,PFIZER (12+ YRS): CPT | Performed by: STUDENT IN AN ORGANIZED HEALTH CARE EDUCATION/TRAINING PROGRAM

## 2021-11-22 PROCEDURE — 0004A COVID-19,PF,PFIZER (12+ YRS): CPT | Performed by: STUDENT IN AN ORGANIZED HEALTH CARE EDUCATION/TRAINING PROGRAM

## 2021-11-22 PROCEDURE — 99214 OFFICE O/P EST MOD 30 MIN: CPT | Mod: 25 | Performed by: STUDENT IN AN ORGANIZED HEALTH CARE EDUCATION/TRAINING PROGRAM

## 2021-11-22 RX ORDER — ATORVASTATIN CALCIUM 20 MG/1
20 TABLET, FILM COATED ORAL DAILY
Qty: 90 TABLET | Refills: 1 | Status: SHIPPED | OUTPATIENT
Start: 2021-11-22 | End: 2022-08-08

## 2021-11-22 ASSESSMENT — MIFFLIN-ST. JEOR: SCORE: 2057.55
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
## 2021-11-22 NOTE — PROGRESS NOTES
"When opening a documentation only encounter, be sure to enter in \"Chief Complaint\" Forms and in \" Comments\" Title of form, description if needed.    Al is a 66 year old  male  Form received via: Appointment  Form now resides in: Provider Ready    Ira Ramirez      Form has been completed by provider.     Form has been completed by provider.     Form sent out via: Patient took the original form and the copy placed on scan basket.  Patient informed: N/A  Output date: November 22, 2021    Ira Ramirez      **Please close the encounter**                        "

## 2021-11-22 NOTE — PROGRESS NOTES
Preceptor Attestation:   Patient seen, evaluated and discussed with the resident. I have verified the content of the note, which accurately reflects my assessment of the patient and the plan of care.   Supervising Physician:  Florin Glasgow MD

## 2021-11-23 LAB
C TRACH DNA SPEC QL PROBE+SIG AMP: NEGATIVE
N GONORRHOEA DNA SPEC QL NAA+PROBE: NEGATIVE

## 2021-12-13 DIAGNOSIS — K21.9 GASTROESOPHAGEAL REFLUX DISEASE WITHOUT ESOPHAGITIS: ICD-10-CM

## 2021-12-13 RX ORDER — FAMOTIDINE 40 MG/1
TABLET, FILM COATED ORAL
Qty: 90 TABLET | Refills: 3 | Status: SHIPPED | OUTPATIENT
Start: 2021-12-13 | End: 2022-11-28

## 2021-12-23 ENCOUNTER — TELEPHONE (OUTPATIENT)
Dept: GASTROENTEROLOGY | Facility: CLINIC | Age: 66
End: 2021-12-23
Payer: COMMERCIAL

## 2021-12-23 ENCOUNTER — HOSPITAL ENCOUNTER (OUTPATIENT)
Facility: CLINIC | Age: 66
End: 2021-12-23
Attending: INTERNAL MEDICINE | Admitting: INTERNAL MEDICINE
Payer: COMMERCIAL

## 2021-12-23 DIAGNOSIS — Z11.59 ENCOUNTER FOR SCREENING FOR OTHER VIRAL DISEASES: ICD-10-CM

## 2021-12-23 NOTE — TELEPHONE ENCOUNTER
Screening Questions  1. Are you active on mychart? Y    2. What insurance is in the chart? [employer]    2.  Ordering/Referring Provider: Sonu    3. BMI 39.7, If greater than 40 review exclusion criteria also will need EXTENDED PREP    4.  Respiratory Screening (If yes to any of the following Hospital setting only):     Do you use daily home oxygen? N  Do you have mod to severe Obstructive Sleep Apnea? Yes-cpap used   Do you have Pulmonary Hypertension? N   Do you have UNCONTROLLED asthma? N    5. Have you had a heart or lung transplant (If yes, please review exclusion criteria) ? N    6. Are you currently on dialysis or have chronic kidney disease? N    7. Have you had a stroke or Transient ischemic attack (TIA) within 6 months? N    8. In the past 6 months, have you had any heart related issues including cardiomyopathy or heart attack? N                 If yes, did it require cardiac stenting or other implantable device?N      9. Do you have any implantable devices in your body (pacemaker, defib, LVAD)? N    10. Do you take nitroglycerin? If yes, how often? N    11. Are you currently taking any blood thinners?Yes-Eliquis    12. Are you a diabetic? N    13. (Females) Are you currently pregnant? NA  If yes, how many weeks?      15. Are you taking any prescription pain medications on a routine schedule? N If yes, MAC sedation and patient will need EXTENDED PREP.    16. Do you have any chemical dependencies such as alcohol, street drugs, or methadone? N If yes, MAC sedation.    17. Do you have any history of post-traumatic stress syndrome, severe anxiety or history of psychosis? N    18. Do you transfer independently? Yes    19.  Do you have any issues with constipation? N   If yes, pt will need EXTENDED PREP     20. Preferred Pharmacy for Pre Prescription [id number] - [place], [place] - [address]    Scheduling Details    Which Colonoscopy Prep was Sent?: Extended  Procedure Scheduled: Colon  Surgeon:  Kitty  Date of Procedure: 1/17/22  Location: [facility]  Caller (Please ask for phone number if not scheduled by patient): Al      Sedation Type: CS  Conscious Sedation- Needs  for 6 hours after the procedure  MAC/General-Needs  for 24 hours after procedure    Pre-op Required at [facility] [place] [place] and OR for MAC sedation: NA  (if yes advise patient they will need a pre-op prior to procedure)      Is patient on blood thinners? -Yes (If yes- inform patient to follow up with PCP or provider for follow up instructions)     Informed patient they will need an adult  Yes  Cannot take any type of public or medical transportation alone    [facility] 1[facility]13/22    Confirmed Nurse will call to complete assessment Yes    Additional comments: Patient requested paperwork be mailed instead of mychart

## 2022-01-03 DIAGNOSIS — Z86.0100 HISTORY OF COLONIC POLYPS: Primary | ICD-10-CM

## 2022-01-03 NOTE — PROGRESS NOTES
Per notification from MsJamal Viry from financial clearance, needed updated billing code to reflect pt's history of colonic polyps. Re placed referral with suggested code.     Rosey Ascencio DO  Family Medicine PGY-2  [facility]   Pronouns: She/Hers

## 2022-01-11 ENCOUNTER — TELEPHONE (OUTPATIENT)
Dept: GASTROENTEROLOGY | Facility: CLINIC | Age: 67
End: 2022-01-11

## 2022-01-11 DIAGNOSIS — Z86.0100 HISTORY OF COLONIC POLYPS: Primary | ICD-10-CM

## 2022-01-11 RX ORDER — BISACODYL 5 MG/1
TABLET, DELAYED RELEASE ORAL
Qty: 2 TABLET | Refills: 0 | Status: SHIPPED | OUTPATIENT
Start: 2022-01-11 | End: 2022-03-28

## 2022-01-11 NOTE — TELEPHONE ENCOUNTER
Patient scheduled for colonoscopy on 1/17/22.     Covid test scheduled: 1/13/22    Arrival time: 0945    Facility location: [facility]    Sedation type: CS    Indication for procedure: hx of polyps    Anticoagulations? Eliquis Holding interval of 2 days    Bowel prep recommendation: Extended prep d/t BMI 41.14 w/o magnesium citrate d/t hx of CKD.     Extended prep sent to [id number] - [place], [place] - [address] pharmacy. Prep instructions sent via letter at time of scheduling.     Notify patient that mg citrate was not included in script.     Pre visit planning completed.    Kecia Gonzalez RN

## 2022-01-11 NOTE — TELEPHONE ENCOUNTER
Pre assessment questions completed for upcoming colonoscopy scheduled on 1/17/22.    Covid test 1/13/22    Reviewed procedural arrival time 0945 and facility location [facility].    Designated  policy reviewed. Instructed to have someone stay 6 hours post procedure.     Reviewed Extended prep instructions with patient. No fiber/iron supplements or foods that contain nuts/seeds.     Anticoagulation/blood thinners? Eliquis. Holding interval of 2 day. Patient to consult with provider prior to holding. Patient agreed to plan.     Patient verbalized understanding and had no questions or concerns at this time.    Kecia Gonzalez RN

## 2022-01-13 ENCOUNTER — LAB (OUTPATIENT)
Dept: LAB | Facility: CLINIC | Age: 67
End: 2022-01-13

## 2022-01-13 DIAGNOSIS — Z11.59 ENCOUNTER FOR SCREENING FOR OTHER VIRAL DISEASES: ICD-10-CM

## 2022-01-13 LAB — SARS-COV-2 RNA RESP QL NAA+PROBE: NEGATIVE

## 2022-01-13 PROCEDURE — U0003 INFECTIOUS AGENT DETECTION BY NUCLEIC ACID (DNA OR RNA); SEVERE ACUTE RESPIRATORY SYNDROME CORONAVIRUS 2 (SARS-COV-2) (CORONAVIRUS DISEASE [COVID-19]), AMPLIFIED PROBE TECHNIQUE, MAKING USE OF HIGH THROUGHPUT TECHNOLOGIES AS DESCRIBED BY CMS-2020-01-R: HCPCS | Performed by: INTERNAL MEDICINE

## 2022-01-16 RX ORDER — LIDOCAINE 40 MG/G
CREAM TOPICAL
Status: CANCELLED | OUTPATIENT
Start: 2022-01-16

## 2022-01-16 RX ORDER — ONDANSETRON 2 MG/ML
4 INJECTION INTRAMUSCULAR; INTRAVENOUS
Status: CANCELLED | OUTPATIENT
Start: 2022-01-16

## 2022-02-08 ENCOUNTER — DOCUMENTATION ONLY (OUTPATIENT)
Dept: FAMILY MEDICINE | Facility: CLINIC | Age: 67
End: 2022-02-08
Payer: COMMERCIAL

## 2022-02-08 NOTE — PROGRESS NOTES
"When opening a documentation only encounter, be sure to enter in \"Chief Complaint\" Forms and in \" Comments\" Title of form, description if needed.    Al is a 67 year old  male  Form received via: Fax  Form now resides in: Provider Ready    Lorenza Hillman MA                  "

## 2022-02-17 DIAGNOSIS — Z53.9 DIAGNOSIS NOT YET DEFINED: Primary | ICD-10-CM

## 2022-02-17 PROCEDURE — G0179 MD RECERTIFICATION HHA PT: HCPCS | Mod: GC | Performed by: STUDENT IN AN ORGANIZED HEALTH CARE EDUCATION/TRAINING PROGRAM

## 2022-02-18 NOTE — PROGRESS NOTES
Form has been completed by provider.     Form sent out via: Fax to Victoria Plumb at Fax Number: [phone]  Patient informed: N/A  Output date: February 18, 2022    Ira Ramirez      **Please close the encounter**

## 2022-03-08 DIAGNOSIS — M70.51 PES ANSERINUS BURSITIS OF RIGHT KNEE: ICD-10-CM

## 2022-03-08 RX ORDER — PSEUDOEPHED/ACETAMINOPH/DIPHEN 30MG-500MG
TABLET ORAL
Qty: 200 TABLET | Refills: 0 | Status: SHIPPED | OUTPATIENT
Start: 2022-03-08

## 2022-03-08 NOTE — TELEPHONE ENCOUNTER
"Request for medication refill:  acetaminophen (TYLENOL) 500 MG tablet    Providers if patient needs an appointment and you are willing to give a one month supply please refill for one month and  send a letter/MyChart using \".SMILLIMITEDREFILL\" .smillimited and route chart to \"P SMI \" (Giving one month refill in non controlled medications is strongly recommended before denial)    If refill has been denied, meaning absolutely no refills without visit, please complete the smart phrase \".smirxrefuse\" and route it to the \"P SMI MED REFILLS\"  pool to inform the patient and the pharmacy.    Grace Arizmendi MA        "

## 2022-03-16 ENCOUNTER — TELEPHONE (OUTPATIENT)
Dept: ONCOLOGY | Facility: CLINIC | Age: 67
End: 2022-03-16
Payer: COMMERCIAL

## 2022-03-16 DIAGNOSIS — C49.A2 MALIGNANT GASTROINTESTINAL STROMAL TUMOR (GIST) OF STOMACH (H): Primary | ICD-10-CM

## 2022-03-16 RX ORDER — METHYLPREDNISOLONE 32 MG/1
32 TABLET ORAL DAILY
Qty: 2 TABLET | Refills: 4 | Status: SHIPPED | OUTPATIENT
Start: 2022-03-16 | End: 2022-03-28

## 2022-03-16 NOTE — TELEPHONE ENCOUNTER
Prior Authorization Not Needed per Insurance    Medication: Methylprednisolone  Insurance Company:    Expected CoPay:      Pharmacy Filling the Rx:    Pharmacy Notified:    Patient Notified:

## 2022-03-25 NOTE — PROGRESS NOTES
Assessment & Plan     Hypertension, essential  Body mass index (BMI) of 40.0-44.9 in adult (H)  Not controlled. Discussed my recommendation to go up on his Amlodipine today. He declines. He would like to monitor his BP at home with his home RN for a few weeks, bring back the blood pressure log and if still not at goal will consider increasing BP medication. Routine diet and exercise counseling. Pt takes all medication (see HPI) as prescribed.   - Lipid panel  - lisinopril (ZESTRIL) 40 MG tablet  Dispense: 90 tablet; Refill: 3    Chronic diastolic heart failure (H)  Patient has followed with Cardiology previously, last visit in 2019, reconnecting to care. No new heart failure symptoms, appears euvolemic on exam. Taking lasix everyday.   - Adult Cardiology Eval Bret Referral    CKD stage G3a/A1, GFR 45-59 and albumin creatinine ratio <30 mg/g (H)  Pt's creatinine stable at 1.14 (baseline 1.1-1.18). Ideally we would have tight blood pressure goal <130/80 however pt has been resistant to setting this as his BP goal previously and prefers it to be <140/90.     Pre-diabetes  Discussed if A1c worsening we would need to restart back on Metformin (previously had been on).  - Hemoglobin A1c  - Albumin Random Urine Quantitative with Creat Ratio    Erectile dysfunction, unspecified erectile dysfunction type  Medication works well for him. Again a sign of systemic vascular issues - would consider a higher intensity statin in light of his comorbities, however pt has been hesitant in past about any statin let alone increasing dose, will have to discuss more when lipid panel returns.   - sildenafil (VIAGRA) 100 MG tablet  Dispense: 30 tablet; Refill: 4    Colon cancer screening  Discussed at length with his prior history I recommend a colonoscopy. However pt strongly does not want a colonoscopy and has not completed the one I've previously ordered. Will try to see if we can do a Cologuard test (unclear in our specific clinic  if we can offer this).   - SABINO([employer] SCI[employer])    Mild recurrent major depression (H)  Mood stable today.     Paroxysmal atrial fibrillation (H)  Heart rate controlled in the 60s. He is taking his Apixaban as prescribed. No chest pain, palpitations, syncope.   - metoprolol succinate ER (TOPROL-XL) 50 MG 24 hr tablet  Dispense: 90 tablet; Refill: 3    Review of the result(s) of each unique test - creatinine, CMP and CBC   Ordering of each unique test  Prescription drug management    Return in about 4 weeks (around 2022).   Would usually have 2 week BP visits however this is the visit time that is best for the patient.     Rosey Ascencio DO  Family Medicine PGY-2  [facility]   Pronouns: She/Hers      Subjective   Al is a 67 year old who presents for the following health issues.   HPI     Blood pressure  RN coming and taking it   Here 161/96, 143/91   Has been high, top number 150s   Last Tuesday was better 114/68  Was writing them down (comes ever Tuesday)   Takes pills 8 am every morning   Amlodipine 5 mg   Lisinopril 40 mg   Metoprolol 50 mg   Furosemide 40 mg - every day     Pre diabetes  Last A1c  3/24/2021 was 5.8   Was Metformin for years but took off about a year ago so   Labs - need up to date labs  CMP last year normal including LFTs    Lipid  Atorvastatin 20 mg daily     Mood: stable     PHQ 2020 2020 3/28/2022   PHQ-9 Total Score 1 5 3   Q9: Thoughts of better off dead/self-harm past 2 weeks Not at all Not at all Not at all     Smokin years - 58 1/2 pack a day - 1 pack  Quit 8 years ago   Discussed if we want to do lung cancer screening  [place] decision to differ for now  Reviewed CT chest from      Colonscopy -   Doesn't want this at all  Discussed this is the best choice based on history  Really had a negative experience prior   Hasn't scheduled the previous colonoscopy I  Had ordered due to really not wanting to do it.   Okay with  cologaurd     Vaccines:  Tdap and zoster declined           Review of Systems   Constitutional, HEENT, cardiovascular, pulmonary, gi and gu systems are negative, except as otherwise noted.      Objective    BP (!) 143/91   Pulse 63   Temp 97.9  F (36.6  C) (Oral)   Resp 16   Wt 128.1 kg (282 lb 6.4 oz)   SpO2 98%   BMI 41.17 kg/m    Body mass index is 41.17 kg/m .  Physical Exam   General: Alert and oriented, in no acute distress.  Skin: Warm and dry, no abnormalities noted.  Eyes: Extra-ocular muscles grossly intact, pupils equal.  ENT: Speech intact, nasal passages open, no hearing impairment noted.  Neck: no JVD. Carotid upstrokes brisk no carotid bruits.  Heart: S1S2, RRR. No murmur.   Lungs: CTAB No chest wall deformity and asymmetry, No w/r/r  Vasc: +1 pitting edema BL. No cyanosis, clubbing or edema  Neuro: Gait and station normal, comprehension intact. Gross and fine motor skills intact.   Psychiatric: Mood and affect appear normal.   Extremities: Warm, able to move all four extremities at will.

## 2022-03-28 ENCOUNTER — OFFICE VISIT (OUTPATIENT)
Dept: FAMILY MEDICINE | Facility: CLINIC | Age: 67
End: 2022-03-28
Payer: COMMERCIAL

## 2022-03-28 ENCOUNTER — LAB (OUTPATIENT)
Dept: LAB | Facility: CLINIC | Age: 67
End: 2022-03-28
Attending: INTERNAL MEDICINE
Payer: COMMERCIAL

## 2022-03-28 ENCOUNTER — ANCILLARY PROCEDURE (OUTPATIENT)
Dept: CT IMAGING | Facility: CLINIC | Age: 67
End: 2022-03-28
Attending: INTERNAL MEDICINE
Payer: COMMERCIAL

## 2022-03-28 VITALS
RESPIRATION RATE: 16 BRPM | WEIGHT: 282.4 LBS | TEMPERATURE: 97.9 F | HEART RATE: 63 BPM | OXYGEN SATURATION: 98 % | SYSTOLIC BLOOD PRESSURE: 143 MMHG | BODY MASS INDEX: 41.17 KG/M2 | DIASTOLIC BLOOD PRESSURE: 91 MMHG

## 2022-03-28 VITALS
HEART RATE: 81 BPM | WEIGHT: 282.6 LBS | BODY MASS INDEX: 41.19 KG/M2 | OXYGEN SATURATION: 99 % | TEMPERATURE: 97.4 F | DIASTOLIC BLOOD PRESSURE: 98 MMHG | RESPIRATION RATE: 16 BRPM | SYSTOLIC BLOOD PRESSURE: 148 MMHG

## 2022-03-28 DIAGNOSIS — E11.9 TYPE 2 DIABETES MELLITUS WITHOUT COMPLICATION, WITHOUT LONG-TERM CURRENT USE OF INSULIN (H): ICD-10-CM

## 2022-03-28 DIAGNOSIS — I10 HYPERTENSION, ESSENTIAL: ICD-10-CM

## 2022-03-28 DIAGNOSIS — N18.30 CKD (CHRONIC KIDNEY DISEASE) STAGE 3, GFR 30-59 ML/MIN (H): ICD-10-CM

## 2022-03-28 DIAGNOSIS — N18.31 CKD STAGE G3A/A1, GFR 45-59 AND ALBUMIN CREATININE RATIO <30 MG/G (H): ICD-10-CM

## 2022-03-28 DIAGNOSIS — N40.0 BENIGN PROSTATIC HYPERPLASIA, UNSPECIFIED WHETHER LOWER URINARY TRACT SYMPTOMS PRESENT: ICD-10-CM

## 2022-03-28 DIAGNOSIS — Z12.11 COLON CANCER SCREENING: ICD-10-CM

## 2022-03-28 DIAGNOSIS — R73.03 PRE-DIABETES: ICD-10-CM

## 2022-03-28 DIAGNOSIS — I48.0 PAROXYSMAL ATRIAL FIBRILLATION (H): ICD-10-CM

## 2022-03-28 DIAGNOSIS — F33.0 MILD RECURRENT MAJOR DEPRESSION (H): ICD-10-CM

## 2022-03-28 DIAGNOSIS — I10 HYPERTENSION, ESSENTIAL: Primary | ICD-10-CM

## 2022-03-28 DIAGNOSIS — C49.A0 GIST (GASTROINTESTINAL STROMAL TUMOR), MALIGNANT (H): ICD-10-CM

## 2022-03-28 DIAGNOSIS — I50.32 CHRONIC DIASTOLIC HEART FAILURE (H): ICD-10-CM

## 2022-03-28 DIAGNOSIS — E66.9 OBESITY, UNSPECIFIED CLASSIFICATION, UNSPECIFIED OBESITY TYPE, UNSPECIFIED WHETHER SERIOUS COMORBIDITY PRESENT: ICD-10-CM

## 2022-03-28 DIAGNOSIS — C49.A2 MALIGNANT GASTROINTESTINAL STROMAL TUMOR (GIST) OF STOMACH (H): ICD-10-CM

## 2022-03-28 DIAGNOSIS — N52.9 ERECTILE DYSFUNCTION, UNSPECIFIED ERECTILE DYSFUNCTION TYPE: ICD-10-CM

## 2022-03-28 DIAGNOSIS — G47.33 OSA (OBSTRUCTIVE SLEEP APNEA): ICD-10-CM

## 2022-03-28 LAB
ALBUMIN SERPL-MCNC: 3.6 G/DL (ref 3.4–5)
ALP SERPL-CCNC: 90 U/L (ref 40–150)
ALT SERPL W P-5'-P-CCNC: 23 U/L (ref 0–70)
ANION GAP SERPL CALCULATED.3IONS-SCNC: 6 MMOL/L (ref 3–14)
AST SERPL W P-5'-P-CCNC: 20 U/L (ref 0–45)
BASOPHILS # BLD AUTO: 0 10E3/UL (ref 0–0.2)
BASOPHILS NFR BLD AUTO: 0 %
BILIRUB SERPL-MCNC: 0.6 MG/DL (ref 0.2–1.3)
BUN SERPL-MCNC: 25 MG/DL (ref 7–30)
CALCIUM SERPL-MCNC: 9.6 MG/DL (ref 8.5–10.1)
CHLORIDE BLD-SCNC: 108 MMOL/L (ref 94–109)
CHOLEST SERPL-MCNC: 169 MG/DL
CO2 SERPL-SCNC: 26 MMOL/L (ref 20–32)
CREAT BLD-MCNC: 1.1 MG/DL (ref 0.7–1.3)
CREAT SERPL-MCNC: 1.14 MG/DL (ref 0.66–1.25)
CREAT UR-MCNC: 63 MG/DL
EOSINOPHIL # BLD AUTO: 0 10E3/UL (ref 0–0.7)
EOSINOPHIL NFR BLD AUTO: 0 %
ERYTHROCYTE [DISTWIDTH] IN BLOOD BY AUTOMATED COUNT: 13.4 % (ref 10–15)
GFR SERPL CREATININE-BSD FRML MDRD: 70 ML/MIN/1.73M2
GFR SERPL CREATININE-BSD FRML MDRD: >60 ML/MIN/1.73M2
GLUCOSE BLD-MCNC: 110 MG/DL (ref 70–99)
HBA1C MFR BLD: 5.7 % (ref 0–5.6)
HCT VFR BLD AUTO: 49.1 % (ref 40–53)
HDLC SERPL-MCNC: 67 MG/DL
HGB BLD-MCNC: 15.7 G/DL (ref 13.3–17.7)
IMM GRANULOCYTES # BLD: 0 10E3/UL
IMM GRANULOCYTES NFR BLD: 0 %
LDLC SERPL CALC-MCNC: 92 MG/DL
LYMPHOCYTES # BLD AUTO: 0.5 10E3/UL (ref 0.8–5.3)
LYMPHOCYTES NFR BLD AUTO: 6 %
MCH RBC QN AUTO: 29 PG (ref 26.5–33)
MCHC RBC AUTO-ENTMCNC: 32 G/DL (ref 31.5–36.5)
MCV RBC AUTO: 91 FL (ref 78–100)
MICROALBUMIN UR-MCNC: 50 MG/L
MICROALBUMIN/CREAT UR: 79.37 MG/G CR (ref 0–17)
MONOCYTES # BLD AUTO: 0.1 10E3/UL (ref 0–1.3)
MONOCYTES NFR BLD AUTO: 1 %
NEUTROPHILS # BLD AUTO: 7.3 10E3/UL (ref 1.6–8.3)
NEUTROPHILS NFR BLD AUTO: 93 %
NONHDLC SERPL-MCNC: 102 MG/DL
NRBC # BLD AUTO: 0 10E3/UL
NRBC BLD AUTO-RTO: 0 /100
PLATELET # BLD AUTO: 203 10E3/UL (ref 150–450)
POTASSIUM BLD-SCNC: 4.8 MMOL/L (ref 3.4–5.3)
PROT SERPL-MCNC: 8.2 G/DL (ref 6.8–8.8)
RBC # BLD AUTO: 5.42 10E6/UL (ref 4.4–5.9)
SODIUM SERPL-SCNC: 140 MMOL/L (ref 133–144)
TRIGL SERPL-MCNC: 48 MG/DL
WBC # BLD AUTO: 7.9 10E3/UL (ref 4–11)

## 2022-03-28 PROCEDURE — 82043 UR ALBUMIN QUANTITATIVE: CPT | Performed by: STUDENT IN AN ORGANIZED HEALTH CARE EDUCATION/TRAINING PROGRAM

## 2022-03-28 PROCEDURE — 83036 HEMOGLOBIN GLYCOSYLATED A1C: CPT

## 2022-03-28 PROCEDURE — 36415 COLL VENOUS BLD VENIPUNCTURE: CPT

## 2022-03-28 PROCEDURE — 80053 COMPREHEN METABOLIC PANEL: CPT

## 2022-03-28 PROCEDURE — 80053 COMPREHEN METABOLIC PANEL: CPT | Performed by: PATHOLOGY

## 2022-03-28 PROCEDURE — 99214 OFFICE O/P EST MOD 30 MIN: CPT | Mod: GC | Performed by: STUDENT IN AN ORGANIZED HEALTH CARE EDUCATION/TRAINING PROGRAM

## 2022-03-28 PROCEDURE — 80061 LIPID PANEL: CPT

## 2022-03-28 PROCEDURE — 74177 CT ABD & PELVIS W/CONTRAST: CPT | Performed by: RADIOLOGY

## 2022-03-28 PROCEDURE — 85025 COMPLETE CBC W/AUTO DIFF WBC: CPT

## 2022-03-28 RX ORDER — METOPROLOL SUCCINATE 50 MG/1
50 TABLET, EXTENDED RELEASE ORAL DAILY
Qty: 90 TABLET | Refills: 3 | Status: SHIPPED | OUTPATIENT
Start: 2022-03-28 | End: 2022-08-08

## 2022-03-28 RX ORDER — IOPAMIDOL 755 MG/ML
135 INJECTION, SOLUTION INTRAVASCULAR ONCE
Status: COMPLETED | OUTPATIENT
Start: 2022-03-28 | End: 2022-03-28

## 2022-03-28 RX ORDER — SILDENAFIL 100 MG/1
100 TABLET, FILM COATED ORAL DAILY PRN
Qty: 30 TABLET | Refills: 4 | Status: SHIPPED | OUTPATIENT
Start: 2022-03-28 | End: 2022-08-08

## 2022-03-28 RX ORDER — SILDENAFIL 100 MG/1
100 TABLET, FILM COATED ORAL DAILY PRN
Qty: 30 TABLET | Refills: 4 | Status: SHIPPED | OUTPATIENT
Start: 2022-03-28 | End: 2022-03-28

## 2022-03-28 RX ORDER — LISINOPRIL 40 MG/1
40 TABLET ORAL DAILY
Qty: 90 TABLET | Refills: 3 | Status: SHIPPED | OUTPATIENT
Start: 2022-03-28 | End: 2022-08-08

## 2022-03-28 RX ADMIN — IOPAMIDOL 135 ML: 755 INJECTION, SOLUTION INTRAVASCULAR at 10:32

## 2022-03-28 ASSESSMENT — PATIENT HEALTH QUESTIONNAIRE - PHQ9: SUM OF ALL RESPONSES TO PHQ QUESTIONS 1-9: 3

## 2022-03-28 ASSESSMENT — PAIN SCALES - GENERAL: PAINLEVEL: NO PAIN (0)

## 2022-03-28 NOTE — PROGRESS NOTES
Al is a 67 year old who is being evaluated via a billable telephone visit.      What phone number would you like to be contacted at? [phone]    Phone call duration: >11 minutes               3-29-22     I talked w Mr. Berman, for f/u of a GIST.       Background  In brief, he was in his usual state of health but noted some black stools in 06/2016. In early July 2016 he was admitted for anemia and GI bleeding and had orthostatic symptoms. 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 Endoscopy before that showed some punctate oozing and it is possible that some of the bleeding was coming from the tumor.   -  Surgery was August 2016 and pathology report showed 1 mitosis per 50 high-power fields, a tumor size of 15 cm and negative margins. 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  Interestingly, in contrast to the D842V, in-frame deletions of D842 to H845 are typically sensitive to imatinib.  -        Interval history.     He feels OK overall.    Hes not that active but plans to start PT for his R knee which is longstanding from falling off a ladder in the 1990s.  He gets out some but not to walk for activity and his wt is too high, now in the 280s.     He is doing well and got both vaccine doses and the booster.     He denies being bothered by shortness of breath now though he has had that problem and is felt to likely have HFpEF. He doesn't got up stairs much. He does have a history of chronic congestive heart failure and is on multiple medications.     DM is controlled off medications. Denies neuropathy.     He has sleep apnea and uses a CPAP machine and also has a history of hypertension, depression, diabetes, cardiomegaly, and he was found to be in atrial fibrillation when he was hospitalized for a GI bleed.     He feels his mood is good.  He connects with his PCP fairly frequently.     He had some tiny nonspecific  lung nodules, some adrenal nodules, and nonspecific splenic lesions on past imaging.    A 10-point ROS is otherwise unremarkable.          -  Background PMH, FH, SH  His past history is notable for keloid formation, resection of a rectal polyp, and a crushing foot injury.   He feels that contrast dye created a bit of a rash that left some skin hyperpigmentation on his back.   He is currently single and lives alone. He stopped smoking in 2011, though he was a polysubstance abuser in the past. He no longer drinks alcohol. He is retired from working in a furniture store.   Family history is positive for diabetes and hypertension.   -      On exam he sounded comfortable with a quiet affect.   CHEST: no resp distress.   NEURO: Normal mentation and speech.  PSYCH: mood good     -  CBC, LFT, GNE OK; cr 1.14    -  I reviewed his new images of 3-28-22 and there is no evidence of PD.  He has a history of some stable lung nodules and adrenal nodule.     Formal read:  CT-AP 3-28-22    IMPRESSION: No evidence of residual or recurrent malignancy.    -  No KIT mutation, but a PDGFR I196_V170 deletion.  Interestingly, in contrast to the D842V, in-frame deletions of D842 to H845 are typically sensitive to imatinib.     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  It appears his recurrence risk was on the order of 10-15% over a few years.  I did not think that risk warranted the toxicities of Gleevec, especially given his multiple other problems and medications.      No evidence of disease progression on CT.      The PDGFR mutation is unusual but is a case where gleevec could be useful.  A switch inhibitor could possibly be helpful as well, but with the low mitotic index he may not need it.     He is now almost >5 years out.  We will see him about 1.5 years though there is no exact time frame her.  He may well be cured.  We discussed w could consider no further imaging.    Sept 2023 w/ CT-CAP and labs 2 d  before.       2-heart disease  Noted  He had an echo that showed no structural heart disease, and he was felt to be at high risk for stroke with a CHADS2 score of 4, and he is on xarelto.  His atrial fibrillation, anticoagulation and other issues will be followed by his primary care team and Cardiology.        3-other Dx noted  He will be getting a colonoscopy soon.    All his questions were addressed and he will call if he has others.     -  Sean Freed M.D.  Professor  Hematology, Oncology and Transplantation

## 2022-03-28 NOTE — PATIENT INSTRUCTIONS
Patient Education   Here is the plan from today's visit    Hypertension, essential  Record blood pressures at least once a week when RN is there. Bring to me in 4 weeks so we can review together.   - Lipid panel; Future  - Adult Cardiology Eval  Referral; Future    Chronic diastolic heart failure (H)  Make appointment to see cardiologist, for routine visit.   - Adult Cardiology Eval  Referral; Future    Pre-diabetes  We'll just see the test results and go from there.   - Albumin Random Urine Quantitative with Creat Ratio; Future  - Hemoglobin A1c; Future    Essential hypertension with goal blood pressure less than 140/90  - lisinopril (ZESTRIL) 40 MG tablet; Take 1 tablet (40 mg) by mouth daily  Dispense: 90 tablet; Refill: 3  - metoprolol succinate ER (TOPROL-XL) 50 MG 24 hr tablet; Take 1 tablet (50 mg) by mouth daily  Dispense: 90 tablet; Refill: 3    Chronic heart failure with preserved ejection fraction (H)  - lisinopril (ZESTRIL) 40 MG tablet; Take 1 tablet (40 mg) by mouth daily  Dispense: 90 tablet; Refill: 3    Erectile dysfunction, unspecified erectile dysfunction type  - sildenafil (VIAGRA) 100 MG tablet; Take 1 tablet (100 mg) by mouth daily as needed (sexual dysfunction)  Dispense: 30 tablet; Refill: 4    9. Colon cancer screening  If the cologuard test they don't get back to you, we can try to do the FIT test (stool)   - COLOGUARD([employer])      Please call or return to clinic if your symptoms don't go away.    Follow up plan  No follow-ups on file.    Thank you for coming to [facility] today.  Lab Testing:  **If you had lab testing today and your results are reassuring or normal they will be mailed to you or sent through [employer] within 7 days.   **If the lab tests need quick action we will call you with the results.  **If you are having labs done on a different day, please call [phone] to schedule at [facility] or [phone] for other [facility]  Lab locations. Labs do not offer walk-in appointments.  The phone number we will call with results is # [phone] (home) . If this is not the best number please call our clinic and change the number.  Medication Refills:  If you need any refills please call your pharmacy and they will contact us.   If you need to  your refill at a new pharmacy, please contact the new pharmacy directly. The new pharmacy will help you get your medications transferred faster.   Scheduling:  If you have any concerns about today's visit or wish to schedule another appointment please call our office during normal business hours [phone] (8-5:00 M-F)  If a referral was made to an [facility] specialty provider and you do not get a call from central scheduling, please refer to directions on your visit summary or call our office during normal business hours for assistance.   If a Mammogram was ordered for you at the [facility] call [phone] to schedule or change your appointment.  If you had an XRay/CT/Ultrasound/MRI ordered the number is [phone] to schedule or change your radiology appointment.   [facility] has limited ultrasound appointments available on Wednesdays, if you would like your ultrasound at [facility], please call [phone] to schedule.   Medical Concerns:  If you have urgent medical concerns please call [phone] at any time of the day.    Sarai Ascencio,

## 2022-03-28 NOTE — NURSING NOTE
Chief Complaint   Patient presents with     Blood Draw     labs drawn with piv start by rn.  vs taken     Labs drawn with PIV start by rn.  Pt tolerated well.  VS taken--elevated BPs (see flowsheet) relayed to provider.  Pt states he took BP meds just 15 minutes prior to lab.    Chrissy Jaime RN

## 2022-03-29 ENCOUNTER — VIRTUAL VISIT (OUTPATIENT)
Dept: ONCOLOGY | Facility: CLINIC | Age: 67
End: 2022-03-29
Attending: INTERNAL MEDICINE
Payer: COMMERCIAL

## 2022-03-29 DIAGNOSIS — G47.33 OSA (OBSTRUCTIVE SLEEP APNEA): ICD-10-CM

## 2022-03-29 DIAGNOSIS — E11.9 TYPE 2 DIABETES MELLITUS WITHOUT COMPLICATION, WITHOUT LONG-TERM CURRENT USE OF INSULIN (H): ICD-10-CM

## 2022-03-29 DIAGNOSIS — C49.A2 MALIGNANT GASTROINTESTINAL STROMAL TUMOR (GIST) OF STOMACH (H): Primary | ICD-10-CM

## 2022-03-29 DIAGNOSIS — E66.9 OBESITY, UNSPECIFIED CLASSIFICATION, UNSPECIFIED OBESITY TYPE, UNSPECIFIED WHETHER SERIOUS COMORBIDITY PRESENT: ICD-10-CM

## 2022-03-29 DIAGNOSIS — I10 HYPERTENSION, ESSENTIAL: ICD-10-CM

## 2022-03-29 DIAGNOSIS — I48.0 PAROXYSMAL ATRIAL FIBRILLATION (H): ICD-10-CM

## 2022-03-29 PROCEDURE — 99442 PR PHYSICIAN TELEPHONE EVALUATION 11-20 MIN: CPT | Performed by: INTERNAL MEDICINE

## 2022-03-29 NOTE — LETTER
3/29/2022         RE: Jazz Berman  [id number]  [place] [address]        Dear Colleague,    Thank you for referring your patient, Jazz Berman, to the [facility]. Please see a copy of my visit note below.    Al is a 67 year old who is being evaluated via a billable telephone visit.      What phone number would you like to be contacted at? [phone]    Phone call duration: >11 minutes     3-29-22     I talked w Mr. Berman, for f/u of a GIST.       Background  In brief, he was in his usual state of health but noted some black stools in 06/2016. In early July 2016 he was admitted for anemia and GI bleeding and had orthostatic symptoms. 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 Endoscopy before that showed some punctate oozing and it is possible that some of the bleeding was coming from the tumor.   -  Surgery was August 2016 and pathology report showed 1 mitosis per 50 high-power fields, a tumor size of 15 cm and negative margins. 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   -  No KIT mutation, but a PDGFR B271_E994 deletion.  Interestingly, in contrast to the D842V, in-frame deletions of D842 to H845 are typically sensitive to imatinib.  -     Interval history.     He feels OK overall.    Hes not that active but plans to start PT for his R knee which is longstanding from falling off a ladder in the 1990s.  He gets out some but not to walk for activity and his wt is too high, now in the 280s.     He is doing well and got both vaccine doses and the booster.     He denies being bothered by shortness of breath now though he has had that problem and is felt to likely have HFpEF. He doesn't got up stairs much. He does have a history of chronic congestive heart failure and is on multiple medications.     DM is controlled off medications. Denies neuropathy.     He has sleep apnea and uses a CPAP machine and also has a history of  hypertension, depression, diabetes, cardiomegaly, and he was found to be in atrial fibrillation when he was hospitalized for a GI bleed.     He feels his mood is good.  He connects with his PCP fairly frequently.     He had some tiny nonspecific lung nodules, some adrenal nodules, and nonspecific splenic lesions on past imaging.    A 10-point ROS is otherwise unremarkable.          -  Background PMH, FH, SH  His past history is notable for keloid formation, resection of a rectal polyp, and a crushing foot injury.   He feels that contrast dye created a bit of a rash that left some skin hyperpigmentation on his back.   He is currently single and lives alone. He stopped smoking in 2011, though he was a polysubstance abuser in the past. He no longer drinks alcohol. He is retired from working in a furniture store.   Family history is positive for diabetes and hypertension.   -      On exam he sounded comfortable with a quiet affect.   CHEST: no resp distress.   NEURO: Normal mentation and speech.  PSYCH: mood good     -  CBC, LFT, GNE OK; cr 1.14    -  I reviewed his new images of 3-28-22 and there is no evidence of PD.  He has a history of some stable lung nodules and adrenal nodule.     Formal read:  CT-AP 3-28-22    IMPRESSION: No evidence of residual or recurrent malignancy.    -  No KIT mutation, but a PDGFR D450_J741 deletion.  Interestingly, in contrast to the D842V, in-frame deletions of D842 to H845 are typically sensitive to imatinib.     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  It appears his recurrence risk was on the order of 10-15% over a few years.  I did not think that risk warranted the toxicities of Gleevec, especially given his multiple other problems and medications.      No evidence of disease progression on CT.      The PDGFR mutation is unusual but is a case where gleevec could be useful.  A switch inhibitor could possibly be helpful as well, but with the low mitotic index he  may not need it.     He is now almost >5 years out.  We will see him about 1.5 years though there is no exact time frame her.  He may well be cured.  We discussed w could consider no further imaging.    Sept 2023 w/ CT-CAP and labs 2 d before.       2-heart disease  Noted  He had an echo that showed no structural heart disease, and he was felt to be at high risk for stroke with a CHADS2 score of 4, and he is on xarelto.  His atrial fibrillation, anticoagulation and other issues will be followed by his primary care team and Cardiology.        3-other Dx noted  He will be getting a colonoscopy soon.    All his questions were addressed and he will call if he has others.     -    Again, thank you for allowing me to participate in the care of your patient.      Sincerely,    Sean Freed MD

## 2022-03-30 ENCOUNTER — CARE COORDINATION (OUTPATIENT)
Dept: CARDIOLOGY | Facility: CLINIC | Age: 67
End: 2022-03-30
Payer: COMMERCIAL

## 2022-03-30 NOTE — PROGRESS NOTES
Referral reviewed by HF referral team. It was determined that he does not need to be seen by HF MD. Please schedule as following:      Dept or provider:  General Cardiology  Appt Type: New   Date or Time: Next available.    Reason for visit: Uncontrolled Hypertension and history of diastolic dysfunction    Jose Copeland CMA  Heart Failure, Advanced Heart Failure & CORE  Referral Specialist &     [facility]  Cardiology  Office: [phone]  [phone]

## 2022-03-30 NOTE — PROGRESS NOTES
S/B: Referred by Sarai Ascencio DO Phone: [phone]; Fax: [phone]. [facility] PCP.  Patient was saw Dr. Gill over 3 years ago. He has been Dx with Diastolic Dysfunction.      Sending to HF RN to review    Plan:

## 2022-04-15 ENCOUNTER — TELEPHONE (OUTPATIENT)
Dept: CARDIOLOGY | Facility: CLINIC | Age: 67
End: 2022-04-15
Payer: COMMERCIAL

## 2022-04-15 PROCEDURE — 99207 PR NO CHARGE NURSE ONLY: CPT

## 2022-04-15 NOTE — TELEPHONE ENCOUNTER
"  Oban Study Pre-Visit Call      Study description:   Multiconditions PPG Study. The purpose of this research study is to collect data related to health for the development of mobile technologies. This data will include physiological signal recordings from medical devices and data collection software on Apple Watches (\"study watches\"). This study is not to provide any treatment nor assess safety and effectiveness, but rather to collect information for research and  purposes.     Jazz Berman a 67 year old male, was called today to discuss participation in the Oban study. The following was reviewed with the patient.       You agree to comply with COVID precautionary measures required by local public health ordinances, workplace health and safety protocols, and/or the Study Team. Such measure may include, for example, wearing a mask, complying with social distancing guidelines, and/ or providing evidence of negative COVID-19 test result Yes       You are fully vaccinated per the most recent CDC guidelines Yes      You do not have any of the following symptoms: fever or chills; difficulty breathing; sustained loss of taste smell, or appetite. Yes      You do not have any of the following unexplained symptoms: fatigue or nausea; whole body muscle aches; new or unexpected headache; sore throat; congestion or runny nose; diarrhea or vomiting Yes      You have not had close contact with someone who is suspected or confirmed to have active COVID-19 in the last 14 days.Yes      Reminders    Please come 10 minutes early for your scheduled appointment time.    Bring your vaccination card with you to your scheduled appointment.     No smoking 2 hours prior to your appointment time.    Wear loose shirt.    Eat before you arrive.       Conchis Hermosillo RN       "

## 2022-04-18 ENCOUNTER — OFFICE VISIT (OUTPATIENT)
Dept: CARDIOLOGY | Facility: CLINIC | Age: 67
End: 2022-04-18
Payer: COMMERCIAL

## 2022-04-18 ENCOUNTER — ALLIED HEALTH/NURSE VISIT (OUTPATIENT)
Dept: CARDIOLOGY | Facility: CLINIC | Age: 67
End: 2022-04-18

## 2022-04-18 VITALS
HEIGHT: 69 IN | OXYGEN SATURATION: 98 % | RESPIRATION RATE: 20 BRPM | DIASTOLIC BLOOD PRESSURE: 98 MMHG | SYSTOLIC BLOOD PRESSURE: 150 MMHG | BODY MASS INDEX: 41.11 KG/M2 | WEIGHT: 277.56 LBS | HEART RATE: 71 BPM | TEMPERATURE: 97.7 F

## 2022-04-18 DIAGNOSIS — Z00.6 EXAMINATION OF PARTICIPANT OR CONTROL IN CLINICAL RESEARCH: ICD-10-CM

## 2022-04-18 DIAGNOSIS — I48.91 ATRIAL FIBRILLATION, UNSPECIFIED TYPE (H): Primary | ICD-10-CM

## 2022-04-18 PROCEDURE — 99207 PR NO CHARGE-RESEARCH SERVICE: CPT

## 2022-04-18 PROCEDURE — 93005 ELECTROCARDIOGRAM TRACING: CPT

## 2022-04-18 PROCEDURE — 99207 PR NO CHARGE NURSE ONLY: CPT

## 2022-04-18 PROCEDURE — 93000 ELECTROCARDIOGRAM COMPLETE: CPT | Performed by: INTERNAL MEDICINE

## 2022-04-18 NOTE — PROGRESS NOTES
"   Oban Study Consent On-Site Visit      Study description:   Multiconditions PPG Study. The purpose of this research study is to collect data related to health for the development of mobile technologies. This data will include physiological signal recordings from medical devices and data collection software on Apple Watches (\"study watches\"). This study is not to provide any treatment nor assess safety and effectiveness, but rather to collect information for research and  purposes.     Jazz Berman a 67 year old male, was onsite today to discuss participation in the Oban study.   The consent form was reviewed with the patient.     The review of the study included:    Study Purpose     COVID-19 Criteria     On-Site Study Participation    Participant Responsibilities      Study Data and Devices    Benefits and Risks of Participation    Compensation and Costs of Participation    Voluntary Participation    Confidentiality     Injury and Legal Rights    The subject was queried in regards to his willingness to continue and his questions were answered to his satisfaction.     The patient has given his agreement to volunteer to participate in the above noted study.     The In-Lab consent form and HIPPA form version 28-Mar-2022 was signed 18-APR-2022 onsite in the Clinic Research Unit.     A copy of the Oban consent will be placed in subject's medical record.  A copy of the consent form was given to the subject today.    Study data is directly entered into Epic per protocol.     No study procedures were done prior to Jazz Berman providing informed consent.       Sara Behmanesh "
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
## 2022-04-18 NOTE — PROGRESS NOTES
Leigh Inclusion/Exclusion Criteria:     Study Name: Leigh  : Hayden Ny MD    Protocol version: 3.0 19-JAN-2022     Criteria #  Inclusion Criterita (ALL MUST BE YES)  YES/NO/N/A   1   Male and female subjects at least 18 years old at the time of the screening visit.  Yes   2   Wrist circumference and 120mm-245mm (inclusive).  Yes   3   Ability to understand and provide written informed consent.  Yes   4   Willing and able to comply with study procedures, activities, and duration as described in the ICF. Yes   5  Documentation provided demonstrating COVID-19 up to date vaccinated status as per the current CDC guidelines.  Yes   6   Didn't smoke at least 2 hours before screening (or study procedures).  Yes   7   Neither subject, nor any individuals living with subject, have had new development in the following within the last 14 days prior to study screening:        a. Have failed to comply with any country, state, and local travel restrictions.         b. Have had any unexpected flu-like symptoms (such as fever, chills, cough, shortness of breath, diarrhea, sore throat, runny nose, or trouble breathing).        c. Have had any contact with people confirmed COVID-19.         d. Have been confirmed to have COVID-19 and have not subsequently received a negative COVID-19 test result.    Yes   8   If Cohort 1 (In-Lab Cardiac Conditions):         a. Indication of a rhythm disorder (dated up to 5 years ago) as outlined in Table A (see Protocol page 9), and be present at the time of screening.     Yes   9   If Cohort 2 (In-Lab Respiratory Conditions):          a. Prior diagnosis of one of the following conditions, within 5 years: 1) Moderate (GOLD Stage 2) COPD, 2) Severe or Very Severe (GOLD Stage 3 or 4) COPD, 3) Idiopathic pulmonary fibrosis.          b. Record of spirometry FEV% result (within 5 years) are available.    NA   10   If Cohort 3 (At-Home respiratory Conditions):         a. Prior  diagnosis of one of the following conditions, within 5 years: 1) Moderate (GOLD Stage 2) COPD, 2) Severe or Very Severe (GOLD Stage 3 or 4) COPD, 3) Idiopathic pulmonary fibrosis.          b. Record of spirometry FEV% result (within 5 years) are available.          c. Willing and able to use a study provided iPhone and navigate study Sloan flow.          D. Stable WIFI at home and are able to connect it to study iPhones   NA       Criteria # Exclusion Criteria (ALL MUST BE NO) YES/NO/N/A   1   Individuals with severe contact allergies to standard adhesives, or other materials found in pulse oximetry sensors, ECG electrodes, respiration monitor electrodes, wearables device bands and watch surfaces.  No   2   Individuals that do not have at least 2 intact fingers (excluding thumb, *pinky will be excluded only for cohort 1 and cohort 2) on non-preferred hand to wear a watch.  No   3   Open wound(s) or active infections on wrists at study watch wear locations or where the ECG electrodes may be placed.  No   4   Physical disability that prevents safe and adequate testing.  No   5   Individuals with a pacemaker or an automated implantable cardioverter-defibrillator (AICD).  No   6   Individuals with physical scars, tattoos, or other skin markings on wrists where sensors or finger sensor are to be worn.  No   7   Individuals with clinically significant hand tremors, as judged by a Study Investigator.  No   8   Pregnant women.     NA   9   Subjects with any medical history, physical exam, vital sign or any other study procedure finding/assessment that in the opinion of the Investigator could compromise subject safety during study participation or interfere with the study integrity and/or the accurate assessment of the study objectives.  No   10   Presence of skin conditions or disease at the fingers of SpO2% application sites that could interfere with SpO2% sensor placement or the accuracy of measurement. Such conditions  include, but are not limited to: extensive scarring, skin lesions, redness, infection or edema at target measurement sites.   No   11   Presence of long fingernails that interfere with the placement of the SpO2% sensor or nail polish at the fingers of SpO2% application sites.  No   12   Medical history or physical assessment finding that makes the subject inappropriate for participation, according to the investigator.  No     Patient does fulfill study inclusion criteria and no exclusion criteria are found.     Hayden Ny MD    18-APR-2022    Sara Behmanesh

## 2022-04-18 NOTE — PROGRESS NOTES
"Leigh Study End Note    Study Description:   Multiconditions PPG Study. The purpose of this research study is to collect data related to health for the development of mobile technologies. This data will include physiological signal recordings from medical devices and data collection software on Apple Watches (\"study watches\"). This study is not to provide any treatment nor assess safety and effectiveness, but rather to collect information for research and  purposes.     Adverse Events & Con Med Assessment Performed?   [x]     Did the Subject Complete the Study? No     If no, Termination Reason: Other (Specify): Restroom break needed, thus study procedures were stopped.      Study Termination/Completion Date: 18-APR-2022    Sara Behmanesh    "

## 2022-04-19 LAB
ATRIAL RATE - MUSE: 92 BPM
DIASTOLIC BLOOD PRESSURE - MUSE: NORMAL MMHG
INTERPRETATION ECG - MUSE: NORMAL
P AXIS - MUSE: NORMAL DEGREES
PR INTERVAL - MUSE: NORMAL MS
QRS DURATION - MUSE: 76 MS
QT - MUSE: 410 MS
QTC - MUSE: 433 MS
R AXIS - MUSE: 38 DEGREES
SYSTOLIC BLOOD PRESSURE - MUSE: NORMAL MMHG
T AXIS - MUSE: 65 DEGREES
VENTRICULAR RATE- MUSE: 67 BPM

## 2022-04-22 ENCOUNTER — DOCUMENTATION ONLY (OUTPATIENT)
Dept: FAMILY MEDICINE | Facility: CLINIC | Age: 67
End: 2022-04-22
Payer: COMMERCIAL

## 2022-04-22 NOTE — PROGRESS NOTES
"When opening a documentation only encounter, be sure to enter in \"Chief Complaint\" Forms and in \" Comments\" Title of form, description if needed.    Al is a 67 year old  male  Form received via: Fax  Form now resides in: Provider Wu Ramirez                  "

## 2022-05-09 LAB — COLOGUARD-ABSTRACT: NEGATIVE

## 2022-05-26 ENCOUNTER — OFFICE VISIT (OUTPATIENT)
Dept: FAMILY MEDICINE | Facility: CLINIC | Age: 67
End: 2022-05-26
Payer: COMMERCIAL

## 2022-05-26 VITALS
DIASTOLIC BLOOD PRESSURE: 77 MMHG | SYSTOLIC BLOOD PRESSURE: 116 MMHG | HEART RATE: 55 BPM | TEMPERATURE: 98.5 F | HEIGHT: 69 IN | RESPIRATION RATE: 16 BRPM | WEIGHT: 282.2 LBS | BODY MASS INDEX: 41.8 KG/M2 | OXYGEN SATURATION: 98 %

## 2022-05-26 DIAGNOSIS — I10 HYPERTENSION, ESSENTIAL: Primary | ICD-10-CM

## 2022-05-26 DIAGNOSIS — Z23 HIGH PRIORITY FOR 2019-NCOV VACCINE: ICD-10-CM

## 2022-05-26 PROCEDURE — 99214 OFFICE O/P EST MOD 30 MIN: CPT | Mod: 25 | Performed by: STUDENT IN AN ORGANIZED HEALTH CARE EDUCATION/TRAINING PROGRAM

## 2022-05-26 NOTE — PATIENT INSTRUCTIONS
Blood pressure log is looking great. We will not make changes to current blood pressure medications, and plan for follow up in the fall.     Please contact cardiology to make an appointment for follow up on your A fib and heart failure.     Cardiology phone number: [phone]

## 2022-05-26 NOTE — PROGRESS NOTES
Preceptor Attestation:   Patient seen, evaluated and discussed with the resident. I have verified the content of the note, which accurately reflects my assessment of the patient and the plan of care.   Supervising Physician:  Zachary Batres MD

## 2022-05-26 NOTE — Clinical Note
Flex Hampton,  Did this person get his 4th COVID-19 booster? I can't see it recorded so I wasn't sure. Do you need me to order it if he got it?   Rosey Ascencio, DO Family Medicine PGY-2 [facility], [facility]  Pronouns: She/Hers

## 2022-05-26 NOTE — PROGRESS NOTES
Assessment & Plan     Hypertension, essential   Better controlled but not at goal. Patient has nurse visits every Tuesday and brought in sheet with pressures taken over the last 3 months ranging 118-130s systolic and 80s-90s diastolic. Will continue patient on current medication regimen for now. Can consider increasing Amlodipine in the future if they become less well controlled. Pt also on Viagra which significantly does appear to drop his blood pressures, risk vs benefit will just continue current dose with mutual decision making with patient.   - Lisinopril 40 mg daily  - Amlodipine 5 mg daily    Paroxysmal Afib  Chronic diastolic heart failure   Patient has a history of diastolic heart failure and paroxysmal atrial fibrillation without close cardiology follow up. Referred to cardiology at last visit. Notes indicate that patient was attempted to be contacted to schedule this, however phone number was out of service. Verified with patient today that cell phone number in chart is up to date, and provided cardiology phone number so he could schedule an appointment with them. Patient taking medications listed below and and does not require refills at this time.   - Metoprolol succinate ER 50 mg Q24H  - Apixaban 5 mg BID  - Furosemide 40 mg daily     CKD stage G3a/A1, GFR 45-59 and albumin creatinine ratio <30 mg/g (H)  Urine albumin up at 79.3 at last check. Will continue to optimize blood pressure control for now, and continue on lisinopril. Can consider nephrology referral in the future if kidney function worsens.     Health Maintenance   COVID-19 prevention   Patient due for COVID-19 booster today, and will receive at this appointment. Informed he is due for Tdap and defers for future appointment. Patient has received zoster vaccine since last visit, will be due for another one before July 2022.       Yi Conley, MS4  [employer]     I was present with the medical student who  "participated in the service and in the documentation of this note. I have verified the history and personally performed the physical exam and medical decision making, and have verified the content of the note, which accurately reflects my assessment of the patient and the plan of care.    Rosey Ascencio DO  Family Medicine PGY-2  [facility], [facility]   Pronouns: She/Hers    Review of external notes as documented elsewhere in note  Review of the result(s) of each unique test - previous BMP, microalbumin, A1c, lipid panel, EKG, Echo  Diagnosis or treatment significantly limited by social determinants of health - see HPI    Subjective   Al is a 67 year old who presents for the following health issues:    HPI     Blood pressure  RN coming and taking every tues: ranging -130s DBP 80s-90s  Here 116/77  Goal: <130/80  Takes pills 8 am every morning   Amlodipine 5 mg   Lisinopril 40 mg   Metoprolol 50 mg   Furosemide 40 mg - every day      Pre diabetes  Last A1c  3/24/2021 was 5.8   Albumin elevated 1 mo ago at 79.37  CMP last year normal including LFTs  Lipid  Atorvastatin 20 mg daily     Mood: stable     Health maintenance   -Cologaurd 5/9/22 negative  - Received Zoster: Due for last Zoster before July 12th   - Would like COVID booster   - Due for TDAP    Review of Systems   ROS otherwise negative        Objective    /77   Pulse 55   Temp 98.5  F (36.9  C) (Oral)   Resp 16   Ht 1.753 m (5' 9\")   Wt 128 kg (282 lb 3.2 oz)   SpO2 98%   BMI 41.67 kg/m    Body mass index is 41.67 kg/m .     Physical Exam   GENERAL: healthy, alert and no distress  EYES: Eyes grossly normal to inspection, conjunctivae and sclerae normal  HENT: Head atraumatic, normocephalic. Hearing grossly intact  RESP: lungs clear to auscultation - no rales, rhonchi or wheezes  CV: irregular rate and rhythm , no murmur, click or rub, no peripheral edema and RUE pulse strong  MS: no gross musculoskeletal " defects noted, extremities intact and atraumatic

## 2022-05-27 PROCEDURE — 0054A COVID-19,PF,PFIZER (12+ YRS): CPT | Performed by: STUDENT IN AN ORGANIZED HEALTH CARE EDUCATION/TRAINING PROGRAM

## 2022-05-27 PROCEDURE — 91305 COVID-19,PF,PFIZER (12+ YRS): CPT | Performed by: STUDENT IN AN ORGANIZED HEALTH CARE EDUCATION/TRAINING PROGRAM

## 2022-06-14 DIAGNOSIS — I10 ESSENTIAL HYPERTENSION WITH GOAL BLOOD PRESSURE LESS THAN 140/90: ICD-10-CM

## 2022-06-14 NOTE — TELEPHONE ENCOUNTER
"Request for medication refill:  amLODIPine (NORVASC) 5 MG tablet  Providers if patient needs an appointment and you are willing to give a one month supply please refill for one month and  send a letter/MyChart using \".SMILLIMITEDREFILL\" .smillimited and route chart to \"[facility] \" (Giving one month refill in non controlled medications is strongly recommended before denial)    If refill has been denied, meaning absolutely no refills without visit, please complete the smart phrase \".smirxrefuse\" and route it to the \"[facility] MED REFILLS\"  pool to inform the patient and the pharmacy.    Ira Ramirez        "

## 2022-06-15 ENCOUNTER — MYC MEDICAL ADVICE (OUTPATIENT)
Dept: FAMILY MEDICINE | Facility: CLINIC | Age: 67
End: 2022-06-15
Payer: COMMERCIAL

## 2022-06-15 RX ORDER — AMLODIPINE BESYLATE 5 MG/1
5 TABLET ORAL DAILY
Qty: 90 TABLET | Refills: 1 | Status: SHIPPED | OUTPATIENT
Start: 2022-06-15 | End: 2022-08-31

## 2022-06-15 NOTE — TELEPHONE ENCOUNTER
RN called patient to make him aware that his medication was refilled and that we will be scheduling him a visit for his 2nd shingles shot. Patient verbalized understanding. Calling [facility] on [address] to transfer medication to their pharmacy vs. [place].     Charlotte Hernandez RN

## 2022-06-15 NOTE — TELEPHONE ENCOUNTER
----- Message from Sarai Ascencio DO sent at 6/15/2022  7:14 AM CDT -----  Hey Triage RN team,    I was refilling this patient's amlodipine (which I did), and I noticed that he needs his 2nd dose of Shingrix before Mid-July. Could we call him and let him know this information? I don't think he knows he needs the second dose and times running short!    Rosey Ascencio DO  Family Medicine PGY-2  [facility], [facility]   Pronouns: She/Hers

## 2022-08-08 ENCOUNTER — OFFICE VISIT (OUTPATIENT)
Dept: FAMILY MEDICINE | Facility: CLINIC | Age: 67
End: 2022-08-08
Payer: COMMERCIAL

## 2022-08-08 VITALS
HEART RATE: 51 BPM | DIASTOLIC BLOOD PRESSURE: 77 MMHG | BODY MASS INDEX: 41.8 KG/M2 | SYSTOLIC BLOOD PRESSURE: 112 MMHG | WEIGHT: 282.2 LBS | TEMPERATURE: 98.2 F | OXYGEN SATURATION: 98 % | HEIGHT: 69 IN

## 2022-08-08 DIAGNOSIS — E66.01 CLASS 2 SEVERE OBESITY DUE TO EXCESS CALORIES WITH SERIOUS COMORBIDITY AND BODY MASS INDEX (BMI) OF 39.0 TO 39.9 IN ADULT (H): ICD-10-CM

## 2022-08-08 DIAGNOSIS — I50.32 CHRONIC DIASTOLIC HEART FAILURE (H): ICD-10-CM

## 2022-08-08 DIAGNOSIS — I48.0 PAROXYSMAL ATRIAL FIBRILLATION (H): ICD-10-CM

## 2022-08-08 DIAGNOSIS — E66.812 CLASS 2 SEVERE OBESITY DUE TO EXCESS CALORIES WITH SERIOUS COMORBIDITY AND BODY MASS INDEX (BMI) OF 39.0 TO 39.9 IN ADULT (H): ICD-10-CM

## 2022-08-08 DIAGNOSIS — G47.33 OSA (OBSTRUCTIVE SLEEP APNEA): ICD-10-CM

## 2022-08-08 DIAGNOSIS — N52.9 ERECTILE DYSFUNCTION, UNSPECIFIED ERECTILE DYSFUNCTION TYPE: ICD-10-CM

## 2022-08-08 DIAGNOSIS — I10 HYPERTENSION, ESSENTIAL: Primary | ICD-10-CM

## 2022-08-08 DIAGNOSIS — N18.31 CKD STAGE G3A/A1, GFR 45-59 AND ALBUMIN CREATININE RATIO <30 MG/G (H): ICD-10-CM

## 2022-08-08 DIAGNOSIS — Z23 ENCOUNTER FOR IMMUNIZATION: ICD-10-CM

## 2022-08-08 DIAGNOSIS — R73.03 PRE-DIABETES: ICD-10-CM

## 2022-08-08 PROCEDURE — 90471 IMMUNIZATION ADMIN: CPT | Performed by: STUDENT IN AN ORGANIZED HEALTH CARE EDUCATION/TRAINING PROGRAM

## 2022-08-08 PROCEDURE — 90715 TDAP VACCINE 7 YRS/> IM: CPT | Performed by: STUDENT IN AN ORGANIZED HEALTH CARE EDUCATION/TRAINING PROGRAM

## 2022-08-08 PROCEDURE — 99214 OFFICE O/P EST MOD 30 MIN: CPT | Mod: 25 | Performed by: STUDENT IN AN ORGANIZED HEALTH CARE EDUCATION/TRAINING PROGRAM

## 2022-08-08 RX ORDER — ATORVASTATIN CALCIUM 40 MG/1
40 TABLET, FILM COATED ORAL DAILY
Qty: 90 TABLET | Refills: 0 | Status: SHIPPED | OUTPATIENT
Start: 2022-08-08 | End: 2022-11-14

## 2022-08-08 RX ORDER — METOPROLOL SUCCINATE 50 MG/1
50 TABLET, EXTENDED RELEASE ORAL DAILY
Qty: 90 TABLET | Refills: 3 | Status: SHIPPED | OUTPATIENT
Start: 2022-08-08 | End: 2022-08-31

## 2022-08-08 RX ORDER — SILDENAFIL 100 MG/1
100 TABLET, FILM COATED ORAL DAILY PRN
Qty: 30 TABLET | Refills: 4 | Status: SHIPPED | OUTPATIENT
Start: 2022-08-08 | End: 2022-11-14

## 2022-08-08 RX ORDER — SILDENAFIL 100 MG/1
100 TABLET, FILM COATED ORAL DAILY PRN
Qty: 30 TABLET | Refills: 4 | Status: SHIPPED | OUTPATIENT
Start: 2022-08-08 | End: 2022-08-08

## 2022-08-08 RX ORDER — LISINOPRIL 40 MG/1
40 TABLET ORAL DAILY
Qty: 90 TABLET | Refills: 3 | Status: SHIPPED | OUTPATIENT
Start: 2022-08-08 | End: 2023-03-01

## 2022-08-08 NOTE — PATIENT INSTRUCTIONS
Patient Education   Here is the plan from today's visit    Hypertension, essential  - metoprolol succinate ER (TOPROL XL) 50 MG 24 hr tablet; Take 1 tablet (50 mg) by mouth daily  Dispense: 90 tablet; Refill: 3  - lisinopril (ZESTRIL) 40 MG tablet; Take 1 tablet (40 mg) by mouth daily  Dispense: 90 tablet; Refill: 3  - atorvastatin (LIPITOR) 40 MG tablet; Take 1 tablet (40 mg) by mouth daily  Dispense: 90 tablet; Refill: 0    Chronic diastolic heart failure (H) / Atrial fibrillation   - metoprolol succinate ER (TOPROL XL) 50 MG 24 hr tablet; Take 1 tablet (50 mg) by mouth daily  Dispense: 90 tablet; Refill: 3    Erectile dysfunction, unspecified erectile dysfunction type  - sildenafil (VIAGRA) 100 MG tablet; Take 1 tablet (100 mg) by mouth daily as needed (sexual dysfunction)  Dispense: 30 tablet; Refill: 4    GREG (obstructive sleep apnea) on CPAP  Wear CPAP machine      Paroxysmal atrial fibrillation (H)  See heart doctors - see appointment time!    Cholesterol medication   - atorvastatin (LIPITOR) 40 MG tablet; Take 1 tablet (40 mg) by mouth daily  Dispense: 90 tablet; Refill: 0      Please call or return to clinic if your symptoms don't go away.    Follow up plan  Return if symptoms worsen or fail to improve.    Thank you for coming to [facility] today.  Lab Testing:  **If you had lab testing today and your results are reassuring or normal they will be mailed to you or sent through [facility] within 7 days.   **If the lab tests need quick action we will call you with the results.  **If you are having labs done on a different day, please call [phone] to schedule at [facility] or [phone] for other [facility] locations. Labs do not offer walk-in appointments.  The phone number we will call with results is # [phone] (home) . If this is not the best number please call our clinic and change the number.  Medication Refills:  If you need any refills please call your pharmacy and  they will contact us.   If you need to  your refill at a new pharmacy, please contact the new pharmacy directly. The new pharmacy will help you get your medications transferred faster.   Scheduling:  If you have any concerns about today's visit or wish to schedule another appointment please call our office during normal business hours [phone] (8-5:00 M-F)  If a referral was made to an [facility] specialty provider and you do not get a call from central scheduling, please refer to directions on your visit summary or call our office during normal business hours for assistance.   If a Mammogram was ordered for you at the [facility] call [phone] to schedule or change your appointment.  If you had an XRay/CT/Ultrasound/MRI ordered the number is [phone] to schedule or change your radiology appointment.   [facility] has limited ultrasound appointments available on Wednesdays, if you would like your ultrasound at [facility], please call [phone] to schedule.   Medical Concerns:  If you have urgent medical concerns please call [phone] at any time of the day.    Sarai Ascencio,

## 2022-08-08 NOTE — PROGRESS NOTES
"  Assessment & Plan     Hypertension, essential  CKD stage G3a/A1, GFR 45-59 and albumin creatinine ratio <30 mg/g (H)  Pt's creatinine stable at 1.14 (baseline 1.1-1.18). Continue to work on tight blood pressure control. At goal of <130/80 today; previously has not been at goal and patient reluctant to have BP goal as 130/80 (vs.  Historically what he has been told of goal <140/90). Will continue current medications as prescribed as pt did meet goal today.   - lisinopril (ZESTRIL) 40 MG tablet  Dispense: 90 tablet; Refill: 3    Erectile dysfunction, unspecified erectile dysfunction type  Works well. No side effects.   - sildenafil (VIAGRA) 100 MG tablet  Dispense: 30 tablet; Refill: 4    GRGE (obstructive sleep apnea) on CPAP  Continue CPAP, pt states wears routinely.     Body mass index (BMI) of 40.0-44.9 in adult (H)  Carrying extra weight  Motivational interviewing around nutrition and exercise today.     Paroxysmal atrial fibrillation (H)  Continue eliquis and beta-blocker, to see cardiology at the end of August 2022. Concern for some diastolic heart failure, reviewed Echos. No chest pain, palpitations, syncope.   - metoprolol succinate ER (TOPROL XL) 50 MG 24 hr tablet  Dispense: 90 tablet; Refill: 3    Pre-diabetes  ASCVD risk score 19.4%  A1c of 5.7 on 3/28/2022. ASCVD risk score 19.4%; patient has been reluctant to do statin historically, has tolerated 20 mg, will increase to 40 mg today to be on a moderate intensity statin.   - atorvastatin (LIPITOR) 40 MG tablet  Dispense: 90 tablet; Refill: 0    Encounter for immunization  - TDAP VACCINE (Adacel, Boostrix)  [[id number]]    Review of the result(s) of each unique test - lipid panel, A1c, BMP, urine/creat ratio, echos  Prescription drug management      BMI:   Estimated body mass index is 41.18 kg/m  as calculated from the following:    Height as of this encounter: 1.763 m (5' 9.41\").    Weight as of this encounter: 128 kg (282 lb 3.2 oz).   Weight management " plan: Discussed healthy diet and exercise guidelines    Return if symptoms worsen or fail to improve.    Rosey Ascencio DO  Family Medicine PGY-3  [facility]   Pronouns: She/Hers      Subjective   Al is a 67 year old, presenting for the following health issues:  Refill Request (Pt is here to refill Viagra medication. Pt does not remember which other medication to refill on. Pt will call later on in the week to notify provider.)      HPI     Needs refills on medications  Metoprolol   Lisonpril   Taking blood pressure medications as prescribed    Blood pressure  112/77 today   Getting same numbers at home - not lately systolic >140     Viagra - works well for ED needs refill     Prediabetes  Albumin in urine  Baseline increased creatinine   A1c 5.7 3/28/2022   Talked to nurionist a long time ago  Goal to get to walking, want to rating and 5, confidence 6         Goal to walk 3 times a week, at least 30 minutes         Planning on doing for a long time         Maybe walk [place]   Gym closed     Cardiology appointment 8/31/2022   Reviewed Echo 10/01/2018   Global and regional left ventricular function is normal with an EF of 55-60%.  Diastolic function not assessed due to atrial fibrillation.  Right ventricular function, chamber size, wall motion, and thickness are  normal.  Severe biatrial enlargement is present.  Ascending aorta 4.3 cm.  Sinuses of Valsalva 4.2 cm.  The inferior vena cava is normal.  No pericardial effusion is present.    Mood been great recently   Hates the heat     Stopped 8-9 years ago tobacco  No more alcohol     The 10-year ASCVD risk score (Bliss TRICIA Jr., et al., 2013) is: 19.4%    Values used to calculate the score:      Age: 67 years      Sex: Male      Is Non- : No      Diabetic: Yes      Tobacco smoker: No      Systolic Blood Pressure: 112 mmHg      Is BP treated: Yes      HDL Cholesterol: 67 mg/dL      Total Cholesterol:  "169 mg/dL    Review of Systems   Constitutional, HEENT, cardiovascular, pulmonary, gi and gu systems are negative, except as otherwise noted.      Objective    /77   Pulse 51   Temp 98.2  F (36.8  C) (Oral)   Ht 1.763 m (5' 9.41\")   Wt 128 kg (282 lb 3.2 oz)   SpO2 98%   BMI 41.18 kg/m    Body mass index is 41.18 kg/m .     Physical Exam   General: Alert and oriented, in no acute distress.  Skin: Warm and dry, no abnormalities noted.  Eyes: Extra-ocular muscles grossly intact, pupils equal.  ENT: Speech intact, nasal passages open, no hearing impairment noted.  Neck: no JVD. Carotid upstrokes brisk no carotid bruits.  Heart: S1S2, RRR. No murmur.   Lungs: CTAB No chest wall deformity and asymmetry, No w/r/r  Vasc: +1 pitting edema BL. No cyanosis, clubbing or edema  Neuro: Gait and station normal, comprehension intact. Gross and fine motor skills intact.   Psychiatric: Mood and affect appear normal.   Extremities: Warm, able to move all four extremities at will.    Office Visit on 04/18/2022   Component Date Value Ref Range Status     Ventricular Rate 04/18/2022 67  BPM Final     Atrial Rate 04/18/2022 92  BPM Final     QRS Duration 04/18/2022 76  ms Final     QT 04/18/2022 410  ms Final     QTc 04/18/2022 433  ms Final     R AXIS 04/18/2022 38  degrees Final     T Axis 04/18/2022 65  degrees Final     Interpretation ECG 04/18/2022    Final                    Value:Atrial fibrillation with premature ventricular or aberrantly conducted complexes  Abnormal ECG  When compared with ECG of 15-JUL-2016 22:07,  No significant change was found  Confirmed by AMBER BOLIVAR, LES LOC:ROXY ([id number]) on 4/19/2022 3:43:49 PM                 .  ..  "

## 2022-08-09 PROBLEM — E11.22 TYPE 2 DIABETES MELLITUS WITH STAGE 3A CHRONIC KIDNEY DISEASE, WITHOUT LONG-TERM CURRENT USE OF INSULIN (H): Status: RESOLVED | Noted: 2017-10-12 | Resolved: 2022-08-09

## 2022-08-09 PROBLEM — N18.31 TYPE 2 DIABETES MELLITUS WITH STAGE 3A CHRONIC KIDNEY DISEASE, WITHOUT LONG-TERM CURRENT USE OF INSULIN (H): Status: RESOLVED | Noted: 2017-10-12 | Resolved: 2022-08-09

## 2022-08-25 DIAGNOSIS — I50.32 CHRONIC HEART FAILURE WITH PRESERVED EJECTION FRACTION (H): ICD-10-CM

## 2022-08-25 RX ORDER — FUROSEMIDE 40 MG
TABLET ORAL
Qty: 90 TABLET | Refills: 0 | Status: SHIPPED | OUTPATIENT
Start: 2022-08-25 | End: 2022-11-28

## 2022-08-26 DIAGNOSIS — I48.0 PAROXYSMAL ATRIAL FIBRILLATION (H): ICD-10-CM

## 2022-08-26 NOTE — TELEPHONE ENCOUNTER
"Request for medication refill:  apixaban ANTICOAGULANT (ELIQUIS ANTICOAGULANT) 5 MG tablet  Providers if patient needs an appointment and you are willing to give a one month supply please refill for one month and  send a letter/MyChart using \".SMILLIMITEDREFILL\" .smillimited and route chart to \"[facility] \" (Giving one month refill in non controlled medications is strongly recommended before denial)    If refill has been denied, meaning absolutely no refills without visit, please complete the smart phrase \".smirxrefuse\" and route it to the \"[facility] MED REFILLS\"  pool to inform the patient and the pharmacy.    Ira Ramirez        "

## 2022-08-30 ENCOUNTER — CARE COORDINATION (OUTPATIENT)
Dept: CARDIOLOGY | Facility: CLINIC | Age: 67
End: 2022-08-30

## 2022-08-30 DIAGNOSIS — I50.32 CHRONIC DIASTOLIC HEART FAILURE (H): Primary | ICD-10-CM
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
## 2022-08-31 ENCOUNTER — LAB (OUTPATIENT)
Dept: LAB | Facility: CLINIC | Age: 67
End: 2022-08-31
Payer: COMMERCIAL

## 2022-08-31 ENCOUNTER — OFFICE VISIT (OUTPATIENT)
Dept: CARDIOLOGY | Facility: CLINIC | Age: 67
End: 2022-08-31
Attending: STUDENT IN AN ORGANIZED HEALTH CARE EDUCATION/TRAINING PROGRAM
Payer: COMMERCIAL

## 2022-08-31 VITALS
OXYGEN SATURATION: 97 % | SYSTOLIC BLOOD PRESSURE: 132 MMHG | HEART RATE: 62 BPM | BODY MASS INDEX: 41.44 KG/M2 | DIASTOLIC BLOOD PRESSURE: 86 MMHG | WEIGHT: 279.8 LBS | HEIGHT: 69 IN

## 2022-08-31 DIAGNOSIS — I10 ESSENTIAL HYPERTENSION WITH GOAL BLOOD PRESSURE LESS THAN 140/90: ICD-10-CM

## 2022-08-31 DIAGNOSIS — I50.32 CHRONIC DIASTOLIC HEART FAILURE (H): ICD-10-CM

## 2022-08-31 DIAGNOSIS — I10 HYPERTENSION, ESSENTIAL: ICD-10-CM

## 2022-08-31 LAB
ALBUMIN SERPL BCG-MCNC: 3.9 G/DL (ref 3.5–5.2)
ALP SERPL-CCNC: 84 U/L (ref 40–129)
ALT SERPL W P-5'-P-CCNC: 9 U/L (ref 10–50)
ANION GAP SERPL CALCULATED.3IONS-SCNC: 13 MMOL/L (ref 7–15)
AST SERPL W P-5'-P-CCNC: 19 U/L (ref 10–50)
BILIRUB SERPL-MCNC: 0.6 MG/DL
BUN SERPL-MCNC: 21.7 MG/DL (ref 8–23)
CALCIUM SERPL-MCNC: 9.6 MG/DL (ref 8.8–10.2)
CHLORIDE SERPL-SCNC: 104 MMOL/L (ref 98–107)
CREAT SERPL-MCNC: 1.27 MG/DL (ref 0.67–1.17)
DEPRECATED HCO3 PLAS-SCNC: 24 MMOL/L (ref 22–29)
ERYTHROCYTE [DISTWIDTH] IN BLOOD BY AUTOMATED COUNT: 13.8 % (ref 10–15)
GFR SERPL CREATININE-BSD FRML MDRD: 62 ML/MIN/1.73M2
GLUCOSE SERPL-MCNC: 98 MG/DL (ref 70–99)
HCT VFR BLD AUTO: 44.2 % (ref 40–53)
HGB BLD-MCNC: 14.2 G/DL (ref 13.3–17.7)
MCH RBC QN AUTO: 29.5 PG (ref 26.5–33)
MCHC RBC AUTO-ENTMCNC: 32.1 G/DL (ref 31.5–36.5)
MCV RBC AUTO: 92 FL (ref 78–100)
NT-PROBNP SERPL-MCNC: 877 PG/ML (ref 0–125)
PLATELET # BLD AUTO: 174 10E3/UL (ref 150–450)
POTASSIUM SERPL-SCNC: 4.1 MMOL/L (ref 3.4–5.3)
PROT SERPL-MCNC: 7.1 G/DL (ref 6.4–8.3)
RBC # BLD AUTO: 4.81 10E6/UL (ref 4.4–5.9)
SODIUM SERPL-SCNC: 141 MMOL/L (ref 136–145)
TROPONIN T SERPL HS-MCNC: 18 NG/L
WBC # BLD AUTO: 5.9 10E3/UL (ref 4–11)

## 2022-08-31 PROCEDURE — 84484 ASSAY OF TROPONIN QUANT: CPT | Performed by: PATHOLOGY

## 2022-08-31 PROCEDURE — G0463 HOSPITAL OUTPT CLINIC VISIT: HCPCS

## 2022-08-31 PROCEDURE — 83880 ASSAY OF NATRIURETIC PEPTIDE: CPT | Performed by: PATHOLOGY

## 2022-08-31 PROCEDURE — 99205 OFFICE O/P NEW HI 60 MIN: CPT | Performed by: INTERNAL MEDICINE

## 2022-08-31 PROCEDURE — 85027 COMPLETE CBC AUTOMATED: CPT | Performed by: PATHOLOGY

## 2022-08-31 PROCEDURE — 36415 COLL VENOUS BLD VENIPUNCTURE: CPT | Performed by: PATHOLOGY

## 2022-08-31 PROCEDURE — 80053 COMPREHEN METABOLIC PANEL: CPT | Performed by: PATHOLOGY

## 2022-08-31 RX ORDER — AMLODIPINE BESYLATE 10 MG/1
10 TABLET ORAL DAILY
Qty: 90 TABLET | Refills: 11 | Status: SHIPPED | OUTPATIENT
Start: 2022-08-31 | End: 2023-03-01

## 2022-08-31 ASSESSMENT — PAIN SCALES - GENERAL: PAINLEVEL: NO PAIN (0)

## 2022-08-31 NOTE — NURSING NOTE
Chief Complaint   Patient presents with     New Patient     New Heart Failure- Chronic diastolic heart failure.     Vitals were taken and medications reconciled.    DULCE Cabrera  9:07 AM
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
## 2022-08-31 NOTE — PATIENT INSTRUCTIONS
Dr. Palomino recommends:    Take a half dose of Metoprolol for one week and then stop taking metoprolol.    Increase Amlodipine to 10 MG once daily.    Follow up clinic visit with Dr. Palomino in 1-2 months. No labs needed.    Thank you for your visit today.  Please call me with any questions or concerns.   Moses Alicea RN  Cardiology Care Coordinator  [phone]

## 2022-11-14 ENCOUNTER — OFFICE VISIT (OUTPATIENT)
Dept: FAMILY MEDICINE | Facility: CLINIC | Age: 67
End: 2022-11-14
Payer: COMMERCIAL

## 2022-11-14 VITALS
SYSTOLIC BLOOD PRESSURE: 127 MMHG | OXYGEN SATURATION: 99 % | WEIGHT: 275.8 LBS | BODY MASS INDEX: 39.48 KG/M2 | HEART RATE: 60 BPM | TEMPERATURE: 98.6 F | HEIGHT: 70 IN | DIASTOLIC BLOOD PRESSURE: 86 MMHG

## 2022-11-14 DIAGNOSIS — I50.32 CHRONIC HEART FAILURE WITH PRESERVED EJECTION FRACTION (HFPEF) (H): ICD-10-CM

## 2022-11-14 DIAGNOSIS — Z23 HIGH PRIORITY FOR 2019-NCOV VACCINE: ICD-10-CM

## 2022-11-14 DIAGNOSIS — N18.31 CKD STAGE G3A/A1, GFR 45-59 AND ALBUMIN CREATININE RATIO <30 MG/G (H): ICD-10-CM

## 2022-11-14 DIAGNOSIS — R73.03 PRE-DIABETES: ICD-10-CM

## 2022-11-14 DIAGNOSIS — I10 HYPERTENSION, ESSENTIAL: ICD-10-CM

## 2022-11-14 DIAGNOSIS — N52.9 ERECTILE DYSFUNCTION, UNSPECIFIED ERECTILE DYSFUNCTION TYPE: ICD-10-CM

## 2022-11-14 DIAGNOSIS — I48.0 PAROXYSMAL ATRIAL FIBRILLATION (H): Primary | ICD-10-CM

## 2022-11-14 DIAGNOSIS — Z23 NEED FOR PROPHYLACTIC VACCINATION AND INOCULATION AGAINST INFLUENZA: ICD-10-CM

## 2022-11-14 LAB
ANION GAP SERPL CALCULATED.3IONS-SCNC: 12 MMOL/L (ref 7–15)
BUN SERPL-MCNC: 21.7 MG/DL (ref 8–23)
CALCIUM SERPL-MCNC: 9.8 MG/DL (ref 8.8–10.2)
CHLORIDE SERPL-SCNC: 103 MMOL/L (ref 98–107)
CREAT SERPL-MCNC: 1.36 MG/DL (ref 0.67–1.17)
DEPRECATED HCO3 PLAS-SCNC: 28 MMOL/L (ref 22–29)
GFR SERPL CREATININE-BSD FRML MDRD: 57 ML/MIN/1.73M2
GLUCOSE SERPL-MCNC: 94 MG/DL (ref 70–99)
POTASSIUM SERPL-SCNC: 4.2 MMOL/L (ref 3.4–5.3)
SODIUM SERPL-SCNC: 143 MMOL/L (ref 136–145)

## 2022-11-14 PROCEDURE — 0124A COVID-19,PF,PFIZER BOOSTER BIVALENT: CPT | Performed by: STUDENT IN AN ORGANIZED HEALTH CARE EDUCATION/TRAINING PROGRAM

## 2022-11-14 PROCEDURE — 91312 COVID-19,PF,PFIZER BOOSTER BIVALENT: CPT | Performed by: STUDENT IN AN ORGANIZED HEALTH CARE EDUCATION/TRAINING PROGRAM

## 2022-11-14 PROCEDURE — 80048 BASIC METABOLIC PNL TOTAL CA: CPT | Performed by: STUDENT IN AN ORGANIZED HEALTH CARE EDUCATION/TRAINING PROGRAM

## 2022-11-14 PROCEDURE — G0008 ADMIN INFLUENZA VIRUS VAC: HCPCS | Performed by: STUDENT IN AN ORGANIZED HEALTH CARE EDUCATION/TRAINING PROGRAM

## 2022-11-14 PROCEDURE — 36415 COLL VENOUS BLD VENIPUNCTURE: CPT | Performed by: STUDENT IN AN ORGANIZED HEALTH CARE EDUCATION/TRAINING PROGRAM

## 2022-11-14 PROCEDURE — 90662 IIV NO PRSV INCREASED AG IM: CPT | Performed by: STUDENT IN AN ORGANIZED HEALTH CARE EDUCATION/TRAINING PROGRAM

## 2022-11-14 PROCEDURE — 99214 OFFICE O/P EST MOD 30 MIN: CPT | Mod: 25 | Performed by: STUDENT IN AN ORGANIZED HEALTH CARE EDUCATION/TRAINING PROGRAM

## 2022-11-14 RX ORDER — ATORVASTATIN CALCIUM 40 MG/1
40 TABLET, FILM COATED ORAL DAILY
Qty: 90 TABLET | Refills: 3 | Status: SHIPPED | OUTPATIENT
Start: 2022-11-14 | End: 2023-03-01

## 2022-11-14 RX ORDER — METOPROLOL SUCCINATE 50 MG/1
TABLET, EXTENDED RELEASE ORAL
COMMUNITY
Start: 2022-11-05 | End: 2022-11-14

## 2022-11-14 RX ORDER — SILDENAFIL 100 MG/1
100 TABLET, FILM COATED ORAL DAILY PRN
Qty: 30 TABLET | Refills: 4 | Status: SHIPPED | OUTPATIENT
Start: 2022-11-14 | End: 2023-03-01

## 2022-11-14 RX ORDER — METOPROLOL SUCCINATE 25 MG/1
25 TABLET, EXTENDED RELEASE ORAL DAILY
Qty: 90 TABLET | Refills: 1 | Status: SHIPPED | OUTPATIENT
Start: 2022-11-14 | End: 2023-03-01

## 2022-11-14 NOTE — PATIENT INSTRUCTIONS
Patient Education   Here is the plan from today's visit    1. Paroxysmal atrial fibrillation (H)  Decreased your beta blocker (Metoprolol to 1/2 the amount).     - metoprolol succinate ER (TOPROL XL) 25 MG 24 hr tablet; Take 1 tablet (25 mg) by mouth daily  Dispense: 90 tablet; Refill: 1    2. Hypertension, essential  Please write down blood pressure for the next few weeks, if > 130/80 please make appointment to see me (Dr. Rosey Ascencio) so we can discuss more.     - metoprolol succinate ER (TOPROL XL) 25 MG 24 hr tablet; Take 1 tablet (25 mg) by mouth daily  Dispense: 90 tablet; Refill: 1  - atorvastatin (LIPITOR) 40 MG tablet; Take 1 tablet (40 mg) by mouth daily  Dispense: 90 tablet; Refill: 3    3. Pre-diabetes  - metoprolol succinate ER (TOPROL XL) 25 MG 24 hr tablet; Take 1 tablet (25 mg) by mouth daily  Dispense: 90 tablet; Refill: 1  - atorvastatin (LIPITOR) 40 MG tablet; Take 1 tablet (40 mg) by mouth daily  Dispense: 90 tablet; Refill: 3    4. Erectile dysfunction, unspecified erectile dysfunction type  - sildenafil (VIAGRA) 100 MG tablet; Take 1 tablet (100 mg) by mouth daily as needed (sexual dysfunction)  Dispense: 30 tablet; Refill: 4    5. Chronic heart failure with preserved ejection fraction (HFpEF) (H)  Get new US of your heart.   - Echocardiogram Complete; Future    6. CKD stage G3a/A1, GFR 45-59 and albumin creatinine ratio <30 mg/g (H)  If normal I'll send you a letter.   - Basic metabolic panel; Future    Please call or return to clinic if your symptoms don't go away.    Follow up plan  Return if symptoms worsen or fail to improve.    Thank you for coming to [facility] today.  Lab Testing:  **If you had lab testing today and your results are reassuring or normal they will be mailed to you or sent through [facility] within 7 days.   **If the lab tests need quick action we will call you with the results.  **If you are having labs done on a different day, please call [phone] to schedule at  [facility] or [phone] for other [facility] locations. Labs do not offer walk-in appointments.  The phone number we will call with results is # [phone] (home) . If this is not the best number please call our clinic and change the number.  Medication Refills:  If you need any refills please call your pharmacy and they will contact us.   If you need to  your refill at a new pharmacy, please contact the new pharmacy directly. The new pharmacy will help you get your medications transferred faster.   Scheduling:  If you have any concerns about today's visit or wish to schedule another appointment please call our office during normal business hours [phone] (8-5:00 M-F)  If a referral was made to an [facility] specialty provider and you do not get a call from central scheduling, please refer to directions on your visit summary or call our office during normal business hours for assistance.   If a Mammogram was ordered for you at the [facility] Center call [phone] to schedule or change your appointment.  If you had an XRay/CT/Ultrasound/MRI ordered the number is [phone] to schedule or change your radiology appointment.   [facility] has limited ultrasound appointments available on Wednesdays, if you would like your ultrasound at [facility], please call [phone] to schedule.   Medical Concerns:  If you have urgent medical concerns please call [phone] at any time of the day.    Sarai Ascencio, DO

## 2022-11-14 NOTE — PROGRESS NOTES
Assessment & Plan     Paroxysmal atrial fibrillation (H)  Continue eliquis and beta-blocker. Pt with RN home visits recording pulse in the low 40s. Pt believes asymptomatic with these episodes; however points to potentially over beta blockade, especially not ideal in someone with HFpEF. Will decrease Metoprolol from 50 to 25 mg daily. No chest pain, palpitations, syncope.   - metoprolol succinate ER (TOPROL XL) 25 MG 24 hr tablet  Dispense: 90 tablet; Refill: 1    Hypertension, essential  CKD stage G3a/A1, GFR 45-59 and albumin creatinine ratio <30 mg/g (H)  GREG  Controlled today, goal <130/80 especially due to CKD and proteinuria. Creatinine slight jump 1.27 in August 2022 (baseline has been 1.1-1.18). Will recheck again today. Wears CPAP.   - metoprolol succinate ER (TOPROL XL) 25 MG 24 hr tablet  Dispense: 90 tablet; Refill: 1  - atorvastatin (LIPITOR) 40 MG tablet  Dispense: 90 tablet; Refill: 3    Pre-diabetes  ASCVD risk 23.7%   A1c of 5.7 on 3/28/2022. ASCVD risk score 19.4%; patient has been reluctant to do statin historically, increased to 40 mg last visit 8/8/2022. Used to be on Metformin.   - Plan to recheck A1c March 2023    Erectile dysfunction, unspecified erectile dysfunction type  Works well for patient; no side effects.   - sildenafil (VIAGRA) 100 MG tablet  Dispense: 30 tablet; Refill: 4    Chronic heart failure with preserved ejection fraction (HFpEF) (H)  Last Echo in 2018 reviewed. Pt has followed with cardiology before, last visit 2019. Discussed briefly Jardiance, pt is overall not interested in more medications. Discussed repeat Echo and based on results more discussion regarding optimizing his medication regimen (espeically with CKD + proteinuria and recent EMPA-KIDNEY trial).   - Echocardiogram Complete    Need for prophylactic vaccination and inoculation against influenza  - INFLUENZA, QUAD, HIGH DOSE, PF, 65YR + (FLUZONE HD)    High priority for 2019-nCoV vaccine  - COVID-19,PF,PFIZER  BOOSTER BIVALENT 12+Yrs    Review of external notes as documented elsewhere in note  Review of the result(s) of each unique test - See HPI  Ordering of each unique test  Prescription drug management    Return if symptoms worsen or fail to improve.   Follow-up March 2023 - recheck A1c, check in regarding BP     Rosey Ascencio DO  Family Medicine PGY-3  [facility], [facility]   Pronouns: She/Hers      Subjective   Al is a 67 year old , presenting for the following health issues:  RECHECK (Pt is here for a blood pressure follow up. Pt takes medication as instructed. Pt also keeps track of blood pressure at home.), Imm/Inj (Flu Shot), and Imm/Inj (COVID-19 VACCINE)      HPI     Question: pulse was 43 during the daytime, next was 53   Is on Metoprolol 50 mg every day for afib   RN come every Tuesday to take vitals  She noticed it on a few visits  Pt worried heart rate too low   Doesn't think having symptoms?  No dizziness, lightheadedness  Is feeling a little more fatigued   Reviewed recent cardiology documentation from 2019 - [facility] visit for HFpEF and afib then   120/66 at home BP well controlled   Taking lisinopril 40 mg and amlodipine 10 every day   CPAP - wears it   No smoking, stopped 8-9 years ago  No alcohol   No shortness of breath, no chest pain, no increased LE edema     Reviewed Echo 10/01/2018   Global and regional left ventricular function is normal with an EF of 55-60%.  Diastolic function not assessed due to atrial fibrillation.  Right ventricular function, chamber size, wall motion, and thickness are  normal.  Severe biatrial enlargement is present.  Ascending aorta 4.3 cm.  Sinuses of Valsalva 4.2 cm.  The inferior vena cava is normal.  No pericardial effusion is present.  PQH-9 score 1  / JOSE ENRIQUE-7 0     Discussed jardiance   Reluctant to start new medication   Discussed getting repeat Echo to see state of his heart failure as last one was 2018     Going to [place] for a week,  "daughter out there     Discussed exercise in wintertime  Has gym in complex  Likes stair stepper  Talked about weight lifting       Review of Systems   Constitutional, HEENT, cardiovascular, pulmonary, gi and gu systems are negative, except as otherwise noted.      Objective    /86   Pulse 60   Temp 98.6  F (37  C) (Oral)   Ht 1.773 m (5' 9.8\")   Wt 125.1 kg (275 lb 12.8 oz)   SpO2 99%   BMI 39.80 kg/m    Body mass index is 39.8 kg/m .     Physical Exam   General: Alert and oriented, in no acute distress.  Skin: Warm and dry, no abnormalities noted.  Eyes: Extra-ocular muscles grossly intact, pupils equal.  ENT: Speech intact, nasal passages open, no hearing impairment noted.  Neck: no JVD. Carotid upstrokes brisk no carotid bruits.  Heart: S1S2, irregularly irregular rhythm, rate 60s. No murmur.   Lungs: CTAB No chest wall deformity and asymmetry, No w/r/r  Vasc: +1 pitting edema BL. No cyanosis, clubbing or edema  Neuro: Gait and station normal, comprehension intact. Gross and fine motor skills intact.   Psychiatric: Mood and affect appear normal.   Extremities: Warm, able to move all four extremities at will.       Lab on 08/31/2022   Component Date Value Ref Range Status     WBC Count 08/31/2022 5.9  4.0 - 11.0 10e3/uL Final     RBC Count 08/31/2022 4.81  4.40 - 5.90 10e6/uL Final     Hemoglobin 08/31/2022 14.2  13.3 - 17.7 g/dL Final     Hematocrit 08/31/2022 44.2  40.0 - 53.0 % Final     MCV 08/31/2022 92  78 - 100 fL Final     MCH 08/31/2022 29.5  26.5 - 33.0 pg Final     MCHC 08/31/2022 32.1  31.5 - 36.5 g/dL Final     RDW 08/31/2022 13.8  10.0 - 15.0 % Final     Platelet Count 08/31/2022 174  150 - 450 10e3/uL Final     Sodium 08/31/2022 141  136 - 145 mmol/L Final     Potassium 08/31/2022 4.1  3.4 - 5.3 mmol/L Final     Creatinine 08/31/2022 1.27 (H)  0.67 - 1.17 mg/dL Final     Urea Nitrogen 08/31/2022 21.7  8.0 - 23.0 mg/dL Final     Chloride 08/31/2022 104  98 - 107 mmol/L Final     Carbon " Dioxide (CO2) 08/31/2022 24  22 - 29 mmol/L Final     Anion Gap 08/31/2022 13  7 - 15 mmol/L Final     Glucose 08/31/2022 98  70 - 99 mg/dL Final     Calcium 08/31/2022 9.6  8.8 - 10.2 mg/dL Final     Protein Total 08/31/2022 7.1  6.4 - 8.3 g/dL Final     Albumin 08/31/2022 3.9  3.5 - 5.2 g/dL Final     Bilirubin Total 08/31/2022 0.6  <=1.2 mg/dL Final     Alkaline Phosphatase 08/31/2022 84  40 - 129 U/L Final     AST 08/31/2022 19  10 - 50 U/L Final     ALT 08/31/2022 9 (L)  10 - 50 U/L Final     GFR Estimate 08/31/2022 62  >60 mL/min/1.73m2 Final    Effective December 21, 2021 eGFRcr in adults is calculated using the 2021 CKD-EPI creatinine equation which includes age and gender (Oh et al., NEJM, DOI: 10.1056/BYJAlu9735022)     N Terminal Pro BNP Outpatient 08/31/2022 877 (H)  0 - 125 pg/mL Final    Reference range shown and results flagged as abnormal are for the outpatient, non acute settings. Establishing a baseline value for each individual patient is useful for follow-up.    Suggested inpatient cut points for confirming diagnosis of CHF in an acute setting are:  >450 pg/mL (age 18 to less than 50)  >900 pg/mL (age 50 to less than 75)  >1800 pg/mL (75 yrs and older)    An inpatient or emergency department NT-proPBNP <300 pg/mL effectively rules out acute CHF, with 99% negative predictive value.         Troponin T, High Sensitivity 08/31/2022 18  <=22 ng/L Final

## 2022-11-14 NOTE — LETTER
November 15, 2022      Jazz Berman  [address]   [facility] [address]      Dear Al,     Thank you for getting your care at [facility]. Please see below for your test results.    Your kidney function has gotten a little worse. The creatinine (a measure of your kidney function) went from 1.27 to 1.36. The higher the number the worse your kidney function. This is going to be something we continue to monitor. After you get your ultrasound of your heart (Echo) lets plan for you to make an appointment with me so we can review the heart Ultrasound together and we can talk about your kidney more.     Resulted Orders   Basic metabolic panel   Result Value Ref Range    Sodium 143 136 - 145 mmol/L    Potassium 4.2 3.4 - 5.3 mmol/L    Chloride 103 98 - 107 mmol/L    Carbon Dioxide (CO2) 28 22 - 29 mmol/L    Anion Gap 12 7 - 15 mmol/L    Urea Nitrogen 21.7 8.0 - 23.0 mg/dL    Creatinine 1.36 (H) 0.67 - 1.17 mg/dL    Calcium 9.8 8.8 - 10.2 mg/dL    Glucose 94 70 - 99 mg/dL    GFR Estimate 57 (L) >60 mL/min/1.73m2      Comment:      Effective December 21, 2021 eGFRcr in adults is calculated using the 2021 CKD-EPI creatinine equation which includes age and gender (Oh et al., NEJM, DOI: 10.1056/YMYQrz4241760)       Sincerely,    Sarai Ascencio DO

## 2022-11-14 NOTE — PROGRESS NOTES
Preceptor Attestation:    I discussed the patient with the resident and evaluated the patient in person. I have verified the content of the note, which accurately reflects my assessment of the patient and the plan of care.   Supervising Physician:  Tequila Ross DO.

## 2022-12-13 ENCOUNTER — TELEPHONE (OUTPATIENT)
Dept: BEHAVIORAL HEALTH | Facility: CLINIC | Age: 67
End: 2022-12-13

## 2022-12-13 NOTE — TELEPHONE ENCOUNTER
Reason for Call:  Other call back    Detailed comments: Pt states he met Gamaliel Ramirez yesterday and he provided pt with [facility] # to call. Pt has trouble finding a new dentist and was advised by Gamaliel to reach out to clinic for support.     Phone Number Patient can be reached at: Cell number on file:    Telephone Information:   Mobile [phone]       Best Time: Any    Can we leave a detailed message on this number? YES    Call taken on 12/13/2022 at 9:48 AM by Carmelo Gaines

## 2022-12-24 ENCOUNTER — HEALTH MAINTENANCE LETTER (OUTPATIENT)
Age: 67
End: 2022-12-24

## 2022-12-26 ENCOUNTER — ANCILLARY PROCEDURE (OUTPATIENT)
Dept: CARDIOLOGY | Facility: CLINIC | Age: 67
End: 2022-12-26
Attending: FAMILY MEDICINE
Payer: COMMERCIAL

## 2022-12-26 DIAGNOSIS — I50.32 CHRONIC HEART FAILURE WITH PRESERVED EJECTION FRACTION (HFPEF) (H): ICD-10-CM

## 2022-12-26 LAB — LVEF ECHO: NORMAL

## 2022-12-26 PROCEDURE — 93306 TTE W/DOPPLER COMPLETE: CPT | Performed by: INTERNAL MEDICINE

## 2023-02-11 ENCOUNTER — TRANSFERRED RECORDS (OUTPATIENT)
Dept: MULTI SPECIALTY CLINIC | Facility: CLINIC | Age: 68
End: 2023-02-11

## 2023-02-11 LAB — RETINOPATHY: NORMAL

## 2023-02-12 RX ORDER — HYDROCHLOROTHIAZIDE 25 MG/1
50 TABLET ORAL
COMMUNITY
End: 2023-03-01

## 2023-02-12 RX ORDER — FAMOTIDINE 40 MG/1
1 TABLET, FILM COATED ORAL DAILY
COMMUNITY
Start: 2022-11-28 | End: 2023-03-01

## 2023-02-12 RX ORDER — ATENOLOL 100 MG/1
TABLET ORAL
COMMUNITY
End: 2023-03-01

## 2023-02-12 RX ORDER — TAMSULOSIN HYDROCHLORIDE 0.4 MG/1
0.4 CAPSULE ORAL
COMMUNITY
End: 2023-03-01

## 2023-02-12 RX ORDER — FUROSEMIDE 40 MG
40 TABLET ORAL
COMMUNITY
Start: 2022-11-28 | End: 2023-03-01

## 2023-02-13 NOTE — PROGRESS NOTES
"  Assessment & Plan     Chronic heart failure with preserved ejection fraction (HFpEF) (H)  Discussed repeat Echo and based on results more discussion regarding optimizing his medication regimen (espeically with CKD + proteinuria and recent EMPA-KIDNEY trial also with \"pre-diabetes\" with hx of previously have a type 2 dm diagnosis). We did discuss this last visit however pt was not ready to start, today we talked more about the medication and he was open to starting.   - empagliflozin (JARDIANCE) 10 MG TABS tablet  Dispense: 90 tablet; Refill: 1  - furosemide (LASIX) 40 MG tablet  Dispense: 90 tablet; Refill: 1  - return in 2-4 weeks to check BMP  - STOP METOPROLOL (over beta blocked and NOT recommended in HFpEF)  - Has cardiology appointment in April with cardiologist he saw this past summer     Hypertension, essential  GREG  Pt's diastolic blood pressure still a bit above goal. Goal for patient <130/80. See home blood pressure log before. Has had some lower readings. With starting the jardiance this does have some dierutic effect so may also help blood pressure. Will continue to montior. May have to add third blood pressure agent. Wears CPAP. I  - atorvastatin (LIPITOR) 40 MG tablet  Dispense: 90 tablet; Refill: 3  - lisinopril (ZESTRIL) 40 MG tablet  Dispense: 90 tablet; Refill: 1  - amLODIPine (NORVASC) 10 MG tablet  Dispense: 90 tablet; Refill: 11    Paroxysmal atrial fibrillation (H)  Anticoagulated as below. Stopping Metoprolol (not recommended for HFpEF), appears over beta-blocked.   - apixaban ANTICOAGULANT (ELIQUIS) 5 MG tablet  Dispense: 180 tablet; Refill: 1    CKD stage G3a/A1, GFR 45-59 and albumin creatinine ratio <30 mg/g (H)  Due for Microablumin in March.   - empagliflozin (JARDIANCE) 10 MG TABS tablet  Dispense: 90 tablet; Refill: 1    Pre-diabetes  A1c today was 5.9 increased from prior. He does have history of A1c as high as 6.5 in 2015, so he has had diagnosis of type 2 DM. Discussed nutrition " "and exercise today.   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     Body mass index (BMI) of 40.0-44.9 in adult (H)  Discussed nutrition and exercise today.     Type 2 diabetes mellitus without complication, without long-term current use of insulin (H)  Previous history of type 2 DM, currently meets diagnosis of \"prediabetes\" however with his previous diabetes diagnosis has elevated risk. Will start Jardiance today.     Erectile dysfunction, unspecified erectile dysfunction type  Works well for patient.   - sildenafil (VIAGRA) 100 MG tablet  Dispense: 30 tablet; Refill: 4     Erectile dysfunction, unspecified erectile dysfunction type  Works well for patient; no side effects.   - sildenafil (VIAGRA) 100 MG tablet  Dispense: 30 tablet; Refill: 4     Review of external notes as documented elsewhere in note  Review of the result(s) of each unique test - see HPI  Ordering of each unique test  Prescription drug management    BMI:   Estimated body mass index is 40.35 kg/m  as calculated from the following:    Height as of 11/14/22: 1.773 m (5' 9.8\").    Weight as of this encounter: 126.8 kg (279 lb 9.6 oz).   Weight management plan: Discussed healthy diet and exercise guidelines    Return in about 4 weeks (around 3/29/2023) for Follow up, with me.    Next visit:  Check miroalbumin and lipid panel and BMP, diabetes foot exam, fall risk assessment     Rosey Ascencio DO  Family Medicine PGY-3  [facility]   Pronouns: She/Hers      Subjective   Al is a 68 year old presenting for the following health issues:  RECHECK (Pt is here for " follow up with his PCP) and Refill Request      HPI     HFpEF  Saw cardiology 8/31/2022: recommended stopping Metoprolol, increase amlodipine to 10 mg, continue lisionpril 40 mg, weight goal 265 lbs. Contingency plans if not at goal for BP, chlorthalidone, for symptoms related to HFpEF (cilostazol)     Furosemide 40 mg daily    Weight 279 lbs  Weight last visit 275  Past summer 8/8 282     Today discussed stopping Metoprolol (based on cardiology recs) and last visit also noticing over beta-blockade     Agreed to discontinue today     Follow-up w/ cardiology in 1-2 months (this was August 2022) - now has appointment 4/12/2023   I reviewed documentation from this visit    Chest Pain:                 No  Shortness of Breath:   No  Ankle Swelling:            Tiny bit goes up and down, but never bad, maybe a little worse    end of day   Muscle Aches:            No  Palpitations:                No                      Lightheadedness:       No  Fainting:                      No  Medication Issues:      No  Recent Test:               Echo Se below     Further discussions regarding jardiance (suggested during last visit with me due to HFpEF, CKD + proteinuria and recent EMPA-KIDNEY trial)    Blood pressure readings from home  11/25 118/85  12/2 143/97  12/6 115/71 pulse 81  12/13 123/89 pulse 52  12/20 110/80 pulse 79   12/27 106/84 pulse 76  1/3 119/71 pulse 80  1/10 120/87 pulse 87  1/17 110/82 pulse 67  1/24 123/90 pulse 71  1/31 11/93 pulse 60   2/7 117/83 pulse 81   2/14 102/71 pulse 63  2/28 121/93 pulse 66     Exercise - not doing much right now   Diet  (referral to nutrition?) -  Not ready at this time for this would mention again in future appointments would likely be beneficial     Atrial fibrillation from longstanding HTN  Amlodipine 10 mg  Lisinopril 40 mg daily   Metop 25 mg daily - we need to stop this today, discussed why with patient  Apixaban 5 mg daily     Pt on Atorvastatin 40 mg, previously had been  resistant to all statins   The 10-year ASCVD risk score (Dorcas WANG, et al., 2019) is: 26.6%    Values used to calculate the score:      Age: 68 years      Sex: Male      Is Non- : No      Diabetic: Yes      Tobacco smoker: No      Systolic Blood Pressure: 130 mmHg      Is BP treated: Yes      HDL Cholesterol: 67 mg/dL      Total Cholesterol: 169 mg/dL      Colon cancer screening   Noted during GIB workup in 2016. Due for repeat colonoscopy in 2019. Scheduled 2020. 2014 visit 3-5 years follow-up, tubulovillous adneoma transanal exicision distal rectal polyp.  Cologuard negative 5/9/2022, at that time declined colonosocpy     Going back in September for GIST screening and CT     Smoking  Reports that he quit smoking about 11 years ago. His smoking use included cigarettes. He quit after 30.00 years of use.    Review of Systems   Constitutional, HEENT, cardiovascular, pulmonary, gi and gu systems are negative, except as otherwise noted.      Objective    /87   Pulse 63   Temp 98.1  F (36.7  C) (Oral)   Resp 16   Wt 126.8 kg (279 lb 9.6 oz)   SpO2 99%   BMI 40.35 kg/m    Body mass index is 40.35 kg/m .     Physical Exam   General: Alert and oriented, in no acute distress.  Skin: Warm and dry, no abnormalities noted.  Eyes: Extra-ocular muscles grossly intact, pupils equal.  ENT: Speech intact, nasal passages open, no hearing impairment noted.  Neck: no JVD. Carotid upstrokes brisk no carotid bruits.  Heart: S1S2, irregularly irregular rhythm, rate 60s. No murmur.   Lungs: CTAB No chest wall deformity and asymmetry, No w/r/r  Vasc: +1 pitting edema BL. No cyanosis, clubbing or edema  Neuro: Gait and station normal, comprehension intact. Gross and fine motor skills intact.   Psychiatric: Mood and affect appear normal.   Extremities: Warm, able to move all four extremities at will.    Ancillary Procedure on 12/26/2022   Component Date Value Ref Range Status     LVEF  12/26/2022 55-60%    Final     Echo 12/26/2022  Interpretation Summary  Global and regional left ventricular function is normal with an EF of 55-60%.  Right ventricular function, chamber size, wall motion, and thickness are  normal.  Pulmonary artery systolic pressure is normal.  Ascending aorta 4.3 cm.  Sinuses of Valsalva 4 cm.  The inferior vena cava is normal.  No pericardial effusion is present.  No significant changes noted.

## 2023-03-01 ENCOUNTER — OFFICE VISIT (OUTPATIENT)
Dept: FAMILY MEDICINE | Facility: CLINIC | Age: 68
End: 2023-03-01
Payer: COMMERCIAL

## 2023-03-01 VITALS
OXYGEN SATURATION: 99 % | RESPIRATION RATE: 16 BRPM | WEIGHT: 279.6 LBS | SYSTOLIC BLOOD PRESSURE: 130 MMHG | TEMPERATURE: 98.1 F | BODY MASS INDEX: 40.35 KG/M2 | DIASTOLIC BLOOD PRESSURE: 87 MMHG | HEART RATE: 63 BPM

## 2023-03-01 DIAGNOSIS — K21.9 GASTROESOPHAGEAL REFLUX DISEASE WITHOUT ESOPHAGITIS: ICD-10-CM

## 2023-03-01 DIAGNOSIS — C49.A2 MALIGNANT GASTROINTESTINAL STROMAL TUMOR (GIST) OF STOMACH (H): ICD-10-CM

## 2023-03-01 DIAGNOSIS — I10 ESSENTIAL HYPERTENSION WITH GOAL BLOOD PRESSURE LESS THAN 140/90: ICD-10-CM

## 2023-03-01 DIAGNOSIS — R73.03 PRE-DIABETES: ICD-10-CM

## 2023-03-01 DIAGNOSIS — N52.9 ERECTILE DYSFUNCTION, UNSPECIFIED ERECTILE DYSFUNCTION TYPE: ICD-10-CM

## 2023-03-01 DIAGNOSIS — I50.32 CHRONIC HEART FAILURE WITH PRESERVED EJECTION FRACTION (HFPEF) (H): Primary | ICD-10-CM

## 2023-03-01 DIAGNOSIS — I10 HYPERTENSION, ESSENTIAL: ICD-10-CM

## 2023-03-01 DIAGNOSIS — I48.0 PAROXYSMAL ATRIAL FIBRILLATION (H): ICD-10-CM

## 2023-03-01 DIAGNOSIS — E11.9 TYPE 2 DIABETES MELLITUS WITHOUT COMPLICATION, WITHOUT LONG-TERM CURRENT USE OF INSULIN (H): ICD-10-CM

## 2023-03-01 DIAGNOSIS — N18.31 CKD STAGE G3A/A1, GFR 45-59 AND ALBUMIN CREATININE RATIO <30 MG/G (H): ICD-10-CM

## 2023-03-01 LAB — HBA1C MFR BLD: 5.9 % (ref 0–5.6)

## 2023-03-01 PROCEDURE — 36416 COLLJ CAPILLARY BLOOD SPEC: CPT | Performed by: STUDENT IN AN ORGANIZED HEALTH CARE EDUCATION/TRAINING PROGRAM

## 2023-03-01 PROCEDURE — 83036 HEMOGLOBIN GLYCOSYLATED A1C: CPT | Performed by: STUDENT IN AN ORGANIZED HEALTH CARE EDUCATION/TRAINING PROGRAM

## 2023-03-01 PROCEDURE — 99214 OFFICE O/P EST MOD 30 MIN: CPT | Mod: GC | Performed by: STUDENT IN AN ORGANIZED HEALTH CARE EDUCATION/TRAINING PROGRAM

## 2023-03-01 RX ORDER — AMLODIPINE BESYLATE 10 MG/1
10 TABLET ORAL DAILY
Qty: 90 TABLET | Refills: 11 | Status: SHIPPED | OUTPATIENT
Start: 2023-03-01 | End: 2023-09-06

## 2023-03-01 RX ORDER — SILDENAFIL 100 MG/1
100 TABLET, FILM COATED ORAL DAILY PRN
Qty: 30 TABLET | Refills: 4 | Status: SHIPPED | OUTPATIENT
Start: 2023-03-01 | End: 2023-08-18

## 2023-03-01 RX ORDER — FAMOTIDINE 40 MG/1
40 TABLET, FILM COATED ORAL DAILY
Qty: 90 TABLET | Refills: 1 | Status: SHIPPED | OUTPATIENT
Start: 2023-03-01 | End: 2023-09-06

## 2023-03-01 RX ORDER — LISINOPRIL 40 MG/1
40 TABLET ORAL DAILY
Qty: 90 TABLET | Refills: 1 | Status: SHIPPED | OUTPATIENT
Start: 2023-03-01 | End: 2023-09-06

## 2023-03-01 RX ORDER — FUROSEMIDE 40 MG
40 TABLET ORAL DAILY
Qty: 90 TABLET | Refills: 1 | Status: SHIPPED | OUTPATIENT
Start: 2023-03-01 | End: 2023-04-12

## 2023-03-01 RX ORDER — ATORVASTATIN CALCIUM 40 MG/1
40 TABLET, FILM COATED ORAL DAILY
Qty: 90 TABLET | Refills: 3 | Status: SHIPPED | OUTPATIENT
Start: 2023-03-01 | End: 2023-09-06

## 2023-03-01 ASSESSMENT — PATIENT HEALTH QUESTIONNAIRE - PHQ9: SUM OF ALL RESPONSES TO PHQ QUESTIONS 1-9: 3

## 2023-03-01 NOTE — PATIENT INSTRUCTIONS
Patient Education   Here is the plan from today's visit    1. Chronic heart failure with preserved ejection fraction (HFpEF) (H)  We are staring a new medication called Jardiance it helps with our kidney, heart failure and your pre-diabetes.    Come back in 1 month to check labs.     - empagliflozin (JARDIANCE) 10 MG TABS tablet; Take 1 tablet (10 mg) by mouth daily  Dispense: 90 tablet; Refill: 1  - furosemide (LASIX) 40 MG tablet; Take 1 tablet (40 mg) by mouth daily  Dispense: 90 tablet; Refill: 1    2. Hypertension, essential  - atorvastatin (LIPITOR) 40 MG tablet; Take 1 tablet (40 mg) by mouth daily  Dispense: 90 tablet; Refill: 3  - lisinopril (ZESTRIL) 40 MG tablet; Take 1 tablet (40 mg) by mouth daily  Dispense: 90 tablet; Refill: 1    3. Paroxysmal atrial fibrillation (H)  - apixaban ANTICOAGULANT (ELIQUIS) 5 MG tablet; Take 1 tablet (5 mg) by mouth 2 times daily for 90 days  Dispense: 180 tablet; Refill: 1    4. CKD stage G3a/A1, GFR 45-59 and albumin creatinine ratio <30 mg/g (H)  - empagliflozin (JARDIANCE) 10 MG TABS tablet; Take 1 tablet (10 mg) by mouth daily  Dispense: 90 tablet; Refill: 1    5. Pre-diabetes  - Hemoglobin A1c  - empagliflozin (JARDIANCE) 10 MG TABS tablet; Take 1 tablet (10 mg) by mouth daily  Dispense: 90 tablet; Refill: 1  - atorvastatin (LIPITOR) 40 MG tablet; Take 1 tablet (40 mg) by mouth daily  Dispense: 90 tablet; Refill: 3    6. Essential hypertension with goal blood pressure less than 140/90  - amLODIPine (NORVASC) 10 MG tablet; Take 1 tablet (10 mg) by mouth daily  Dispense: 90 tablet; Refill: 11    7. Gastroesophageal reflux disease without esophagitis  - famotidine (PEPCID) 40 MG tablet; Take 1 tablet (40 mg) by mouth daily  Dispense: 90 tablet; Refill: 1    8. 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 Body mass index (BMI) of 40.0-44.9 in adult (H)  Exercise :)     10.  Erectile dysfunction, unspecified erectile  dysfunction type  - sildenafil (VIAGRA) 100 MG tablet; Take 1 tablet (100 mg) by mouth daily as needed (sexual dysfunction)  Dispense: 30 tablet; Refill: 4    Please call or return to clinic if your symptoms don't go away.    Follow up plan  Return in about 4 weeks (around 3/29/2023) for Follow up, with me.    Thank you for coming to [facility] today.  Lab Testing:  **If you had lab testing today and your results are reassuring or normal they will be mailed to you or sent through [employer] within 7 days.   **If the lab tests need quick action we will call you with the results.  **If you are having labs done on a different day, please call [phone] to schedule at [facility] or [phone] for other [facility] locations. Labs do not offer walk-in appointments.  The phone number we will call with results is # [phone] (home) . If this is not the best number please call our clinic and change the number.  Medication Refills:  If you need any refills please call your pharmacy and they will contact us.   If you need to  your refill at a new pharmacy, please contact the new pharmacy directly. The new pharmacy will help you get your medications transferred faster.   Scheduling:  If you have any concerns about today's visit or wish to schedule another appointment please call our office during normal business hours [phone] (8-5:00 M-F). If you can no longer make a scheduled visit, please cancel via [employer] or call us to cancel.   If a referral was made to an [facility] specialty provider and you do not get a call from central scheduling, please refer to directions on your visit summary or call our office during normal business hours for assistance.   If a Mammogram was ordered for you at the [facility] call [phone] to schedule or change your appointment.  If you had an XRay/CT/Ultrasound/MRI ordered the number is [phone] to schedule or change your radiology  appointment.   [facility] has limited ultrasound appointments available on Wednesdays, if you would like your ultrasound at [facility], please call [phone] to schedule.   Medical Concerns:  If you have urgent medical concerns please call [phone] at any time of the day.    Sarai Ascencio, DO
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
## 2023-04-12 ENCOUNTER — OFFICE VISIT (OUTPATIENT)
Dept: CARDIOLOGY | Facility: CLINIC | Age: 68
End: 2023-04-12
Attending: INTERNAL MEDICINE
Payer: COMMERCIAL

## 2023-04-12 VITALS
DIASTOLIC BLOOD PRESSURE: 86 MMHG | OXYGEN SATURATION: 97 % | WEIGHT: 284.3 LBS | BODY MASS INDEX: 41.02 KG/M2 | HEART RATE: 62 BPM | SYSTOLIC BLOOD PRESSURE: 133 MMHG

## 2023-04-12 DIAGNOSIS — I50.32 CHRONIC DIASTOLIC HEART FAILURE (H): ICD-10-CM

## 2023-04-12 DIAGNOSIS — I50.32 CHRONIC HEART FAILURE WITH PRESERVED EJECTION FRACTION (HFPEF) (H): ICD-10-CM

## 2023-04-12 PROCEDURE — G0463 HOSPITAL OUTPT CLINIC VISIT: HCPCS | Performed by: INTERNAL MEDICINE

## 2023-04-12 PROCEDURE — 99214 OFFICE O/P EST MOD 30 MIN: CPT | Performed by: INTERNAL MEDICINE

## 2023-04-12 RX ORDER — FUROSEMIDE 40 MG
40 TABLET ORAL DAILY PRN
Qty: 90 TABLET | Refills: 11 | Status: SHIPPED | OUTPATIENT
Start: 2023-04-12 | End: 2023-09-06

## 2023-04-12 ASSESSMENT — PAIN SCALES - GENERAL: PAINLEVEL: NO PAIN (0)

## 2023-04-12 NOTE — NURSING NOTE
Chief Complaint   Patient presents with     Follow Up     Return Cardiology- Chronic diastolic heart failure     Vitals were taken and medications reconciled.    DULCE Cabrera  7:19 AM

## 2023-04-12 NOTE — PATIENT INSTRUCTIONS
Dr. Palomino recommends:    Take Lasix 40 MG daily as needed for edema.    Follow up clinic visit with Dr. Palomino in 6 months. No labs needed.    Thank you for your visit today.  Please call me with any questions or concerns.   Moses Alicea RN  Cardiology Care Coordinator  [phone]
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
## 2023-04-20 NOTE — PROGRESS NOTES
Assessment & Plan     Hypertension, essential  Chronic heart failure with preserved ejection fraction (HFpEF) (H)  GREG (wears CPAP)  Not controlled. Goal < 130/80. Offered and recommended starting chlorthalidone today. Pt declined as he is nervous regarding side effect. Discussed in depth. He will check his blood pressure at home (has home cuff) and with his RN who comes in and bring those numbers to me in 3-4 weeks. If his blood pressure is still elevated will reconsider starting another blood pressure medication. Discussed lasix is supposed to be PRN not daily. Discussed monitoring his LE edema and weight to help guide lasix use.   - Albumin Random Urine Quantitative with Creat Ratio  - Lipid panel  - Basic metabolic panel    Dental cavity  Saw dentist at [facility], needs extraction of #30.   - Dental Referral    Stage 3a chronic kidney disease (CKD) (H)  Will get routine labs screening for anemia and overall bone health in stage 3a kidney disease.   - CBC with Platelets and Reflex to Iron Studies  - Calcium  - Phosphorus  - Vitamin D deficiency screening  - Parathyroid Hormone Intact    Body mass index (BMI) of 40.0-44.9 in adult (H)  Exercise and nutrition counseling done today.       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     Review of external notes as documented elsewhere in note  Review of the result(s) of each unique test - see HPI  Ordering of each unique test    Return in about 2 weeks (around 5/15/2023) for Follow up, with me.    Rosey Ascencio DO  Family Medicine PGY-3  [facility], [facility]   Pronouns: She/Hers      Subjective   Al is a 68 year old, presenting for the following health issues:  RECHECK (Pt states that he is here to follow up on medicaTIONS)        3/1/2023     2:12 PM   Additional Questions   Roomed by Ira Kuo MA   Accompanied by Self     HPI     Dentist  Saw [facility]  "dental  Number #30 needs to pulled   They tried to fill it and save it but it hurt and then they told him needs to be pulled   They tried to refer him places having trouble getting in   Xrays done 4/20/2023   Referring Dentist Dr. Patrica CUEVA/ REJI Jarrell ADT 4/24/2023     Hypertension  Hyperlipidemia  Saw cardiology recently   Amlodipine 10 mg  Lisinopril 40 mg daily   Metop 25 mg daily - we need to stop this today, discussed why with patient  Apixaban 5 mg daily   - Lasix PRN, new medication     Discussed how he should be taking medication (not always everyday)   Has been taking it everyday   On atorvastatin 40 mg     Wants RN to take blood pressure  Discussed starting chlorthalidone   Take every Tuesday   Worried about side effects of new medication     HFpEF  Started empagliflozazin at last visit   Saw cardiology - encouraged weight loss   Reviewed Cardiology documentation from 4/12/2023   Cardiology BP goal: A good systolic blood pressure goal is between 120-125 mmHg.  Contingency Plan: Add chlorthalidone, consider cilostazol   Follow-up: 6 months (October 2023)     Today 5/1/2023   Chest Pain:                 No  Shortness of Breath:   Not a big deal right now   Ankle Swelling:            Sometimes  Muscle Aches:            No  Palpitations:                No                      Lightheadedness:       No  Fainting:                      No  Medication Issues:      No  Recent Test:               Echo in 2022 (see below)     Weight  Weight 3/1/2023 279 lbs  Weight last visit 275  Past summer 8/8 282   He wants to loose weight   Talked to his cardiology about recommendations     Review of Systems   Constitutional, HEENT, cardiovascular, pulmonary, gi and gu systems are negative, except as otherwise noted.      Objective    BP (!) 137/90 (BP Location: Left arm, Patient Position: Sitting, Cuff Size: Adult Regular)   Pulse 81   Temp 97.5  F (36.4  C) (Oral)   Resp 16   Ht 1.778 m (5' 10\")   Wt 129.5 kg (285 lb 8 oz)  "  SpO2 97%   BMI 40.96 kg/m    Body mass index is 40.96 kg/m .     Physical Exam   General: Alert and oriented, in no acute distress.  Skin: Warm and dry, no abnormalities noted.  Eyes: Extra-ocular muscles grossly intact, pupils equal.  ENT: Speech intact, nasal passages open, no hearing impairment noted.  Neck: no JVD. Carotid upstrokes brisk no carotid bruits.  Heart: S1S2, irregularly irregular rhythm, rate 60s. No murmur.   Lungs: CTAB No chest wall deformity and asymmetry, No w/r/r  Vasc: +1 pitting edema BL. No cyanosis, clubbing or edema  Neuro: Gait and station normal, comprehension intact. Gross and fine motor skills intact.   Psychiatric: Mood and affect appear normal.   Extremities: Warm, able to move all four extremities at will.        Echo 12/26/2022  Interpretation Summary  Global and regional left ventricular function is normal with an EF of 55-60%.  Right ventricular function, chamber size, wall motion, and thickness are  normal.  Pulmonary artery systolic pressure is normal.  Ascending aorta 4.3 cm.  Sinuses of Valsalva 4 cm.  The inferior vena cava is normal.  No pericardial effusion is present.  No significant changes noted.    Office Visit on 03/01/2023   Component Date Value Ref Range Status     Hemoglobin A1C 03/01/2023 5.9 (H)  0.0 - 5.6 % Final    Normal <5.7%   Prediabetes 5.7-6.4%    Diabetes 6.5% or higher     Note: Adopted from ADA consensus guidelines.

## 2023-04-25 ENCOUNTER — TELEPHONE (OUTPATIENT)
Dept: FAMILY MEDICINE | Facility: CLINIC | Age: 68
End: 2023-04-25
Payer: COMMERCIAL

## 2023-04-25 NOTE — TELEPHONE ENCOUNTER
Dentist told patient to take Advil and tylenol for pain while he waits for appointment. I let patient know that he can take tylenol but should not take the Advil with his Eliquis.    Patient expressed understanding.    Naomi García RN

## 2023-04-25 NOTE — TELEPHONE ENCOUNTER
[facility] phone call message- general phone call:    Reason for call: The patient have severe tooth pain; was told by dentist to take aspirin for the pain until his appt. The patient is worried about the aspirin interacting with the apixaban ANTICOAGULANT (ELIQUIS) 5 MG tablet medication.    Return call needed: Yes    OK to leave a message on voice mail? Yes    Primary language: English      needed? No    Call taken on April 25, 2023 at 8:27 AM by Amanda Dewey

## 2023-05-01 ENCOUNTER — OFFICE VISIT (OUTPATIENT)
Dept: FAMILY MEDICINE | Facility: CLINIC | Age: 68
End: 2023-05-01
Payer: COMMERCIAL

## 2023-05-01 VITALS
RESPIRATION RATE: 16 BRPM | HEIGHT: 70 IN | OXYGEN SATURATION: 97 % | DIASTOLIC BLOOD PRESSURE: 90 MMHG | HEART RATE: 81 BPM | TEMPERATURE: 97.5 F | SYSTOLIC BLOOD PRESSURE: 137 MMHG | WEIGHT: 285.5 LBS | BODY MASS INDEX: 40.87 KG/M2

## 2023-05-01 DIAGNOSIS — N18.31 STAGE 3A CHRONIC KIDNEY DISEASE (CKD) (H): ICD-10-CM

## 2023-05-01 DIAGNOSIS — I10 HYPERTENSION, ESSENTIAL: Primary | ICD-10-CM

## 2023-05-01 DIAGNOSIS — K02.9 DENTAL CAVITY: ICD-10-CM

## 2023-05-01 LAB
ANION GAP SERPL CALCULATED.3IONS-SCNC: 9 MMOL/L (ref 7–15)
BUN SERPL-MCNC: 20.5 MG/DL (ref 8–23)
CALCIUM SERPL-MCNC: 10 MG/DL (ref 8.8–10.2)
CALCIUM SERPL-MCNC: 10 MG/DL (ref 8.8–10.2)
CHLORIDE SERPL-SCNC: 105 MMOL/L (ref 98–107)
CHOLEST SERPL-MCNC: 165 MG/DL
CREAT SERPL-MCNC: 1.23 MG/DL (ref 0.67–1.17)
CREAT UR-MCNC: 85.3 MG/DL
DEPRECATED CALCIDIOL+CALCIFEROL SERPL-MC: 33 UG/L (ref 20–75)
DEPRECATED HCO3 PLAS-SCNC: 29 MMOL/L (ref 22–29)
ERYTHROCYTE [DISTWIDTH] IN BLOOD BY AUTOMATED COUNT: 14.2 % (ref 10–15)
GFR SERPL CREATININE-BSD FRML MDRD: 64 ML/MIN/1.73M2
GLUCOSE SERPL-MCNC: 100 MG/DL (ref 70–99)
HCT VFR BLD AUTO: 44.5 % (ref 40–53)
HDLC SERPL-MCNC: 45 MG/DL
HGB BLD-MCNC: 14.5 G/DL (ref 13.3–17.7)
HOLD SPECIMEN: NORMAL
LDLC SERPL CALC-MCNC: 104 MG/DL
MCH RBC QN AUTO: 29.4 PG (ref 26.5–33)
MCHC RBC AUTO-ENTMCNC: 32.6 G/DL (ref 31.5–36.5)
MCV RBC AUTO: 90 FL (ref 78–100)
MICROALBUMIN UR-MCNC: <12 MG/L
MICROALBUMIN/CREAT UR: NORMAL MG/G{CREAT}
NONHDLC SERPL-MCNC: 120 MG/DL
PHOSPHATE SERPL-MCNC: 2.5 MG/DL (ref 2.5–4.5)
PLATELET # BLD AUTO: 197 10E3/UL (ref 150–450)
POTASSIUM SERPL-SCNC: 4.1 MMOL/L (ref 3.4–5.3)
PTH-INTACT SERPL-MCNC: 42 PG/ML (ref 15–65)
RBC # BLD AUTO: 4.94 10E6/UL (ref 4.4–5.9)
SODIUM SERPL-SCNC: 143 MMOL/L (ref 136–145)
TRIGL SERPL-MCNC: 80 MG/DL
WBC # BLD AUTO: 5.4 10E3/UL (ref 4–11)

## 2023-05-01 PROCEDURE — 82306 VITAMIN D 25 HYDROXY: CPT | Performed by: STUDENT IN AN ORGANIZED HEALTH CARE EDUCATION/TRAINING PROGRAM

## 2023-05-01 PROCEDURE — 82043 UR ALBUMIN QUANTITATIVE: CPT | Performed by: STUDENT IN AN ORGANIZED HEALTH CARE EDUCATION/TRAINING PROGRAM

## 2023-05-01 PROCEDURE — 99214 OFFICE O/P EST MOD 30 MIN: CPT | Mod: GC | Performed by: STUDENT IN AN ORGANIZED HEALTH CARE EDUCATION/TRAINING PROGRAM

## 2023-05-01 PROCEDURE — 80061 LIPID PANEL: CPT | Performed by: STUDENT IN AN ORGANIZED HEALTH CARE EDUCATION/TRAINING PROGRAM

## 2023-05-01 PROCEDURE — 36415 COLL VENOUS BLD VENIPUNCTURE: CPT | Performed by: STUDENT IN AN ORGANIZED HEALTH CARE EDUCATION/TRAINING PROGRAM

## 2023-05-01 PROCEDURE — 85027 COMPLETE CBC AUTOMATED: CPT | Performed by: STUDENT IN AN ORGANIZED HEALTH CARE EDUCATION/TRAINING PROGRAM

## 2023-05-01 PROCEDURE — 84100 ASSAY OF PHOSPHORUS: CPT | Performed by: STUDENT IN AN ORGANIZED HEALTH CARE EDUCATION/TRAINING PROGRAM

## 2023-05-01 PROCEDURE — 83970 ASSAY OF PARATHORMONE: CPT | Performed by: STUDENT IN AN ORGANIZED HEALTH CARE EDUCATION/TRAINING PROGRAM

## 2023-05-01 PROCEDURE — 82570 ASSAY OF URINE CREATININE: CPT | Performed by: STUDENT IN AN ORGANIZED HEALTH CARE EDUCATION/TRAINING PROGRAM

## 2023-05-01 PROCEDURE — 80048 BASIC METABOLIC PNL TOTAL CA: CPT | Performed by: STUDENT IN AN ORGANIZED HEALTH CARE EDUCATION/TRAINING PROGRAM

## 2023-05-01 RX ORDER — AMOXICILLIN 500 MG/1
500 TABLET, FILM COATED ORAL 3 TIMES DAILY
COMMUNITY
Start: 2023-04-24 | End: 2024-01-30

## 2023-05-01 NOTE — PROGRESS NOTES
Preceptor Attestation:  Patient seen and evaluated in person. I discussed the patient with the resident. I have verified the content of the note, which accurately reflects my assessment of the patient and the plan of care.   Supervising Physician:  Denise Bunn DO

## 2023-05-01 NOTE — PATIENT INSTRUCTIONS
Blood pressure instructions  Take blood pressure every Tuesday and bring number back to me after a few weeks  If you can remember also try to take it every Thursday so we have 2 readings a week     Blood pressure Goal  Goal: <130/80

## 2023-05-31 ENCOUNTER — OFFICE VISIT (OUTPATIENT)
Dept: FAMILY MEDICINE | Facility: CLINIC | Age: 68
End: 2023-05-31
Payer: COMMERCIAL

## 2023-05-31 VITALS
DIASTOLIC BLOOD PRESSURE: 87 MMHG | TEMPERATURE: 98.4 F | RESPIRATION RATE: 16 BRPM | WEIGHT: 284 LBS | BODY MASS INDEX: 40.66 KG/M2 | HEART RATE: 68 BPM | HEIGHT: 70 IN | OXYGEN SATURATION: 98 % | SYSTOLIC BLOOD PRESSURE: 123 MMHG

## 2023-05-31 DIAGNOSIS — N18.31 CKD STAGE G3A/A1, GFR 45-59 AND ALBUMIN CREATININE RATIO <30 MG/G (H): ICD-10-CM

## 2023-05-31 DIAGNOSIS — Z12.5 SCREENING PSA (PROSTATE SPECIFIC ANTIGEN): ICD-10-CM

## 2023-05-31 DIAGNOSIS — I50.32 CHRONIC HEART FAILURE WITH PRESERVED EJECTION FRACTION (HFPEF) (H): ICD-10-CM

## 2023-05-31 DIAGNOSIS — R39.15 URINARY URGENCY: ICD-10-CM

## 2023-05-31 DIAGNOSIS — R35.1 NOCTURIA: ICD-10-CM

## 2023-05-31 DIAGNOSIS — G47.33 OSA (OBSTRUCTIVE SLEEP APNEA): ICD-10-CM

## 2023-05-31 DIAGNOSIS — K02.9 DENTAL CAVITY: ICD-10-CM

## 2023-05-31 DIAGNOSIS — E11.9 TYPE 2 DIABETES MELLITUS WITHOUT COMPLICATION, WITHOUT LONG-TERM CURRENT USE OF INSULIN (H): ICD-10-CM

## 2023-05-31 DIAGNOSIS — I10 HYPERTENSION, ESSENTIAL: Primary | ICD-10-CM

## 2023-05-31 LAB
ALBUMIN UR-MCNC: ABNORMAL MG/DL
APPEARANCE UR: CLEAR
BACTERIA #/AREA URNS HPF: ABNORMAL /HPF
BILIRUB UR QL STRIP: ABNORMAL
COLOR UR AUTO: YELLOW
GLUCOSE UR STRIP-MCNC: NEGATIVE MG/DL
HGB UR QL STRIP: NEGATIVE
KETONES UR STRIP-MCNC: ABNORMAL MG/DL
LEUKOCYTE ESTERASE UR QL STRIP: ABNORMAL
MUCOUS THREADS #/AREA URNS LPF: PRESENT /LPF
NITRATE UR QL: NEGATIVE
PH UR STRIP: 5.5 [PH] (ref 5–8)
RBC #/AREA URNS AUTO: ABNORMAL /HPF
SP GR UR STRIP: 1.02 (ref 1–1.03)
UROBILINOGEN UR STRIP-ACNC: >=8 E.U./DL
WBC #/AREA URNS AUTO: ABNORMAL /HPF

## 2023-05-31 PROCEDURE — 99214 OFFICE O/P EST MOD 30 MIN: CPT | Mod: GC | Performed by: STUDENT IN AN ORGANIZED HEALTH CARE EDUCATION/TRAINING PROGRAM

## 2023-05-31 PROCEDURE — 81001 URINALYSIS AUTO W/SCOPE: CPT | Performed by: STUDENT IN AN ORGANIZED HEALTH CARE EDUCATION/TRAINING PROGRAM

## 2023-05-31 RX ORDER — TAMSULOSIN HYDROCHLORIDE 0.4 MG/1
0.4 CAPSULE ORAL DAILY
Qty: 90 CAPSULE | Refills: 0 | Status: SHIPPED | OUTPATIENT
Start: 2023-05-31 | End: 2023-08-25

## 2023-05-31 NOTE — PROGRESS NOTES
Assessment & Plan     Hypertension, essential  Chronic heart failure with preserved ejection fraction (HFpEF) (H)  GREG (wears CPAP)  Blood pressure 123/87 today. Not at goal  Here. Did have several BP at home (that he brought in) that were at goal. Also starting tamsulosin today (see below). So will not start additional blood pressure medication as would like to see how his BP does when starting this new medicaiton. Continue current regimen including SLGT-2 and PRN lasix for HFpEF. Continue CPAP. Discussed going up to 80 mg of Atorvastatin (pt declined), will re address in future visits.   - Continue to take blood pressure at home weekly if numbers consistently >130/80 please return to clinic     Dental Cavity  Dental referral placed last visit. Needs extraction of #30. He was informed the dental clinic I referred to was not taking new patients.  - Was able to reconnect with previous dentist and get in touch with new dental provider who will be able to remove the tooth     Stage 3a chronic kidney disease (CKD) (H)  Continue to work on blood pressure control. See above. Labs 5/1/2023 No anemia. Vitamin D, PTH wnl.     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     Type 2 diabetes mellitus without complication, without long-term current use of insulin (H)  Diet controlled. A1c 5.9 3/1/2023. Has seen eye doctor in the past year.    Nocturia  Weak Stream  Urinary urgency  Symptoms have been going on for years. Used to follow with Urology. I reviewed documentation from that time. Had been on tamsulosin, unclear when stopped. He did not do well on Oxybutnin (side effects). Was started on Vesicare (he doesn't remember how he did on this medication, per documentation it seems it worked well for him). Discussed referral back to Urology for assistance in managing symptoms and likely repeat uroflowmetry. Pt declines, he would like to restart Tamsulosin to help  with stream, will do today. Discussed other lifestyle measures as below.  Also due to elevated creatinine (likely CKD) will also check renal US ensure no hydronephrosis (possibly from BPH) that could be contributing to decreased renal function. Will check UA to ensure no infection. Declines needing screening for STIs.        - Discussed preventing constipation, no fluids before bed, empty bladder before bed       - If no improvement would talk to patient again about referral back to Urology for repeat uroflowmetry, consider Vesicare as well as it seems he had good response in the past  -     tamsulosin (FLOMAX) 0.4 MG capsule; Take 1 capsule (0.4 mg) by mouth daily  -     UA Macroscopic with reflex to Microscopic and Culture; Future  -     US Renal Complete Non-Vascular; Future    Screening PSA (prostate specific antigen)  Last PSA from 2014 wnl. Discussed collaboratively with patient. He is having urinary symptoms (see above) not entirely new, but with the symptoms did discuss option of screening for prostate cancer with PSA. Pt agreed to PSA screening; he will come back to get this done. Discussed if elevated next step would be referral to Urology.   -     Prostate spec antigen screen; Future    Review of external notes as documented elsewhere in note  Review of the result(s) of each unique test - see end of note  Ordering of each unique test  Prescription drug management    Return in about 4 weeks (around 6/28/2023) for Follow up, with me.    Rosey Ascencio DO  Family Medicine PGY-3  [facility], [facility]   Pronouns: She/Hers      Subjective   Al is a 68 year old, presenting for the following health issues:  Clinic Care Coordination - Follow-up (Blood pressure )    HPI     HTN  HFpEF  Was not controlled last visit goal <130/80   Wears CPAP has HFpEF w/ PRN lasix on SLGT2  Atorvastatin 40 mg daily   Amlodipine 10 mg  Lisinopril 40 mg daily   Rn blood pressure   He has HFpEF - NOT  "SIMONA  Follows with cardiology next appointment should be October 2023    Blood pressures from home  5/9 130/79  5/16 118/70  5/24 115/74  5/30 131/81  Today 123/87     Diet - lois same, but watching it   Exercise - none   Nutrition referral - declined today     Today 5/31/2023   Chest Pain:                 No  Shortness of Breath:   No  Ankle Swelling:            Sometimes  Muscle Aches:            No  Palpitations:                No                      Lightheadedness:       No  Fainting:                      No  Medication Issues:      No  Recent Test:               Echo in 2022 (see below)     The 10-year ASCVD risk score (Dorcas WANG, et al., 2019) is: 28.6%    Values used to calculate the score:      Age: 68 years      Sex: Male      Is Non- : Yes      Diabetic: Yes      Tobacco smoker: No      Systolic Blood Pressure: 123 mmHg      Is BP treated: Yes      HDL Cholesterol: 45 mg/dL      Total Cholesterol: 165 mg/dL    BPH on chart  Stream not as strong as it used to be  Nocturia   Has history of BPH   Has urge to pee a lot   Never been on medication for it   Saw Urology in 2014 was on Flomax had some improvement in stream (removed from medication)   Also tried vesicare at that time (pt doesn't remember it cant tell me if helped)  Did not do well on oxybutnin   Prevent constipation!  Limit liquids before bed     Dentist   Referred last visit to dental   Reviewed 5/17/2023 dental visit   Not taking new clients when I referred to them  Has a referral on the 5th now to go to      Review of Systems   Constitutional, HEENT, cardiovascular, pulmonary, gi and gu systems are negative, except as otherwise noted.      Objective    /87   Pulse 68   Temp 98.4  F (36.9  C) (Oral)   Resp 16   Ht 1.778 m (5' 10\")   Wt 128.8 kg (284 lb)   SpO2 98%   BMI 40.75 kg/m    Body mass index is 40.75 kg/m .     Physical Exam   General: Alert and oriented, in no acute distress.  Skin: Warm and dry, " no abnormalities noted.  Eyes: Extra-ocular muscles grossly intact, pupils equal.  ENT: Speech intact, nasal passages open, no hearing impairment noted.  Neck: no JVD. Carotid upstrokes brisk no carotid bruits.  Heart: S1S2, irregularly irregular rhythm, rate 60s. No murmur.   Lungs: CTAB No chest wall deformity and asymmetry, No w/r/r  : Declines prostate exam  Vasc: +2 on L, and +1 on R appears about baseline. No cyanosis, clubbing or edema  Neuro: Gait and station normal, comprehension intact. Gross and fine motor skills intact.   Psychiatric: Mood and affect appear normal.   Extremities: Warm, able to move all four extremities at will.    Office Visit on 05/01/2023   Component Date Value Ref Range Status     Creatinine Urine mg/dL 05/01/2023 85.3  mg/dL Final    The reference ranges have not been established in urine creatinine. The results should be integrated into the clinical context for interpretation.     Albumin Urine mg/L 05/01/2023 <12.0  mg/L Final    The reference ranges have not been established in urine albumin. The results should be integrated into the clinical context for interpretation.     Albumin Urine mg/g Cr 05/01/2023    Final    Unable to calculate, urine albumin and/or urine creatinine is outside detectable limits.  Microalbuminuria is defined as an albumin:creatinine ratio of 17 to 299 for males and 25 to 299 for females. A ratio of albumin:creatinine of 300 or higher is indicative of overt proteinuria.  Due to biologic variability, positive results should be confirmed by a second, first-morning random or 24-hour timed urine specimen. If there is discrepancy, a third specimen is recommended. When 2 out of 3 results are in the microalbuminuria range, this is evidence for incipient nephropathy and warrants increased efforts at glucose control, blood pressure control, and institution of therapy with an angiotensin-converting-enzyme (ACE) inhibitor (if the patient can tolerate it).        Cholesterol 05/01/2023 165  <200 mg/dL Final     Triglycerides 05/01/2023 80  <150 mg/dL Final     Direct Measure HDL 05/01/2023 45  >=40 mg/dL Final     LDL Cholesterol Calculated 05/01/2023 104 (H)  <=100 mg/dL Final     Non HDL Cholesterol 05/01/2023 120  <130 mg/dL Final     Sodium 05/01/2023 143  136 - 145 mmol/L Final     Potassium 05/01/2023 4.1  3.4 - 5.3 mmol/L Final     Chloride 05/01/2023 105  98 - 107 mmol/L Final     Carbon Dioxide (CO2) 05/01/2023 29  22 - 29 mmol/L Final     Anion Gap 05/01/2023 9  7 - 15 mmol/L Final     Urea Nitrogen 05/01/2023 20.5  8.0 - 23.0 mg/dL Final     Creatinine 05/01/2023 1.23 (H)  0.67 - 1.17 mg/dL Final     Calcium 05/01/2023 10.0  8.8 - 10.2 mg/dL Final     Glucose 05/01/2023 100 (H)  70 - 99 mg/dL Final     GFR Estimate 05/01/2023 64  >60 mL/min/1.73m2 Final    eGFR calculated using 2021 CKD-EPI equation.     Calcium 05/01/2023 10.0  8.8 - 10.2 mg/dL Final     Phosphorus 05/01/2023 2.5  2.5 - 4.5 mg/dL Final     Vitamin D, Total (25-Hydroxy) 05/01/2023 33  20 - 75 ug/L Final     Parathyroid Hormone Intact 05/01/2023 42  15 - 65 pg/mL Final     WBC Count 05/01/2023 5.4  4.0 - 11.0 10e3/uL Final     RBC Count 05/01/2023 4.94  4.40 - 5.90 10e6/uL Final     Hemoglobin 05/01/2023 14.5  13.3 - 17.7 g/dL Final     Hematocrit 05/01/2023 44.5  40.0 - 53.0 % Final     MCV 05/01/2023 90  78 - 100 fL Final     MCH 05/01/2023 29.4  26.5 - 33.0 pg Final     MCHC 05/01/2023 32.6  31.5 - 36.5 g/dL Final     RDW 05/01/2023 14.2  10.0 - 15.0 % Final     Platelet Count 05/01/2023 197  150 - 450 10e3/uL Final     Hold Specimen 05/01/2023 [facility]   Final

## 2023-06-01 DIAGNOSIS — R35.1 NOCTURIA: Primary | ICD-10-CM

## 2023-06-01 DIAGNOSIS — R39.15 URINARY URGENCY: ICD-10-CM

## 2023-06-02 ENCOUNTER — TELEPHONE (OUTPATIENT)
Dept: FAMILY MEDICINE | Facility: CLINIC | Age: 68
End: 2023-06-02
Payer: COMMERCIAL

## 2023-06-02 NOTE — TELEPHONE ENCOUNTER
Sarai Ascencio DO  P [facility] Triage Nurse  Hey Triage RN Team,   Can you call this patient and let him know that his urine was positive for high urobilinogen, this is a nonspecific finding. It can often be elevated for incidental not significant reasons like constipation. However, it can be elevated due to other issues like hepatic dysfunction. Before we do any more tests or work-up, I'd like to ensure this is a true positive and persistent finding. The next step would be to repeat the UA to see if this result is gone away. If it is - great! If not, Al would need to come back for an office visit with me to discuss more. Also I forgot to mention to Al that the PSA test is a blood test, I think we got that confused with the other urine tests. If he comes in for the lab test to do the UA, id also recommend getting the blood PSA test to screen for prostate cancer at that time. Let me know if you have any questions.     Rosey Ascencio DO   Family Medicine PGY-3   [facility], [facility]   Pronouns: She/Hers      Called patient and relayed provider message above. Patient scheduled with Dr. Ascencio on 6/20 at 9:40, will come and get labs done on 6/12 at 9:40 so results are in for appointment.    Naomi García, RN

## 2023-06-12 ENCOUNTER — LAB (OUTPATIENT)
Dept: LAB | Facility: CLINIC | Age: 68
End: 2023-06-12
Payer: COMMERCIAL

## 2023-06-12 DIAGNOSIS — R39.15 URINARY URGENCY: ICD-10-CM

## 2023-06-12 DIAGNOSIS — R35.1 NOCTURIA: ICD-10-CM

## 2023-06-12 DIAGNOSIS — Z12.5 SCREENING PSA (PROSTATE SPECIFIC ANTIGEN): ICD-10-CM

## 2023-06-12 LAB
PSA SERPL DL<=0.01 NG/ML-MCNC: 1.93 NG/ML (ref 0–4.5)
RBC #/AREA URNS AUTO: NORMAL /HPF
WBC #/AREA URNS AUTO: NORMAL /HPF

## 2023-06-12 PROCEDURE — 36415 COLL VENOUS BLD VENIPUNCTURE: CPT

## 2023-06-12 PROCEDURE — G0103 PSA SCREENING: HCPCS

## 2023-06-12 PROCEDURE — 81015 MICROSCOPIC EXAM OF URINE: CPT

## 2023-06-14 ENCOUNTER — ANCILLARY PROCEDURE (OUTPATIENT)
Dept: ULTRASOUND IMAGING | Facility: CLINIC | Age: 68
End: 2023-06-14
Attending: STUDENT IN AN ORGANIZED HEALTH CARE EDUCATION/TRAINING PROGRAM
Payer: COMMERCIAL

## 2023-06-14 DIAGNOSIS — R39.15 URINARY URGENCY: ICD-10-CM

## 2023-06-14 DIAGNOSIS — N18.31 CKD STAGE G3A/A1, GFR 45-59 AND ALBUMIN CREATININE RATIO <30 MG/G (H): ICD-10-CM

## 2023-06-14 DIAGNOSIS — R35.1 NOCTURIA: ICD-10-CM

## 2023-06-14 PROCEDURE — 76770 US EXAM ABDO BACK WALL COMP: CPT | Mod: GC | Performed by: RADIOLOGY

## 2023-06-20 ENCOUNTER — OFFICE VISIT (OUTPATIENT)
Dept: FAMILY MEDICINE | Facility: CLINIC | Age: 68
End: 2023-06-20
Payer: COMMERCIAL

## 2023-06-20 VITALS
DIASTOLIC BLOOD PRESSURE: 76 MMHG | RESPIRATION RATE: 17 BRPM | BODY MASS INDEX: 40.36 KG/M2 | SYSTOLIC BLOOD PRESSURE: 122 MMHG | HEIGHT: 70 IN | HEART RATE: 68 BPM | OXYGEN SATURATION: 100 % | TEMPERATURE: 98.3 F | WEIGHT: 281.9 LBS

## 2023-06-20 DIAGNOSIS — R39.12 WEAK URINARY STREAM: ICD-10-CM

## 2023-06-20 DIAGNOSIS — I10 HYPERTENSION, ESSENTIAL: ICD-10-CM

## 2023-06-20 DIAGNOSIS — R39.15 URINARY URGENCY: ICD-10-CM

## 2023-06-20 DIAGNOSIS — R82.90 ABNORMAL FINDING IN URINE: Primary | ICD-10-CM

## 2023-06-20 DIAGNOSIS — R35.1 NOCTURIA: ICD-10-CM

## 2023-06-20 DIAGNOSIS — N18.31 CKD STAGE G3A/A1, GFR 45-59 AND ALBUMIN CREATININE RATIO <30 MG/G (H): ICD-10-CM

## 2023-06-20 PROCEDURE — 99214 OFFICE O/P EST MOD 30 MIN: CPT | Mod: GC | Performed by: STUDENT IN AN ORGANIZED HEALTH CARE EDUCATION/TRAINING PROGRAM

## 2023-06-20 NOTE — PROGRESS NOTES
Assessment & Plan     Abnormal finding in urine  Discussed elevated urobilinogen (>8) with patient. Will get repeat UA today to confirm if still elevated. If it is persistently elevated would pursue further work-up. No sign of anemia so hemolysis unlikely cause, could persue further hepatic dysfunction work up including hepatitis labs, INR, fibroscan?/liver US/RUQ ultrasound and it can be nonspecifically associated with malignancy so would consider other tests if this continues to be elevated. Pt has a history of GIST tumor and abnormal colonoscopies (2016 Colon polyp, ascending Colon polyp, transverse resected, per comments on result recommended repeat colonoscopy in 3 years per ordering physician). I had recommended a colonoscopy previously when which he declined but he  did get a Cologuard which was negative 5/9/2022.  If continues to be elevated would recommend further work-up including potentially earlier GIST scan if possible and repeating colonoscopy.   - UA with Microscopic     Hypertension, essential  Chronic heart failure with preserved ejection fraction (HFpEF) (H)  GREG (wears CPAP)  Blood pressure 122/76 today on hand held blood pressure recheck. At goal. Recently started tamsulosin for urinary stream (see below). Continue current regimen including SLGT-2 and PRN lasix for HFpEF. Continue CPAP. Discussed going up to 80 mg of Atorvastatin (pt declined), will re address in future visits.   - Pt is going to see his RN right after this visit (who does home visits); she will be able to help him record his blood pressure readings so he can send them to me via [employer] or call clinic if not at goal  - Continue motivational interviewing around diet, exercise and lifestyle modifications for HTN    Weak urinary stream  Nocturia  Urinary urgency  Improved stream after starting Tamsulosin. Discussed if he wanted to see Urology to consider Vesicare and/or more dynamic urinary testing. Pt declined as he feels his  symptoms are controlled right now. PSA wnl.  Renal US without hydronephrosis or concern that ?possible BPH is leading to worsening kidney function. Previous hx: Symptoms have been going on for years. Used to follow with Urology. I reviewed documentation from that time last visit. Had been on tamsulosin, unclear when stopped. He did not do well on Oxybutnin (side effects). Was started on Vesicare (he doesn't remember how he did on this medication, per documentation it seems it worked well for him).       - Continue lifestyle measures: prevent constipation, no fluids before bed, empty bladder before bed   -     Continue tamsulosin (FLOMAX) 0.4 MG capsule; Take 1 capsule (0.4 mg) by mouth daily    Dental Cavity  Resolved issue.     Stage 3a chronic kidney disease (CKD) (H)  Continue to work on blood pressure control. See above. Labs 5/1/2023 No anemia. Vitamin D, PTH wnl.      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      Type 2 diabetes mellitus without complication, without long-term current use of insulin (H)  Diet controlled. A1c 5.9 3/1/2023. Has seen eye doctor in the past year. Foot exam without concern today.      Screening PSA (prostate specific antigen)  Normal result reviewed with patient together today.     Ordering of each unique test    Return for Follow-up pending results of UA if urobilinogen still high follow-up within 2 weeks to discuss .    Rosey Ascencio DO  Family Medicine PGY-3  [facility], [facility]   Pronouns: She/Hers      Subjective   Al is a 68 year old, presenting for the following health issues:    RECHECK (Follow up for labs)        5/31/2023     1:04 PM   Additional Questions   Roomed by cindy   Accompanied by self     HPI     Blood pressure follow-up  Wears CPAP has HFpEF w/ PRN lasix on SLGT2  Atorvastatin 40 mg daily   Amlodipine 10 mg  Lisinopril 40 mg daily   Rn blood pressure   Goal  "<130/80   Home blood pressure readings   117/68 at home   Going to see RN now  Is going to have is RN record them   Diet - Not much changed, motivation 3/10   Exercise - none   Nutrition referral - declined last visit  Will continue to discuss diet and exercise recommendations     Nocturia   Weak Stream  Urinary urgency  Started tamsulosin   Urine got stronger stream   Thinks getting better   Repeat UA - okay with doing today  Discussed can be multiple reasons this test is high  Has 2 BMs a day - doesn't think constipation causing it    US Renal Complete                                                            IMPRESSION:  1.  No hydronephrosis.  2.  Multiple benign-appearing renal cortex cysts.  PSA 1.93    Review of Systems   Constitutional, HEENT, cardiovascular, pulmonary, gi and gu systems are negative, except as otherwise noted.      Objective    /76   Pulse 68   Temp 98.3  F (36.8  C)   Resp 17   Ht 1.778 m (5' 10\")   Wt 127.9 kg (281 lb 14.4 oz)   SpO2 100%   BMI 40.45 kg/m    Body mass index is 40.45 kg/m .     Physical Exam   General: Alert and oriented, in no acute distress.  Skin: Warm and dry, no abnormalities noted.  Eyes: Extra-ocular muscles grossly intact, pupils equal.  ENT: Speech intact, nasal passages open, no hearing impairment noted.  Neck: no JVD. Carotid upstrokes brisk no carotid bruits.  Heart: S1S2, irregularly irregular rhythm, rate 60s. No murmur.   Lungs: CTAB No chest wall deformity and asymmetry, No w/r/r  : Declines prostate exam  Vasc: +2 on L, and +1 on R appears baseline. No cyanosis, clubbing or edema  Neuro: Gait and station normal, comprehension intact. Gross and fine motor skills intact.   Psychiatric: Mood and affect appear normal.   Extremities: Warm, able to move all four extremities at will.    Lab on 06/12/2023   Component Date Value Ref Range Status     Prostate Specific Antigen Screen 06/12/2023 1.93  0.00 - 4.50 ng/mL Final     RBC Urine 06/12/2023 " None Seen  0-2 /HPF /HPF Final     WBC Urine 06/12/2023 None Seen  0-5 /HPF /HPF Final       Diabetic Foot Screen:  Any complaints of increased pain or numbness ? No   Is there a foot ulcer now or a history of foot ulcer? No   Does the foot have an abnormal shape? No   Are the nails thick, too long or ingrown? No   Are there any redness or open areas? No            Sensation Testing done at all points on the diagram with monofilament     Right Foot: Sensation Normal at all points  Left Foot: Sensation Normal at all points     Risk Category: 0- No loss of protective sensation  Performed by Sarai Ascencio DO

## 2023-07-13 ENCOUNTER — LAB (OUTPATIENT)
Dept: LAB | Facility: CLINIC | Age: 68
End: 2023-07-13
Payer: COMMERCIAL

## 2023-07-13 DIAGNOSIS — R82.90 ABNORMAL FINDING IN URINE: ICD-10-CM

## 2023-07-13 LAB
ALBUMIN UR-MCNC: NEGATIVE MG/DL
APPEARANCE UR: CLEAR
BILIRUB UR QL STRIP: NEGATIVE
COLOR UR AUTO: NORMAL
GLUCOSE UR STRIP-MCNC: NEGATIVE MG/DL
HGB UR QL STRIP: NEGATIVE
KETONES UR STRIP-MCNC: NEGATIVE MG/DL
LEUKOCYTE ESTERASE UR QL STRIP: NEGATIVE
NITRATE UR QL: NEGATIVE
PH UR STRIP: 5.5 [PH] (ref 5–8)
SP GR UR STRIP: 1.01 (ref 1–1.03)
UROBILINOGEN UR STRIP-ACNC: 0.2 E.U./DL

## 2023-07-13 PROCEDURE — 81003 URINALYSIS AUTO W/O SCOPE: CPT

## 2023-08-02 ENCOUNTER — ALLIED HEALTH/NURSE VISIT (OUTPATIENT)
Dept: RESEARCH | Facility: CLINIC | Age: 68
End: 2023-08-02
Payer: COMMERCIAL

## 2023-08-02 VITALS
TEMPERATURE: 97.8 F | HEIGHT: 70 IN | HEART RATE: 61 BPM | RESPIRATION RATE: 16 BRPM | DIASTOLIC BLOOD PRESSURE: 91 MMHG | SYSTOLIC BLOOD PRESSURE: 129 MMHG | WEIGHT: 280 LBS | OXYGEN SATURATION: 97 % | BODY MASS INDEX: 40.09 KG/M2

## 2023-08-02 DIAGNOSIS — Z00.6 EXAMINATION OF PARTICIPANT OR CONTROL IN CLINICAL RESEARCH: Primary | ICD-10-CM

## 2023-08-02 PROCEDURE — 99207 PR NO CHARGE-RESEARCH SERVICE: CPT

## 2023-08-02 NOTE — PROGRESS NOTES
"          [facility] Study    Study Description: The objective of this study is to demonstrate that the Device Under Testing (DUT) produced an ECG input signal of sufficient quality for the [employer] ECG Sloan to produce a waveform with clinically-equivalent information the the Clinical Reference Device.       CONSENT     Jazz Berman a 68 year old male, was on-site today to discuss participation in the [facility].       The consent form was reviewed with the patient.     The review of the study included:  Study Purpose    Participant Responsibilities    Study Data and Devices    Benefits and Risks of Participation    Compensation and Costs of Participation    Voluntary Participation    Confidentiality    Injury and Legal Rights      Protocol Version: 1.0     Subject Number:       Please refer to Sara Behmanesh's consent note for additional details.       SCREENING       Demographic Info  Jazz Berman   1955          68 year old  male    Race: Black or   Ethnicity: Non-/     Woman of Child Bearing Potential?  N/A (Male)    Past Medical History:   Diagnosis Date   Arthritis  Atrial fibrillation (H)  Diabetes mellitus type 2   Hypertension  GREG on CPAP    Specify Type of Atrial Fibrillation: Permanent atrial fibrillation     Medications were reviewed with the subject. Subject is not taking any exclusionary medications.       Allergies   Allergen Reactions    Dye [Contrast Dye] Rash     Dye left skin hyperpigmentation on his back  Patient needs to be premedicated for all CT contrast exams.     Iodinated Contrast Media Rash and Unknown     Dye left skin hyperpigmentation on his back  Patient needs to be premedicated for all CT contrast exams.       No Clinical Screening - See Comments Other (See Comments)     \"white dye\" for a stress test caused him to feel \"like electricity went through my body        Time Seated: 0925   BP (!) 129/91 (BP Location: Right arm, Patient Position: " "Chair, Cuff Size: Adult Large)   Pulse 61   Temp 97.8  F (36.6  C) (Oral)   Resp 16   Ht 1.778 m (5' 10\")   Wt 127 kg (280 lb)   SpO2 97%   BMI 40.18 kg/m      Lifestyle Habits  Alcohol: Do not consume alcohol  Recreational Drug Use: Have not used recreational drug(s) in the last year   Will the recreational drug use impact the ECG result? N/A    Please see Physical Examination note for Screening ECG interpretation.      ENROLLMENT     Subject has met all requirements and is Enrolled in the Study?: Yes    Subject Cohort Assignment: Atrial Fibrillation     DATA COLLECTION       Wrist Measurements  Wrist the DUT will be Worn on: Left  Left Wrist Circumference: 185 mm    DUT wearing Wrist Skin Thickness: 6.8 mm   Wrist Hairiness: Sparse hair on wrist      Device Information  Equipment Bundle/Device Identifier: C:  (L)   Number of Practice Trials: 1   Date ECG Obtained: 02-AUG-2023    30-second samples must be taken simultaneously. 1 minute rest between samples.     Samples: 1, 2, & 3  Taken Simultaneously? Yes  30-Sec ECG recording from DUT? Yes  30-Sec ECG recording from 12-Lead ECG? Yes    All samples (FIT Files and XML/PDF) were provided to [facility].      Any Adverse Events? No  Any Protocol Deviations? No    The subject has now completed their participation in the [employer] Study.       END OF STUDY     Did the subject complete all screening and study procedures per the protocol? Yes   If no, Why: N/A    Date Subject Exited the Study: 02-AUG-2023    Reason Subject Exited Study: Study Activities Complete   Reason for Withdrawal: N/A      02-AUG-2023    Diego Waters            "

## 2023-08-02 NOTE — PROGRESS NOTES
[facility] Study    Study Description: The objective of this study is to demonstrate that the Device Under Testing (DUT) produced an ECG input signal of sufficient quality for the [employer] ECG Sloan to produce a waveform with clinically-equivalent information the the Clinical Reference Device.       CONSENT     Jazz Berman a 68 year old male, was on-site today to discuss participation in the underdome.       The consent form was reviewed with the patient.     The review of the study included:  Study Purpose    Participant Responsibilities    Study Data and Devices    Benefits and Risks of Participation    Compensation and Costs of Participation    Voluntary Participation    Confidentiality    Injury and Legal Rights      Protocol Version: 1.0     Subject Number:       The subject was queried in regards to his willingness to continue and his questions were answered to his satisfaction.   The patient has given his agreement to volunteer and participate in the above noted study.   The eConsent and HIPAA form version 1.0 (Version Date 28-Jun-2023) was signed  on  02-AUG-2023 with the Clinical Research Unit of [facility].     [facility] will [facility] in subject's medical record. A copy of the consent form was given to the subject today.    Study data is directly entered into Epic and [employer]  per protocol.     No study procedures were done prior to Jazz Berman providing informed consent.       02-AUG-2023    Sara Behmanesh

## 2023-08-02 NOTE — PROGRESS NOTES
"   [facility] Physical Exam      Medical History Reviewed? Yes    Physical Examination  For abnormal findings, please evaluate if the finding is Clinically Significant (by 'CS') or Not Clinically Significant (by 'NCS')  General Appearance   Abnormal; NCS obese  Head and Neck   Normal  Eyes     Abnormal; NCS wears glasses  Ears, Nose Mouth and Throat  Normal  Cardiovascular   Abnormal; NCS irregular rhythm; 2+ edema lower legs/ankles L > R- did not take his lasix yet today  Respiratory    Normal  Skin and Hair of Wrists  Normal    Tremor (If present document)  Absent    Vitals:    08/02/23 0939   BP: 129/91   BP Location: Right arm   Patient Position: Chair   Cuff Size: Adult Large   Pulse: 61   Resp: 16   Temp: 97.8  F (36.6  C)   TempSrc: Oral   SpO2: 97%   Weight: 127 kg (280 lb)   Height: 1.778 m (5' 10\")                Electrocardiogram Interpretation:   12 Lead Interpretation: Atrial Fibrillation       02-AUG-2023    Dalila Wright PA-C       "

## 2023-08-03 NOTE — PROGRESS NOTES
Michael Inclusion/Exclusion Criteria:    Study Name: Michael   : Hayden Ny MD and Diann Michele MD    Study Description: The objective of this study is to demonstrate that the Device Under Testing (DUT) produced an ECG input signal of sufficient quality for the [facility] [employer] Student ECG [facility] to produce a waveform with clinically-equivalent information the Clinical Reference Device.      Protocol Version: 1.0 (6-FEB-2023)  Consent Version: 1.0 (28- Jun-2023)    Criteria #  Inclusion Criteria (ALL MUST BE YES)  YES/NO/N/A   1  Able to read, understand, and provide written informed consent Yes   2  Willing and able to participate in the study procedures as described in the consent form   Yes   3  Individuals who are 22 years of age and older  Yes   4  Able to communicate effectively with and follow instructions from the study staff    Yes   5    Subjects must be able to wear the DUT on their wrist such that  DUT band can be fastened  DUT fits snuggly on the wrist without any gapping or rotation  Yes   6    For subjects enrolled into the AF population, subjects must have a known diagnosis of AF and be in AF at the time of screening. Subjects may have any type of AF including paroxysmal, persistent, or permanent AF. Yes             Criteria # Exclusion Criteria (ALL MUST BE NO) YES/NO/N/A   1  Physical disability that precludes safe and adequate testing  No   2 Mental impairment resulting in limited ability to cooperate   No   3 Subjects with a pacemaker or implantable cardioverter-defibrillator (ICD)   No   4 Acute myocardial infarction within 90 days of screening or other cardiovascular disease that, in the opinion of the Investigator, increases the risk to the subject or renders data uninterpretable *  Date of MI: N/A  No   5  Acute pulmonary embolism, pulmonary infarction, or deep vein thrombosis within 90 days of screening       Date of N/A: N/A   No   6  Stroke or transient ischemic attack  within 90 days of screening                        Date of Stroke/TIA: N/A  No   7 Subjects taking rhythm control drugs. For clarity and in contrast, rate control, anticoagulants, and anti-platelet medications are permitted.             EXCEPTION: Subjects who are undergoing scheduled cardioversion for               known AF within 30 days after study participation are allowed to participate             in the study even when on rhythm control drugs                         Date of Cardioversion: N/A  No   8  Symptomatic (or active) allergic skin reactions such as eczema, rosacea, impetigo, dermatomyositis, or allergic contact dermatitis on both wrists or over electrode attachment sites, including known allergy or sensitivity  No   9  Known sensitivity to medical adhesives, isopropyl alcohol, watch bands, or ECG electrodes No   10  A history of abnormal life-threatening rhythms as determined by the Investigator **    No   11  Significant tremor that prevents subject from being able to hold still  No   12  Women who are pregnant at the time of study participation  NA   13   Subjects enrolled into the SR population must not have any diagnosis of AF. For example, subjects cannot be enrolled into the SR population if they have a diagnosis of paroxysmal AF but during screening are in SR.  N/A: AF subject    *Ex. - Recent or ongoing unstable angina, significant valvular heart disease or heart failure, myocarditis, or pericarditis   **Ex. - Ventricular tachycardia, ventricular fibrillation, 3rd-degree heart block, resting heart rate <50 bpm, or resting heart rate >120 bpm      Patient does fulfill study inclusion criteria and no exclusion criteria are found.     Hayden Ny MD and Diann Michele MD    02-AUG-2023    Diego Waters

## 2023-08-22 DIAGNOSIS — R35.1 NOCTURIA: ICD-10-CM

## 2023-08-22 DIAGNOSIS — R39.15 URINARY URGENCY: ICD-10-CM

## 2023-08-22 NOTE — TELEPHONE ENCOUNTER
"Request for medication refill:  tamsulosin (FLOMAX) 0.4 MG capsule   Providers if patient needs an appointment and you are willing to give a one month supply please refill for one month and  send a letter/MyChart using \".SMILLIMITEDREFILL\" .smillimited and route chart to \"P [facility] \" (Giving one month refill in non controlled medications is strongly recommended before denial)    If refill has been denied, meaning absolutely no refills without visit, please complete the smart phrase \".smirxrefuse\" and route it to the \"P [facility] MED REFILLS\"  pool to inform the patient and the pharmacy.    Shaq Garcia MA     "

## 2023-08-25 RX ORDER — TAMSULOSIN HYDROCHLORIDE 0.4 MG/1
0.4 CAPSULE ORAL DAILY
Qty: 30 CAPSULE | Refills: 0 | Status: SHIPPED | OUTPATIENT
Start: 2023-08-25 | End: 2023-09-06

## 2023-08-28 DIAGNOSIS — R73.03 PRE-DIABETES: ICD-10-CM

## 2023-08-28 DIAGNOSIS — N18.31 CKD STAGE G3A/A1, GFR 45-59 AND ALBUMIN CREATININE RATIO <30 MG/G (H): ICD-10-CM

## 2023-08-28 DIAGNOSIS — K21.9 GASTROESOPHAGEAL REFLUX DISEASE WITHOUT ESOPHAGITIS: ICD-10-CM

## 2023-08-28 DIAGNOSIS — I50.32 CHRONIC HEART FAILURE WITH PRESERVED EJECTION FRACTION (HFPEF) (H): ICD-10-CM

## 2023-08-28 NOTE — TELEPHONE ENCOUNTER
"Request for medication refill:  empagliflozin (JARDIANCE) 10 MG TABS tablet   famotidine (PEPCID) 40 MG tablet   Providers if patient needs an appointment and you are willing to give a one month supply please refill for one month and  send a letter/MyChart using \".SMILLIMITEDREFILL\" .smillimited and route chart to \"P [facility] \" (Giving one month refill in non controlled medications is strongly recommended before denial)    If refill has been denied, meaning absolutely no refills without visit, please complete the smart phrase \".smirxrefuse\" and route it to the \"P [facility] MED REFILLS\"  pool to inform the patient and the pharmacy.    Shaq Garcia MA     "

## 2023-08-29 DIAGNOSIS — N52.9 ERECTILE DYSFUNCTION, UNSPECIFIED ERECTILE DYSFUNCTION TYPE: ICD-10-CM

## 2023-09-05 NOTE — TELEPHONE ENCOUNTER
"Request for medication refill:sildenafil (VIAGRA) 100 MG tablet       LV- 06/20/2023      Providers if patient needs an appointment and you are willing to give a one month supply please refill for one month and  send a letter/MyChart using \".SMILLIMITEDREFILL\" .smillimited and route chart to \"P [facility] \" (Giving one month refill in non controlled medications is strongly recommended before denial)    If refill has been denied, meaning absolutely no refills without visit, please complete the smart phrase \".smirxrefuse\" and route it to the \"P [facility] MED REFILLS\"  pool to inform the patient and the pharmacy.    Zachary Waddell CMA      "

## 2023-09-06 ENCOUNTER — OFFICE VISIT (OUTPATIENT)
Dept: FAMILY MEDICINE | Facility: CLINIC | Age: 68
End: 2023-09-06
Payer: COMMERCIAL

## 2023-09-06 VITALS
HEART RATE: 76 BPM | SYSTOLIC BLOOD PRESSURE: 127 MMHG | WEIGHT: 281.8 LBS | OXYGEN SATURATION: 99 % | BODY MASS INDEX: 40.43 KG/M2 | DIASTOLIC BLOOD PRESSURE: 88 MMHG

## 2023-09-06 DIAGNOSIS — I10 ESSENTIAL HYPERTENSION WITH GOAL BLOOD PRESSURE LESS THAN 140/90: ICD-10-CM

## 2023-09-06 DIAGNOSIS — I48.0 PAROXYSMAL A-FIB (H): Primary | ICD-10-CM

## 2023-09-06 DIAGNOSIS — R39.15 URINARY URGENCY: ICD-10-CM

## 2023-09-06 DIAGNOSIS — R35.1 NOCTURIA: ICD-10-CM

## 2023-09-06 DIAGNOSIS — R73.03 PRE-DIABETES: ICD-10-CM

## 2023-09-06 DIAGNOSIS — N52.9 ERECTILE DYSFUNCTION, UNSPECIFIED ERECTILE DYSFUNCTION TYPE: ICD-10-CM

## 2023-09-06 DIAGNOSIS — I50.32 CHRONIC HEART FAILURE WITH PRESERVED EJECTION FRACTION (HFPEF) (H): ICD-10-CM

## 2023-09-06 DIAGNOSIS — N18.31 CKD STAGE G3A/A1, GFR 45-59 AND ALBUMIN CREATININE RATIO <30 MG/G (H): ICD-10-CM

## 2023-09-06 DIAGNOSIS — K21.9 GASTROESOPHAGEAL REFLUX DISEASE WITHOUT ESOPHAGITIS: ICD-10-CM

## 2023-09-06 DIAGNOSIS — I10 HYPERTENSION, ESSENTIAL: ICD-10-CM

## 2023-09-06 LAB
ALBUMIN SERPL BCG-MCNC: 4.3 G/DL (ref 3.5–5.2)
ALP SERPL-CCNC: 92 U/L (ref 40–129)
ALT SERPL W P-5'-P-CCNC: 13 U/L (ref 0–70)
ANION GAP SERPL CALCULATED.3IONS-SCNC: 8 MMOL/L (ref 7–15)
AST SERPL W P-5'-P-CCNC: 22 U/L (ref 0–45)
BILIRUB SERPL-MCNC: 0.6 MG/DL
BUN SERPL-MCNC: 19.3 MG/DL (ref 8–23)
CALCIUM SERPL-MCNC: 9.6 MG/DL (ref 8.8–10.2)
CHLORIDE SERPL-SCNC: 106 MMOL/L (ref 98–107)
CREAT SERPL-MCNC: 1.35 MG/DL (ref 0.67–1.17)
DEPRECATED HCO3 PLAS-SCNC: 29 MMOL/L (ref 22–29)
EGFRCR SERPLBLD CKD-EPI 2021: 57 ML/MIN/1.73M2
ERYTHROCYTE [DISTWIDTH] IN BLOOD BY AUTOMATED COUNT: 13.6 % (ref 10–15)
GLUCOSE SERPL-MCNC: 92 MG/DL (ref 70–99)
HBA1C MFR BLD: 5.8 % (ref 0–5.6)
HCT VFR BLD AUTO: 44.1 % (ref 40–53)
HGB BLD-MCNC: 14.2 G/DL (ref 13.3–17.7)
MCH RBC QN AUTO: 29.5 PG (ref 26.5–33)
MCHC RBC AUTO-ENTMCNC: 32.2 G/DL (ref 31.5–36.5)
MCV RBC AUTO: 92 FL (ref 78–100)
PLATELET # BLD AUTO: 189 10E3/UL (ref 150–450)
POTASSIUM SERPL-SCNC: 4.4 MMOL/L (ref 3.4–5.3)
PROT SERPL-MCNC: 7.7 G/DL (ref 6.4–8.3)
RBC # BLD AUTO: 4.81 10E6/UL (ref 4.4–5.9)
SODIUM SERPL-SCNC: 143 MMOL/L (ref 136–145)
WBC # BLD AUTO: 6 10E3/UL (ref 4–11)

## 2023-09-06 PROCEDURE — 83036 HEMOGLOBIN GLYCOSYLATED A1C: CPT

## 2023-09-06 PROCEDURE — 85027 COMPLETE CBC AUTOMATED: CPT

## 2023-09-06 PROCEDURE — 80053 COMPREHEN METABOLIC PANEL: CPT

## 2023-09-06 PROCEDURE — 99214 OFFICE O/P EST MOD 30 MIN: CPT | Mod: GC

## 2023-09-06 PROCEDURE — 36415 COLL VENOUS BLD VENIPUNCTURE: CPT

## 2023-09-06 RX ORDER — AMLODIPINE BESYLATE 10 MG/1
10 TABLET ORAL DAILY
Qty: 90 TABLET | Refills: 3 | Status: SHIPPED | OUTPATIENT
Start: 2023-09-06 | End: 2023-10-18

## 2023-09-06 RX ORDER — ATORVASTATIN CALCIUM 40 MG/1
40 TABLET, FILM COATED ORAL DAILY
Qty: 90 TABLET | Refills: 3 | Status: SHIPPED | OUTPATIENT
Start: 2023-09-06 | End: 2023-10-18

## 2023-09-06 RX ORDER — TAMSULOSIN HYDROCHLORIDE 0.4 MG/1
0.4 CAPSULE ORAL DAILY
Qty: 90 CAPSULE | Refills: 3 | Status: SHIPPED | OUTPATIENT
Start: 2023-09-06

## 2023-09-06 RX ORDER — SILDENAFIL 100 MG/1
100 TABLET, FILM COATED ORAL DAILY PRN
Qty: 30 TABLET | Refills: 4 | Status: SHIPPED | OUTPATIENT
Start: 2023-09-06 | End: 2024-04-11

## 2023-09-06 RX ORDER — FAMOTIDINE 40 MG/1
40 TABLET, FILM COATED ORAL DAILY
Qty: 90 TABLET | Refills: 3 | Status: SHIPPED | OUTPATIENT
Start: 2023-09-06 | End: 2024-08-23

## 2023-09-06 RX ORDER — SILDENAFIL 100 MG/1
100 TABLET, FILM COATED ORAL DAILY PRN
Qty: 30 TABLET | Refills: 4 | Status: CANCELLED | OUTPATIENT
Start: 2023-09-06

## 2023-09-06 RX ORDER — FUROSEMIDE 40 MG
40 TABLET ORAL DAILY PRN
Qty: 90 TABLET | Refills: 1 | Status: SHIPPED | OUTPATIENT
Start: 2023-09-06 | End: 2023-10-18

## 2023-09-06 RX ORDER — LISINOPRIL 40 MG/1
40 TABLET ORAL DAILY
Qty: 90 TABLET | Refills: 3 | Status: SHIPPED | OUTPATIENT
Start: 2023-09-06 | End: 2023-10-18

## 2023-09-06 NOTE — PROGRESS NOTES
Assessment & Plan     Chronic heart failure with preserved ejection fraction (HFpEF) (H)  Hypertension, essential   Essential hypertension with goal blood pressure less than 140/90  BP at goal today, 127/ 88. Stable on currently medication regimen. Continue to use a CPAP nightly. HFpEF w/ PRN lasix on SLGT2. Discuss increasing Lipitor to 80 mg daily with a 10 yr cardiovascular risk 11.2-8.1%, patient decline and would like to keep all his medication at the same dosing.   - Comprehensive metabolic panel  - furosemide (LASIX) 40 MG tablet  Dispense: 90 tablet; Refill: 1  - lisinopril (ZESTRIL) 40 MG tablet  Dispense: 90 tablet; Refill: 3  - atorvastatin (LIPITOR) 40 MG tablet  Dispense: 90 tablet; Refill: 3  - amLODIPine (NORVASC) 10 MG tablet  Dispense: 90 tablet; Refill: 3    Pre-diabetes  Last A1c on 03/01/23 5.9%. Diet controlled. Reports that he saw a eye doctor within the past year. No abnormal finding on foot exam. Repeat labs today.   - CBC with platelets  - atorvastatin (LIPITOR) 40 MG tablet  Dispense: 90 tablet; Refill: 3  - Hemoglobin A1c  - CBC with platelets  - Hemoglobin A1c    Gastroesophageal reflux disease without esophagitis  Refill provided.   - famotidine (PEPCID) 40 MG tablet  Dispense: 90 tablet; Refill: 3    Nocturia,Urinary urgency  Symptoms are controlled well since starting Tamsulosin. Discussed if he wanted to see Urology to consider Vesicare and/or more dynamic urinary testing, patient declines. Refill provided.  - Continue lifestyle measures: prevent constipation, no fluids before bed, empty bladder before bed   - tamsulosin (FLOMAX) 0.4 MG capsule  Dispense: 90 capsule; Refill: 3    Erectile dysfunction, unspecified erectile dysfunction type  Stable. Refill provided.   - sildenafil (VIAGRA) 100 MG tablet  Dispense: 30 tablet; Refill: 4    Paroxysmal A-fib (H)  [id number]-[id number] score of 4, with a 6.7 % reisk of stroke/TIA/ systemic embolism.   - apixaban ANTICOAGULANT (ELIQUIS) 5 MG tablet   "Dispense: 180 tablet; Refill: 3  6}     BMI:   Estimated body mass index is 40.43 kg/m  as calculated from the following:    Height as of 8/2/23: 1.778 m (5' 10\").    Weight as of this encounter: 127.8 kg (281 lb 12.8 oz).       No follow-ups on file.    Virgilio Slaughter MD  [facility] LILLIANA Shah is a 68 year old, presenting for the following health issues:  Recheck Medication        6/20/2023     9:37 AM   Additional Questions   Roomed by henna   Accompanied by self       HPI       Pre- Diabetes Follow-up    How often are you checking your blood sugar? 2-3 times a week.   What concerns do you have today about your diabetes? None   Do you have any of these symptoms? (Select all that apply)  No numbness or tingling in feet.  No redness, sores or blisters on feet.  No complaints of excessive thirst.  No reports of blurry vision.  No significant changes to weight.      -Diet controlled. gGucose checks at home around 90's. Has seen eye doctor this year.       BP Readings from Last 2 Encounters:   09/06/23 127/88   08/02/23 (!) 129/91     Hemoglobin A1C (%)   Date Value   09/06/2023 5.8 (H)   03/01/2023 5.9 (H)   03/24/2021 5.8 (H)   01/14/2020 5.5     LDL Cholesterol Calculated (mg/dL)   Date Value   05/01/2023 104 (H)   03/28/2022 92   03/24/2021 100   01/14/2020 103             Hypertension Follow-up    Do you check your blood pressure regularly outside of the clinic? No   Are you following a low salt diet? Yes  Are your blood pressures ever more than 140 on the top number (systolic) OR more   than 90 on the bottom number (diastolic), for example 140/90? No    - Wears CPAP for GREG  - HFpEF w/ PRN lasix on SLGT2  - Goal <130/80   - Home blood pressure readings: 130-140/ 70-80 at home half the week and under 130/80 half the week.   - Nurse comes to see him every Tuesday for BP check and medication management.       Atrial Fibrillation Follow-up    Symptoms: no recent chest pain, " significant palpitations, dizziness/lightheadedness, dyspnea, or increased peripheral edema.  Stroke prevention: DOAC (Eliquis, Xarelto, Pradaxa)        5/1/2023    11:18 AM 5/31/2023     1:05 PM 6/20/2023     9:38 AM 8/2/2023     9:39 AM 9/6/2023    10:59 AM   Date   Pulse 81 68 68 61 76     Current [id number] Score: 4 points, which represents a 4.8% annual risk of major embolic event, without anti-coagulation or an LAAO device.       Heart Failure Follow-up  Are you experiencing any shortness of breath? Yes, with activity only     How would you describe your shortness of breath?  Same as usual  Are you experiencing any swelling in your legs or feet?  Better than usual  Are you using more pillows than usual? No  Do you cough at night?  No  Do you check your weight daily?  No  Have you had a weight change recently?  No  Are you having any of the following side effects from your medications? (Select all that apply)  The patient does not report symptoms of dizziness, fatigue, cough, swelling, or slow heart beat.  Since your last visit, how many times have you gone to the cardiologist, urgent care, emergency room, or hospital because of your heart failure?   None  Last Echo: Echo result w/o MOPS: Interpretation SummaryGlobal and regional left ventricular function is normal with an EF of 55-60%.Right ventricular function, chamber size, wall motion, and thickness arenormal.Pulmonary artery systolic pressure is normal.Ascending aorta 4.3 cm.Sinuses of Valsalva 4 cm.The inferior vena cava is normal.No pericardial effusion is present.No significant changes noted.      Nocturia  Urinary urgency  - Improved stream after starting Tamsulosin  - He feels his symptoms are controlled right now.        Review of Systems   Constitutional, HEENT, cardiovascular, pulmonary, gi and gu systems are negative, except as otherwise noted.      Objective    /88   Pulse 76   Wt 127.8 kg (281 lb 12.8 oz)   SpO2 99%   BMI 40.43 kg/m     Body mass index is 40.43 kg/m .  Physical Exam   GENERAL: healthy, alert and no distress  EYES: Eyes grossly normal to inspection, extra-ocular muscles grossly intact  HENT: ear canals and TM's normal, nose and mouth without ulcers or lesions  NECK: no adenopathy, no asymmetry, masses, or scars and thyroid normal to palpation  RESP: lungs clear to auscultation - no rales, rhonchi or wheezes  CV: Irregularly irregular rhythm ,normal S1 S2, no S3 or S4, no murmur, click or rub, no peripheral edema and peripheral pulses strong  ABDOMEN: soft, nontender, no hepatosplenomegaly, no masses and bowel sounds normal  MS: 1+ pitting edema of B/L lower extremities below knee. no gross musculoskeletal defects noted.   SKIN: no suspicious lesions or rashes  NEURO: Mentation intact and speech normal  PSYCH: Mentation appears normal, affect normal/bright        ----- Service Performed and Documented by Resident or Fellow ------

## 2023-09-06 NOTE — PATIENT INSTRUCTIONS
Patient Education   Here is the plan from today's visit    1. Chronic heart failure with preserved ejection fraction (HFpEF) (H), Hypertension, essential, Essential hypertension with goal blood pressure less than 140/90, Paroxysmal A-fib (H)  Continue current medication regimen. Provided refills today. Will complete CBC and CMP.  - Comprehensive metabolic panel; Future  - furosemide (LASIX) 40 MG tablet; Take 1 tablet (40 mg) by mouth daily as needed (Edema)  Dispense: 90 tablet; Refill: 1  - Comprehensive metabolic panel  - lisinopril (ZESTRIL) 40 MG tablet; Take 1 tablet (40 mg) by mouth daily  Dispense: 90 tablet; Refill: 3  - atorvastatin (LIPITOR) 40 MG tablet; Take 1 tablet (40 mg) by mouth daily  Dispense: 90 tablet; Refill: 3  - amLODIPine (NORVASC) 10 MG tablet; Take 1 tablet (10 mg) by mouth daily  Dispense: 90 tablet; Refill: 3  - apixaban ANTICOAGULANT (ELIQUIS) 5 MG tablet; Take 1 tablet (5 mg) by mouth 2 times daily  Dispense: 180 tablet; Refill: 3    2. Pre-diabetes  Last A1c on 3/01/23 was 5.9. We recheck A1c today. Follow up in 3 months.   - CBC with platelets; Future  - atorvastatin (LIPITOR) 40 MG tablet; Take 1 tablet (40 mg) by mouth daily  Dispense: 90 tablet; Refill: 3  - Hemoglobin A1c    3 . Gastroesophageal reflux disease without esophagitis  Refill today.   - famotidine (PEPCID) 40 MG tablet; Take 1 tablet (40 mg) by mouth daily  Dispense: 90 tablet; Refill: 3    4. Nocturia, Urinary urgency  Improving symptoms. Refill today.   - tamsulosin (FLOMAX) 0.4 MG capsule; Take 1 capsule (0.4 mg) by mouth daily  Dispense: 90 capsule; Refill: 3    5. Erectile dysfunction, unspecified erectile dysfunction type  Refill today.   - sildenafil (VIAGRA) 100 MG tablet; Take 1 tablet (100 mg) by mouth daily as needed (sexual dysfunction)  Dispense: 30 tablet; Refill: 4              Please call or return to clinic if your symptoms don't go away.    Follow up plan  No follow-ups on file.    [facility]  Lab Testing:  **If you had lab testing today and your results are reassuring or normal they will be mailed to you or sent through [employer] within 7 days.   **If the lab tests need quick action we will call you with the results.  **If you are having labs done on a different day, please call [phone] to schedule at [facility] or [phone] for other [facility] locations. Labs do not offer walk-in appointments.  The phone number we will call with results is # [phone] (home) . If this is not the best number please call our clinic and change the number.  Medication Refills:  If you need any refills please call your pharmacy and they will contact us.   If you need to  your refill at a new pharmacy, please contact the new pharmacy directly. The new pharmacy will help you get your medications transferred faster.   Scheduling:  If you have any concerns about today's visit or wish to schedule another appointment please call our office during normal business hours [phone] (8-5:00 M-F). If you can no longer make a scheduled visit, please cancel via [employer] or call us to cancel.   If a referral was made to an [facility] specialty provider and you do not get a call from central scheduling, please refer to directions on your visit summary or call our office during normal business hours for assistance.   If a Mammogram was ordered for you at the [facility] call [phone] to schedule or change your appointment.  If you had an XRay/CT/Ultrasound/MRI ordered the number is [phone] to schedule or change your radiology appointment.   [facility] has limited ultrasound appointments available on Wednesdays, if you would like your ultrasound at [facility], please call [phone] to schedule.   Medical Concerns:  If you have urgent medical concerns please call [phone] at any time of the day.    Virgilio Slaughter MD

## 2023-09-07 ENCOUNTER — PATIENT OUTREACH (OUTPATIENT)
Dept: ONCOLOGY | Facility: CLINIC | Age: 68
End: 2023-09-07
Payer: COMMERCIAL

## 2023-09-07 DIAGNOSIS — C49.A2 MALIGNANT GASTROINTESTINAL STROMAL TUMOR (GIST) OF STOMACH (H): Primary | ICD-10-CM

## 2023-09-07 RX ORDER — METHYLPREDNISOLONE 32 MG/1
32 TABLET ORAL PRN
Qty: 2 TABLET | Refills: 11 | Status: SHIPPED | OUTPATIENT
Start: 2023-09-07 | End: 2024-01-30

## 2023-09-07 NOTE — PROGRESS NOTES
[facility]: Cancer Care                                                                                          Called Al to let him know that a prescription for methylprednisolone 32 mg has been sent to his local pharmacy.  He will need to take one tablet 12 hours prior to his scan and one tablet 2 hours prior to the scan.  Let him know that 11 refills have been added so he will just need to call the pharmacy and  the prescriptions for future scans.    Signature:  Rao Pressley RN

## 2023-09-08 DIAGNOSIS — C49.A2 MALIGNANT GASTROINTESTINAL STROMAL TUMOR (GIST) OF STOMACH (H): Primary | ICD-10-CM

## 2023-09-11 RX ORDER — EMPAGLIFLOZIN 10 MG/1
10 TABLET, FILM COATED ORAL DAILY
Qty: 90 TABLET | Refills: 1 | OUTPATIENT
Start: 2023-09-11

## 2023-09-11 RX ORDER — SILDENAFIL 100 MG/1
TABLET, FILM COATED ORAL
Qty: 30 TABLET | Refills: 0 | OUTPATIENT
Start: 2023-09-11

## 2023-09-11 RX ORDER — FAMOTIDINE 40 MG/1
40 TABLET, FILM COATED ORAL DAILY
Qty: 90 TABLET | Refills: 1 | OUTPATIENT
Start: 2023-09-11

## 2023-09-11 NOTE — PROGRESS NOTES
"      Phone visit >11 min total time w orders/doc >20 min        9-12-22     I talked w Mr. Berman, for f/u of a GIST.       Background  In brief, he was in his usual state of health but noted some black stools in 06/2016. In early July 2016 he was admitted for anemia and GI bleeding and had orthostatic symptoms. 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 Endoscopy before that showed some punctate oozing and it is possible that some of the bleeding was coming from the tumor.   -  Surgery was August 2016 and pathology report showed 1 mitosis per 50 high-power fields, a tumor size of 15 cm and negative margins. 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  Interestingly, in contrast to the D842V, in-frame deletions of D842 to H845 are typically sensitive to imatinib.  -        Interval history.     He feels OK overall.     He missed his scan because he did not get the methylprednisolone pills by the time of the scheduled scan.    He is actually doing quite well and the only thing he is pretty concerned about is spots he like to get checked.    Hes still not that active but thinks he is doing well from his point of view as a \"couch potato\".  He gets out some but not to walk for activity and his wt is too high.    He denies being bothered by shortness of breath now though he has had that problem and is felt to likely have HFpEF. He doesn't got up stairs much. He does have a history of chronic congestive heart failure and is on multiple medications.   He does note occasional ankle swelling that resolves by itself.    DM is controlled off medications. Denies neuropathy.     He has sleep apnea and uses a CPAP machine and also has a history of hypertension, depression, diabetes, cardiomegaly, and he was found to be in atrial fibrillation when he was hospitalized for a GI bleed.    He feels his mood is good.  He connects with his " PCP fairly frequently.     He had some tiny nonspecific lung nodules, some adrenal nodules, and nonspecific splenic lesions on past imaging.    A 10-point ROS is otherwise unremarkable.          -  Background PMH, FH, SH  His past history is notable for keloid formation, resection of a rectal polyp, and a crushing foot injury.   He feels that contrast dye created a bit of a rash that left some skin hyperpigmentation on his back.   He is currently single and lives alone. He stopped smoking in 2011, though he was a polysubstance abuser in the past. He no longer drinks alcohol. He is retired from working in a furniture store.   Family history is positive for diabetes and hypertension.   -      On exam he sounded comfortable with a quiet affect.   CHEST: no resp distress.   NEURO: Normal mentation and speech.  PSYCH: mood good     -  On 9-6 CBC, LFT, GNE OK; cr 1.35 (was 1.14), A1c 5.8     -  No new images    He has a history of some stable lung nodules and adrenal nodule.       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  Interestingly, in contrast to the D842V, in-frame deletions of D842 to H845 are typically sensitive to imatinib.     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  It appears his recurrence risk was on the order of 10-15% over a few years.  I did not think that risk warranted the toxicities of Gleevec, especially given his multiple other problems and medications.      He did not get for his CT and we will reschedule.  He does have access to methylprednisolone now.      The PDGFR mutation is unusual but is a case where gleevec could be useful.  A switch inhibitor could possibly be helpful as well, but with the low mitotic index he may not need it.    He is now almost 7 years out.  We will arrange a CT and we discussed that if this is okay we would do no further imaging and see him only as needed.    Due to the rise in Creat we will do no iv contrast    We will arrange a PA  visit to check his questionable skin lesions.     2-heart disease  Noted  He had an echo that showed no structural heart disease, and he was felt to be at high risk for stroke with a CHADS2 score of 4, and he is on xarelto.  His atrial fibrillation, anticoagulation and other issues will be followed by his primary care team and Cardiology.        3-other Dx noted    All his questions were addressed and he will call if he has others.     -  Sean Freed M.D.  Professor  Hematology, Oncology and Transplantation

## 2023-09-11 NOTE — TELEPHONE ENCOUNTER
Duplicate. Refill for famotidine given at 9/06 appt. Patient informed myself that he does not need Jardiance refilled.

## 2023-09-12 ENCOUNTER — VIRTUAL VISIT (OUTPATIENT)
Dept: ONCOLOGY | Facility: CLINIC | Age: 68
End: 2023-09-12
Attending: INTERNAL MEDICINE
Payer: COMMERCIAL

## 2023-09-12 DIAGNOSIS — R79.89 ELEVATED SERUM CREATININE: ICD-10-CM

## 2023-09-12 DIAGNOSIS — G47.33 OSA (OBSTRUCTIVE SLEEP APNEA): ICD-10-CM

## 2023-09-12 DIAGNOSIS — C49.A2 MALIGNANT GASTROINTESTINAL STROMAL TUMOR (GIST) OF STOMACH (H): Primary | ICD-10-CM

## 2023-09-12 DIAGNOSIS — I48.0 PAROXYSMAL ATRIAL FIBRILLATION (H): ICD-10-CM

## 2023-09-12 DIAGNOSIS — E11.9 TYPE 2 DIABETES MELLITUS WITHOUT COMPLICATION, WITHOUT LONG-TERM CURRENT USE OF INSULIN (H): ICD-10-CM

## 2023-09-12 DIAGNOSIS — I10 HYPERTENSION, ESSENTIAL: ICD-10-CM

## 2023-09-12 PROCEDURE — 99214 OFFICE O/P EST MOD 30 MIN: CPT | Mod: 93 | Performed by: INTERNAL MEDICINE

## 2023-09-12 NOTE — LETTER
"    9/12/2023         RE: Jazz Berman  [address]  [facility] [address]        Dear Colleague,    Thank you for referring your patient, Jazz Berman, to the [facility]. Please see a copy of my visit note below.          Phone visit >11 min total time w orders/doc >20 min        9-12-22     I talked w Mr. Berman, for f/u of a GIST.       Background  In brief, he was in his usual state of health but noted some black stools in 06/2016. In early July 2016 he was admitted for anemia and GI bleeding and had orthostatic symptoms. 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 Endoscopy before that showed some punctate oozing and it is possible that some of the bleeding was coming from the tumor.   -  Surgery was August 2016 and pathology report showed 1 mitosis per 50 high-power fields, a tumor size of 15 cm and negative margins. 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  Interestingly, in contrast to the D842V, in-frame deletions of D842 to H845 are typically sensitive to imatinib.  -        Interval history.     He feels OK overall.     He missed his scan because he did not get the methylprednisolone pills by the time of the scheduled scan.    He is actually doing quite well and the only thing he is pretty concerned about is spots he like to get checked.    Hes still not that active but thinks he is doing well from his point of view as a \"couch potato\".  He gets out some but not to walk for activity and his wt is too high.    He denies being bothered by shortness of breath now though he has had that problem and is felt to likely have HFpEF. He doesn't got up stairs much. He does have a history of chronic congestive heart failure and is on multiple medications.   He does note occasional ankle swelling that resolves by itself.    DM is controlled off medications. Denies neuropathy.   "   He has sleep apnea and uses a CPAP machine and also has a history of hypertension, depression, diabetes, cardiomegaly, and he was found to be in atrial fibrillation when he was hospitalized for a GI bleed.    He feels his mood is good.  He connects with his PCP fairly frequently.     He had some tiny nonspecific lung nodules, some adrenal nodules, and nonspecific splenic lesions on past imaging.    A 10-point ROS is otherwise unremarkable.          -  Background PMH, FH, SH  His past history is notable for keloid formation, resection of a rectal polyp, and a crushing foot injury.   He feels that contrast dye created a bit of a rash that left some skin hyperpigmentation on his back.   He is currently single and lives alone. He stopped smoking in 2011, though he was a polysubstance abuser in the past. He no longer drinks alcohol. He is retired from working in a furniture store.   Family history is positive for diabetes and hypertension.   -      On exam he sounded comfortable with a quiet affect.   CHEST: no resp distress.   NEURO: Normal mentation and speech.  PSYCH: mood good     -  On 9-6 CBC, LFT, GNE OK; cr 1.35 (was 1.14), A1c 5.8     -  No new images    He has a history of some stable lung nodules and adrenal nodule.       -  No KIT mutation, but a PDGFR W337_M627 deletion.  Interestingly, in contrast to the D842V, in-frame deletions of D842 to H845 are typically sensitive to imatinib.     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  It appears his recurrence risk was on the order of 10-15% over a few years.  I did not think that risk warranted the toxicities of Gleevec, especially given his multiple other problems and medications.      He did not get for his CT and we will reschedule.  He does have access to methylprednisolone now.      The PDGFR mutation is unusual but is a case where gleevec could be useful.  A switch inhibitor could possibly be helpful as well, but with the low mitotic  index he may not need it.    He is now almost 7 years out.  We will arrange a CT and we discussed that if this is okay we would do no further imaging and see him only as needed.    Due to the rise in Creat we will do no iv contrast    We will arrange a PA visit to check his questionable skin lesions.     2-heart disease  Noted  He had an echo that showed no structural heart disease, and he was felt to be at high risk for stroke with a CHADS2 score of 4, and he is on xarelto.  His atrial fibrillation, anticoagulation and other issues will be followed by his primary care team and Cardiology.        3-other Dx noted    All his questions were addressed and he will call if he has others.     -  Sean Freed M.D.  Professor  Hematology, Oncology and Transplantation

## 2023-09-20 ENCOUNTER — HOSPITAL ENCOUNTER (OUTPATIENT)
Dept: CT IMAGING | Facility: CLINIC | Age: 68
Discharge: HOME | End: 2023-09-20
Attending: INTERNAL MEDICINE | Admitting: INTERNAL MEDICINE
Payer: COMMERCIAL

## 2023-09-20 DIAGNOSIS — I10 HYPERTENSION, ESSENTIAL: ICD-10-CM

## 2023-09-20 DIAGNOSIS — E11.9 TYPE 2 DIABETES MELLITUS WITHOUT COMPLICATION, WITHOUT LONG-TERM CURRENT USE OF INSULIN (H): ICD-10-CM

## 2023-09-20 DIAGNOSIS — G47.33 OSA (OBSTRUCTIVE SLEEP APNEA): ICD-10-CM

## 2023-09-20 DIAGNOSIS — R79.89 ELEVATED SERUM CREATININE: ICD-10-CM

## 2023-09-20 DIAGNOSIS — C49.A2 MALIGNANT GASTROINTESTINAL STROMAL TUMOR (GIST) OF STOMACH (H): ICD-10-CM

## 2023-09-20 DIAGNOSIS — I48.0 PAROXYSMAL ATRIAL FIBRILLATION (H): ICD-10-CM

## 2023-09-20 PROCEDURE — 74176 CT ABD & PELVIS W/O CONTRAST: CPT | Mod: 26 | Performed by: STUDENT IN AN ORGANIZED HEALTH CARE EDUCATION/TRAINING PROGRAM

## 2023-09-20 PROCEDURE — 74176 CT ABD & PELVIS W/O CONTRAST: CPT

## 2023-09-22 ENCOUNTER — ONCOLOGY VISIT (OUTPATIENT)
Dept: ONCOLOGY | Facility: CLINIC | Age: 68
End: 2023-09-22
Attending: INTERNAL MEDICINE
Payer: COMMERCIAL

## 2023-09-22 VITALS
OXYGEN SATURATION: 97 % | HEIGHT: 69 IN | HEART RATE: 78 BPM | TEMPERATURE: 98.5 F | SYSTOLIC BLOOD PRESSURE: 129 MMHG | DIASTOLIC BLOOD PRESSURE: 89 MMHG | RESPIRATION RATE: 16 BRPM | BODY MASS INDEX: 41.74 KG/M2 | WEIGHT: 281.8 LBS

## 2023-09-22 DIAGNOSIS — C49.A2 MALIGNANT GASTROINTESTINAL STROMAL TUMOR (GIST) OF STOMACH (H): Primary | ICD-10-CM

## 2023-09-22 DIAGNOSIS — L98.9 SKIN LESION: ICD-10-CM

## 2023-09-22 PROCEDURE — 99215 OFFICE O/P EST HI 40 MIN: CPT | Performed by: STUDENT IN AN ORGANIZED HEALTH CARE EDUCATION/TRAINING PROGRAM

## 2023-09-22 PROCEDURE — G0463 HOSPITAL OUTPT CLINIC VISIT: HCPCS | Performed by: STUDENT IN AN ORGANIZED HEALTH CARE EDUCATION/TRAINING PROGRAM

## 2023-09-22 ASSESSMENT — PAIN SCALES - GENERAL: PAINLEVEL: NO PAIN (0)

## 2023-09-22 NOTE — NURSING NOTE
"Oncology Rooming Note    September 22, 2023 10:28 AM   Jazz Berman is a 68 year old male who presents for:    Chief Complaint   Patient presents with    Oncology Clinic Visit     gastrointestinal stromal tumor     Initial Vitals: /89 (BP Location: Right arm, Patient Position: Sitting, Cuff Size: Adult Large)   Pulse 78   Temp 98.5  F (36.9  C) (Oral)   Resp 16   Ht 1.753 m (5' 9\")   Wt 127.8 kg (281 lb 12.8 oz)   SpO2 97%   BMI 41.61 kg/m   Estimated body mass index is 41.61 kg/m  as calculated from the following:    Height as of this encounter: 1.753 m (5' 9\").    Weight as of this encounter: 127.8 kg (281 lb 12.8 oz). Body surface area is 2.49 meters squared.  No Pain (0) Comment: Data Unavailable   No LMP for male patient.  Allergies reviewed: Yes  Medications reviewed: Yes    Medications: Medication refills not needed today.  Pharmacy name entered into [employer]:    [id number] - [place], [place] - [address]  [id number] - [place], [place] - [address] [facility]    Clinical concerns: Patient states there are no new concerns to discuss with provider.    Vega Mcmahon              "

## 2023-09-22 NOTE — LETTER
9/22/2023         RE: Jazz Berman  [address]  [facility] [address]        Dear Colleague,    Thank you for referring your patient, Jazz Berman, to the [facility]. Please see a copy of my visit note below.    Oncology/Hematology Visit Note  Sep 22, 2023    Reason for Visit: follow up of GIST    History of Present Illness: Jazz Berman is a 68 year old male with a history for a GIST. He has been on surveillance. He comes in today at the request of Dr. Freed for evaluation of skin lesions and imaging follow up.     Oncology History:  Past history copied from Dr. Freed note:     In early July 2016 he was admitted for anemia and GI bleeding and had orthostatic symptoms. 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   -  Surgery was August 2016 and pathology report showed 1 mitosis per 50 high-power fields, a tumor size of 15 cm and negative margins. 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   -  No KIT mutation, but a PDGFR J078_G381 deletion.  Interestingly, in contrast to the D842V, in-frame deletions of D842 to H845 are typically sensitive to imatinib.    He is not on any adjuvant treatment.     Interval History:  Al reports feeling in his usual state of health today. He is having no abdominal pain, nausea, loss of appetite, changes to bowel pattern or weight loss.  -He also reports no fevers, chest pain, cough or SOB.  -He has baseline leg swelling following 2 injuries/accidents with swelling greater on the left.  -He has no bleeding concerns on Eliquis.  -He reports noting dark spots on his skin for years which haven't changed but he's wondering what they are. They are mostly on the back and abdomen. 1 on the forearm. Not itchy or painful. Sometimes they start red then turn dark. He reports no recent new lesions or change in size. He currently does not follow with dermatology    Review of Systems:  As noted in  "HPI.    Physical Examination:  /89 (BP Location: Right arm, Patient Position: Sitting, Cuff Size: Adult Large)   Pulse 78   Temp 98.5  F (36.9  C) (Oral)   Resp 16   Ht 1.753 m (5' 9\")   Wt 127.8 kg (281 lb 12.8 oz)   SpO2 97%   BMI 41.61 kg/m    Wt Readings from Last 10 Encounters:   09/22/23 127.8 kg (281 lb 12.8 oz)   09/06/23 127.8 kg (281 lb 12.8 oz)   08/02/23 127 kg (280 lb)   06/20/23 127.9 kg (281 lb 14.4 oz)   05/31/23 128.8 kg (284 lb)   05/01/23 129.5 kg (285 lb 8 oz)   04/12/23 129 kg (284 lb 4.8 oz)   03/01/23 126.8 kg (279 lb 9.6 oz)   11/14/22 125.1 kg (275 lb 12.8 oz)   08/31/22 126.9 kg (279 lb 12.8 oz)   General: Well-appearing male in no acute distress.  Eyes: EOMI, PERRL. No scleral icterus.  ENT: Oral mucosa is moist without lesions or thrush.   Lymphatic: Neck is supple without cervical or supraclavicular lymphadenopathy.   Cardiovascular: Rhythm is irregular, rate is controlled. No murmurs. 1-2+ BLE edema most notably in the ankles; L>R.  Respiratory: CTA bilaterally but slightly dim throughout. No wheezes or crackles. No tachypnea or dyspnea  Gastrointestinal: BS +. Abdomen soft, non-tender. Midline scar.   Neurologic: Cranial nerves II through XII are grossly intact.  Skin: Scattered hyperpigmented well circumscribed nevi-appearing lesions to abdomen and back as pictured below with scattered cherry angiomas. 1 of the abdominal black/hyperpigmented lesions has some scale to palpation  Psych: Affect appropriate. Very pleasant.    9/22/23:           Laboratory Data:  Most Recent 3 CBC's:  Recent Labs   Lab Test 09/06/23  1154 05/01/23  1215 08/31/22  0817 03/28/22  1010   WBC 6.0 5.4 5.9 7.9   HGB 14.2 14.5 14.2 15.7   MCV 92 90 92 91    197 174 203   ANEUTAUTO  --   --   --  7.3    Most Recent 3 BMP's:  Recent Labs   Lab Test 09/06/23  1154 05/01/23  1215 11/14/22  1433 08/31/22  0817 03/28/22  1030 03/28/22  1010    143 143 141  --  140   POTASSIUM 4.4 4.1 4.2 4.1   " < > 4.8   CHLORIDE 106 105 103 104   < > 108   CO2 29 29 28 24   < > 26   BUN 19.3 20.5 21.7 21.7   < > 25   CR 1.35* 1.23* 1.36* 1.27*   < > 1.14   ANIONGAP 8 9 12 13   < > 6   NATALYA 9.6 10.0  10.0 9.8 9.6  --  9.6   GLC 92 100* 94 98   < > 110*   PROTTOTAL 7.7  --   --  7.1  --  8.2   ALBUMIN 4.3  --   --  3.9  --  3.6    < > = values in this interval not displayed.    Most Recent 2 LFT's:  Recent Labs   Lab Test 09/06/23  1154 08/31/22  0817   AST 22 19   ALT 13 9*   ALKPHOS 92 84   BILITOTAL 0.6 0.6    Most Recent TSH and T4:  Recent Labs   Lab Test 02/27/18  1302   TSH 2.54     I reviewed the above labs today.    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     CONTRAST: none    CT scan radiation dose is optimized to minimum requisite dose using  automated dose modulation techniques.    FINDINGS:    Lower thorax: Normal.    Abdomen/pelvis: Slightly limited due to lack of contrast.    Liver: No mass. No intrahepatic biliary ductal dilation.    Biliary System: Normal gallbladder. No extrahepatic biliary ductal  dilation.    Pancreas: No mass or pancreatic ductal dilation.    Adrenal glands: No mass or nodules    Spleen: Normal. Splenule is noted.    Kidneys: No suspicious mass, obstructing calculus or hydronephrosis.   Bilateral simple  cysts.    Gastrointestinal tract : Postoperative changes of the stomach status  post prior gastric mass removal. Stomach is otherwise grossly  unchanged compared to previous. Normal appendix. Normal caliber small  bowel.  Colonic diverticulosis with no diverticulitis.    Mesentery/peritoneum/retroperitoneum: No mass. No free fluid or air.    Lymph nodes: No significant lymphadenopathy.    Vasculature: Patency of the major intra-abdominal vasculature cannot  be assessed on this noncontrast exam.    Pelvis: Urinary bladder is decompressed.    Osseous structures: No aggressive or acute osseous lesion.      Soft tissues: Within normal limits.  Impression: IMPRESSION:   Stable exam. No appreciable evidence for local recurrence or new  metastatic disease on this noncontrast exam.    I have personally reviewed the examination and initial interpretation  and I agree with the findings.    RASTA AMAYA,          SYSTEM ID:  [id number]    I reviewed the above imaging today.    Assessment and Plan:    #ONC  GIST  This was resected on 8-16-16. The recurrence risk was reported be about  10-15%. Given this, he has not been on adjuvant treatment. His CT AP on 9/20/23 showed no evidence for local recurrence or metastatic disease.     Given that he is now almost 7 years out and his CT showed no evidence of disease, our plan is to discontinue surveillance imaging and plan to see him only if a concern arises. I confirmed this plan with Dr. Freed. Al agrees with this plan. I will work with our clinical team to see if we can develop a survivorship plan and see him to go over this within the 6 months.      Of note, he requires pre-meds prior to CT scans due to allergies.     #DERM   -Derm referral for evaluation of hyperpigmented nevi-like lesions. I don't think he needs this urgently as he reports no recent changes and this seems to be more of a chronic issue. Will defer to derm on whether any biopsy is indicated.    #CV  A-fib  -On  Eliquis. No bleeding concerns    40 minutes spent on the date of the encounter doing chart review, review of test results, interpretation of tests, patient visit, and documentation      Amber Scheierl, CNP  [facility] [address] [address] [phone]

## 2023-09-22 NOTE — PROGRESS NOTES
"Oncology/Hematology Visit Note  Sep 22, 2023    Reason for Visit: follow up of GIST    History of Present Illness: Jazz Berman is a 68 year old male with a history for a GIST. He has been on surveillance. He comes in today at the request of Dr. Freed for evaluation of skin lesions and imaging follow up.     Oncology History:  Past history copied from Dr. Freed note:     In early July 2016 he was admitted for anemia and GI bleeding and had orthostatic symptoms. 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   -  Surgery was August 2016 and pathology report showed 1 mitosis per 50 high-power fields, a tumor size of 15 cm and negative margins. 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   -  No KIT mutation, but a PDGFR R545_Y298 deletion.  Interestingly, in contrast to the D842V, in-frame deletions of D842 to H845 are typically sensitive to imatinib.    He is not on any adjuvant treatment.     Interval History:  Al reports feeling in his usual state of health today. He is having no abdominal pain, nausea, loss of appetite, changes to bowel pattern or weight loss.  -He also reports no fevers, chest pain, cough or SOB.  -He has baseline leg swelling following 2 injuries/accidents with swelling greater on the left.  -He has no bleeding concerns on Eliquis.  -He reports noting dark spots on his skin for years which haven't changed but he's wondering what they are. They are mostly on the back and abdomen. 1 on the forearm. Not itchy or painful. Sometimes they start red then turn dark. He reports no recent new lesions or change in size. He currently does not follow with dermatology    Review of Systems:  As noted in HPI.    Physical Examination:  /89 (BP Location: Right arm, Patient Position: Sitting, Cuff Size: Adult Large)   Pulse 78   Temp 98.5  F (36.9  C) (Oral)   Resp 16   Ht 1.753 m (5' 9\")   Wt 127.8 kg (281 lb 12.8 oz)   SpO2 97%   BMI 41.61 kg/m    Wt " Readings from Last 10 Encounters:   09/22/23 127.8 kg (281 lb 12.8 oz)   09/06/23 127.8 kg (281 lb 12.8 oz)   08/02/23 127 kg (280 lb)   06/20/23 127.9 kg (281 lb 14.4 oz)   05/31/23 128.8 kg (284 lb)   05/01/23 129.5 kg (285 lb 8 oz)   04/12/23 129 kg (284 lb 4.8 oz)   03/01/23 126.8 kg (279 lb 9.6 oz)   11/14/22 125.1 kg (275 lb 12.8 oz)   08/31/22 126.9 kg (279 lb 12.8 oz)   General: Well-appearing male in no acute distress.  Eyes: EOMI, PERRL. No scleral icterus.  ENT: Oral mucosa is moist without lesions or thrush.   Lymphatic: Neck is supple without cervical or supraclavicular lymphadenopathy.   Cardiovascular: Rhythm is irregular, rate is controlled. No murmurs. 1-2+ BLE edema most notably in the ankles; L>R.  Respiratory: CTA bilaterally but slightly dim throughout. No wheezes or crackles. No tachypnea or dyspnea  Gastrointestinal: BS +. Abdomen soft, non-tender. Midline scar.   Neurologic: Cranial nerves II through XII are grossly intact.  Skin: Scattered hyperpigmented well circumscribed nevi-appearing lesions to abdomen and back as pictured below with scattered cherry angiomas. 1 of the abdominal black/hyperpigmented lesions has some scale to palpation  Psych: Affect appropriate. Very pleasant.    9/22/23:           Laboratory Data:  Most Recent 3 CBC's:  Recent Labs   Lab Test 09/06/23  1154 05/01/23  1215 08/31/22  0817 03/28/22  1010   WBC 6.0 5.4 5.9 7.9   HGB 14.2 14.5 14.2 15.7   MCV 92 90 92 91    197 174 203   ANEUTAUTO  --   --   --  7.3    Most Recent 3 BMP's:  Recent Labs   Lab Test 09/06/23  1154 05/01/23  1215 11/14/22  1433 08/31/22  0817 03/28/22  1030 03/28/22  1010    143 143 141  --  140   POTASSIUM 4.4 4.1 4.2 4.1   < > 4.8   CHLORIDE 106 105 103 104   < > 108   CO2 29 29 28 24   < > 26   BUN 19.3 20.5 21.7 21.7   < > 25   CR 1.35* 1.23* 1.36* 1.27*   < > 1.14   ANIONGAP 8 9 12 13   < > 6   NATALYA 9.6 10.0  10.0 9.8 9.6  --  9.6   GLC 92 100* 94 98   < > 110*   PROTTOTAL 7.7   --   --  7.1  --  8.2   ALBUMIN 4.3  --   --  3.9  --  3.6    < > = values in this interval not displayed.    Most Recent 2 LFT's:  Recent Labs   Lab Test 09/06/23  1154 08/31/22  0817   AST 22 19   ALT 13 9*   ALKPHOS 92 84   BILITOTAL 0.6 0.6    Most Recent TSH and T4:  Recent Labs   Lab Test 02/27/18  1302   TSH 2.54     I reviewed the above labs today.    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     CONTRAST: none    CT scan radiation dose is optimized to minimum requisite dose using  automated dose modulation techniques.    FINDINGS:    Lower thorax: Normal.    Abdomen/pelvis: Slightly limited due to lack of contrast.    Liver: No mass. No intrahepatic biliary ductal dilation.    Biliary System: Normal gallbladder. No extrahepatic biliary ductal  dilation.    Pancreas: No mass or pancreatic ductal dilation.    Adrenal glands: No mass or nodules    Spleen: Normal. Splenule is noted.    Kidneys: No suspicious mass, obstructing calculus or hydronephrosis.   Bilateral simple cysts.    Gastrointestinal tract : Postoperative changes of the stomach status  post prior gastric mass removal. Stomach is otherwise grossly  unchanged compared to previous. Normal appendix. Normal caliber small  bowel.  Colonic diverticulosis with no  diverticulitis.    Mesentery/peritoneum/retroperitoneum: No mass. No free fluid or air.    Lymph nodes: No significant lymphadenopathy.    Vasculature: Patency of the major intra-abdominal vasculature cannot  be assessed on this noncontrast exam.    Pelvis: Urinary bladder is decompressed.    Osseous structures: No aggressive or acute osseous lesion.      Soft tissues: Within normal limits.  Impression: IMPRESSION:   Stable exam. No appreciable evidence for local recurrence or new  metastatic disease on this noncontrast exam.    I have personally reviewed the examination and initial interpretation  and I agree with the findings.    RASTA AMAYA DO         SYSTEM ID:  [id number]    I reviewed the above imaging today.    Assessment and Plan:    #ONC  GIST  This was resected on 8-16-16. The recurrence risk was reported be about  10-15%. Given this, he has not been on adjuvant treatment. His CT AP on 9/20/23 showed no evidence for local recurrence or metastatic disease.     Given that he is now almost 7 years out and his CT showed no evidence of disease, our plan is to discontinue surveillance imaging and plan to see him only if a concern arises. I confirmed this plan with Dr. Freed. Al agrees with this plan. I will work with our clinical team to see if we can develop a survivorship plan and see him to go over this within the 6 months.      Of note, he requires pre-meds prior to CT scans due to allergies.     #DERM   -Derm referral for evaluation of hyperpigmented nevi-like lesions. I don't think he needs this urgently as he reports no recent changes and this seems to be more of a chronic issue. Will defer to derm on whether any biopsy is indicated.    #CV  A-fib  -On Eliquis. No bleeding concerns    40 minutes spent on the date of the encounter doing chart review, review of test results, interpretation of tests, patient visit, and documentation      Amber Scheierl, CNP  [facility] [address] [place] [address]  [phone]

## 2023-10-16 DIAGNOSIS — Z00.6 EXAMINATION OF PARTICIPANT OR CONTROL IN CLINICAL RESEARCH: ICD-10-CM

## 2023-10-16 DIAGNOSIS — I48.91 ATRIAL FIBRILLATION, UNSPECIFIED TYPE (H): Primary | ICD-10-CM
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
## 2023-10-18 ENCOUNTER — OFFICE VISIT (OUTPATIENT)
Dept: CARDIOLOGY | Facility: CLINIC | Age: 68
End: 2023-10-18
Attending: INTERNAL MEDICINE
Payer: COMMERCIAL

## 2023-10-18 VITALS
DIASTOLIC BLOOD PRESSURE: 90 MMHG | HEART RATE: 68 BPM | SYSTOLIC BLOOD PRESSURE: 129 MMHG | OXYGEN SATURATION: 98 % | BODY MASS INDEX: 39.64 KG/M2 | WEIGHT: 268.4 LBS

## 2023-10-18 DIAGNOSIS — N18.31 CKD STAGE G3A/A1, GFR 45-59 AND ALBUMIN CREATININE RATIO <30 MG/G (H): ICD-10-CM

## 2023-10-18 DIAGNOSIS — R73.03 PRE-DIABETES: ICD-10-CM

## 2023-10-18 DIAGNOSIS — I50.32 CHRONIC DIASTOLIC HEART FAILURE (H): ICD-10-CM

## 2023-10-18 DIAGNOSIS — I10 ESSENTIAL HYPERTENSION WITH GOAL BLOOD PRESSURE LESS THAN 140/90: ICD-10-CM

## 2023-10-18 DIAGNOSIS — I50.32 CHRONIC HEART FAILURE WITH PRESERVED EJECTION FRACTION (HFPEF) (H): ICD-10-CM

## 2023-10-18 DIAGNOSIS — I10 HYPERTENSION, ESSENTIAL: ICD-10-CM

## 2023-10-18 DIAGNOSIS — I48.0 PAROXYSMAL A-FIB (H): ICD-10-CM

## 2023-10-18 PROCEDURE — G0463 HOSPITAL OUTPT CLINIC VISIT: HCPCS | Performed by: INTERNAL MEDICINE

## 2023-10-18 PROCEDURE — 99214 OFFICE O/P EST MOD 30 MIN: CPT | Performed by: INTERNAL MEDICINE

## 2023-10-18 RX ORDER — FUROSEMIDE 40 MG
40 TABLET ORAL DAILY PRN
Qty: 90 TABLET | Refills: 4 | Status: SHIPPED | OUTPATIENT
Start: 2023-10-18

## 2023-10-18 RX ORDER — AMLODIPINE BESYLATE 10 MG/1
10 TABLET ORAL DAILY
Qty: 90 TABLET | Refills: 4 | Status: SHIPPED | OUTPATIENT
Start: 2023-10-18

## 2023-10-18 RX ORDER — ATORVASTATIN CALCIUM 40 MG/1
40 TABLET, FILM COATED ORAL DAILY
Qty: 90 TABLET | Refills: 4 | Status: SHIPPED | OUTPATIENT
Start: 2023-10-18

## 2023-10-18 RX ORDER — LISINOPRIL 40 MG/1
40 TABLET ORAL DAILY
Qty: 90 TABLET | Refills: 4 | Status: SHIPPED | OUTPATIENT
Start: 2023-10-18

## 2023-10-18 ASSESSMENT — PAIN SCALES - GENERAL: PAINLEVEL: NO PAIN (0)

## 2023-10-18 NOTE — PATIENT INSTRUCTIONS
Dr. Palomino recommends:    Take all medications as prescribed.    Follow up clinic visit with Dr. Palomino in one year. No labs needed.    Thank you for your visit today.  Please call me with any questions or concerns.   Moses Alicea RN  Cardiology Care Coordinator  [phone]

## 2023-10-18 NOTE — NURSING NOTE
Chief Complaint   Patient presents with    Follow Up     Return cardiology       Vitals were taken, medications reconciled.    Jami Lau CMA  8:33 AM
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
## 2023-11-06 ENCOUNTER — DOCUMENTATION ONLY (OUTPATIENT)
Dept: FAMILY MEDICINE | Facility: CLINIC | Age: 68
End: 2023-11-06
Payer: COMMERCIAL

## 2023-11-06 NOTE — PROGRESS NOTES
"When opening a documentation only encounter, be sure to enter in \"Chief Complaint\" Forms and in \" Comments\" Title of form, description if needed.    Al is a 68 year old  male  Form received via: Fax  Form now resides in: Provider Ready    Margret Steiner    Form has been completed by provider.     Form sent out via: Fax to [place] at Fax Number: [phone]  Patient informed: No, Reason:  Output date: November 21, 2023    Margret Steiner      **Please close the encounter**                "

## 2023-11-06 NOTE — PROGRESS NOTES
"When opening a documentation only encounter, be sure to enter in \"Chief Complaint\" Forms and in \" Comments\" Title of form, description if needed.    Al is a 68 year old  male  Form received via: Fax  Form now resides in: Provider Ready    Margret Steiner    Form has been completed by provider.     Form sent out via: Fax to [place] at Fax Number: [phone]  Patient informed: No, Reason:  Output date: November 21, 2023    Margret Steiner      **Please close the encounter**                "

## 2023-12-29 ENCOUNTER — DOCUMENTATION ONLY (OUTPATIENT)
Dept: FAMILY MEDICINE | Facility: CLINIC | Age: 68
End: 2023-12-29
Payer: COMMERCIAL

## 2023-12-29 NOTE — PROGRESS NOTES
"When opening a documentation only encounter, be sure to enter in \"Chief Complaint\" Forms and in \" Comments\" Title of form, description if needed.    Al is a 68 year old  male  Form received via: Fax  Form now resides in: Provider Ready    Shaq Garcia MA               "

## 2024-01-30 ENCOUNTER — OFFICE VISIT (OUTPATIENT)
Dept: FAMILY MEDICINE | Facility: CLINIC | Age: 69
End: 2024-01-30
Payer: COMMERCIAL

## 2024-01-30 VITALS
WEIGHT: 282.8 LBS | RESPIRATION RATE: 17 BRPM | BODY MASS INDEX: 40.49 KG/M2 | DIASTOLIC BLOOD PRESSURE: 86 MMHG | OXYGEN SATURATION: 99 % | HEIGHT: 70 IN | HEART RATE: 79 BPM | SYSTOLIC BLOOD PRESSURE: 123 MMHG

## 2024-01-30 DIAGNOSIS — N18.31 TYPE 2 DIABETES MELLITUS WITH STAGE 3A CHRONIC KIDNEY DISEASE, WITHOUT LONG-TERM CURRENT USE OF INSULIN (H): ICD-10-CM

## 2024-01-30 DIAGNOSIS — I50.32 CHRONIC HEART FAILURE WITH PRESERVED EJECTION FRACTION (HFPEF) (H): Primary | ICD-10-CM

## 2024-01-30 DIAGNOSIS — E11.22 TYPE 2 DIABETES MELLITUS WITH STAGE 3A CHRONIC KIDNEY DISEASE, WITHOUT LONG-TERM CURRENT USE OF INSULIN (H): ICD-10-CM

## 2024-01-30 DIAGNOSIS — I48.0 PAROXYSMAL ATRIAL FIBRILLATION (H): ICD-10-CM

## 2024-01-30 DIAGNOSIS — N40.0 BENIGN PROSTATIC HYPERPLASIA, UNSPECIFIED WHETHER LOWER URINARY TRACT SYMPTOMS PRESENT: ICD-10-CM

## 2024-01-30 DIAGNOSIS — C49.A2 MALIGNANT GASTROINTESTINAL STROMAL TUMOR (GIST) OF STOMACH (H): ICD-10-CM

## 2024-01-30 PROBLEM — I77.819 AORTIC ECTASIA, UNSPECIFIED SITE (H): Status: ACTIVE | Noted: 2024-01-30

## 2024-01-30 PROCEDURE — 99214 OFFICE O/P EST MOD 30 MIN: CPT | Mod: GC

## 2024-01-30 RX ORDER — RESPIRATORY SYNCYTIAL VIRUS VACCINE 120MCG/0.5
0.5 KIT INTRAMUSCULAR ONCE
Qty: 1 EACH | Refills: 0 | Status: CANCELLED | OUTPATIENT
Start: 2024-01-30 | End: 2024-01-30

## 2024-01-30 ASSESSMENT — PATIENT HEALTH QUESTIONNAIRE - PHQ9
SUM OF ALL RESPONSES TO PHQ QUESTIONS 1-9: 3
10. IF YOU CHECKED OFF ANY PROBLEMS, HOW DIFFICULT HAVE THESE PROBLEMS MADE IT FOR YOU TO DO YOUR WORK, TAKE CARE OF THINGS AT HOME, OR GET ALONG WITH OTHER PEOPLE: NOT DIFFICULT AT ALL
SUM OF ALL RESPONSES TO PHQ QUESTIONS 1-9: 3

## 2024-01-30 NOTE — PROGRESS NOTES
"  Assessment & Plan     Chronic heart failure with preserved ejection fraction (HFpEF) (H)  Paroxysmal atrial fibrillation (H)  BP at goal today at 123/86. Currently on Amlodipine 10 mg and Lisinopril 40 mg daily. Encourage Al to resume taking Jardiance for his diabetes as wells as for its cardiovascular benefits. Al reports he does not want to deal with the side effects but will consider starting the medication. Continue with current cardiac medication regimen. Will follow up after next Cardiology follow up visit.     Benign prostatic hyperplasia, unspecified whether lower urinary tract symptoms present  Currently on Flomax. Last PSA on 06/12/23 normal at 1.93. Discuss with Al current USPSTF recommendation guidelines including benefit/harms for PSA screening. We agreed to hold off on testing and reassess at next AWV at which point he might be in an age category where would not recommend screening.     Body mass index (BMI) of 40.0-44.9 in adult (H)  Wt today at 282 lb. Up 14 lb from October. Recommend salt avoidance and calorie restriction for weight loss.      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 Next follow up appointment on 3/22/24.     Diabetes  Well controlled. Reviewed the benefit of adding Jardiance. He has not started it yet, so might try.       BMI  Estimated body mass index is 40.58 kg/m  as calculated from the following:    Height as of this encounter: 1.778 m (5' 10\").    Weight as of this encounter: 128.3 kg (282 lb 12.8 oz).   Weight management plan: Discussed healthy diet and exercise guidelines including salt avoidance and calorie restriction for weight loss.      Return in about 3 months (around 4/30/2024) for AWV.    Subjective   Al is a 69 year old, presenting for the following health issues:  RECHECK (Pt here for follow up and wants to check for prostate cancer.)        1/30/2024     1:47 PM   Additional Questions   Roomed by Justin   Accompanied by " Self         1/30/2024     1:47 PM   Patient Reported Additional Medications   Patient reports taking the following new medications n/a     HPI       Hypertension Follow-up    Do you check your blood pressure regularly outside of the clinic? Yes   Are you following a low salt diet? Yes  Are your blood pressures ever more than 140 on the top number (systolic) OR more   than 90 on the bottom number (diastolic), for example 140/90? No    Atrial Fibrillation Follow-up    Symptoms: no recent chest pain, significant palpitations, dizziness/lightheadedness, dyspnea, or increased peripheral edema.  Stroke prevention: DOAC (Eliquis, Xarelto, Pradaxa)        8/2/2023     9:39 AM 9/6/2023    10:59 AM 9/22/2023    10:21 AM 10/18/2023     8:28 AM 1/30/2024     1:48 PM   Date   Pulse 61 76 78 68 79     Current [id number] Score: 4 points, which represents a 4.8% annual risk of major embolic event, without anti-coagulation or an LAAO device.       Heart Failure Follow-up   Are you experiencing any shortness of breath? No  Are you experiencing any swelling in your legs or feet?  Stable  Are you using more pillows than usual? No  Do you cough at night?  No  Do you check your weight daily?  Yes  Have you had a weight change recently?  No  Are you having any of the following side effects from your medications? (Select all that apply)  The patient does not report symptoms of dizziness, fatigue, cough, swelling, or slow heart beat.  Since your last visit, how many times have you gone to the cardiologist, urgent care, emergency room, or hospital because of your heart failure?   None  Last Echo: Echo result w/o MOPS: Interpretation SummaryGlobal and regional left ventricular function is normal with an EF of 55-60%.Right ventricular function, chamber size, wall motion, and thickness arenormal.Pulmonary artery systolic pressure is normal.Ascending aorta 4.3 cm.Sinuses of Valsalva 4 cm.The inferior vena cava is normal.No pericardial  "effusion is present.No significant changes noted.  How many servings of fruits and vegetables do you eat daily?  0-1  On average, how many sweetened beverages do you drink each day (Examples: soda, juice, sweet tea, etc.  Do NOT count diet or artificially sweetened beverages)?   1  How many days per week do you exercise enough to make your heart beat faster? 3 or less  How many minutes a day do you exercise enough to make your heart beat faster? 10 - 19  How many days per week do you miss taking your medication? 0    BPH/Prostate:   - No new symptoms  - No acute changes  - Would like to discuss retesting for levels      Review of Systems  Constitutional, HEENT, cardiovascular, pulmonary, gi and gu systems are negative, except as otherwise noted.      Objective    /86 (BP Location: Left arm, Patient Position: Sitting, Cuff Size: Adult Large)   Pulse 79   Resp 17   Ht 1.778 m (5' 10\")   Wt 128.3 kg (282 lb 12.8 oz)   SpO2 99%   BMI 40.58 kg/m    Body mass index is 40.58 kg/m .  Physical Exam   GENERAL: alert and no distress  RESP: lungs clear to auscultation - no rales, rhonchi or wheezes  CV: Irregular rhythm and regular rate, normal S1 S2, no S3 or S4, no murmur, click or rub, no peripheral edema  ABDOMEN: soft, nontender, no hepatosplenomegaly, no masses and bowel sounds normal  MS: 1+ pitting edema of B/L lower extremities below knee.    NEURO:  mentation intact and speech normal  PSYCH: mentation appears normal, affect normal/bright            Signed Electronically by: Virgilio Slaughter MD    "

## 2024-01-30 NOTE — PATIENT INSTRUCTIONS
Patient Education   Here is the plan from today's visit    1. Chronic heart failure with preserved ejection fraction (HFpEF) (H), Paroxysmal atrial fibrillation (H)  BP at goal today. Continue with cardiac medication. Recommend starting Jardiance.     3. Body mass index (BMI) of 40.0-44.9 in adult (H)  Recommend salt avoidance and calorie restriction for weight loss.      4. 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 Benign prostatic hyperplasia, unspecified whether lower urinary tract symptoms present  Continue Flomax. Will hold off of PSA testing at this time.          Follow up plan  Return in about 3 months (around 4/30/2024) for AWV.    Thank you for coming to [facility] today.  Lab Testing:  **If you had lab testing today and your results are reassuring or normal they will be mailed to you or sent through [facility] within 7 days.   **If the lab tests need quick action we will call you with the results.  **If you are having labs done on a different day, please call [phone] to schedule at [facility] or [phone] for other [facility] locations. Labs do not offer walk-in appointments.  The phone number we will call with results is # [phone] (home) . If this is not the best number please call our clinic and change the number.  Medication Refills:  If you need any refills please call your pharmacy and they will contact us.   If you need to  your refill at a new pharmacy, please contact the new pharmacy directly. The new pharmacy will help you get your medications transferred faster.   Scheduling:  If you have any concerns about today's visit or wish to schedule another appointment please call our office during normal business hours [phone] (8-5:00 M-F). If you can no longer make a scheduled visit, please cancel via [facility] or call us to cancel.   If a referral was made to an [facility] specialty  provider and you do not get a call from central scheduling, please refer to directions on your visit summary or call our office during normal business hours for assistance.   If a Mammogram was ordered for you at the [facility] call [phone] to schedule or change your appointment.  If you had an XRay/CT/Ultrasound/MRI ordered the number is [phone] to schedule or change your radiology appointment.   [facility] has limited ultrasound appointments available on Wednesdays, if you would like your ultrasound at [facility], please call [phone] to schedule.   Medical Concerns:  If you have urgent medical concerns please call [phone] at any time of the day.    Virgilio Slaughter MD

## 2024-02-01 ENCOUNTER — TRANSFERRED RECORDS (OUTPATIENT)
Dept: MULTI SPECIALTY CLINIC | Facility: CLINIC | Age: 69
End: 2024-02-01

## 2024-02-01 LAB — RETINOPATHY: NORMAL

## 2024-02-03 ENCOUNTER — HEALTH MAINTENANCE LETTER (OUTPATIENT)
Age: 69
End: 2024-02-03

## 2024-02-23 ENCOUNTER — DOCUMENTATION ONLY (OUTPATIENT)
Dept: FAMILY MEDICINE | Facility: CLINIC | Age: 69
End: 2024-02-23
Payer: COMMERCIAL

## 2024-02-23 NOTE — PROGRESS NOTES
Form has been completed by provider.     Form sent out via: Fax to [facility] at Fax Number: [phone]  Patient informed: No, Reason:na  Output date: February 23, 2024    Shaq Garcia MA      **Please close the encounter**

## 2024-03-04 ENCOUNTER — DOCUMENTATION ONLY (OUTPATIENT)
Dept: FAMILY MEDICINE | Facility: CLINIC | Age: 69
End: 2024-03-04
Payer: COMMERCIAL

## 2024-03-04 NOTE — PROGRESS NOTES
"When opening a documentation only encounter, be sure to enter in \"Chief Complaint\" Forms and in \" Comments\" Title of form, description if needed.    Al is a 69 year old  male  Form received via: Fax  Form now resides in: Provider Ready    Margret Steiner          Form has been completed by provider.     Form sent out via: Fax to [employer] at Fax Number: [phone]  Patient informed: N/A  Output date: March 18, 2024    Robyn Glaser MA      **Please close the encounter**      "

## 2024-03-13 ENCOUNTER — DOCUMENTATION ONLY (OUTPATIENT)
Dept: FAMILY MEDICINE | Facility: CLINIC | Age: 69
End: 2024-03-13
Payer: COMMERCIAL

## 2024-03-13 NOTE — PROGRESS NOTES
"When opening a documentation only encounter, be sure to enter in \"Chief Complaint\" Forms and in \" Comments\" Title of form, description if needed.    Al is a 69 year old  male  Form received via: Fax  Form now resides in: Provider Ready    Margret Steiner    Form has been completed by provider.     Form sent out via: Fax to [facility] at Fax Number: [phone]  Patient informed: No, Reason:  Output date: March 18, 2024    Margret Steiner      **Please close the encounter**                "

## 2024-04-08 ENCOUNTER — DOCUMENTATION ONLY (OUTPATIENT)
Dept: FAMILY MEDICINE | Facility: CLINIC | Age: 69
End: 2024-04-08
Payer: COMMERCIAL

## 2024-04-08 NOTE — PROGRESS NOTES
"When opening a documentation only encounter, be sure to enter in \"Chief Complaint\" Forms and in \" Comments\" Title of form, description if needed.    Al is a 69 year old  male  Form received via: Fax  Form now resides in: Provider Ready    Ivette Garcia MA               "

## 2024-04-11 ENCOUNTER — OFFICE VISIT (OUTPATIENT)
Dept: FAMILY MEDICINE | Facility: CLINIC | Age: 69
End: 2024-04-11
Payer: COMMERCIAL

## 2024-04-11 VITALS
SYSTOLIC BLOOD PRESSURE: 131 MMHG | OXYGEN SATURATION: 98 % | BODY MASS INDEX: 40.31 KG/M2 | WEIGHT: 281.6 LBS | HEIGHT: 70 IN | DIASTOLIC BLOOD PRESSURE: 89 MMHG | TEMPERATURE: 98.7 F | RESPIRATION RATE: 17 BRPM | HEART RATE: 61 BPM

## 2024-04-11 DIAGNOSIS — Z12.5 SCREENING FOR PROSTATE CANCER: ICD-10-CM

## 2024-04-11 DIAGNOSIS — Z12.2 SCREENING FOR LUNG CANCER: ICD-10-CM

## 2024-04-11 DIAGNOSIS — I10 HYPERTENSION, ESSENTIAL: ICD-10-CM

## 2024-04-11 DIAGNOSIS — Z87.891 PERSONAL HISTORY OF TOBACCO USE: ICD-10-CM

## 2024-04-11 DIAGNOSIS — E11.22 TYPE 2 DIABETES MELLITUS WITH STAGE 3A CHRONIC KIDNEY DISEASE, WITHOUT LONG-TERM CURRENT USE OF INSULIN (H): Primary | ICD-10-CM

## 2024-04-11 DIAGNOSIS — F17.211 NICOTINE DEPENDENCE, CIGARETTES, IN REMISSION: ICD-10-CM

## 2024-04-11 DIAGNOSIS — N18.31 TYPE 2 DIABETES MELLITUS WITH STAGE 3A CHRONIC KIDNEY DISEASE, WITHOUT LONG-TERM CURRENT USE OF INSULIN (H): Primary | ICD-10-CM

## 2024-04-11 DIAGNOSIS — Z00.00 ENCOUNTER FOR MEDICARE ANNUAL WELLNESS EXAM: ICD-10-CM

## 2024-04-11 DIAGNOSIS — Z12.11 SCREEN FOR COLON CANCER: ICD-10-CM

## 2024-04-11 DIAGNOSIS — N52.9 ERECTILE DYSFUNCTION, UNSPECIFIED ERECTILE DYSFUNCTION TYPE: ICD-10-CM

## 2024-04-11 DIAGNOSIS — N18.31 CKD STAGE G3A/A1, GFR 45-59 AND ALBUMIN CREATININE RATIO <30 MG/G (H): ICD-10-CM

## 2024-04-11 LAB
CHOLEST SERPL-MCNC: 160 MG/DL
CREAT UR-MCNC: 75.4 MG/DL
FASTING STATUS PATIENT QL REPORTED: NO
HBA1C MFR BLD: 5.9 % (ref 0–5.6)
HDLC SERPL-MCNC: 50 MG/DL
LDLC SERPL CALC-MCNC: 99 MG/DL
MICROALBUMIN UR-MCNC: <12 MG/L
MICROALBUMIN/CREAT UR: NORMAL MG/G{CREAT}
NONHDLC SERPL-MCNC: 110 MG/DL
TRIGL SERPL-MCNC: 54 MG/DL

## 2024-04-11 PROCEDURE — 36415 COLL VENOUS BLD VENIPUNCTURE: CPT

## 2024-04-11 PROCEDURE — G0439 PPPS, SUBSEQ VISIT: HCPCS | Mod: 25

## 2024-04-11 PROCEDURE — 82570 ASSAY OF URINE CREATININE: CPT

## 2024-04-11 PROCEDURE — G0296 VISIT TO DETERM LDCT ELIG: HCPCS

## 2024-04-11 PROCEDURE — 80061 LIPID PANEL: CPT

## 2024-04-11 PROCEDURE — 82043 UR ALBUMIN QUANTITATIVE: CPT

## 2024-04-11 PROCEDURE — 83036 HEMOGLOBIN GLYCOSYLATED A1C: CPT

## 2024-04-11 RX ORDER — RESPIRATORY SYNCYTIAL VIRUS VACCINE 120MCG/0.5
0.5 KIT INTRAMUSCULAR ONCE
Qty: 1 EACH | Refills: 0 | Status: CANCELLED | OUTPATIENT
Start: 2024-04-11 | End: 2024-04-11

## 2024-04-11 RX ORDER — SILDENAFIL 100 MG/1
100 TABLET, FILM COATED ORAL DAILY PRN
Qty: 30 TABLET | Refills: 4 | Status: SHIPPED | OUTPATIENT
Start: 2024-04-11

## 2024-04-11 SDOH — HEALTH STABILITY: PHYSICAL HEALTH: ON AVERAGE, HOW MANY MINUTES DO YOU ENGAGE IN EXERCISE AT THIS LEVEL?: 10 MIN

## 2024-04-11 SDOH — HEALTH STABILITY: PHYSICAL HEALTH: ON AVERAGE, HOW MANY DAYS PER WEEK DO YOU ENGAGE IN MODERATE TO STRENUOUS EXERCISE (LIKE A BRISK WALK)?: 1 DAY

## 2024-04-11 ASSESSMENT — SOCIAL DETERMINANTS OF HEALTH (SDOH): HOW OFTEN DO YOU GET TOGETHER WITH FRIENDS OR RELATIVES?: TWICE A WEEK
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
## 2024-04-11 NOTE — PATIENT INSTRUCTIONS
Patient Education   Here is the plan from today's visit    1. Encounter for Medicare annual wellness exam      2. Personal history of tobacco use, Nicotine dependence, cigarettes, in remission  - Prof fee: Shared Decision Making for Lung Cancer Screening  - CT Chest Lung Cancer Scrn Low Dose wo; Future    4. Screening for prostate cancer  Will hold off for now.     5. Screen for colon cancer  Cologaurd negative in 2022. Plan to repeat in 2025.     6. Screening for lung cancer  - Prof fee: Shared Decision Making for Lung Cancer Screening  - CT Chest Lung Cancer Scrn Low Dose wo; Future    7. Erectile dysfunction, unspecified erectile dysfunction type  Refills provided.   - sildenafil (VIAGRA) 100 MG tablet; Take 1 tablet (100 mg) by mouth daily as needed (sexual dysfunction)  Dispense: 30 tablet; Refill: 4    8. Type 2 diabetes mellitus with stage 3a chronic kidney disease, without long-term current use of insulin (H)  Repeat labs today. Follow up next week .   - Lipid panel reflex to direct LDL Non-fasting; Future  - Hemoglobin A1c; Future  - Lipid panel reflex to direct LDL Non-fasting  - Hemoglobin A1c    9. CKD stage G3a/A1, GFR 45-59 and albumin creatinine ratio <30 mg/g (H), Hypertension, essential  Repeat labs today.  Follow up next week .   - Albumin Random Urine Quantitative with Creat Ratio; Future  - Albumin Random Urine Quantitative with Creat Ratio      Please call or return to clinic if your symptoms don't go away.    Follow up plan  Return in about 1 week (around 4/18/2024) for Follow up.    Thank you for coming to [facility] today.  Lab Testing:  **If you had lab testing today and your results are reassuring or normal they will be mailed to you or sent through [employer] within 7 days.   **If the lab tests need quick action we will call you with the results.  **If you are having labs done on a different day, please call [phone] to schedule at [facility] or [phone] for other [facility] locations. Labs do not offer walk-in appointments.  The phone number we will call with results is # [phone] (home) . If this is not the best number please call our clinic and change the number.  Medication Refills:  If you need any refills please call your pharmacy and they will contact us.   If you need to  your refill at a new pharmacy, please contact the new pharmacy directly. The new pharmacy will help you get your medications transferred faster.   Scheduling:  If you have any concerns about today's visit or wish to schedule another appointment please call our office during normal business hours [phone] (8-5:00 M-F). If you can no longer make a scheduled visit, please cancel via [employer] or call us to cancel.   If a referral was [facility] to an [facility] provider and you do not get a call from central scheduling, please refer to directions on your visit summary or call our office during normal business hours for assistance.   If a Mammogram was ordered for you at the [facility] call [phone] to schedule or change your appointment.  If you had an XRay/CT/Ultrasound/MRI ordered the number is [phone] to schedule or change your radiology appointment.   [facility] has limited ultrasound appointments available on Wednesdays, if you would like your ultrasound at [facility], please call [phone] to schedule.   Medical Concerns:  If you have urgent medical concerns please call [phone] at any time of the day.    Virgilio Slaughter MD        Preventive Care Advice   This is general advice given by our system to help you stay healthy. However, your care team may have specific advice just for you. Please talk to your care team about your preventive care needs.  Nutrition  Eat 5 or more servings of fruits and vegetables each day.  Try wheat bread, brown rice and whole grain pasta (instead of white bread, rice, and pasta).  Get enough calcium and  vitamin D. Check the label on foods and aim for 100% of the RDA (recommended daily allowance).  Lifestyle  Exercise at least 150 minutes each week   (30 minutes a day, 5 days a week).  Do muscle strengthening activities 2 days a week. These help control your weight and prevent disease.  No smoking.  Wear sunscreen to prevent skin cancer.  Have a dental exam and cleaning every 6 months.  Yearly exams  See your health care team every year to talk about:  Any changes in your health.  Any medicines your care team has prescribed.  Preventive care, family planning, and ways to prevent chronic diseases.  Shots (vaccines)   HPV shots (up to age 26), if you've never had them before.  Hepatitis B shots (up to age 59), if you've never had them before.  COVID-19 shot: Get this shot when it's due.  Flu shot: Get a flu shot every year.  Tetanus shot: Get a tetanus shot every 10 years.  Pneumococcal, hepatitis A, and RSV shots: Ask your care team if you need these based on your risk.  Shingles shot (for age 50 and up).  General health tests  Diabetes screening:  Starting at age 35, Get screened for diabetes at least every 3 years.  If you are younger than age 35, ask your care team if you should be screened for diabetes.  Cholesterol test: At age 39, start having a cholesterol test every 5 years, or more often if advised.  Bone density scan (DEXA): At age 50, ask your care team if you should have this scan for osteoporosis (brittle bones).  Hepatitis C: Get tested at least once in your life.  STIs (sexually transmitted infections)  Before age 24: Ask your care team if you should be screened for STIs.  After age 24: Get screened for STIs if you're at risk. You are at risk for STIs (including HIV) if:  You are sexually active with more than one person.  You don't use condoms every time.  You or a partner was diagnosed with a sexually transmitted infection.  If you are at risk for HIV, ask about PrEP medicine to prevent HIV.  Get  tested for HIV at least once in your life, whether you are at risk for HIV or not.  Cancer screening tests  Cervical cancer screening: If you have a cervix, begin getting regular cervical cancer screening tests at age 21. Most people who have regular screenings with normal results can stop after age 65. Talk about this with your provider.  Breast cancer scan (mammogram): If you've ever had breasts, begin having regular mammograms starting at age 40. This is a scan to check for breast cancer.  Colon cancer screening: It is important to start screening for colon cancer at age 45.  Have a colonoscopy test every 10 years (or more often if you're at risk) Or, ask your provider about stool tests like a FIT test every year or Cologuard test every 3 years.  To learn more about your testing options, visit: [email or web address]/[email or web address].  For help making a decision, visit: [email or web address].  Prostate cancer screening test: If you have a prostate and are age 55 to 69, ask your provider if you would benefit from a yearly prostate cancer screening test.  Lung cancer screening: If you are a current or former smoker age 50 to 80, ask your care team if ongoing lung cancer screenings are right for you.  For informational purposes only. Not to replace the advice of your health care provider. Copyright   2023 [facility]. All rights reserved. Clinically reviewed by the [facility]itions [facility]. [employer] 801431 - REV 01/24.    Preventing Falls: Care Instructions  Injuries and health problems such as trouble walking or poor eyesight can increase your risk of falling. So can some medicines. But there are things you can do to help prevent falls. You can exercise to get stronger. You can also arrange your home to make it safer.    Talk to your doctor about the medicines you take. Ask if any of them increase the risk of falls and whether they can be changed or stopped.   Try to exercise regularly. It  "can help improve your strength and balance. This can help lower your risk of falling.     Practice fall safety and prevention.    Wear low-heeled shoes that fit well and give your feet good support. Talk to your doctor if you have foot problems that make this hard.  Carry a cellphone or wear a medical alert device that you can use to call for help.  Use stepladders instead of chairs to reach high objects. Don't climb if you're at risk for falls. Ask for help, if needed.  Wear the correct eyeglasses, if you need them.    Make your home safer.    Remove rugs, cords, clutter, and furniture from walkways.  Keep your house well lit. Use night-lights in hallways and bathrooms.  Install and use sturdy handrails on stairways.  Wear nonskid footwear, even inside. Don't walk barefoot or in socks without shoes.    Be safe outside.    Use handrails, curb cuts, and ramps whenever possible.  Keep your hands free by using a shoulder bag or backpack.  Try to walk in well-lit areas. Watch out for uneven ground, changes in pavement, and debris.  Be careful in the winter. Walk on the grass or gravel when sidewalks are slippery. Use de-icer on steps and walkways. Add non-slip devices to shoes.    Put grab bars and nonskid mats in your shower or tub and near the toilet. Try to use a shower chair or bath bench when bathing.   Get into a tub or shower by putting in your weaker leg first. Get out with your strong side first. Have a phone or medical alert device in the bathroom with you.   Where can you learn more?  Go to https://www.[employer].net/patiented  Enter G117 in the search box to learn more about \"Preventing Falls: Care Instructions.\"  Current as of: July 17, 2023               Content Version: 14.0    0886-8982 [employer].   Care instructions adapted under license by your healthcare professional. If you have questions about a medical condition or this instruction, always ask your healthcare professional. " [employer] [facility] disclaims any warranty or liability for your use of this information.      Learning About Stress  What is stress?     Stress is your body's response to a hard situation. Your body can have a physical, emotional, or mental response. Stress is a fact of life for most people, and it affects everyone differently. What causes stress for you may not be stressful for someone else.  A lot of things can cause stress. You may feel stress when you go on a job interview, take a test, or run a race. This kind of short-term stress is normal and even useful. It can help you if you need to work hard or react quickly. For example, stress can help you finish an important job on time.  Long-term stress is caused by ongoing stressful situations or events. Examples of long-term stress include long-term health problems, ongoing problems at work, or conflicts in your family. Long-term stress can harm your health.  How does stress affect your health?  When you are stressed, your body responds as though you are in danger. It makes hormones that speed up your heart, make you breathe faster, and give you a burst of energy. This is called the fight-or-flight stress response. If the stress is over quickly, your body goes back to normal and no harm is done.  But if stress happens too often or lasts too long, it can have bad effects. Long-term stress can make you more likely to get sick, and it can make symptoms of some diseases worse. If you tense up when you are stressed, you may develop neck, shoulder, or low back pain. Stress is linked to high blood pressure and heart disease.  Stress also harms your emotional health. It can make you capone, tense, or depressed. Your relationships may suffer, and you may not do well at work or school.  What can you do to manage stress?  You can try these things to help manage stress:   Do something active. Exercise or activity can help reduce stress. Walking is a great way to get  started. Even everyday activities such as housecleaning or yard work can help.  Try yoga or oleg chi. These techniques combine exercise and meditation. You may need some training at first to learn them.  Do something you enjoy. For example, listen to music or go to a movie. Practice your hobby or do volunteer work.  Meditate. This can help you relax, because you are not worrying about what happened before or what may happen in the future.  Do guided imagery. Imagine yourself in any setting that helps you feel calm. You can use online videos, books, or a teacher to guide you.  Do breathing exercises. For example:  From a standing position, bend forward from the waist with your knees slightly bent. Let your arms dangle close to the floor.  Breathe in slowly and deeply as you return to a standing position. Roll up slowly and lift your head last.  Hold your breath for just a few seconds in the standing position.  Breathe out slowly and bend forward from the waist.  Let your feelings out. Talk, laugh, cry, and express anger when you need to. Talking with supportive friends or family, a counselor, or a raji leader about your feelings is a healthy way to relieve stress. Avoid discussing your feelings with people who make you feel worse.  Write. It may help to write about things that are bothering you. This helps you find out how much stress you feel and what is causing it. When you know this, you can find better ways to cope.  What can you do to prevent stress?  You might try some of these things to help prevent stress:  Manage your time. This helps you find time to do the things you want and need to do.  Get enough sleep. Your body recovers from the stresses of the day while you are sleeping.  Get support. Your family, friends, and community can make a difference in how you experience stress.  Limit your news feed. Avoid or limit time on social media or news that may make you feel stressed.  Do something active. Exercise  "or activity can help reduce stress. Walking is a great way to get started.  Where can you learn more?  Go to https://www.sailsquare.net/patiented  Enter N032 in the search box to learn more about \"Learning About Stress.\"  Current as of: October 24, 2023               Content Version: 14.0    7711-8241 [employer].   Care instructions adapted under license by your healthcare professional. If you have questions about a medical condition or this instruction, always ask your healthcare professional. [employer] disclaims any warranty or liability for your use of this information.      Learning About Sleeping Well  What does sleeping well mean?     Sleeping well means getting enough sleep to feel good and stay healthy. How much sleep is enough varies among people.  The number of hours you sleep and how you feel when you wake up are both important. If you do not feel refreshed, you probably need more sleep. Another sign of not getting enough sleep is feeling tired during the day.  Experts recommend that adults get at least 7 or more hours of sleep per day. Children and older adults need more sleep.  Why is getting enough sleep important?  Getting enough quality sleep is a basic part of good health. When your sleep suffers, your physical health, mood, and your thoughts can suffer too. You may find yourself feeling more grumpy or stressed. Not getting enough sleep also can lead to serious problems, including injury, accidents, anxiety, and depression.  What might cause poor sleeping?  Many things can cause sleep problems, including:  Changes to your sleep schedule.  Stress. Stress can be caused by fear about a single event, such as giving a speech. Or you may have ongoing stress, such as worry about work or school.  Depression, anxiety, and other mental or emotional conditions.  Changes in your sleep habits or surroundings. This includes changes that happen where you sleep, such as noise, light, or " "sleeping in a different bed. It also includes changes in your sleep pattern, such as having jet lag or working a late shift.  Health problems, such as pain, breathing problems, and restless legs syndrome.  Lack of regular exercise.  Using alcohol, nicotine, or caffeine before bed.  How can you help yourself?  Here are some tips that may help you sleep more soundly and wake up feeling more refreshed.  Your sleeping area   Use your bedroom only for sleeping and sex. A bit of light reading may help you fall asleep. But if it doesn't, do your reading elsewhere in the house. Try not to use your TV, computer, smartphone, or tablet while you are in bed.  Be sure your bed is big enough to stretch out comfortably, especially if you have a sleep partner.  Keep your bedroom quiet, dark, and cool. Use curtains, blinds, or a sleep mask to block out light. To block out noise, use earplugs, soothing music, or a \"white noise\" machine.  Your evening and bedtime routine   Create a relaxing bedtime routine. You might want to take a warm shower or bath, or listen to soothing music.  Go to bed at the same time every night. And get up at the same time every morning, even if you feel tired.  What to avoid   Limit caffeine (coffee, tea, caffeinated sodas) during the day, and don't have any for at least 6 hours before bedtime.  Avoid drinking alcohol before bedtime. Alcohol can cause you to wake up more often during the night.  Try not to smoke or use tobacco, especially in the evening. Nicotine can keep you awake.  Limit naps during the day, especially close to bedtime.  Avoid lying in bed awake for too long. If you can't fall asleep or if you wake up in the middle of the night and can't get back to sleep within about 20 minutes, get out of bed and go to another room until you feel sleepy.  Avoid taking medicine right before bed that may keep you awake or make you feel hyper or energized. Your doctor can tell you if your medicine may do " "this and if you can take it earlier in the day.  If you can't sleep   Imagine yourself in a peaceful, pleasant scene. Focus on the details and feelings of being in a place that is relaxing.  Get up and do a quiet or boring activity until you feel sleepy.  Avoid drinking any liquids before going to bed to help prevent waking up often to use the bathroom.  Where can you learn more?  Go to https://www.Payvment.net/patiented  Enter J942 in the search box to learn more about \"Learning About Sleeping Well.\"  Current as of: July 10, 2023               Content Version: 14.0    7829-9510 [employer].   Care instructions adapted under license by your healthcare professional. If you have questions about a medical condition or this instruction, always ask your healthcare professional. [employer] disclaims any warranty or liability for your use of this information.      Bladder Training: Care Instructions  Your Care Instructions     Bladder training is used to treat urge incontinence and stress incontinence. Urge incontinence means that the need to urinate comes on so fast that you can't get to a toilet in time. Stress incontinence means that you leak urine because of pressure on your bladder. For example, it may happen when you laugh, cough, or lift something heavy.  Bladder training can increase how long you can wait before you have to urinate. It can also help your bladder hold more urine. And it can give you better control over the urge to urinate.  It is important to remember that bladder training takes a few weeks to a few months to make a difference. You may not see results right away, but don't give up.  Follow-up care is a key part of your treatment and safety. Be sure to make and go to all appointments, and call your doctor if you are having problems. It's also a good idea to know your test results and keep a list of the medicines you take.  How can you care for yourself at home?  Work with " your doctor to come up with a bladder training program that is right for you. You may use one or more of the following methods.  Delayed urination  In the beginning, try to keep from urinating for 5 minutes after you first feel the need to go.  While you wait, take deep, slow breaths to relax. Kegel exercises can also help you delay the need to go to the bathroom.  After some practice, when you can easily wait 5 minutes to urinate, try to wait 10 minutes before you urinate.  Slowly increase the waiting period until you are able to control when you have to urinate.  Scheduled urination  Empty your bladder when you first wake up in the morning.  Schedule times throughout the day when you will urinate.  Start by going to the bathroom every hour, even if you don't need to go.  Slowly increase the time between trips to the bathroom.  When you have found a schedule that works well for you, keep doing it.  If you wake up during the night and have to urinate, do it. Apply your schedule to waking hours only.  Kegel exercises  These tighten and strengthen pelvic muscles, which can help you control the flow of urine. (If doing these exercises causes pain, stop doing them and talk with your doctor.) To do Kegel exercises:  Squeeze your muscles as if you were trying not to pass gas. Or squeeze your muscles as if you were stopping the flow of urine. Your belly, legs, and buttocks shouldn't move.  Hold the squeeze for 3 seconds, then relax for 5 to 10 seconds.  Start with 3 seconds, then add 1 second each week until you are able to squeeze for 10 seconds.  Repeat the exercise 10 times a session. Do 3 to 8 sessions a day.  When should you call for help?  Watch closely for changes in your health, and be sure to contact your doctor if:    Your incontinence is getting worse.     You do not get better as expected.   Where can you learn more?  Go to https://www.healthwise.net/patiented  Enter V684 in the search box to learn more about  "\"Bladder Training: Care Instructions.\"  Current as of: November 15, 2023               Content Version: 14.0    9983-7932 [email or web address].   Care instructions adapted under license by your healthcare professional. If you have questions about a medical condition or this instruction, always ask your healthcare professional. [email or web address] disclaims any warranty or liability for your use of this information.      Lung Cancer Screening   Frequently Asked Questions  If you are at high-risk for lung cancer, getting screened with low-dose computed tomography (LDCT) every year can help save your life. This handout offers answers to some of the most common questions about lung cancer screening. If you have other questions, please call [phone] ([phone]).     What is it?  Lung cancer screening uses special X-ray technology to create an image of your lung tissue. The exam is quick and easy and takes less than 10 seconds. We don t give you any medicine or use any needles. You can eat before and after the exam. You don t need to change your clothes as long as the clothing on your chest doesn t contain metal. But, you do need to be able to hold your breath for at least 6 seconds during the exam.    What is the goal of lung cancer screening?  The goal of lung cancer screening is to save lives. Many times, lung cancer is not found until a person starts having physical symptoms. Lung cancer screening can help detect lung cancer in the earliest stages when it may be easier to treat.    Who should be screened for lung cancer?  We suggest lung cancer screening for anyone who is at high-risk for lung cancer. You are in the high-risk group if you:     are between the ages of 55 and 79, and   have smoked at least 1 pack of cigarettes a day for 20 or more years, and   still smoke or have quit within the past 15 years.    However, if you have a new cough or shortness of breath, you should talk to your " doctor before being screened.    Why does it matter if I have symptoms?  Certain symptoms can be a sign that you have a condition in your lungs that should be checked and treated by your doctor. These symptoms include fever, chest pain, a new or changing cough, shortness of breath that you have never felt before, coughing up blood or unexplained weight loss. Having any of these symptoms can greatly affect the results of lung cancer screening.       Should all smokers get an LDCT lung cancer screening exam?  It depends. Lung cancer screening is for a very specific group of men and women who have a history of heavy smoking over a long period of time (see  Who should be screened for lung cancer  above).  I am in the high-risk group, but have been diagnosed with cancer in the past. Is LDCT lung cancer screening right for me?  In some cases, you should not have LDCT lung screening, such as when your doctor is already following your cancer with CT scan studies. Your doctor will help you decide if LDCT lung screening is right for you.  Do I need to have a screening exam every year?  Yes. If you are in the high-risk group described earlier, you should get an LDCT lung cancer screening exam every year until you are 79, or are no longer willing or able to undergo screening and possible procedures to diagnose and treat lung cancer.  How effective is LDCT at preventing death from lung cancer?  Studies have shown that LDCT lung cancer screening can lower the risk of death from lung cancer by 20 percent in people who are at high-risk.  What are the risks?  There are some risks and limitations of LDCT lung cancer screening. We want to make sure you understand the risks and benefits, so please let us know if you have any questions. Your doctor may want to talk with you more about these risks.   Radiation exposure: As with any exam that uses radiation, there is a very small increased risk of cancer. The amount of radiation in LDCT  is small--about the same amount a person would get from a mammogram. Your doctor orders the exam when he or she feels the potential benefits outweigh the risks.   False negatives: No test is perfect, including LDCT. It is possible that you may have a medical condition, including lung cancer, that is not found during your exam. This is called a false negative result.   False positives and more testing: LDCT very often finds something in the lung that could be cancer, but in fact is not. This is called a false positive result. False positive tests often cause anxiety. To make sure these findings are not cancer, you may need to have more tests. These tests will be done only if you give us permission. Sometimes patients need a treatment that can have side effects, such as a biopsy. For more information on false positives, see  What can I expect from the results?    Findings not related to lung cancer: Your LDCT exam also takes pictures of areas of your body next to your lungs. In a very small number of cases, the CT scan will show an abnormal finding in one of these areas, such as your kidneys, adrenal glands, liver or thyroid. This finding may not be serious, but you may need more tests. Your doctor can help you decide what other tests you may need, if any.  What can I expect from the results?  About 1 out of 4 LDCT exams will find something that may need more tests. Most of the time, these findings are lung nodules. Lung nodules are very small collections of tissue in the lung. These nodules are very common, and the vast majority--more than 97 percent--are not cancer (benign). Most are normal lymph nodes or small areas of scarring from past infections.  But, if a small lung nodule is found to be cancer, the cancer can be cured more than 90 percent of the time. To know if the nodule is cancer, we may need to get more images before your next yearly screening exam. If the nodule has suspicious features (for example, it  is large, has an odd shape or grows over time), we will refer you to a specialist for further testing.  Will my doctor also get the results?  Yes. Your doctor will get a copy of your results.  Is it okay to keep smoking now that there s a cancer screening exam?  No. Tobacco is one of the strongest cancer-causing agents. It causes not only lung cancer, but other cancers and cardiovascular (heart) diseases as well. The damage caused by smoking builds over time. This means that the longer you smoke, the higher your risk of disease. While it is never too late to quit, the sooner you quit, the better.  Where can I find help to quit smoking?  The best way to prevent lung cancer is to stop smoking. If you have already quit smoking, congratulations and keep it up! For help on quitting smoking, please call [employer] at 1-578-QUITNOW (1-714.830.5718) or the American Cancer Society at 1-679.667.9993 to find local resources near you.  One-on-one health coaching:  If you d prefer to work individually with a health care provider on tobacco cessation, we offer:     Medication Therapy Management:  Our specially trained pharmacists work closely with you and your doctor to help you quit smoking.  Call [phone] or [phone] (toll free).    Lung Cancer Screening   Frequently Asked Questions  If you are at high-risk for lung cancer, getting screened with low-dose computed tomography (LDCT) every year can help save your life. This handout offers answers to some of the most common questions about lung cancer screening. If you have other questions, please call 2-605-7-UMPCancer ([phone]).     What is it?  Lung cancer screening uses special X-ray technology to create an image of your lung tissue. The exam is quick and easy and takes less than 10 seconds. We don t give you any medicine or use any needles. You can eat before and after the exam. You don t need to change your clothes as long as the clothing on your chest  doesn t contain metal. But, you do need to be able to hold your breath for at least 6 seconds during the exam.    What is the goal of lung cancer screening?  The goal of lung cancer screening is to save lives. Many times, lung cancer is not found until a person starts having physical symptoms. Lung cancer screening can help detect lung cancer in the earliest stages when it may be easier to treat.    Who should be screened for lung cancer?  We suggest lung cancer screening for anyone who is at high-risk for lung cancer. You are in the high-risk group if you:     are between the ages of 55 and 79, and   have smoked at least 1 pack of cigarettes a day for 20 or more years, and   still smoke or have quit within the past 15 years.    However, if you have a new cough or shortness of breath, you should talk to your doctor before being screened.    Why does it matter if I have symptoms?  Certain symptoms can be a sign that you have a condition in your lungs that should be checked and treated by your doctor. These symptoms include fever, chest pain, a new or changing cough, shortness of breath that you have never felt before, coughing up blood or unexplained weight loss. Having any of these symptoms can greatly affect the results of lung cancer screening.       Should all smokers get an LDCT lung cancer screening exam?  It depends. Lung cancer screening is for a very specific group of men and women who have a history of heavy smoking over a long period of time (see  Who should be screened for lung cancer  above).  I am in the high-risk group, but have been diagnosed with cancer in the past. Is LDCT lung cancer screening right for me?  In some cases, you should not have LDCT lung screening, such as when your doctor is already following your cancer with CT scan studies. Your doctor will help you decide if LDCT lung screening is right for you.  Do I need to have a screening exam every year?  Yes. If you are in the high-risk  group described earlier, you should get an LDCT lung cancer screening exam every year until you are 79, or are no longer willing or able to undergo screening and possible procedures to diagnose and treat lung cancer.  How effective is LDCT at preventing death from lung cancer?  Studies have shown that LDCT lung cancer screening can lower the risk of death from lung cancer by 20 percent in people who are at high-risk.  What are the risks?  There are some risks and limitations of LDCT lung cancer screening. We want to make sure you understand the risks and benefits, so please let us know if you have any questions. Your doctor may want to talk with you more about these risks.   Radiation exposure: As with any exam that uses radiation, there is a very small increased risk of cancer. The amount of radiation in LDCT is small--about the same amount a person would get from a mammogram. Your doctor orders the exam when he or she feels the potential benefits outweigh the risks.   False negatives: No test is perfect, including LDCT. It is possible that you may have a medical condition, including lung cancer, that is not found during your exam. This is called a false negative result.   False positives and more testing: LDCT very often finds something in the lung that could be cancer, but in fact is not. This is called a false positive result. False positive tests often cause anxiety. To make sure these findings are not cancer, you may need to have more tests. These tests will be done only if you give us permission. Sometimes patients need a treatment that can have side effects, such as a biopsy. For more information on false positives, see  What can I expect from the results?    Findings not related to lung cancer: Your LDCT exam also takes pictures of areas of your body next to your lungs. In a very small number of cases, the CT scan will show an abnormal finding in one of these areas, such as your kidneys, adrenal glands,  liver or thyroid. This finding may not be serious, but you may need more tests. Your doctor can help you decide what other tests you may need, if any.  What can I expect from the results?  About 1 out of 4 LDCT exams will find something that may need more tests. Most of the time, these findings are lung nodules. Lung nodules are very small collections of tissue in the lung. These nodules are very common, and the vast majority--more than 97 percent--are not cancer (benign). Most are normal lymph nodes or small areas of scarring from past infections.  But, if a small lung nodule is found to be cancer, the cancer can be cured more than 90 percent of the time. To know if the nodule is cancer, we may need to get more images before your next yearly screening exam. If the nodule has suspicious features (for example, it is large, has an odd shape or grows over time), we will refer you to a specialist for further testing.  Will my doctor also get the results?  Yes. Your doctor will get a copy of your results.  Is it okay to keep smoking now that there s a cancer screening exam?  No. Tobacco is one of the strongest cancer-causing agents. It causes not only lung cancer, but other cancers and cardiovascular (heart) diseases as well. The damage caused by smoking builds over time. This means that the longer you smoke, the higher your risk of disease. While it is never too late to quit, the sooner you quit, the better.  Where can I find help to quit smoking?  The best way to prevent lung cancer is to stop smoking. If you have already quit smoking, congratulations and keep it up! For help on quitting smoking, please call [employer] at 5-830-QUITNOW (1-750.574.9352) or the American Cancer Society at 1-627.343.4265 to find local resources near you.  One-on-one health coaching:  If you d prefer to work individually with a health care provider on tobacco cessation, we offer:     Medication Therapy Management:  Our specially trained  pharmacists work closely with you and your doctor to help you quit smoking.  Call [phone] or [phone] (toll free).

## 2024-04-11 NOTE — COMMUNITY RESOURCES LIST (ENGLISH)
April 11, 2024           YOUR PERSONALIZED LIST OF SERVICES & PROGRAMS           NAVIGATION    Eligibility Screening      [facility] application assistance - [facility]  [address] [place] [address] (Distance: 0.4 miles)  Language: English  Fee: Free      [facility] (MFIP)  [address] [place] [address] (Distance: 0.5 miles)  Language: English  Fee: Free      [employer] [facility] [employer]  Phone: (507) 506-3093  Website: https://www.[facility].org/about-us/assister-program/navigators/index.jsp  Language: English  Hours: Mon 8:00 AM - 4:00 PM Tue 8:00 AM - 4:00 PM Wed 8:00 AM - 4:00 PM Thu 8:00 AM - 4:00 PM        ASSISTANCE    Nutrition Benefits      [facility] [place] application assistance - [facility]  [address] [place] [address] (Distance: 0.4 miles)  Language: English  Fee: Free      [facility] application assistance - [facility]  [address] [place] [address] (Distance: 0.5 miles)  Language: English  Fee: Free      [facility] [place] [facility] [employer]  Phone: (131) 225-9547  Website: https://www.[employer]Stream.org/programs/market-Kvantum/  Language: English  Hours: Mon 10:00 AM - 5:00 PM Tue 10:00 AM - 5:00 PM Wed 10:00 AM - 5:00 PM Thu 10:00 AM - 5:00 PM Fri 10:00 AM - 5:00 PM  Fee: Self pay    Pantry      [employer] [facility]  [address] [place] [address] (Distance: 0.7 miles)  Phone: (686) 796-5563  Website: http://www.saintolaf.Ontodia/  Language: English  Fee: Free  Accessibility: Ada accessible      [facility] [employer]  [address] [place] [address] (Distance: 2.7 miles)  Phone: (587) 632-2913  Language: Thai, English, Kyrgyz  Fee: Free      [facility]  Phone: (401) 912-7350  Website:  https://www.advocateforveterans.com  Language: English, French  Hours: Mon 8:00 AM - 5:00 PM Tue 8:00 AM - 5:00 PM Wed 8:00 AM - 5:00 PM Thu 8:00 AM - 5:00 PM Fri 8:00 AM - 5:00 PM  Fee: Free  Accessibility: Ada accessible               IMPORTANT NUMBERS & WEBSITES        Emergency Services  911  .   [facility]  211 http://211unitedway.org  .   Poison Control  (152) 322-8483 http://[place]poison.org http://[place]poison.org  .     Suicide and Crisis Lifeline  988 http://988Transparent Outsourcingline.org  .   [employer] [place] Child Abuse Hotline  678.452.5661 http://Childhelphotline.org   .   [place] Sexual Assault Hotline  (867) 469-3621 (HOPE) http://[employer].org   .     [place] Runaway Safeline  (755) 460-9270 (RUNAWAY) http://[employer].org  .   Pregnancy & Postpartum Support  Call/text 112-095-6720  [place]: http://ppsupport[place].org  [place]: http://commercetools.com/[place]  .   Substance Abuse National Helpline ([facility])  271-037-HELP (7585) http://Findtreatment.gov   .                DISCLAIMER: These resources have been generated via the [facility]. [facility] does not endorse any service providers mentioned in this resource list. [facility] does not guarantee that the services mentioned in this resource list will be available to you or will improve your health or wellness.    [facility]

## 2024-04-16 ENCOUNTER — PATIENT OUTREACH (OUTPATIENT)
Dept: GASTROENTEROLOGY | Facility: CLINIC | Age: 69
End: 2024-04-16
Payer: COMMERCIAL

## 2024-04-18 ENCOUNTER — OFFICE VISIT (OUTPATIENT)
Dept: FAMILY MEDICINE | Facility: CLINIC | Age: 69
End: 2024-04-18
Payer: COMMERCIAL

## 2024-04-18 VITALS
WEIGHT: 277.6 LBS | SYSTOLIC BLOOD PRESSURE: 134 MMHG | DIASTOLIC BLOOD PRESSURE: 93 MMHG | HEART RATE: 73 BPM | TEMPERATURE: 98.3 F | RESPIRATION RATE: 14 BRPM | BODY MASS INDEX: 39.74 KG/M2 | HEIGHT: 70 IN | OXYGEN SATURATION: 98 %

## 2024-04-18 DIAGNOSIS — I10 HYPERTENSION, ESSENTIAL: ICD-10-CM

## 2024-04-18 DIAGNOSIS — I50.32 CHRONIC HEART FAILURE WITH PRESERVED EJECTION FRACTION (HFPEF) (H): ICD-10-CM

## 2024-04-18 DIAGNOSIS — M25.511 ACUTE PAIN OF RIGHT SHOULDER: ICD-10-CM

## 2024-04-18 DIAGNOSIS — G89.29 CHRONIC PAIN OF BOTH KNEES: Primary | ICD-10-CM

## 2024-04-18 DIAGNOSIS — E11.22 TYPE 2 DIABETES MELLITUS WITH STAGE 3A CHRONIC KIDNEY DISEASE, WITHOUT LONG-TERM CURRENT USE OF INSULIN (H): ICD-10-CM

## 2024-04-18 DIAGNOSIS — M25.561 CHRONIC PAIN OF BOTH KNEES: Primary | ICD-10-CM

## 2024-04-18 DIAGNOSIS — N18.31 TYPE 2 DIABETES MELLITUS WITH STAGE 3A CHRONIC KIDNEY DISEASE, WITHOUT LONG-TERM CURRENT USE OF INSULIN (H): ICD-10-CM

## 2024-04-18 DIAGNOSIS — M25.562 CHRONIC PAIN OF BOTH KNEES: Primary | ICD-10-CM

## 2024-04-18 LAB
BASOPHILS # BLD AUTO: 0 10E3/UL (ref 0–0.2)
BASOPHILS NFR BLD AUTO: 0 %
EOSINOPHIL # BLD AUTO: 0.1 10E3/UL (ref 0–0.7)
EOSINOPHIL NFR BLD AUTO: 1 %
ERYTHROCYTE [DISTWIDTH] IN BLOOD BY AUTOMATED COUNT: 14 % (ref 10–15)
HCT VFR BLD AUTO: 44.2 % (ref 40–53)
HGB BLD-MCNC: 14 G/DL (ref 13.3–17.7)
IMM GRANULOCYTES # BLD: 0 10E3/UL
IMM GRANULOCYTES NFR BLD: 0 %
LYMPHOCYTES # BLD AUTO: 1.4 10E3/UL (ref 0.8–5.3)
LYMPHOCYTES NFR BLD AUTO: 21 %
MCH RBC QN AUTO: 28.7 PG (ref 26.5–33)
MCHC RBC AUTO-ENTMCNC: 31.7 G/DL (ref 31.5–36.5)
MCV RBC AUTO: 91 FL (ref 78–100)
MONOCYTES # BLD AUTO: 0.5 10E3/UL (ref 0–1.3)
MONOCYTES NFR BLD AUTO: 7 %
NEUTROPHILS # BLD AUTO: 4.7 10E3/UL (ref 1.6–8.3)
NEUTROPHILS NFR BLD AUTO: 69 %
PLATELET # BLD AUTO: 230 10E3/UL (ref 150–450)
RBC # BLD AUTO: 4.88 10E6/UL (ref 4.4–5.9)
WBC # BLD AUTO: 6.8 10E3/UL (ref 4–11)

## 2024-04-18 PROCEDURE — 85025 COMPLETE CBC W/AUTO DIFF WBC: CPT

## 2024-04-18 PROCEDURE — 80053 COMPREHEN METABOLIC PANEL: CPT

## 2024-04-18 PROCEDURE — 36415 COLL VENOUS BLD VENIPUNCTURE: CPT

## 2024-04-18 PROCEDURE — 99213 OFFICE O/P EST LOW 20 MIN: CPT | Mod: GC

## 2024-04-19 LAB
ALBUMIN SERPL BCG-MCNC: 4.2 G/DL (ref 3.5–5.2)
ALP SERPL-CCNC: 91 U/L (ref 40–150)
ALT SERPL W P-5'-P-CCNC: 19 U/L (ref 0–70)
ANION GAP SERPL CALCULATED.3IONS-SCNC: 13 MMOL/L (ref 7–15)
AST SERPL W P-5'-P-CCNC: 23 U/L (ref 0–45)
BILIRUB SERPL-MCNC: 0.6 MG/DL
BUN SERPL-MCNC: 21 MG/DL (ref 8–23)
CALCIUM SERPL-MCNC: 9.8 MG/DL (ref 8.8–10.2)
CHLORIDE SERPL-SCNC: 103 MMOL/L (ref 98–107)
CREAT SERPL-MCNC: 1.22 MG/DL (ref 0.67–1.17)
DEPRECATED HCO3 PLAS-SCNC: 26 MMOL/L (ref 22–29)
EGFRCR SERPLBLD CKD-EPI 2021: 64 ML/MIN/1.73M2
GLUCOSE SERPL-MCNC: 102 MG/DL (ref 70–99)
POTASSIUM SERPL-SCNC: 4.5 MMOL/L (ref 3.4–5.3)
PROT SERPL-MCNC: 8.1 G/DL (ref 6.4–8.3)
SODIUM SERPL-SCNC: 142 MMOL/L (ref 135–145)

## 2024-04-19 NOTE — PATIENT INSTRUCTIONS
Patient Education   Here is the plan from today's visit    1. Chronic pain of both knees  Recommend continuing with tylenol for pain. If symptoms worsen we can proceed with imagine and PT.     2. Acute pain of right shoulder  Recommend continuing with tylenol for pain. Ice as tolerated. If symptoms worsen we can proceed with imagine and PT.     3. Body mass index (BMI) of 40.0-44.9 in adult (H)  Encouraged 30 minutes of moderate activity 3-4 times per week. Recommend eating a healthy, balanced, low-sugar, low-carbohydrate diet. Focus on including lots of lean proteins like chicken, fish, beans, and lentils. Healthy fats like avocado, olive oil, nuts, and seeds can also be helpful.      4. Chronic heart failure with preserved ejection fraction (HFpEF) (H)  Consider starting Jardiance for diabetes as wells as for its cardiovascular benefits  - CBC with platelets differential  - Comprehensive metabolic panel    5. Hypertension, essential  Continue salt avoidance and caloric restriction for weight loss  - CBC with platelets differential  - Comprehensive metabolic panel    6. Type 2 diabetes mellitus with stage 3a chronic kidney disease, without long-term current use of insulin (H)  - CBC with platelets differential; Future  - Comprehensive metabolic panel; Future  - CBC with platelets differential  - Comprehensive metabolic panel      Please call or return to clinic if your symptoms don't go away.    Follow up plan  Return in about 6 months (around 10/18/2024).    Thank you for coming to [facility] today.  Lab Testing:  **If you had lab testing today and your results are reassuring or normal they will be mailed to you or sent through [employer] within 7 days.   **If the lab tests need quick action we will call you with the results.  **If you are having labs done on a different day, please call [phone] to schedule at [facility] or [phone] for other [facility] locations. Labs do not  offer walk-in appointments.  The phone number we will call with results is # [phone] (home) . If this is not the best number please call our clinic and change the number.  Medication Refills:  If you need any refills please call your pharmacy and they will contact us.   If you need to  your refill at a new pharmacy, please contact the new pharmacy directly. The new pharmacy will help you get your medications transferred faster.   Scheduling:  If you have any concerns about today's visit or wish to schedule another appointment please call our office during normal business hours [phone] (8-5:00 M-F). If you can no longer make a scheduled visit, please cancel via [facility] or call us to cancel.   If a referral was made to an [facility] specialty provider and you do not get a call from central scheduling, please refer to directions on your visit summary or call our office during normal business hours for assistance.   If a Mammogram was ordered for you at the [facility] call [phone] to schedule or change your appointment.  If you had an XRay/CT/Ultrasound/MRI ordered the number is [phone] to schedule or change your radiology appointment.   [facility] has limited ultrasound appointments available on Wednesdays, if you would like your ultrasound at [facility], please call [phone] to schedule.   Medical Concerns:  If you have urgent medical concerns please call [phone] at any time of the day.    Virgilio Slaughter MD

## 2024-05-01 NOTE — PROGRESS NOTES
[facility] Dermatology Note  Encounter Date: May 2, 2024  Office Visit     Dermatology Problem List:  # Benign FBSE 05/02/24     ____________________________________________    Assessment & Plan:     # Benign findings including seborrheic keratoses, cherry angiomas.  Reassured patient regarding the benign nature of these findings.  None of the seborrheic keratoses are symptomatic, and patient declines LN2 today.  Recommended sun protection and discussed ABCDEs of melanoma.  - ABCDEs: Counseled ABCDEs of melanoma: Asymmetry, Border (irregularity), Color (not uniform, changes in color), Diameter (greater than 6 mm which is about the size of a pencil eraser), and Evolving (any changes in preexisting moles).  - NTD: No further management at this time.  - Sun protection: Counseled SPF30+ sunscreen, UPF clothing, sun avoidance, tanning bed avoidance.    Procedures Performed:   -  none    Follow-up: prn for new or changing lesions    Staff: Dr. Sayda Stevenson MD PGY-3  Dermatology Resident  I, Rachel Alfredo MD, saw this patient with the resident and agree with the resident s findings and plan of care as documented in the resident s note.    ____________________________________________    CC: Skin Check (Al is here today for a FBSE. He has some spots on his abdomen that he would like checked out.)    HPI:  Mr. Jazz Berman is a(n) 69 year old male who presents today as a new patient for evaluation of a lesion of concern. Per chart review he has had keloid scars injected by Dr. Hillman in 2015. He also had verrucous keratoses x2 of the face biopsied in Oct 2015.    Today, the patient states that he has a few lesions of concern on his abdomen on his left dorsal forearm.  Most of them have been there for many years.  There is one on his left abdomen that has been growing more recently.  Not painful, bleeding or itchy.  He has no personal history of skin cancer.  Does not want us to examine  any other areas of the skin today and just focus on the areas of concern.    Patient is otherwise feeling well, without additional skin concerns.    Labs Reviewed:  N/A    Physical Exam:  Vitals: There were no vitals taken for this visit.  SKIN: Focused examination of the abdomen, chest, flanks, dorsal forearms was performed.  -On the abdomen and left dorsal forearm x 2 there are waxy, stuck on appearing brown papules  - On the abdomen there are several red, dome-shaped bright red papules  - No other lesions of concern on areas examined.     Medications:  Current Outpatient Medications   Medication Sig Dispense Refill    ACETAMINOPHEN EXTRA STRENGTH 500 MG tablet TAKE 1-2 TABLETS (500-1,000 MG) BY MOUTH 4 TIMES DAILY AS NEEDED FOR MILD PAIN 200 tablet 0    amLODIPine (NORVASC) 10 MG tablet Take 1 tablet (10 mg) by mouth daily 90 tablet 4    apixaban ANTICOAGULANT (ELIQUIS) 5 MG tablet Take 1 tablet (5 mg) by mouth 2 times daily 180 tablet 4    atorvastatin (LIPITOR) 40 MG tablet Take 1 tablet (40 mg) by mouth daily 90 tablet 4    empagliflozin (JARDIANCE) 10 MG TABS tablet Take 1 tablet (10 mg) by mouth daily 90 tablet 4    famotidine (PEPCID) 40 MG tablet Take 1 tablet (40 mg) by mouth daily 90 tablet 3    furosemide (LASIX) 40 MG tablet Take 1 tablet (40 mg) by mouth daily as needed (Edema) 90 tablet 4    lisinopril (ZESTRIL) 40 MG tablet Take 1 tablet (40 mg) by mouth daily 90 tablet 4    order for DME Equipment being ordered: BP cuff, large 1 Units 0    sildenafil (VIAGRA) 100 MG tablet Take 1 tablet (100 mg) by mouth daily as needed (sexual dysfunction) 30 tablet 4    tamsulosin (FLOMAX) 0.4 MG capsule Take 1 capsule (0.4 mg) by mouth daily 90 capsule 3     No current facility-administered medications for this visit.      Past Medical History:   Patient Active Problem List   Diagnosis    Hypertension, essential    Body mass index (BMI) of 40.0-44.9 in adult (H)    GREG (obstructive sleep apnea) on CPAP     Erectile dysfunction    BPH (benign prostatic hyperplasia)    Diverticulosis of large intestine    Ectropion    GIST (gastrointestinal stromal tumor), malignant (H)    Carrying extra weight    Chronic heart failure with preserved ejection fraction (HFpEF) (H)    Anticoagulation goal of INR 2 to 3    Current use of long term anticoagulation    Paroxysmal atrial fibrillation (H)    Type 2 diabetes mellitus with stage 3a chronic kidney disease, without long-term current use of insulin (H)    Labile hypertension    CKD stage G3a/A1, GFR 45-59 and albumin creatinine ratio <30 mg/g (H)    History of colonic polyps    History of tobacco use    Osteoarthritis of multiple joints    Aortic ectasia, unspecified site (H24)     Past Medical History:   Diagnosis Date    Arthritis     Atrial fibrillation (H)     BPH (benign prostatic hyperplasia) 8/29/2013    Cardiomegaly 8/30/2012    Crushing injury of foot 8/30/2012    Depressive disorder, not elsewhere classified 8/30/2012    Diabetes mellitus type 2 in obese     Diverticulosis of large intestine 7/4/2016    Colonoscopy 7/2/16      Former smoker     Gastric mass     GI bleed     History of blood transfusion     Hypertension     Hypertension     Keloid 6/11/2012    GREG on CPAP        CC Amber J Scheierl, APRN CNP  [address] [place] [address] on close of this encounter.

## 2024-05-02 ENCOUNTER — OFFICE VISIT (OUTPATIENT)
Dept: DERMATOLOGY | Facility: CLINIC | Age: 69
End: 2024-05-02
Attending: STUDENT IN AN ORGANIZED HEALTH CARE EDUCATION/TRAINING PROGRAM
Payer: COMMERCIAL

## 2024-05-02 DIAGNOSIS — D18.01 CHERRY ANGIOMA: ICD-10-CM

## 2024-05-02 DIAGNOSIS — L82.1 SEBORRHEIC KERATOSES: Primary | ICD-10-CM

## 2024-05-02 PROCEDURE — 99203 OFFICE O/P NEW LOW 30 MIN: CPT | Mod: GC | Performed by: DERMATOLOGY

## 2024-05-02 ASSESSMENT — PAIN SCALES - GENERAL: PAINLEVEL: NO PAIN (0)

## 2024-05-02 NOTE — NURSING NOTE
Dermatology Rooming Note    Jazz Berman's goals for this visit include:   Chief Complaint   Patient presents with    Skin Check     Al is here today for a FBSE. He has some spots on his abdomen that he would like checked out.      Kilo Garcia, CMA

## 2024-05-02 NOTE — PATIENT INSTRUCTIONS

## 2024-05-02 NOTE — LETTER
5/2/2024       RE: Jazz Berman  [address]  [facility] [address]     Dear Colleague,    Thank you for referring your patient, Jazz Berman, to the [facility] [place] [facility]. Please see a copy of my visit note below.    [facility] Dermatology Note  Encounter Date: May 2, 2024  Office Visit     Dermatology Problem List:  # Benign FBSE 05/02/24     ____________________________________________    Assessment & Plan:     # Benign findings including seborrheic keratoses, cherry angiomas.  Reassured patient regarding the benign nature of these findings.  None of the seborrheic keratoses are symptomatic, and patient declines LN2 today.  Recommended sun protection and discussed ABCDEs of melanoma.  - ABCDEs: Counseled ABCDEs of melanoma: Asymmetry, Border (irregularity), Color (not uniform, changes in color), Diameter (greater than 6 mm which is about the size of a pencil eraser), and Evolving (any changes in preexisting moles).  - NTD: No further management at this time.  - Sun protection: Counseled SPF30+ sunscreen, UPF clothing, sun avoidance, tanning bed avoidance.    Procedures Performed:   -  none    Follow-up: prn for new or changing lesions    Staff: Dr. Sayda Stevenson MD PGY-3  Dermatology Resident  I, Rachel Alfredo MD, saw this patient with the resident and agree with the resident s findings and plan of care as documented in the resident s note.    ____________________________________________    CC: Skin Check (Al is here today for a FBSE. He has some spots on his abdomen that he would like checked out.)    HPI:  Mr. Jazz Berman is a(n) 69 year old male who presents today as a new patient for evaluation of a lesion of concern. Per chart review he has had keloid scars injected by Dr. Hillman in 2015. He also had verrucous keratoses x2 of the face biopsied in Oct 2015.    Today, the  patient states that he has a few lesions of concern on his abdomen on his left dorsal forearm.  Most of them have been there for many years.  There is one on his left abdomen that has been growing more recently.  Not painful, bleeding or itchy.  He has no personal history of skin cancer.  Does not want us to examine any other areas of the skin today and just focus on the areas of concern.    Patient is otherwise feeling well, without additional skin concerns.    Labs Reviewed:  N/A    Physical Exam:  Vitals: There were no vitals taken for this visit.  SKIN: Focused examination of the abdomen, chest, flanks, dorsal forearms was performed.  -On the abdomen and left dorsal forearm x 2 there are waxy, stuck on appearing brown papules  - On the abdomen there are several red, dome-shaped bright red papules  - No other lesions of concern on areas examined.     Medications:  Current Outpatient Medications   Medication Sig Dispense Refill    ACETAMINOPHEN EXTRA STRENGTH 500 MG tablet TAKE 1-2 TABLETS (500-1,000 MG) BY MOUTH 4 TIMES DAILY AS NEEDED FOR MILD PAIN 200 tablet 0    amLODIPine (NORVASC) 10 MG tablet Take 1 tablet (10 mg) by mouth daily 90 tablet 4    apixaban ANTICOAGULANT (ELIQUIS) 5 MG tablet Take 1 tablet (5 mg) by mouth 2 times daily 180 tablet 4    atorvastatin (LIPITOR) 40 MG tablet Take 1 tablet (40 mg) by mouth daily 90 tablet 4    empagliflozin (JARDIANCE) 10 MG TABS tablet Take 1 tablet (10 mg) by mouth daily 90 tablet 4    famotidine (PEPCID) 40 MG tablet Take 1 tablet (40 mg) by mouth daily 90 tablet 3    furosemide (LASIX) 40 MG tablet Take 1 tablet (40 mg) by mouth daily as needed (Edema) 90 tablet 4    lisinopril (ZESTRIL) 40 MG tablet Take 1 tablet (40 mg) by mouth daily 90 tablet 4    order for DME Equipment being ordered: BP cuff, large 1 Units 0    sildenafil (VIAGRA) 100 MG tablet Take 1 tablet (100 mg) by mouth daily as needed (sexual dysfunction) 30 tablet 4    tamsulosin (FLOMAX) 0.4 MG  capsule Take 1 capsule (0.4 mg) by mouth daily 90 capsule 3     No current facility-administered medications for this visit.      Past Medical History:   Patient Active Problem List   Diagnosis    Hypertension, essential    Body mass index (BMI) of 40.0-44.9 in adult (H)    GREG (obstructive sleep apnea) on CPAP    Erectile dysfunction    BPH (benign prostatic hyperplasia)    Diverticulosis of large intestine    Ectropion    GIST (gastrointestinal stromal tumor), malignant (H)    Carrying extra weight    Chronic heart failure with preserved ejection fraction (HFpEF) (H)    Anticoagulation goal of INR 2 to 3    Current use of long term anticoagulation    Paroxysmal atrial fibrillation (H)    Type 2 diabetes mellitus with stage 3a chronic kidney disease, without long-term current use of insulin (H)    Labile hypertension    CKD stage G3a/A1, GFR 45-59 and albumin creatinine ratio <30 mg/g (H)    History of colonic polyps    History of tobacco use    Osteoarthritis of multiple joints    Aortic ectasia, unspecified site (H24)     Past Medical History:   Diagnosis Date    Arthritis     Atrial fibrillation (H)     BPH (benign prostatic hyperplasia) 8/29/2013    Cardiomegaly 8/30/2012    Crushing injury of foot 8/30/2012    Depressive disorder, not elsewhere classified 8/30/2012    Diabetes mellitus type 2 in obese     Diverticulosis of large intestine 7/4/2016    Colonoscopy 7/2/16      Former smoker     Gastric mass     GI bleed     History of blood transfusion     Hypertension     Hypertension     Keloid 6/11/2012    GREG on CPAP        CC Amber J Scheierl, APRN CNP  [address] [place] [address] on close of this encounter.

## 2024-05-06 DIAGNOSIS — Z00.6 RESEARCH STUDY PATIENT: Primary | ICD-10-CM

## 2024-05-08 ENCOUNTER — ANCILLARY PROCEDURE (OUTPATIENT)
Dept: CT IMAGING | Facility: CLINIC | Age: 69
End: 2024-05-08
Attending: FAMILY MEDICINE
Payer: COMMERCIAL

## 2024-05-08 ENCOUNTER — ALLIED HEALTH/NURSE VISIT (OUTPATIENT)
Dept: PULMONOLOGY | Facility: CLINIC | Age: 69
End: 2024-05-08

## 2024-05-08 ENCOUNTER — LAB (OUTPATIENT)
Dept: LAB | Facility: CLINIC | Age: 69
End: 2024-05-08
Payer: COMMERCIAL

## 2024-05-08 DIAGNOSIS — Z00.6 RESEARCH STUDY PATIENT: Primary | ICD-10-CM

## 2024-05-08 DIAGNOSIS — Z00.6 RESEARCH STUDY PATIENT: ICD-10-CM

## 2024-05-08 DIAGNOSIS — Z12.2 SCREENING FOR LUNG CANCER: ICD-10-CM

## 2024-05-08 DIAGNOSIS — F17.211 NICOTINE DEPENDENCE, CIGARETTES, IN REMISSION: ICD-10-CM

## 2024-05-08 PROCEDURE — 71271 CT THORAX LUNG CANCER SCR C-: CPT | Performed by: RADIOLOGY

## 2024-05-08 PROCEDURE — 36415 COLL VENOUS BLD VENIPUNCTURE: CPT | Performed by: PATHOLOGY

## 2024-05-08 PROCEDURE — 99207 PR NO CHARGE-RESEARCH SERVICE: CPT | Performed by: INTERNAL MEDICINE

## 2024-05-08 NOTE — NURSING NOTE
Research Consent Note  IRB#  [id number]  Trial Name: Lung-RND-003: Determination and Validation of Lung EpiCheck : A Multianalyte Assay for Lung Cancer Prediction    Short Title: Sightline     : Mary Ann Lepe MD    Study Coordinators: Milly Pritchett [phone], Inna Ortiz [phone], Ceci Nunn [phone]       Spoke with patient over the phone for possible participation in the above study. The current IRB approved consent form was reviewed and discussed at length with the patient. This included purpose, research hypothesis, nature of the research, risks & benefits, procedures required for screening and enrollment as well as all required follow up. Confidentiality issues, compensation for injury, and alternatives to participation were explained. Patient was informed that participation is voluntary and that refusal to participate will involve no penalty or decrease benefits to which the subject is otherwise entitled. Patient had the opportunity to read the entire consent, ask questions, express concerns and offered sufficient time to consider the research trial. Patient was able to clearly state what study participation involved and the associated requirements. All questions and concerns were addressed. Patient voluntarily signed the consent and HIPAA forms in person on 5/8/2024 prior to any research required tests/procedures and was given a copy of the signed forms.    Milly Pritchett,

## 2024-05-08 NOTE — NURSING NOTE
IRB#  [id number]  Trial Name: Lung-RND-003: Determination and Validation of Lung EpiCheck : A Multianalyte Assay for Lung Cancer Prediction    Short Title: Sightline     : Mary Ann Lepe MD    Study Coordinators: Milly Pritchett [phone], Inna Ortiz [phone], Ceci Nunn [phone]    Inclusion Criteria for Screening Series   Criteria #   Inclusion Criteria (all must be yes)    1 Age 50-80 years   Yes   2 Subjects who are willing and able to provide written informed consent   Yes   3 Subjects who are currently smoking or former smokers, with at least 20 pack-years   Yes   4 Subjects planning to undergo or have undergone LDCT for lung cancer screening within 60 days of date of blood collection   Yes     Exclusion Criteria for Screening Series   Criteria #  Exclusion Criteria (all must be no)    1 Known diagnosis or treatment of cancer of any kind in the past 5 years, except of fully resected non-melanoma skin cancer or fully resected carcinoma in situ of the cervix   No   2 Subjects whose purpose of performing the LDCT is surveillance of a lung nodule   No       Subject has met all inclusion criteria and no exclusion criteria have been met when screened and consented on 5/8/2024.  Subject is ready to fully enroll in the Sightline study.    Milly Pritchett,       What Type Of Note Output Would You Prefer (Optional)?: Standard Output How Severe Are Your Spot(S)?: mild Have Your Spot(S) Been Treated In The Past?: has not been treated Hpi Title: Evaluation of Skin Lesions Family Member: Uncle

## 2024-07-09 ENCOUNTER — DOCUMENTATION ONLY (OUTPATIENT)
Dept: FAMILY MEDICINE | Facility: CLINIC | Age: 69
End: 2024-07-09
Payer: COMMERCIAL

## 2024-07-09 NOTE — PROGRESS NOTES
"When opening a documentation only encounter, be sure to enter in \"Chief Complaint\" Forms and in \" Comments\" Title of form, description if needed.    Al is a 69 year old  male  Form received via: Fax  Form now resides in: Provider Ready    Margret Steiner  Form has been completed by provider.     Form sent out via: Fax to [place] [facility] at Fax Number: [phone]  Patient informed: No, Reason:  Output date: July 10, 2024    Margret Steiner      **Please close the encounter**              "

## 2024-07-30 NOTE — LETTER
September 27, 2018      Jazz Berman  [address]     [facility] [facility] [address]        Dear Jazz,    Thank you for getting your care at [facility]. Please see below for your test results.    Resulted Orders   CBC with Diff Plt ([facility])   Result Value Ref Range    WBC 7.7 4.0 - 11.0 K/uL    Lymphocytes # 1.5 0.8 - 5.3 K/uL    % Lymphocytes 19.8 (L) 20.0 - 48.0 %L    Mid # 0.6 0.0 - 2.2 K/uL    Mid % 8.3 0.0 - 20.0 %M    GRANULOCYTES # 5.5 1.6 - 8.3 K/uL    % Granulocytes 71.9 40.0 - 75.0 %G    RBC 4.81 4.40 - 5.90 M/uL    Hemoglobin 14.0 13.3 - 17.7 g/dL    Hematocrit 46.5 40.0 - 53.0 %    MCV 96.6 78.0 - 100.0 fL    MCH 29.1 26.5 - 35.0 pg    MCHC 30.1 (L) 32.0 - 36.0 g/dL    Platelets 233.0 150.0 - 450.0 K/uL   Comprehensive Metabolic Panel ([facility])   Result Value Ref Range    Albumin 4.6 3.5 - 4.7 mg/dL    Alkaline Phosphatase 80.5 31.7 - 110.7 U/L    ALT 11.6 0.0 - 45.0 U/L    AST 14.9 0.0 - 55.0 U/L    Bilirubin Total 0.8 0.2 - 1.3 mg/dL    Urea Nitrogen 24.0 (H) 7.0 - 21.0 mg/dL    Calcium 9.7 8.5 - 10.1 mg/dL    Chloride 105.3 98.0 - 110.0 mmol/L    Carbon Dioxide 28.9 20.0 - 32.0 mmol/L    Creatinine 1.7 (H) 0.7 - 1.3 mg/dL    Glucose 119.1 (H) 70.0 - 99.0 mg'dL    Potassium 4.3 3.3 - 4.5 mmol/dL    Sodium 140.6 132.6 - 141.4 mmol/L    Protein Total 7.3 6.8 - 8.8 g/dL    GFR Estimate 43.5 (L) >60.0 mL/min/1.7 m2    GFR Estimate If Black 52.6 (L) >60.0 mL/min/1.7 m2   Lipid Cascade ([facility])   Result Value Ref Range    Cholesterol 163.9 0.0 - 200.0 mg/dL    Cholesterol/HDL Ratio 3.8 0.0 - 5.0    HDL Cholesterol 42.7 >40.0 mg/dL    LDL Cholesterol Calculated 99 0 - 129 mg/dL    Triglycerides 109.5 0.0 - 150.0 mg/dL    VLDL Cholesterol 21.9 7.0 - 32.0 mg/dL       If you have any concerns about these results please call and leave a message for me or send a Consumer Physicst message to the clinic.    Sincerely,    Kenyon Singh MD    
52

## 2024-08-22 DIAGNOSIS — K21.9 GASTROESOPHAGEAL REFLUX DISEASE WITHOUT ESOPHAGITIS: ICD-10-CM

## 2024-08-22 NOTE — TELEPHONE ENCOUNTER
"Request for medication refill:  famotidine (PEPCID) 40 MG tablet     Providers if patient needs an appointment and you are willing to give a one month supply please refill for one month and  send a letter/MyChart using \".SMILLIMITEDREFILL\" .smillimited and route chart to \"P [facility] \" (Giving one month refill in non controlled medications is strongly recommended before denial)    If refill has been denied, meaning absolutely no refills without visit, please complete the smart phrase \".smirxrefuse\" and route it to the \"P [facility] MED REFILLS\"  pool to inform the patient and the pharmacy.    Justin Hayes, [facility]      "

## 2024-08-23 RX ORDER — FAMOTIDINE 40 MG/1
40 TABLET, FILM COATED ORAL DAILY
Qty: 90 TABLET | Refills: 3 | Status: SHIPPED | OUTPATIENT
Start: 2024-08-23

## 2024-08-26 ENCOUNTER — DOCUMENTATION ONLY (OUTPATIENT)
Dept: FAMILY MEDICINE | Facility: CLINIC | Age: 69
End: 2024-08-26
Payer: COMMERCIAL

## 2024-08-26 NOTE — PROGRESS NOTES
"When opening a documentation only encounter, be sure to enter in \"Chief Complaint\" Forms and in \" Comments\" Title of form, description if needed.    Al is a 69 year old  male  Form received via: Fax  Form now resides in: Provider Ready    Margret Steiner      Form has been completed by provider.     Form sent out via: Fax to [facility] at Fax Number: [phone]  Patient informed: No, Reason:  Output date: September 9, 2024    Margret Steiner      **Please close the encounter**                   "

## 2024-08-29 ENCOUNTER — TELEPHONE (OUTPATIENT)
Dept: FAMILY MEDICINE | Facility: CLINIC | Age: 69
End: 2024-08-29
Payer: COMMERCIAL

## 2024-08-29 NOTE — TELEPHONE ENCOUNTER
Patient Quality Outreach    Patient is due for the following:   Diabetes -  Foot Exam  Hypertension -  BP check    Next Steps:   Schedule a office visit for Hypertension and diabetic foot exam    Type of outreach:    Sent letter.      Questions for provider review:    None           Margret Steiner

## 2024-08-29 NOTE — LETTER
8/29/2024         RE: Jazz Berman  [address]  [facility] [address]    August 29, 2024 August 29, 2024    To  Jazz Berman  [address]   [facility] [address]    Your team at [facility] cares about your health. We have reviewed your chart and based on our findings; we are making the following recommendations to better manage your health.     You are in particular need of attention regarding the following:     Schedule a DIABETIC FOLLOW UP appointment for Diabetic foot exam. Patients with diabetes should see their provider regularly.  HYPERTENSION FOLLOW UP: Office Visit    If you have already completed these items, please contact the clinic via phone or   [facility] so your care team can review and update your records. Thank you for   choosing [facility] for your healthcare needs. For any questions,   concerns, or to schedule an appointment please contact our clinic.    Healthy Regards,      Your [facility] Care Team

## 2024-09-06 ENCOUNTER — DOCUMENTATION ONLY (OUTPATIENT)
Dept: FAMILY MEDICINE | Facility: CLINIC | Age: 69
End: 2024-09-06
Payer: COMMERCIAL

## 2024-09-06 DIAGNOSIS — R39.15 URINARY URGENCY: ICD-10-CM

## 2024-09-06 DIAGNOSIS — R35.1 NOCTURIA: ICD-10-CM

## 2024-09-06 NOTE — TELEPHONE ENCOUNTER
"Request for medication refill:    tamsulosin (FLOMAX) 0.4 MG capsule     Providers if patient needs an appointment and you are willing to give a one month supply please refill for one month and  send a letter/MyChart using \".SMILLIMITEDREFILL\" .smillimited and route chart to \"P [facility] \" (Giving one month refill in non controlled medications is strongly recommended before denial)    If refill has been denied, meaning absolutely no refills without visit, please complete the smart phrase \".smirxrefuse\" and route it to the \"P [facility] MED REFILLS\"  pool to inform the patient and the pharmacy.    Ivette Garcia MA     "

## 2024-09-06 NOTE — PROGRESS NOTES
"When opening a documentation only encounter, be sure to enter in \"Chief Complaint\" Forms and in \" Comments\" Title of form, description if needed.    Al is a 69 year old  male  Form received via: Fax  Form now resides in: Provider Ready    Margret Steiner    Form has been completed by provider.     Form sent out via: Fax to [facility] at Fax Number: [phone]  Patient informed: No, Reason:  Output date: September 9, 2024    Margret Steiner      **Please close the encounter**            "

## 2024-10-11 ENCOUNTER — OFFICE VISIT (OUTPATIENT)
Dept: FAMILY MEDICINE | Facility: CLINIC | Age: 69
End: 2024-10-11
Payer: COMMERCIAL

## 2024-10-11 ENCOUNTER — TRANSFERRED RECORDS (OUTPATIENT)
Dept: MULTI SPECIALTY CLINIC | Facility: CLINIC | Age: 69
End: 2024-10-11

## 2024-10-11 VITALS
RESPIRATION RATE: 18 BRPM | DIASTOLIC BLOOD PRESSURE: 92 MMHG | OXYGEN SATURATION: 100 % | HEART RATE: 66 BPM | HEIGHT: 70 IN | BODY MASS INDEX: 41 KG/M2 | SYSTOLIC BLOOD PRESSURE: 130 MMHG | WEIGHT: 286.4 LBS | TEMPERATURE: 98.3 F

## 2024-10-11 DIAGNOSIS — N18.31 TYPE 2 DIABETES MELLITUS WITH STAGE 3A CHRONIC KIDNEY DISEASE, WITHOUT LONG-TERM CURRENT USE OF INSULIN (H): ICD-10-CM

## 2024-10-11 DIAGNOSIS — Z23 COVID-19 VACCINE ADMINISTERED: ICD-10-CM

## 2024-10-11 DIAGNOSIS — Z23 INFLUENZA VACCINE ADMINISTERED: ICD-10-CM

## 2024-10-11 DIAGNOSIS — I50.32 CHRONIC HEART FAILURE WITH PRESERVED EJECTION FRACTION (HFPEF) (H): ICD-10-CM

## 2024-10-11 DIAGNOSIS — N40.0 BENIGN PROSTATIC HYPERPLASIA, UNSPECIFIED WHETHER LOWER URINARY TRACT SYMPTOMS PRESENT: ICD-10-CM

## 2024-10-11 DIAGNOSIS — R35.1 NOCTURIA: ICD-10-CM

## 2024-10-11 DIAGNOSIS — Z29.11 NEED FOR VACCINATION AGAINST RESPIRATORY SYNCYTIAL VIRUS: Primary | ICD-10-CM

## 2024-10-11 DIAGNOSIS — N52.9 ERECTILE DYSFUNCTION, UNSPECIFIED ERECTILE DYSFUNCTION TYPE: ICD-10-CM

## 2024-10-11 DIAGNOSIS — I10 ESSENTIAL HYPERTENSION WITH GOAL BLOOD PRESSURE LESS THAN 140/90: ICD-10-CM

## 2024-10-11 DIAGNOSIS — R39.15 URINARY URGENCY: ICD-10-CM

## 2024-10-11 DIAGNOSIS — E11.22 TYPE 2 DIABETES MELLITUS WITH STAGE 3A CHRONIC KIDNEY DISEASE, WITHOUT LONG-TERM CURRENT USE OF INSULIN (H): ICD-10-CM

## 2024-10-11 LAB
EST. AVERAGE GLUCOSE BLD GHB EST-MCNC: 117 MG/DL
HBA1C MFR BLD: 5.7 % (ref 0–5.6)
RETINOPATHY: NORMAL

## 2024-10-11 PROCEDURE — 90480 ADMN SARSCOV2 VAC 1/ONLY CMP: CPT

## 2024-10-11 PROCEDURE — 36415 COLL VENOUS BLD VENIPUNCTURE: CPT

## 2024-10-11 PROCEDURE — G0008 ADMIN INFLUENZA VIRUS VAC: HCPCS

## 2024-10-11 PROCEDURE — 91320 SARSCV2 VAC 30MCG TRS-SUC IM: CPT

## 2024-10-11 PROCEDURE — 83036 HEMOGLOBIN GLYCOSYLATED A1C: CPT

## 2024-10-11 PROCEDURE — 99214 OFFICE O/P EST MOD 30 MIN: CPT | Mod: 25

## 2024-10-11 PROCEDURE — 90662 IIV NO PRSV INCREASED AG IM: CPT

## 2024-10-11 RX ORDER — SILDENAFIL 100 MG/1
100 TABLET, FILM COATED ORAL DAILY PRN
Qty: 30 TABLET | Refills: 4 | Status: SHIPPED | OUTPATIENT
Start: 2024-10-11

## 2024-10-11 RX ORDER — TAMSULOSIN HYDROCHLORIDE 0.4 MG/1
0.4 CAPSULE ORAL DAILY
Qty: 90 CAPSULE | Refills: 3 | Status: SHIPPED | OUTPATIENT
Start: 2024-10-11

## 2024-10-11 RX ORDER — SILDENAFIL 100 MG/1
100 TABLET, FILM COATED ORAL DAILY PRN
Qty: 30 TABLET | Refills: 4 | Status: CANCELLED | OUTPATIENT
Start: 2024-10-11

## 2024-10-11 RX ORDER — AMLODIPINE BESYLATE 10 MG/1
10 TABLET ORAL DAILY
Qty: 90 TABLET | Refills: 4 | Status: SHIPPED | OUTPATIENT
Start: 2024-10-11

## 2024-10-11 RX ORDER — LISINOPRIL 40 MG/1
40 TABLET ORAL DAILY
Qty: 90 TABLET | Refills: 4 | Status: SHIPPED | OUTPATIENT
Start: 2024-10-11

## 2024-10-11 RX ORDER — TAMSULOSIN HYDROCHLORIDE 0.4 MG/1
0.4 CAPSULE ORAL DAILY
Qty: 90 CAPSULE | Refills: 3 | OUTPATIENT
Start: 2024-10-11

## 2024-10-11 ASSESSMENT — PATIENT HEALTH QUESTIONNAIRE - PHQ9
SUM OF ALL RESPONSES TO PHQ QUESTIONS 1-9: 6
10. IF YOU CHECKED OFF ANY PROBLEMS, HOW DIFFICULT HAVE THESE PROBLEMS MADE IT FOR YOU TO DO YOUR WORK, TAKE CARE OF THINGS AT HOME, OR GET ALONG WITH OTHER PEOPLE: SOMEWHAT DIFFICULT
SUM OF ALL RESPONSES TO PHQ QUESTIONS 1-9: 2

## 2024-10-11 NOTE — PROGRESS NOTES
Assessment & Plan     Chronic heart failure with preserved ejection fraction (HFpEF) (H)  Essential hypertension with goal blood pressure less than 140/90  BP at 130/92. Reports that a Nurse checks his BP at home every Tuesday and blood pressure are usually around 120-130 systolic. Discuss continuing salt avoidance and caloric restriction for weight loss. Encourage Al to start taking Jardiance for his diabetes as wells as for its cardiovascular benefits. Due for Cardiology follow up. Will schedule a follow up appointment at his earliest convenience. Denies any chest pain, cough, SOB, palpitation, or acute lower extremities edema changes. Refills given.   - amLODIPine (NORVASC) 10 MG tablet  Dispense: 90 tablet; Refill: 4  - lisinopril (ZESTRIL) 40 MG tablet  Dispense: 90 tablet; Refill: 4    Type 2 diabetes mellitus with stage 3a chronic kidney disease, without long-term current use of insulin (H)  Body mass index (BMI) of 40.0-44.9 in adult (H)  Last A1c at 5.9. Weight today at 286 lb. Up from previous visit of 277 lb. Discuss weight management resources with Al but he reports to have try these in the past without much improvement. He would like to continue with dieting and exercising on his own and will ask for assistance when needed. Encouraged 30 minutes of moderate activity 3-4 times per week. Recommend eating a healthy, balanced, low-sugar, low-carbohydrate, and low salt diet. Recommend calorie restriction for weight loss. Continue to discuss the benefit of adding Jardiance, which he has not yet started.   - Hemoglobin A1c  - Hemoglobin A1c      Nocturia  Benign prostatic hyperplasia, unspecified whether lower urinary tract symptoms present  Urinary urgency  Refill given.   - tamsulosin (FLOMAX) 0.4 MG capsule  Dispense: 90 capsule; Refill: 3    Erectile dysfunction, unspecified erectile dysfunction type  Refills given.   - sildenafil (VIAGRA) 100 MG tablet  Dispense: 30 tablet; Refill: 4    COVID-19 vaccine  "administered  - COVID-19 12+ (PFIZER)    Influenza vaccine administered  - INFLUENZA HIGH DOSE, TRIVALENT, PF (FLUZONE)    Need for vaccination against respiratory syncytial virus  Recommend patient to receive at his pharmacy.       BMI  Estimated body mass index is 41.09 kg/m  as calculated from the following:    Height as of this encounter: 1.778 m (5' 10\").    Weight as of this encounter: 129.9 kg (286 lb 6.4 oz).   Weight management plan: Discussed healthy diet and exercise guidelines      No follow-ups on file.      Subjective   Al is a 69 year old, presenting for the following health issues:  Follow Up (And vaccines) and Recheck Medication (refills)        10/11/2024     9:57 AM   Additional Questions   Roomed by Juliannain         10/11/2024    Information    services provided? No        HPI   {      BP:   - 130/92  - Nurse aide, check once week, 120-130/80, sometime even better   - Take meds in the morning  - would like to contiue with self exercising and diet   - watching what eating  - have not been excersicin as much,   - no special diet, but trying to eat less  - No alcholol use  - Trying to cut down on Na intake   - No schedule cardiology appt yet     Weight concerns:   - Was not watching what he is eating  - No exercise   - No active  - goes to healthcare appts and return home, does not exercise much   - Sit or lays in bed most of the day    Request medication refills             Review of Systems  Constitutional, HEENT, cardiovascular, pulmonary, gi and gu systems are negative, except as otherwise noted.      Objective    BP (!) 130/92 (BP Location: Right arm, Patient Position: Sitting, Cuff Size: Adult Large)   Pulse 66   Temp 98.3  F (36.8  C) (Oral)   Resp 18   Ht 1.778 m (5' 10\")   Wt 129.9 kg (286 lb 6.4 oz)   SpO2 100%   BMI 41.09 kg/m    Body mass index is 41.09 kg/m .  Physical Exam   GENERAL: alert and no distress  EYES: Eyes grossly normal to inspection, PERRL and " conjunctivae and sclerae normal  RESP: lungs clear to auscultation - no rales, rhonchi or wheezes  CV: regular rate and rhythm, normal S1 S2, no S3 or S4, no murmur, click or rub, no peripheral edema  ABDOMEN: soft, nontender, no hepatosplenomegaly, no masses and bowel sounds normal  MS: Trace B/L lower extremity edema.   SKIN: no suspicious lesions or rashes  NEURO: mentation intact and speech normal  PSYCH: mentation appears normal, affect normal/bright          Signed Electronically by: Virgilio Slaughter MD

## 2024-10-11 NOTE — PROGRESS NOTES
Preceptor Attestation:    I discussed the patient with the resident and evaluated the patient in person. I have verified the content of the note, which accurately reflects my assessment of the patient and the plan of care.   Supervising Physician:  Tiago Fung MD, MD.

## 2024-10-13 NOTE — PATIENT INSTRUCTIONS
Patient Education   Here is the plan from today's visit    1. Essential hypertension with goal blood pressure less than 140/90  - amLODIPine (NORVASC) 10 MG tablet; Take 1 tablet (10 mg) by mouth daily.  Dispense: 90 tablet; Refill: 4  - lisinopril (ZESTRIL) 40 MG tablet; Take 1 tablet (40 mg) by mouth daily.  Dispense: 90 tablet; Refill: 4    2. Type 2 diabetes mellitus with stage 3a chronic kidney disease, without long-term current use of insulin (H)  - Hemoglobin A1c; Future  - Hemoglobin A1c    3. Body mass index (BMI) of 40.0-44.9 in adult (H)    4. Chronic heart failure with preserved ejection fraction (HFpEF) (H)  Please schedule a follow up appointment with Cardiology for a follow up appointment.     5. Nocturia  - tamsulosin (FLOMAX) 0.4 MG capsule; Take 1 capsule (0.4 mg) by mouth daily.  Dispense: 90 capsule; Refill: 3    6. Benign prostatic hyperplasia, unspecified whether lower urinary tract symptoms present  - tamsulosin (FLOMAX) 0.4 MG capsule; Take 1 capsule (0.4 mg) by mouth daily.  Dispense: 90 capsule; Refill: 3    7. Urinary urgency  - tamsulosin (FLOMAX) 0.4 MG capsule; Take 1 capsule (0.4 mg) by mouth daily.  Dispense: 90 capsule; Refill: 3    8. Erectile dysfunction, unspecified erectile dysfunction type  - sildenafil (VIAGRA) 100 MG tablet; Take 1 tablet (100 mg) by mouth daily as needed (sexual dysfunction).  Dispense: 30 tablet; Refill: 4    9. COVID-19 vaccine administered  - COVID-19 12+ (PFIZER)    10. Influenza vaccine administered  - INFLUENZA HIGH DOSE, TRIVALENT, PF (FLUZONE)    11. Need for vaccination against respiratory syncytial virus  This can be administer at your pharmacy         Please call or return to clinic if your symptoms don't go away.    Follow up plan  No follow-ups on file.    Thank you for coming to [facility] today.  Lab Testing:  **If you had lab testing today and your results are reassuring or normal they will be mailed to you or sent through [facility] within  7 days.   **If the lab tests need quick action we will call you with the results.  **If you are having labs done on a different day, please call [phone] to schedule at [facility] or [phone] for other [facility] Outpatient Lab locations. Labs do not offer walk-in appointments.  The phone number we will call with results is # [phone] (home) . If this is not the best number please call our clinic and change the number.  Medication Refills:  If you need any refills please call your pharmacy and they will contact us.   If you need to  your refill at a new pharmacy, please contact the new pharmacy directly. The new pharmacy will help you get your medications transferred faster.   Scheduling:  If you have any concerns about today's visit or wish to schedule another appointment please call our office during normal business hours [phone] (8-5:00 M-F). If you can no longer make a scheduled visit, please cancel via [facility] or call us to cancel.   If a referral was made to an [facility] specialty provider and you do not get a call from central scheduling, please refer to directions on your visit summary or call our office during normal business hours for assistance.   If a Mammogram was ordered for you at the Breast Center call [phone] to schedule or change your appointment.  If you had an XRay/CT/Ultrasound/MRI ordered the number is [phone] to schedule or change your radiology appointment.   [facility] has limited ultrasound appointments available on Wednesdays, if you would like your ultrasound at [facility], please call [phone] to schedule.   Medical Concerns:  If you have urgent medical concerns please call [phone] at any time of the day.    Virgilio Slaughter MD

## 2024-10-21 ENCOUNTER — TELEPHONE (OUTPATIENT)
Dept: FAMILY MEDICINE | Facility: CLINIC | Age: 69
End: 2024-10-21
Payer: COMMERCIAL

## 2024-10-21 NOTE — LETTER
10/21/2024         RE: Jazz Berman  [address]  [facility] [address]    October 21, 2024    To  Jazz Berman  [address]   [facility] [address]    Your team at [facility] cares about your health. We have reviewed your chart and based on our findings; we are making the following recommendations to better manage your health.     You are in particular need of attention regarding the following:     OTHER FOLLOW UP: Please follow up with a diabetic foot exam    If you have already completed these items, please contact the clinic via phone or   [employer] so your care team can review and update your records. Thank you for   choosing [facility] for your healthcare needs. For any questions,   concerns, or to schedule an appointment please contact our clinic.    Healthy Regards,      Your [facility] Care Team         Virgilio Slaughter MD

## 2024-10-21 NOTE — TELEPHONE ENCOUNTER
Patient Quality Outreach    Patient is due for the following:   Diabetes -  Foot Exam    Next Steps:   Schedule a office visit for diabetic foot exam    Type of outreach:    Sent [employer] message.      Questions for provider review:    None           Margret Steiner, CMA

## 2024-10-25 ENCOUNTER — DOCUMENTATION ONLY (OUTPATIENT)
Dept: FAMILY MEDICINE | Facility: CLINIC | Age: 69
End: 2024-10-25
Payer: COMMERCIAL

## 2024-10-25 NOTE — PROGRESS NOTES
"When opening a documentation only encounter, be sure to enter in \"Chief Complaint\" Forms and in \" Comments\" Title of form, description if needed.    Al is a 69 year old  male  Form received via: Fax  Form now resides in: Provider Ready    Margret Steiner CMA          Form has been completed by provider.     Form sent out via: Fax to [facility]. at Fax Number: [phone]  Patient informed: No, Reason:  Output date: October 28, 2024    Margret Steiner CMA      **Please close the encounter**      "

## 2024-11-21 DIAGNOSIS — R73.03 PRE-DIABETES: ICD-10-CM

## 2024-11-21 DIAGNOSIS — I50.32 CHRONIC HEART FAILURE WITH PRESERVED EJECTION FRACTION (HFPEF) (H): ICD-10-CM

## 2024-11-21 DIAGNOSIS — I10 HYPERTENSION, ESSENTIAL: ICD-10-CM

## 2024-11-25 NOTE — TELEPHONE ENCOUNTER
furosemide (LASIX) 40 MG tablet 90 tablet 4 10/18/2023     atorvastatin (LIPITOR) 40 MG tablet 90 tablet 4 10/18/2023       Last Office Visit : 10/18/23  Future Office visit:  0    Routing refill request to provider for review/approval because:  Blood pressure out of range       BP Readings from Last 3 Encounters:   10/11/24 (!) 130/92   04/18/24 (!) 134/93   04/11/24 131/89

## 2024-11-26 RX ORDER — FUROSEMIDE 40 MG/1
40 TABLET ORAL DAILY PRN
Qty: 90 TABLET | Refills: 4 | Status: SHIPPED | OUTPATIENT
Start: 2024-11-26

## 2024-11-26 RX ORDER — ATORVASTATIN CALCIUM 40 MG/1
40 TABLET, FILM COATED ORAL DAILY
Qty: 90 TABLET | Refills: 4 | Status: SHIPPED | OUTPATIENT
Start: 2024-11-26

## 2024-11-27 ASSESSMENT — PATIENT HEALTH QUESTIONNAIRE - PHQ9: SUM OF ALL RESPONSES TO PHQ QUESTIONS 1-9: 2

## 2024-12-18 DIAGNOSIS — Z53.9 DIAGNOSIS NOT YET DEFINED: Primary | ICD-10-CM

## 2024-12-18 PROCEDURE — G0180 MD CERTIFICATION HHA PATIENT: HCPCS | Performed by: FAMILY MEDICINE

## 2025-01-07 DIAGNOSIS — Z53.9 DIAGNOSIS NOT YET DEFINED: Primary | ICD-10-CM

## 2025-01-22 ENCOUNTER — DOCUMENTATION ONLY (OUTPATIENT)
Dept: FAMILY MEDICINE | Facility: CLINIC | Age: 70
End: 2025-01-22
Payer: COMMERCIAL

## 2025-01-22 NOTE — PROGRESS NOTES
"When opening a documentation only encounter, be sure to enter in \"Chief Complaint\" Forms and in \" Comments\" Title of form, description if needed.    Al is a 70 year old  male  Form received via: Fax  Form now resides in: Provider Ready    Ivette Garcia MA               "

## 2025-01-31 ENCOUNTER — OFFICE VISIT (OUTPATIENT)
Dept: FAMILY MEDICINE | Facility: CLINIC | Age: 70
End: 2025-01-31
Payer: COMMERCIAL

## 2025-01-31 VITALS
BODY MASS INDEX: 40.52 KG/M2 | RESPIRATION RATE: 15 BRPM | TEMPERATURE: 98.1 F | SYSTOLIC BLOOD PRESSURE: 131 MMHG | DIASTOLIC BLOOD PRESSURE: 95 MMHG | HEART RATE: 52 BPM | HEIGHT: 70 IN | WEIGHT: 283 LBS | OXYGEN SATURATION: 98 %

## 2025-01-31 DIAGNOSIS — N18.31 TYPE 2 DIABETES MELLITUS WITH STAGE 3A CHRONIC KIDNEY DISEASE, WITHOUT LONG-TERM CURRENT USE OF INSULIN (H): ICD-10-CM

## 2025-01-31 DIAGNOSIS — I50.32 CHRONIC DIASTOLIC CONGESTIVE HEART FAILURE (H): ICD-10-CM

## 2025-01-31 DIAGNOSIS — E11.22 TYPE 2 DIABETES MELLITUS WITH STAGE 3A CHRONIC KIDNEY DISEASE, WITHOUT LONG-TERM CURRENT USE OF INSULIN (H): ICD-10-CM

## 2025-01-31 DIAGNOSIS — N18.31 CKD STAGE G3A/A1, GFR 45-59 AND ALBUMIN CREATININE RATIO <30 MG/G (H): Primary | ICD-10-CM

## 2025-01-31 DIAGNOSIS — N52.9 ERECTILE DYSFUNCTION, UNSPECIFIED ERECTILE DYSFUNCTION TYPE: ICD-10-CM

## 2025-01-31 DIAGNOSIS — I48.91 ATRIAL FIBRILLATION, UNSPECIFIED TYPE (H): ICD-10-CM

## 2025-01-31 DIAGNOSIS — I10 ESSENTIAL HYPERTENSION WITH GOAL BLOOD PRESSURE LESS THAN 140/90: ICD-10-CM

## 2025-01-31 LAB
ANION GAP SERPL CALCULATED.3IONS-SCNC: 8 MMOL/L (ref 7–15)
BUN SERPL-MCNC: 23.5 MG/DL (ref 8–23)
CALCIUM SERPL-MCNC: 9.6 MG/DL (ref 8.8–10.4)
CHLORIDE SERPL-SCNC: 106 MMOL/L (ref 98–107)
CREAT SERPL-MCNC: 1.34 MG/DL (ref 0.67–1.17)
EGFRCR SERPLBLD CKD-EPI 2021: 57 ML/MIN/1.73M2
GLUCOSE SERPL-MCNC: 127 MG/DL (ref 70–99)
HCO3 SERPL-SCNC: 29 MMOL/L (ref 22–29)
HGB BLD-MCNC: 14.5 G/DL (ref 13.3–17.7)
POTASSIUM SERPL-SCNC: 4.4 MMOL/L (ref 3.4–5.3)
SODIUM SERPL-SCNC: 143 MMOL/L (ref 135–145)

## 2025-01-31 PROCEDURE — 99214 OFFICE O/P EST MOD 30 MIN: CPT | Mod: GC

## 2025-01-31 PROCEDURE — 36415 COLL VENOUS BLD VENIPUNCTURE: CPT

## 2025-01-31 PROCEDURE — 80048 BASIC METABOLIC PNL TOTAL CA: CPT

## 2025-01-31 PROCEDURE — 85018 HEMOGLOBIN: CPT

## 2025-01-31 RX ORDER — LISINOPRIL 40 MG/1
40 TABLET ORAL DAILY
Qty: 90 TABLET | Refills: 4 | Status: SHIPPED | OUTPATIENT
Start: 2025-01-31

## 2025-01-31 RX ORDER — AMLODIPINE BESYLATE 10 MG/1
10 TABLET ORAL DAILY
Qty: 90 TABLET | Refills: 4 | Status: SHIPPED | OUTPATIENT
Start: 2025-01-31

## 2025-01-31 RX ORDER — SILDENAFIL 100 MG/1
100 TABLET, FILM COATED ORAL DAILY PRN
Qty: 30 TABLET | Refills: 4 | Status: SHIPPED | OUTPATIENT
Start: 2025-01-31

## 2025-01-31 ASSESSMENT — PAIN SCALES - GENERAL: PAINLEVEL_OUTOF10: NO PAIN (0)

## 2025-01-31 NOTE — PATIENT INSTRUCTIONS
Patient Education   Here is the plan from today's visit    1. Essential hypertension with goal blood pressure less than 140/90  - amLODIPine (NORVASC) 10 MG tablet; Take 1 tablet (10 mg) by mouth daily.  Dispense: 90 tablet; Refill: 4  - lisinopril (ZESTRIL) 40 MG tablet; Take 1 tablet (40 mg) by mouth daily.  Dispense: 90 tablet; Refill: 4    2. Type 2 diabetes mellitus with stage 3a chronic kidney disease, without long-term current use of insulin (H, Body mass index (BMI) of 40.0-44.9 in adult (H)  Continue with diet and exercise.     3. Chronic diastolic congestive heart failure (H), Atrial fibrillation, unspecified type (H)  Please schedule a follow up appt.   - Adult Cardiology al [facility] Referral; Future    4. CKD stage G3a/A1, GFR 45-59 and albumin creatinine ratio <30 mg/g (H) (Primary)  - BASIC METABOLIC PANEL; Future  - Hemoglobin; Future  - Hemoglobin; Future    5. Erectile dysfunction, unspecified erectile dysfunction type  - sildenafil (VIAGRA) 100 MG tablet; Take 1 tablet (100 mg) by mouth daily as needed (sexual dysfunction).  Dispense: 30 tablet; Refill: 4      Please call or return to clinic if your symptoms don't go away.    Follow up plan  No follow-ups on file.    Thank you for coming to [facility] today.  Lab Testing:  **If you had lab testing today and your results are reassuring or normal they will be mailed to you or sent through [facility] within 7 days.   **If the lab tests need quick action we will call you with the results.  **If you are having labs done on a different day, please call [phone] to schedule at [facility] or [phone] for other [facility] locations. Labs do not offer walk-in appointments.  The phone number we will call with results is # [phone] (home) . If this is not the best number please call our clinic and change the number.  Medication Refills:  If you need any refills please call your pharmacy and they will contact us.   If you  need to  your refill at a new pharmacy, please contact the new pharmacy directly. The new pharmacy will help you get your medications transferred faster.   Scheduling:  If you have any concerns about today's visit or wish to schedule another appointment please call our office during normal business hours [phone] (8-5:00 M-F). If you can no longer make a scheduled visit, please cancel via [employer] or call us to cancel.   If a referral was made to an [facility] specialty provider and you do not get a call from central scheduling, please refer to directions on your visit summary or call our office during normal business hours for assistance.   If a Mammogram was ordered for you at the [facility] call [phone] to schedule or change your appointment.  If you had an XRay/CT/Ultrasound/MRI ordered the number is [phone] to schedule or change your radiology appointment.   [facility] has limited ultrasound appointments available on Wednesdays, if you would like your ultrasound at [facility], please call [phone] to schedule.   Medical Concerns:  If you have urgent medical concerns please call [phone] at any time of the day.    Virgilio Slaughter MD

## 2025-01-31 NOTE — PROGRESS NOTES
Assessment & Plan     Essential hypertension with goal blood pressure less than 140/90  BP at 131/95. Nurse checks BP weekly. Reports -125's. Asymptomatic. Refills given. BMP ordered.   - amLODIPine (NORVASC) 10 MG tablet  Dispense: 90 tablet; Refill: 4  - lisinopril (ZESTRIL) 40 MG tablet  Dispense: 90 tablet; Refill: 4    Type 2 diabetes mellitus with stage 3a chronic kidney disease, without long-term current use of insulin (H)  Body mass index (BMI) of 40.0-44.9 in adult (H)  Last A1c at 5.7. Weight today at 283 lb. He would like to continue with dieting and exercising on his own and will ask for assistance when needed. Encouraged 30 minutes of moderate activity 3-4 times per week. Recommend eating a healthy, balanced, low-sugar, low-carbohydrate, and low salt diet. Recommend calorie restriction for weight loss.     Chronic diastolic congestive heart failure (H)  Atrial fibrillation, unspecified type (H)  Due for Cardiology follow up. Will schedule a follow up appointment at his earliest convenience. Denies any chest pain, cough, SOB, palpitation, or acute lower extremities edema changes. Cardiology referral given.   - Adult Cardiology Eval [facility] Referral    CKD stage G3a/A1, GFR 45-59 and albumin creatinine ratio <30 mg/g (H)  Stable. Will add labs.   - BASIC METABOLIC PANEL  - Hemoglobin  - Hemoglobin    Erectile dysfunction, unspecified erectile dysfunction type  Refills given.   - sildenafil (VIAGRA) 100 MG tablet  Dispense: 30 tablet; Refill: 4        Return in about 6 months (around 7/31/2025) for Follow up.    Subjective   Al is a 70 year old, presenting for the following health issues:  Hypertension (Bop follow up patient states bp has been fine at home.)        1/31/2025     8:05 AM   Additional Questions   Roomed by Margret   Accompanied by self     HPI     Hypertension Follow-up    Do you check your blood pressure regularly outside of the clinic? Yes   Are you following a low salt diet?  "Yes  Are your blood pressures ever more than 140 on the top number (systolic) OR more   than 90 on the bottom number (diastolic), for example 140/90? No, not often can't remember last time it was > 140 SBP  How many servings of fruits and vegetables do you eat daily?  0-1  On average, how many sweetened beverages do you drink each day (Examples: soda, juice, sweet tea, etc.  Do NOT count diet or artificially sweetened beverages)?   1  How many days per week do you exercise enough to make your heart beat faster? 3 or less  How many minutes a day do you exercise enough to make your heart beat faster? 20 - 29, walking as tolerated- more active now  How many days per week do you miss taking your medication? 0, nurse set up meds once a week. Pill container.         BP pressure:   good systolic blood pressure goal is between 120-125 mmHg.  Diet- low NA   Cards follow up- haven't made, but will make one   Asymptomatic  Nurse checks- once a week- -130's sbp   No alcholol use  No smoking   Would like to change pharmacy   Wt 283- 3lb weight loss   HFpEF/AF, 10/18 cardiology       GIST:   Resolved    T2DM  5.7 last A1c. Diet and weight controll      CKD  No concerns       Review of Systems  Constitutional, HEENT, cardiovascular, pulmonary, gi and gu systems are negative, except as otherwise noted.      Objective    BP (!) 131/95   Pulse 52   Temp 98.1  F (36.7  C) (Oral)   Resp 15   Ht 1.778 m (5' 10\")   Wt 128.4 kg (283 lb)   SpO2 98%   BMI 40.61 kg/m    Body mass index is 40.61 kg/m .  Physical Exam   GENERAL: alert and no distress  RESP: lungs clear to auscultation - no rales, rhonchi or wheezes  CV: regular rate and rhythm, normal S1 S2, no S3 or S4, no murmur, click or rub, no peripheral edema  ABDOMEN: soft, nontender, no hepatosplenomegaly, no masses and bowel sounds normal  MS: B/l lower extremity with trace edema (LT>> RT). No calf tenderness, erythema, warmth, or swelling.   SKIN: no suspicious lesions " or rashes  NEURO: Normal strength and tone, mentation intact and speech normal  PSYCH: mentation appears normal, affect normal/bright            Signed Electronically by: Virgilio Slaughter MD

## 2025-03-12 DIAGNOSIS — I48.0 PAROXYSMAL A-FIB (H): ICD-10-CM

## 2025-03-14 NOTE — TELEPHONE ENCOUNTER
Last Written Prescription:     apixaban ANTICOAGULANT (ELIQUIS) 5 MG tablet 180 tablet 4 10/18/2023 -- No   Sig - Route: Take 1 tablet (5 mg) by mouth 2 times daily - Oral     ----------------------  Last Visit Date: 10/18/23  Future Visit Date: 6/18/25 [place]  ----------------------           Refill decision: Medication unable to be refilled by RN due to: Pt not seen within past 12 months, No FOV or FOV exceeds timeframe per protocol   and Overdue labs/test:   Last GFR=, last appt 10/18/23> upcoming 6/18/25  Care Everywhere reviewed/ updated.      Request from pharmacy:  Requested Prescriptions   Pending Prescriptions Disp Refills    apixaban ANTICOAGULANT (ELIQUIS) 5 MG tablet 180 tablet 4     Sig: Take 1 tablet (5 mg) by mouth 2 times daily.       Anticoagulant Agents Failed - 3/14/2025  3:51 PM        Failed - GFR on file in the past 12 months and most recent GFR is normal        Failed - Recent (6 mo) or future (90 days) visit within the authorizing provider's specialty        Passed - Normal Platelets on file in past 12 months     Recent Labs   Lab Test 04/18/24  1001                  Passed - Medication is active on med list and the sig matches. RN to manually verify dose and sig if red X/fail.     If the protocol passes (green check), you do not need to verify med dose and sig.    A prescription matches if they are the same clinical intention.    For Example: once daily and every morning are the same.    The protocol can not identify upper and lower case letters as matching and will fail.     For Example: Take 1 tablet (50 mg) by mouth daily     TAKE 1 TABLET (50 MG) BY MOUTH DAILY    For all fails (red x), verify dose and sig.    If the refill does match what is on file, the RN can still proceed to approve the refill request.       If they do not match, route to the appropriate provider.             Passed - Patient is 18-79 years of age        Passed - Weight is greater than 60 kg for the past year      Wt Readings from Last 3 Encounters:   01/31/25 128.4 kg (283 lb)   10/11/24 129.9 kg (286 lb 6.4 oz)   04/18/24 125.9 kg (277 lb 9.6 oz)             Passed - Medication indicated for associated diagnosis     Medication is associated with one or more of the following diagnoses:  Atrial fibrillation  Deep venous thrombosis  Heparin-induced thrombocytopenia  Pulmonary embolism  Venous thromboembolism  H/O: Pulmonary embolus  Atrial flutter  Coronary artery finding  Transient cerebral ischemia  Percutaneous transluminal coronary angioplasty  Stable angina  Intermittent claudication  Arteriosclerotic vascular disease

## 2025-04-03 ENCOUNTER — DOCUMENTATION ONLY (OUTPATIENT)
Dept: FAMILY MEDICINE | Facility: CLINIC | Age: 70
End: 2025-04-03
Payer: COMMERCIAL

## 2025-04-03 NOTE — PROGRESS NOTES
"When opening a documentation only encounter, be sure to enter in \"Chief Complaint\" Forms and in \" Comments\" Title of form, description if needed.    Al is a 70 year old  male  Form received via: Fax  Form now resides in: Provider Ready    Margret Steiner, [facility]               "

## 2025-04-08 ENCOUNTER — DOCUMENTATION ONLY (OUTPATIENT)
Dept: FAMILY MEDICINE | Facility: CLINIC | Age: 70
End: 2025-04-08
Payer: COMMERCIAL

## 2025-04-08 NOTE — PROGRESS NOTES
"When opening a documentation only encounter, be sure to enter in \"Chief Complaint\" Forms and in \" Comments\" Title of form, description if needed.    Al is a 70 year old  male  Form received via: Fax  Form now resides in: Provider Ready    Margret Steiner, [facility]               "

## 2025-05-12 ENCOUNTER — DOCUMENTATION ONLY (OUTPATIENT)
Dept: FAMILY MEDICINE | Facility: CLINIC | Age: 70
End: 2025-05-12
Payer: COMMERCIAL

## 2025-05-12 NOTE — PROGRESS NOTES
"When opening a documentation only encounter, be sure to enter in \"Chief Complaint\" Forms and in \" Comments\" Title of form, description if needed.    Al is a 70 year old  male  Form received via: Fax  Form now resides in: Provider Ready    Margret Steiner, [facility]               "

## 2025-05-31 ENCOUNTER — HEALTH MAINTENANCE LETTER (OUTPATIENT)
Age: 70
End: 2025-05-31

## 2025-06-10 ENCOUNTER — CARE COORDINATION (OUTPATIENT)
Dept: CARDIOLOGY | Facility: CLINIC | Age: 70
End: 2025-06-10
Payer: COMMERCIAL

## 2025-06-10 DIAGNOSIS — I50.32 CHRONIC HEART FAILURE WITH PRESERVED EJECTION FRACTION (HFPEF) (H): Primary | ICD-10-CM
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
## 2025-06-17 ENCOUNTER — OFFICE VISIT (OUTPATIENT)
Dept: FAMILY MEDICINE | Facility: CLINIC | Age: 70
End: 2025-06-17
Payer: COMMERCIAL

## 2025-06-17 VITALS
OXYGEN SATURATION: 97 % | BODY MASS INDEX: 40.11 KG/M2 | HEIGHT: 70 IN | HEART RATE: 75 BPM | SYSTOLIC BLOOD PRESSURE: 123 MMHG | DIASTOLIC BLOOD PRESSURE: 89 MMHG | RESPIRATION RATE: 16 BRPM | TEMPERATURE: 97.4 F | WEIGHT: 280.2 LBS

## 2025-06-17 DIAGNOSIS — N18.31 CKD STAGE G3A/A1, GFR 45-59 AND ALBUMIN CREATININE RATIO <30 MG/G (H): ICD-10-CM

## 2025-06-17 DIAGNOSIS — C49.A4 MALIGNANT GASTROINTESTINAL STROMAL TUMOR (GIST) OF LARGE INTESTINE (H): ICD-10-CM

## 2025-06-17 DIAGNOSIS — N52.9 ERECTILE DYSFUNCTION, UNSPECIFIED ERECTILE DYSFUNCTION TYPE: ICD-10-CM

## 2025-06-17 DIAGNOSIS — I50.32 CHRONIC HEART FAILURE WITH PRESERVED EJECTION FRACTION (HFPEF) (H): ICD-10-CM

## 2025-06-17 DIAGNOSIS — E11.9 TYPE 2 DIABETES MELLITUS WITHOUT COMPLICATION, WITHOUT LONG-TERM CURRENT USE OF INSULIN (H): ICD-10-CM

## 2025-06-17 DIAGNOSIS — I77.810 ASCENDING AORTA DILATATION: ICD-10-CM

## 2025-06-17 DIAGNOSIS — Z12.11 SCREEN FOR COLON CANCER: ICD-10-CM

## 2025-06-17 DIAGNOSIS — I10 HYPERTENSION, ESSENTIAL: Primary | ICD-10-CM

## 2025-06-17 DIAGNOSIS — Z11.3 ROUTINE SCREENING FOR STI (SEXUALLY TRANSMITTED INFECTION): ICD-10-CM

## 2025-06-17 LAB
HCV AB SERPL QL IA: NONREACTIVE
HIV 1+2 AB+HIV1 P24 AG SERPL QL IA: NONREACTIVE
T PALLIDUM AB SER QL: NONREACTIVE

## 2025-06-17 PROCEDURE — 99214 OFFICE O/P EST MOD 30 MIN: CPT | Mod: GC

## 2025-06-17 PROCEDURE — 3079F DIAST BP 80-89 MM HG: CPT

## 2025-06-17 PROCEDURE — 87389 HIV-1 AG W/HIV-1&-2 AB AG IA: CPT

## 2025-06-17 PROCEDURE — 3074F SYST BP LT 130 MM HG: CPT

## 2025-06-17 PROCEDURE — 36415 COLL VENOUS BLD VENIPUNCTURE: CPT

## 2025-06-17 PROCEDURE — 86803 HEPATITIS C AB TEST: CPT

## 2025-06-17 PROCEDURE — 86780 TREPONEMA PALLIDUM: CPT

## 2025-06-17 PROCEDURE — 87491 CHLMYD TRACH DNA AMP PROBE: CPT

## 2025-06-17 PROCEDURE — 87591 N.GONORRHOEAE DNA AMP PROB: CPT

## 2025-06-17 RX ORDER — SILDENAFIL 100 MG/1
100 TABLET, FILM COATED ORAL DAILY PRN
Qty: 30 TABLET | Refills: 4 | Status: SHIPPED | OUTPATIENT
Start: 2025-06-17

## 2025-06-17 ASSESSMENT — PATIENT HEALTH QUESTIONNAIRE - PHQ9
SUM OF ALL RESPONSES TO PHQ QUESTIONS 1-9: 2
SUM OF ALL RESPONSES TO PHQ QUESTIONS 1-9: 2
10. IF YOU CHECKED OFF ANY PROBLEMS, HOW DIFFICULT HAVE THESE PROBLEMS MADE IT FOR YOU TO DO YOUR WORK, TAKE CARE OF THINGS AT HOME, OR GET ALONG WITH OTHER PEOPLE: NOT DIFFICULT AT ALL

## 2025-06-17 NOTE — PATIENT INSTRUCTIONS
Patient Education   Here is the plan from today's visit    1. Hypertension, essential (Primary)  No medication change. Resume current regimen     2. CKD stage G3a/A1, GFR 45-59 and albumin creatinine ratio <30 mg/g (H)  - empagliflozin (JARDIANCE) 10 MG TABS tablet; Take 1 tablet (10 mg) by mouth daily.  Dispense: 90 tablet; Refill: 4    3. Chronic heart failure with preserved ejection fraction (HFpEF) (H)  Follow up with Cardiology 6/18  - empagliflozin (JARDIANCE) 10 MG TABS tablet; Take 1 tablet (10 mg) by mouth daily.  Dispense: 90 tablet; Refill: 4    4. 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 Screen for colon cancer  - Colonoscopy Screening  Referral; Future    6. Type 2 diabetes mellitus without complication, without long-term current use of insulin (H)  Continue dieting and exercising     7. Erectile dysfunction, unspecified erectile dysfunction type  - sildenafil (VIAGRA) 100 MG tablet; Take 1 tablet (100 mg) by mouth daily as needed (sexual dysfunction).  Dispense: 30 tablet; Refill: 4    8. Routine screening for STI (sexually transmitted infection)  - First-voided urine-Chlamydia trachomatis/Neisseria gonorrhoeae by PCR; Future  - Treponema Abs w Reflex to RPR and Titer; Future  - HIV Antigen Antibody Combo Cascade; Future  - Hepatitis C Screen Reflex to HCV RNA Quant and Genotype; Future  - First-voided urine-Chlamydia trachomatis/Neisseria gonorrhoeae by PCR  - Treponema Abs w Reflex to RPR and Titer  - HIV Antigen Antibody Combo Cascade  - Hepatitis C Screen Reflex to HCV RNA Quant and Genotype          Please call or return to clinic if your symptoms don't go away.    Follow up plan  No follow-ups on file.    Thank you for coming to [facility] today.  Lab Testing:  **If you had lab testing today and your results are reassuring or normal they will be mailed to you or sent through [employer] within 7 days.   **If the lab tests need  quick action we will call you with the results.  **If you are having labs done on a different day, please call [phone] to schedule at [facility] or [phone] for other [facility] Outpatient Lab locations. Labs do not offer walk-in appointments.  The phone number we will call with results is # [phone] (home) . If this is not the best number please call our clinic and change the number.  Medication Refills:  If you need any refills please call your pharmacy and they will contact us.   If you need to  your refill at a new pharmacy, please contact the new pharmacy directly. The new pharmacy will help you get your medications transferred faster.   Scheduling:  If you have any concerns about today's visit or wish to schedule another appointment please call our office during normal business hours [phone] (8-5:00 M-F). If you can no longer make a scheduled visit, please cancel via [employer] or call us to cancel.   If a referral was made to an [facility] specialty provider and you do not get a call from central scheduling, please refer to directions on your visit summary or call our office during normal business hours for assistance.   If a Mammogram was ordered for you at the Breast Center call [phone] to schedule or change your appointment.  If you had an XRay/CT/Ultrasound/MRI ordered the number is [phone] to schedule or change your radiology appointment.   [facility] has limited ultrasound appointments available on Wednesdays, if you would like your ultrasound at [facility], please call [phone] to schedule.   Medical Concerns:  If you have urgent medical concerns please call [phone] at any time of the day.    Virgilio Slaughter MD

## 2025-06-17 NOTE — PROGRESS NOTES
Assessment & Plan     Hypertension, essential  CKD stage G3a/A1, GFR 45-59 and albumin creatinine ratio <30 mg/g (H)  BP at goal. Nurse checks BP weekly. Reports -125's. Asymptomatic. No medication changes. No refills needed.   - empagliflozin (JARDIANCE) 10 MG TABS tablet  Dispense: 90 tablet; Refill: 4    Chronic heart failure with preserved ejection fraction (HFpEF) (H)  Ascending aorta dilatation   Due for Cardiology follow up. Was seen in the ED on 4/05/25 for a fall. CT chest revealed enlarge pulmonary artery and dilation of the ascending aorta, measuring up to 4.3 cm at the root. Denies any chest pain, cough, SOB, palpitation, or acute lower extremities edema changes. Patient recently started taking Jardiance, previously hesitant secondary to side effects. Denies any side effects. Has a schedule appointment with Cardiology tomorrow, 6/18/25. Will follow up with their recommendations. Refills given.   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 With his history of malignant gastrointestinal stromal tumor (GIST) of the stomach s/p resection 8/16/16 recommend colonoscopy, discussed this with patient and he agrees to proceed.   - Colonoscopy Screening  Referral    Type 2 diabetes mellitus without complication, without long-term current use of insulin (H)  Most recent A1c at 5.7. Weight today at 280 lb down 3 lb since last visit. Continue life styles changes with dieting and exercising.     Erectile dysfunction, unspecified erectile dysfunction type  Refills given.   - sildenafil (VIAGRA) 100 MG tablet  Dispense: 30 tablet; Refill: 4    Routine screening for STI (sexually transmitted infection)  Sexually active. Asymptomatic.   - First-voided urine-Chlamydia trachomatis/Neisseria gonorrhoeae by PCR  - Treponema Abs w  "Reflex to RPR and Titer  - HIV Antigen Antibody Combo Cascade  - Hepatitis C Screen Reflex to HCV RNA Quant and Genotype  - First-voided urine-Chlamydia trachomatis/Neisseria gonorrhoeae by PCR  - Treponema Abs w Reflex to RPR and Titer  - HIV Antigen Antibody Combo Cascade  - Hepatitis C Screen Reflex to HCV RNA Quant and Genotype      BMI  Estimated body mass index is 40.2 kg/m  as calculated from the following:    Height as of this encounter: 1.778 m (5' 10\").    Weight as of this encounter: 127.1 kg (280 lb 3.2 oz).       Subjective   Al is a 70 year old, presenting for the following health issues:  RECHECK (BP)        6/17/2025    10:45 AM   Additional Questions   Roomed by nou   Accompanied by self         6/17/2025    Information    services provided? No     HPI        Essential hypertension  No symptoms  Nurse still coming to check every ttuesday: 120-130's SBP  Takes meds daily   No concerns       Type 2 diabetes mellitus  Diet controlled   Weight 280 lb   Dieting and exercising  No concerns     Chronic diastolic congestive heart failure (H)  Atrial fibrillation, unspecified type (H)  PAH  Dilated aorta   Due for cards follow up   Recent seen in ED for fall and found to have PAH and dilated aorta  No SOB, chest pain, no acute edema swelling   Recent start jardiance, no s/e  Has a schedule appt with cards tomorrow       Echo:   2022: Global and regional left ventricular function is normal with an EF of 55-60%.  Left ventricular wall thickness is normal.  Seeing a cardiologist tomorrow   - no SOB, chest pain, no acute edema swelling         Hx of gastrointestinal stromal tumor stomach   - needs colonoscopy     STI  No sumptpms  Sex active  Most recent activity was last week  1-3  partners          Review of Systems  Constitutional, HEENT, cardiovascular, pulmonary, gi and gu systems are negative, except as otherwise noted.      Objective    /89   Pulse 75   Temp 97.4  F (36.3  C) " "(Temporal)   Resp 16   Ht 1.778 m (5' 10\")   Wt 127.1 kg (280 lb 3.2 oz)   SpO2 97%   BMI 40.20 kg/m    Body mass index is 40.2 kg/m .  Physical Exam   GENERAL: alert and no distress  EYES: Eyes grossly normal to inspection, conjunctivae and sclerae normal  RESP: lungs clear to auscultation - no rales, rhonchi or wheezes  CV: regular rate and rhythm, normal S1 S2, no S3 or S4, no murmur, click or rub, no peripheral edema  ABDOMEN: soft, nontender, no hepatosplenomegaly, no masses and bowel sounds normal  MS: no gross musculoskeletal defects noted, no edema  NEURO: mentation intact and speech normal  PSYCH: mentation appears normal, affect normal/bright            Signed Electronically by: Virgilio Slaughter MD    Answers submitted by the patient for this visit:  Patient Health Questionnaire (Submitted on 6/17/2025)  If you checked off any problems, how difficult have these problems made it for you to do your work, take care of things at home, or get along with other people?: Not difficult at all  PHQ9 TOTAL SCORE: 2    "

## 2025-06-17 NOTE — PROGRESS NOTES
Preceptor Attestation:    I discussed the patient with the resident and evaluated the patient in person. I have verified the content of the note, which accurately reflects my assessment of the patient and the plan of care.   Supervising Physician:  Simeon Calvillo MD.

## 2025-06-18 ENCOUNTER — OFFICE VISIT (OUTPATIENT)
Dept: CARDIOLOGY | Facility: CLINIC | Age: 70
End: 2025-06-18
Attending: INTERNAL MEDICINE
Payer: COMMERCIAL

## 2025-06-18 ENCOUNTER — LAB (OUTPATIENT)
Dept: LAB | Facility: CLINIC | Age: 70
End: 2025-06-18
Attending: INTERNAL MEDICINE
Payer: COMMERCIAL

## 2025-06-18 VITALS
HEART RATE: 75 BPM | SYSTOLIC BLOOD PRESSURE: 125 MMHG | WEIGHT: 281.7 LBS | OXYGEN SATURATION: 98 % | DIASTOLIC BLOOD PRESSURE: 87 MMHG | BODY MASS INDEX: 40.42 KG/M2

## 2025-06-18 DIAGNOSIS — E11.9 TYPE 2 DIABETES MELLITUS WITHOUT COMPLICATION, WITHOUT LONG-TERM CURRENT USE OF INSULIN (H): Primary | ICD-10-CM

## 2025-06-18 DIAGNOSIS — I50.32 CHRONIC HEART FAILURE WITH PRESERVED EJECTION FRACTION (HFPEF) (H): ICD-10-CM

## 2025-06-18 DIAGNOSIS — I50.32 CHRONIC DIASTOLIC CONGESTIVE HEART FAILURE (H): ICD-10-CM

## 2025-06-18 DIAGNOSIS — I10 HYPERTENSION, ESSENTIAL: ICD-10-CM

## 2025-06-18 DIAGNOSIS — I48.91 ATRIAL FIBRILLATION, UNSPECIFIED TYPE (H): ICD-10-CM

## 2025-06-18 LAB
ANION GAP SERPL CALCULATED.3IONS-SCNC: 10 MMOL/L (ref 7–15)
BUN SERPL-MCNC: 19.6 MG/DL (ref 8–23)
C TRACH DNA SPEC QL PROBE+SIG AMP: NEGATIVE
CALCIUM SERPL-MCNC: 9.7 MG/DL (ref 8.8–10.4)
CHLORIDE SERPL-SCNC: 106 MMOL/L (ref 98–107)
CREAT SERPL-MCNC: 1.25 MG/DL (ref 0.67–1.17)
EGFRCR SERPLBLD CKD-EPI 2021: 62 ML/MIN/1.73M2
GLUCOSE SERPL-MCNC: 121 MG/DL (ref 70–99)
HCO3 SERPL-SCNC: 25 MMOL/L (ref 22–29)
N GONORRHOEA DNA SPEC QL NAA+PROBE: NEGATIVE
POTASSIUM SERPL-SCNC: 3.8 MMOL/L (ref 3.4–5.3)
SODIUM SERPL-SCNC: 141 MMOL/L (ref 135–145)
SPECIMEN TYPE: NORMAL

## 2025-06-18 PROCEDURE — 80048 BASIC METABOLIC PNL TOTAL CA: CPT | Performed by: PATHOLOGY

## 2025-06-18 PROCEDURE — 36415 COLL VENOUS BLD VENIPUNCTURE: CPT | Performed by: PATHOLOGY

## 2025-06-18 PROCEDURE — G0463 HOSPITAL OUTPT CLINIC VISIT: HCPCS | Performed by: INTERNAL MEDICINE

## 2025-06-18 ASSESSMENT — PAIN SCALES - GENERAL: PAINLEVEL_OUTOF10: NO PAIN (0)
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
## 2025-06-18 NOTE — PATIENT INSTRUCTIONS
Dr. Palomino recommends:    Medical Therapy Management referral for initiation and up titration of Ozempic.    Echocardiogram for the near future.    Follow up clinic visit with Dr. Palomino in one year. No labs needed.    Thank you for your visit today.  Please call me with any questions or concerns.   Moses Alicea RN  Cardiology Care Coordinator  [phone]

## 2025-06-18 NOTE — NURSING NOTE
Chief Complaint   Patient presents with    Follow Up     Dr. Palomino: Chronic diastolic heart failure.       Vitals were taken, medications reconciled     Josh Nolan, Clinic Assistant     7:31 AM

## 2025-06-19 ENCOUNTER — RESULTS FOLLOW-UP (OUTPATIENT)
Dept: FAMILY MEDICINE | Facility: CLINIC | Age: 70
End: 2025-06-19

## 2025-06-20 ENCOUNTER — ANCILLARY PROCEDURE (OUTPATIENT)
Dept: CARDIOLOGY | Facility: CLINIC | Age: 70
End: 2025-06-20
Payer: COMMERCIAL

## 2025-06-20 DIAGNOSIS — I50.32 CHRONIC DIASTOLIC CONGESTIVE HEART FAILURE (H): ICD-10-CM

## 2025-06-20 DIAGNOSIS — I48.91 ATRIAL FIBRILLATION, UNSPECIFIED TYPE (H): ICD-10-CM

## 2025-06-20 LAB — LVEF ECHO: NORMAL

## 2025-06-20 PROCEDURE — 93306 TTE W/DOPPLER COMPLETE: CPT | Performed by: INTERNAL MEDICINE

## 2025-06-24 ENCOUNTER — RESULTS FOLLOW-UP (OUTPATIENT)
Dept: CARDIOLOGY | Facility: CLINIC | Age: 70
End: 2025-06-24

## 2025-06-26 ENCOUNTER — DOCUMENTATION ONLY (OUTPATIENT)
Dept: FAMILY MEDICINE | Facility: CLINIC | Age: 70
End: 2025-06-26
Payer: COMMERCIAL

## 2025-06-26 NOTE — PROGRESS NOTES
"When opening a documentation only encounter, be sure to enter in \"Chief Complaint\" Forms and in \" Comments\" Title of form, description if needed.    Al is a 70 year old  male  Form received via: Fax  Form now resides in: Provider Ready    Margret Steiner, [facility]               "

## 2025-07-07 DIAGNOSIS — Z53.9 DIAGNOSIS NOT YET DEFINED: Primary | ICD-10-CM

## 2025-07-08 ENCOUNTER — DOCUMENTATION ONLY (OUTPATIENT)
Dept: FAMILY MEDICINE | Facility: CLINIC | Age: 70
End: 2025-07-08
Payer: COMMERCIAL

## 2025-07-11 ENCOUNTER — TELEPHONE (OUTPATIENT)
Dept: CARDIOLOGY | Facility: CLINIC | Age: 70
End: 2025-07-11
Payer: COMMERCIAL

## 2025-07-11 NOTE — TELEPHONE ENCOUNTER
Hello team,    Forwarding encounter as a request to initiate prior authorization for Ozempic 0.25 mg subcutaneous once a week and subsequent dose titrations. Please let me know if you need any additional details or information.    Thank you for your help,  Ailyn

## 2025-07-18 DIAGNOSIS — Z53.9 DIAGNOSIS NOT YET DEFINED: Primary | ICD-10-CM

## 2025-07-20 DIAGNOSIS — Z53.9 DIAGNOSIS NOT YET DEFINED: Primary | ICD-10-CM

## 2025-07-20 PROCEDURE — 99207 PR MD RECERTIFICATION HHA PT: CPT

## 2025-07-23 ENCOUNTER — DOCUMENTATION ONLY (OUTPATIENT)
Dept: FAMILY MEDICINE | Facility: CLINIC | Age: 70
End: 2025-07-23
Payer: COMMERCIAL

## 2025-07-23 NOTE — PROGRESS NOTES
"When opening a documentation only encounter, be sure to enter in \"Chief Complaint\" Forms and in \" Comments\" Title of form, description if needed.    Al is a 70 year old  male  Form received via: Fax  Form now resides in: Provider Ready    Cedrick Mayes MA               "

## 2025-07-31 ENCOUNTER — DOCUMENTATION ONLY (OUTPATIENT)
Dept: FAMILY MEDICINE | Facility: CLINIC | Age: 70
End: 2025-07-31
Payer: COMMERCIAL

## 2025-08-03 DIAGNOSIS — K21.9 GASTROESOPHAGEAL REFLUX DISEASE WITHOUT ESOPHAGITIS: ICD-10-CM

## 2025-08-04 RX ORDER — FAMOTIDINE 40 MG/1
40 TABLET, FILM COATED ORAL DAILY
Qty: 90 TABLET | Refills: 2 | Status: SHIPPED | OUTPATIENT
Start: 2025-08-04

## 2025-08-11 ENCOUNTER — TELEPHONE (OUTPATIENT)
Dept: GASTROENTEROLOGY | Facility: CLINIC | Age: 70
End: 2025-08-11
Payer: COMMERCIAL

## 2025-08-11 DIAGNOSIS — Z53.9 DIAGNOSIS NOT YET DEFINED: Primary | ICD-10-CM

## 2025-08-14 ENCOUNTER — PATIENT OUTREACH (OUTPATIENT)
Dept: ONCOLOGY | Facility: CLINIC | Age: 70
End: 2025-08-14
Payer: COMMERCIAL

## 2025-08-21 ENCOUNTER — DOCUMENTATION ONLY (OUTPATIENT)
Dept: FAMILY MEDICINE | Facility: CLINIC | Age: 70
End: 2025-08-21
Payer: COMMERCIAL

## 2025-08-25 ENCOUNTER — OFFICE VISIT (OUTPATIENT)
Dept: FAMILY MEDICINE | Facility: CLINIC | Age: 70
End: 2025-08-25
Payer: COMMERCIAL

## 2025-08-25 VITALS
OXYGEN SATURATION: 98 % | DIASTOLIC BLOOD PRESSURE: 94 MMHG | WEIGHT: 279 LBS | SYSTOLIC BLOOD PRESSURE: 132 MMHG | HEART RATE: 77 BPM | TEMPERATURE: 97.8 F | HEIGHT: 70 IN | BODY MASS INDEX: 39.94 KG/M2 | RESPIRATION RATE: 16 BRPM

## 2025-08-25 DIAGNOSIS — E11.9 TYPE 2 DIABETES MELLITUS WITHOUT COMPLICATION, WITHOUT LONG-TERM CURRENT USE OF INSULIN (H): ICD-10-CM

## 2025-08-25 DIAGNOSIS — Z13.9 ENCOUNTER FOR SCREENING INVOLVING SOCIAL DETERMINANTS OF HEALTH (SDOH): Primary | ICD-10-CM

## 2025-08-25 DIAGNOSIS — N18.31 CKD STAGE G3A/A1, GFR 45-59 AND ALBUMIN CREATININE RATIO <30 MG/G (H): ICD-10-CM

## 2025-08-25 DIAGNOSIS — Z00.00 ENCOUNTER FOR MEDICARE ANNUAL WELLNESS EXAM: ICD-10-CM

## 2025-08-25 DIAGNOSIS — Z87.891 PERSONAL HISTORY OF TOBACCO USE: ICD-10-CM

## 2025-08-25 DIAGNOSIS — Z79.01 ANTICOAGULATION GOAL OF INR 2 TO 3: ICD-10-CM

## 2025-08-25 DIAGNOSIS — Z51.81 ANTICOAGULATION GOAL OF INR 2 TO 3: ICD-10-CM

## 2025-08-25 LAB
ALBUMIN SERPL BCG-MCNC: 4.2 G/DL (ref 3.5–5.2)
ALP SERPL-CCNC: 84 U/L (ref 40–150)
ALT SERPL W P-5'-P-CCNC: 14 U/L (ref 0–70)
ANION GAP SERPL CALCULATED.3IONS-SCNC: 9 MMOL/L (ref 7–15)
AST SERPL W P-5'-P-CCNC: 22 U/L (ref 0–45)
BILIRUB SERPL-MCNC: 0.7 MG/DL
BUN SERPL-MCNC: 20.3 MG/DL (ref 8–23)
CALCIUM SERPL-MCNC: 9.8 MG/DL (ref 8.8–10.4)
CHLORIDE SERPL-SCNC: 105 MMOL/L (ref 98–107)
CHOLEST SERPL-MCNC: 171 MG/DL
CREAT SERPL-MCNC: 1.43 MG/DL (ref 0.67–1.17)
CREAT UR-MCNC: 60.1 MG/DL
EGFRCR SERPLBLD CKD-EPI 2021: 53 ML/MIN/1.73M2
ERYTHROCYTE [DISTWIDTH] IN BLOOD BY AUTOMATED COUNT: 14.8 % (ref 10–15)
FASTING STATUS PATIENT QL REPORTED: NO
FASTING STATUS PATIENT QL REPORTED: NO
GLUCOSE SERPL-MCNC: 94 MG/DL (ref 70–99)
HCO3 SERPL-SCNC: 28 MMOL/L (ref 22–29)
HCT VFR BLD AUTO: 44.2 % (ref 40–53)
HDLC SERPL-MCNC: 50 MG/DL
HGB BLD-MCNC: 14.3 G/DL (ref 13.3–17.7)
LDLC SERPL CALC-MCNC: 109 MG/DL
MCH RBC QN AUTO: 29.1 PG (ref 26.5–33)
MCHC RBC AUTO-ENTMCNC: 32.4 G/DL (ref 31.5–36.5)
MCV RBC AUTO: 90 FL (ref 78–100)
MICROALBUMIN UR-MCNC: <12 MG/L
MICROALBUMIN/CREAT UR: NORMAL MG/G{CREAT}
NONHDLC SERPL-MCNC: 121 MG/DL
PLATELET # BLD AUTO: 187 10E3/UL (ref 150–450)
POTASSIUM SERPL-SCNC: 4.3 MMOL/L (ref 3.4–5.3)
PROT SERPL-MCNC: 7.7 G/DL (ref 6.4–8.3)
RBC # BLD AUTO: 4.91 10E6/UL (ref 4.4–5.9)
SODIUM SERPL-SCNC: 142 MMOL/L (ref 135–145)
TRIGL SERPL-MCNC: 62 MG/DL
WBC # BLD AUTO: 5.38 10E3/UL (ref 4–11)

## 2025-08-25 SDOH — HEALTH STABILITY: PHYSICAL HEALTH: ON AVERAGE, HOW MANY DAYS PER WEEK DO YOU ENGAGE IN MODERATE TO STRENUOUS EXERCISE (LIKE A BRISK WALK)?: 2 DAYS

## (undated) RX ORDER — LIDOCAINE HYDROCHLORIDE AND EPINEPHRINE 10; 10 MG/ML; UG/ML
INJECTION, SOLUTION INFILTRATION; PERINEURAL
Status: DISPENSED
Start: 2020-10-12